# Patient Record
Sex: FEMALE | Race: WHITE | Employment: OTHER | URBAN - METROPOLITAN AREA
[De-identification: names, ages, dates, MRNs, and addresses within clinical notes are randomized per-mention and may not be internally consistent; named-entity substitution may affect disease eponyms.]

---

## 2017-01-05 PROCEDURE — 87186 SC STD MICRODIL/AGAR DIL: CPT | Performed by: INTERNAL MEDICINE

## 2017-01-05 PROCEDURE — 87086 URINE CULTURE/COLONY COUNT: CPT | Performed by: INTERNAL MEDICINE

## 2017-01-05 PROCEDURE — 87088 URINE BACTERIA CULTURE: CPT | Performed by: INTERNAL MEDICINE

## 2017-01-10 PROBLEM — R26.9 GAIT ABNORMALITY: Status: ACTIVE | Noted: 2017-01-10

## 2017-02-20 ENCOUNTER — APPOINTMENT (OUTPATIENT)
Dept: CT IMAGING | Facility: HOSPITAL | Age: 77
End: 2017-02-20
Attending: EMERGENCY MEDICINE
Payer: MEDICARE

## 2017-02-20 ENCOUNTER — HOSPITAL ENCOUNTER (EMERGENCY)
Facility: HOSPITAL | Age: 77
Discharge: HOME | End: 2017-02-20
Attending: EMERGENCY MEDICINE
Payer: MEDICARE

## 2017-02-20 VITALS
OXYGEN SATURATION: 96 % | RESPIRATION RATE: 18 BRPM | HEART RATE: 94 BPM | DIASTOLIC BLOOD PRESSURE: 84 MMHG | TEMPERATURE: 98 F | BODY MASS INDEX: 24.11 KG/M2 | SYSTOLIC BLOOD PRESSURE: 145 MMHG | HEIGHT: 66 IN | WEIGHT: 150 LBS

## 2017-02-20 DIAGNOSIS — S16.1XXA CERVICAL STRAIN, INITIAL ENCOUNTER: ICD-10-CM

## 2017-02-20 DIAGNOSIS — S09.90XA CLOSED HEAD INJURY, INITIAL ENCOUNTER: ICD-10-CM
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
## 2017-02-20 LAB
INR BLD: 1.29 (ref 0.89–1.12)
PSA SERPL DL<=0.01 NG/ML-MCNC: 16.5 SECONDS (ref 12.3–14.8)

## 2017-02-20 PROCEDURE — 70450 CT HEAD/BRAIN W/O DYE: CPT

## 2017-02-20 PROCEDURE — 72125 CT NECK SPINE W/O DYE: CPT

## 2017-02-20 PROCEDURE — 85610 PROTHROMBIN TIME: CPT | Performed by: EMERGENCY MEDICINE

## 2017-02-20 PROCEDURE — 99284 EMERGENCY DEPT VISIT MOD MDM: CPT

## 2017-02-20 PROCEDURE — 96374 THER/PROPH/DIAG INJ IV PUSH: CPT

## 2017-02-20 RX ORDER — TRAMADOL HYDROCHLORIDE 50 MG/1
TABLET ORAL EVERY 4 HOURS PRN
Qty: 8 TABLET | Refills: 0 | Status: SHIPPED | OUTPATIENT
Start: 2017-02-20 | End: 2017-04-24

## 2017-02-20 RX ORDER — ONDANSETRON 2 MG/ML
4 INJECTION INTRAMUSCULAR; INTRAVENOUS ONCE
Status: COMPLETED | OUTPATIENT
Start: 2017-02-20 | End: 2017-02-20

## 2017-02-20 RX ORDER — TRAMADOL HYDROCHLORIDE 50 MG/1
100 TABLET ORAL ONCE
Status: COMPLETED | OUTPATIENT
Start: 2017-02-20 | End: 2017-02-20

## 2017-02-20 NOTE — ED INITIAL ASSESSMENT (HPI)
Complaint of posterior head and pain to the sides of her neck status post mechanical fall at 3:30 pm today. Patient said she tripped and fell while walking on a parking lot today. Denies LOC. On coumadin for history of atrial fibrillation.  Patient A& O x 3

## 2017-02-21 NOTE — ED PROVIDER NOTES
Patient Seen in: [facility] Emergency Department    History   Patient presents with:  Fall (musculoskeletal, neurologic)    Stated Complaint:     HPI    Patient is a 73-year-old female presents after a fall.   Since she tripped over a parking stop pa pacemaker    OTHER SURGICAL HISTORY      Comment L SINUS TISSUE REMOVED    OTHER SURGICAL HISTORY      Comment CAROTIDENDARTERECTOMY, RIGHT    OTHER SURGICAL HISTORY      Comment BLADDER LIFT    COLONOSCOPY  10/28/2013    Comment Procedure: COLONOSCOPY;  S SERTRALINE  MG Oral Tab,  TAKE 2 TABLETS BY MOUTH DAILY. levETIRAcetam 250 MG Oral Tab,  Take 1 tablet (250 mg total) by mouth 2 (two) times daily.    tacrolimus (PROTOPIC) 0.1 % External Ointment,  Apply to AA of the arms and legs x two weeks alte and negative except as noted above. PSFH elements reviewed from today and agreed except as otherwise stated in HPI.     Physical Exam       ED Triage Vitals   BP 02/20/17 1651 169/101 mmHg   Pulse 02/20/17 1651 79   Resp 02/20/17 1651 18   Temp 02/20/17 (primary encounter diagnosis)  Closed head injury, initial encounter  Cervical strain, initial encounter    Disposition:  There is no disposition on file for this visit.     Follow-up:  Surekha Noguera MD  [address] [facility],[address] [place] [address]

## 2017-02-21 NOTE — ED PROVIDER NOTES
Patient received from Dr. Zeynep Moise with CTs pending after a fall. CTs show no evidence of acute injury. C-collar removed from patient. Patient ambulating in ER with no difficulty. Patient well-appearing. Patient discharged home with family.   To return

## 2017-02-21 NOTE — ED NOTES
Report received from [place] at 0419. Pt received resting on cart, in no active distress.  Family at bedside

## 2017-04-12 PROBLEM — I48.91 ATRIAL FIBRILLATION, UNSPECIFIED TYPE (HCC): Status: ACTIVE | Noted: 2017-04-12

## 2017-04-24 ENCOUNTER — APPOINTMENT (OUTPATIENT)
Dept: GENERAL RADIOLOGY | Facility: HOSPITAL | Age: 77
DRG: 074 | End: 2017-04-24
Attending: EMERGENCY MEDICINE
Payer: MEDICARE

## 2017-04-24 ENCOUNTER — APPOINTMENT (OUTPATIENT)
Dept: CT IMAGING | Facility: HOSPITAL | Age: 77
DRG: 074 | End: 2017-04-24
Attending: EMERGENCY MEDICINE
Payer: MEDICARE

## 2017-04-24 ENCOUNTER — HOSPITAL ENCOUNTER (INPATIENT)
Facility: HOSPITAL | Age: 77
LOS: 3 days | Discharge: NURSING, REHAB OR LONG-TERM CARE | DRG: 074 | End: 2017-04-28
Attending: EMERGENCY MEDICINE | Admitting: INTERNAL MEDICINE
Payer: MEDICARE

## 2017-04-24 DIAGNOSIS — R55 SYNCOPE AND COLLAPSE: Primary | ICD-10-CM

## 2017-04-24 DIAGNOSIS — D68.9 COAGULOPATHY (HCC): ICD-10-CM

## 2017-04-24 DIAGNOSIS — S60.211A CONTUSION OF RIGHT WRIST, INITIAL ENCOUNTER: ICD-10-CM

## 2017-04-24 PROCEDURE — 80053 COMPREHEN METABOLIC PANEL: CPT | Performed by: EMERGENCY MEDICINE

## 2017-04-24 PROCEDURE — 93005 ELECTROCARDIOGRAM TRACING: CPT

## 2017-04-24 PROCEDURE — 70450 CT HEAD/BRAIN W/O DYE: CPT

## 2017-04-24 PROCEDURE — 85730 THROMBOPLASTIN TIME PARTIAL: CPT | Performed by: EMERGENCY MEDICINE

## 2017-04-24 PROCEDURE — 85025 COMPLETE CBC W/AUTO DIFF WBC: CPT | Performed by: EMERGENCY MEDICINE

## 2017-04-24 PROCEDURE — 99285 EMERGENCY DEPT VISIT HI MDM: CPT

## 2017-04-24 PROCEDURE — 93010 ELECTROCARDIOGRAM REPORT: CPT

## 2017-04-24 PROCEDURE — 84484 ASSAY OF TROPONIN QUANT: CPT | Performed by: EMERGENCY MEDICINE

## 2017-04-24 PROCEDURE — 71010 XR CHEST AP PORTABLE  (CPT=71010): CPT

## 2017-04-24 PROCEDURE — 36415 COLL VENOUS BLD VENIPUNCTURE: CPT

## 2017-04-24 PROCEDURE — 81001 URINALYSIS AUTO W/SCOPE: CPT | Performed by: EMERGENCY MEDICINE

## 2017-04-24 PROCEDURE — 87086 URINE CULTURE/COLONY COUNT: CPT | Performed by: EMERGENCY MEDICINE

## 2017-04-24 PROCEDURE — 85610 PROTHROMBIN TIME: CPT | Performed by: EMERGENCY MEDICINE

## 2017-04-24 PROCEDURE — 73110 X-RAY EXAM OF WRIST: CPT

## 2017-04-24 RX ORDER — PROPRANOLOL HYDROCHLORIDE 120 MG/1
120 CAPSULE, EXTENDED RELEASE ORAL 2 TIMES DAILY
Status: ON HOLD | COMMUNITY
End: 2017-04-28

## 2017-04-24 RX ORDER — ACETAMINOPHEN 325 MG/1
650 TABLET ORAL EVERY 6 HOURS PRN
Status: DISCONTINUED | OUTPATIENT
Start: 2017-04-24 | End: 2017-04-28

## 2017-04-24 RX ORDER — GABAPENTIN 300 MG/1
600 CAPSULE ORAL 3 TIMES DAILY
Status: DISCONTINUED | OUTPATIENT
Start: 2017-04-24 | End: 2017-04-28

## 2017-04-24 RX ORDER — TOPIRAMATE 25 MG/1
25 TABLET ORAL 2 TIMES DAILY
COMMUNITY
End: 2017-07-05

## 2017-04-24 RX ORDER — FLUTICASONE PROPIONATE AND SALMETEROL 250; 50 UG/1; UG/1
1 POWDER RESPIRATORY (INHALATION) EVERY 12 HOURS SCHEDULED
COMMUNITY
End: 2018-10-11

## 2017-04-24 RX ORDER — WARFARIN SODIUM 7.5 MG/1
7 TABLET ORAL AS DIRECTED
Status: ON HOLD | COMMUNITY
End: 2017-04-28

## 2017-04-24 RX ORDER — SERTRALINE HYDROCHLORIDE 100 MG/1
100 TABLET, FILM COATED ORAL 2 TIMES DAILY
COMMUNITY
End: 2017-07-07

## 2017-04-24 RX ORDER — LEVETIRACETAM 500 MG/1
500 TABLET ORAL 2 TIMES DAILY
Status: DISCONTINUED | OUTPATIENT
Start: 2017-04-24 | End: 2017-04-28

## 2017-04-24 RX ORDER — FLUTICASONE PROPIONATE 50 MCG
2 SPRAY, SUSPENSION (ML) NASAL DAILY
Status: DISCONTINUED | OUTPATIENT
Start: 2017-04-25 | End: 2017-04-28

## 2017-04-24 RX ORDER — PRAMIPEXOLE DIHYDROCHLORIDE 0.25 MG/1
0.25 TABLET ORAL NIGHTLY
Status: DISCONTINUED | OUTPATIENT
Start: 2017-04-24 | End: 2017-04-28

## 2017-04-24 RX ORDER — TOPIRAMATE 25 MG/1
25 TABLET ORAL 2 TIMES DAILY
Status: DISCONTINUED | OUTPATIENT
Start: 2017-04-24 | End: 2017-04-28

## 2017-04-24 RX ORDER — ASPIRIN 81 MG/1
81 TABLET ORAL DAILY
Status: DISCONTINUED | OUTPATIENT
Start: 2017-04-25 | End: 2017-04-28

## 2017-04-24 RX ORDER — ATORVASTATIN CALCIUM 40 MG/1
40 TABLET, FILM COATED ORAL NIGHTLY
Status: DISCONTINUED | OUTPATIENT
Start: 2017-04-24 | End: 2017-04-28

## 2017-04-24 RX ORDER — MELATONIN
325
COMMUNITY
End: 2018-02-05

## 2017-04-24 RX ORDER — MELATONIN
325
Status: DISCONTINUED | OUTPATIENT
Start: 2017-04-25 | End: 2017-04-28

## 2017-04-24 RX ORDER — DONEPEZIL HYDROCHLORIDE 10 MG/1
10 TABLET, FILM COATED ORAL NIGHTLY
Status: DISCONTINUED | OUTPATIENT
Start: 2017-04-24 | End: 2017-04-28

## 2017-04-24 RX ORDER — LEVETIRACETAM 500 MG/1
500 TABLET ORAL 2 TIMES DAILY
COMMUNITY
End: 2017-10-05

## 2017-04-24 RX ORDER — ALBUTEROL SULFATE 90 UG/1
2 AEROSOL, METERED RESPIRATORY (INHALATION) EVERY 6 HOURS PRN
Status: DISCONTINUED | OUTPATIENT
Start: 2017-04-24 | End: 2017-04-28

## 2017-04-24 RX ORDER — BUTALBITAL, ACETAMINOPHEN AND CAFFEINE 50; 325; 40 MG/1; MG/1; MG/1
1 TABLET ORAL EVERY 6 HOURS PRN
COMMUNITY
End: 2017-05-23

## 2017-04-24 RX ORDER — GABAPENTIN 300 MG/1
600 CAPSULE ORAL 3 TIMES DAILY
Status: ON HOLD | COMMUNITY
End: 2017-05-25

## 2017-04-24 NOTE — ED INITIAL ASSESSMENT (HPI)
Patient reports she has been falling a lot recently. Reports she has no warning b/f her falls. Patient reports this has been going on for months, has been taking seizure medications for this, unsure if there is improvement.   Patient has been seeing her d

## 2017-04-25 ENCOUNTER — APPOINTMENT (OUTPATIENT)
Dept: GENERAL RADIOLOGY | Facility: HOSPITAL | Age: 77
DRG: 074 | End: 2017-04-25
Attending: Other
Payer: MEDICARE

## 2017-04-25 ENCOUNTER — APPOINTMENT (OUTPATIENT)
Dept: ULTRASOUND IMAGING | Facility: HOSPITAL | Age: 77
DRG: 074 | End: 2017-04-25
Attending: HOSPITALIST
Payer: MEDICARE

## 2017-04-25 PROCEDURE — 85610 PROTHROMBIN TIME: CPT | Performed by: HOSPITALIST

## 2017-04-25 PROCEDURE — 83735 ASSAY OF MAGNESIUM: CPT | Performed by: HOSPITALIST

## 2017-04-25 PROCEDURE — 82550 ASSAY OF CK (CPK): CPT | Performed by: OTHER

## 2017-04-25 PROCEDURE — 94660 CPAP INITIATION&MGMT: CPT

## 2017-04-25 PROCEDURE — 84443 ASSAY THYROID STIM HORMONE: CPT | Performed by: HOSPITALIST

## 2017-04-25 PROCEDURE — 72110 X-RAY EXAM L-2 SPINE 4/>VWS: CPT

## 2017-04-25 PROCEDURE — 80048 BASIC METABOLIC PNL TOTAL CA: CPT | Performed by: HOSPITALIST

## 2017-04-25 PROCEDURE — 80171 DRUG SCREEN QUANT GABAPENTIN: CPT | Performed by: OTHER

## 2017-04-25 PROCEDURE — 94664 DEMO&/EVAL PT USE INHALER: CPT

## 2017-04-25 PROCEDURE — 83036 HEMOGLOBIN GLYCOSYLATED A1C: CPT | Performed by: OTHER

## 2017-04-25 PROCEDURE — 95819 EEG AWAKE AND ASLEEP: CPT

## 2017-04-25 PROCEDURE — 85025 COMPLETE CBC W/AUTO DIFF WBC: CPT | Performed by: HOSPITALIST

## 2017-04-25 PROCEDURE — 93880 EXTRACRANIAL BILAT STUDY: CPT

## 2017-04-25 PROCEDURE — 80201 ASSAY OF TOPIRAMATE: CPT | Performed by: OTHER

## 2017-04-25 PROCEDURE — 80177 DRUG SCRN QUAN LEVETIRACETAM: CPT | Performed by: OTHER

## 2017-04-25 PROCEDURE — 94640 AIRWAY INHALATION TREATMENT: CPT

## 2017-04-25 PROCEDURE — 36415 COLL VENOUS BLD VENIPUNCTURE: CPT | Performed by: HOSPITALIST

## 2017-04-25 PROCEDURE — 82607 VITAMIN B-12: CPT | Performed by: OTHER

## 2017-04-25 NOTE — CM/SW NOTE
04/25/17 1500   CM/SW Referral Data   Referral Source Social Work (self-referral)   Reason for Referral Discharge planning   Informant Patient   Pertinent Medical Hx   Primary Care Physician Name [facility]   Patient Info   Patient's Mental Status Alert;Kennedy

## 2017-04-25 NOTE — CONSULTS
[facility] Cardiology  Report of Consultation    Ifeanyi Jarrell Patient Status:  Inpatient    8/10/1940 MRN [id number]   [facility] 3NE-A Attending Tata Riojas MD   Hosp Day # 1 PCP Magdalene Estrada MD     Reason for Con Pneumonia, organism unspecified    • Anemia 2013     normal coloncopy, egd, capulse endoscopy   • Visual impairment    • Depression    • Arrhythmia      A-Fib diagnosed 2005   • Hearing impairment      no hearing aids   • MCI (mild cognitive impairment) 20 PRN Medications:   Albuterol Sulfate HFA, acetaminophen    Outpatient Medications:     No current facility-administered medications on file prior to encounter.   Current Outpatient Prescriptions on File Prior to Encounter:  ClonazePAM 0.5 MG Oral Tab (Topical)        Anaphylaxis    Comment:Severe vomiting  Milk                    Other (See Comments)    Comment:Difficulty breathing. Pt states she can have 2%             milk.   Pcn [Bicillin L-A]          Comment:SHORTNESS OF BREATH  Pollen 111   CO2  27.0  26.0   ALKPHO  150*   --    AST  35   --    ALT  25   --    BILT  0.7   --    TP  7.1   --        Recent Labs   Lab  04/24/17   1716  04/25/17   0704   RBC  4.77  4.49   HGB  13.4  12.6   HCT  40.8  38.9   MCV  85.5  86.6   MCH  28.1  28.1

## 2017-04-25 NOTE — PLAN OF CARE
Problem: NEUROLOGICAL - ADULT  Goal: Achieves stable or improved neurological status  INTERVENTIONS  - Assess for and report changes in neurological status  - Initiate measures to prevent increased intracranial pressure  - Maintain blood pressure and fluid lead EKG if indicated  - Evaluate effectiveness of antiarrhythmic and heart rate control medications as ordered  - Initiate emergency measures for life threatening arrhythmias  - Monitor electrolytes and administer replacement therapy as ordered  Outcome:

## 2017-04-25 NOTE — PROGRESS NOTES
04/24/17 2122 04/24/17 2123 04/24/17 2124   Vitals   Pulse 80 --  --    Heart Rate Source Monitor --  --    Resp 16 --  --    /88 mmHg 143/95 mmHg 156/85 mmHg   BP Location Left arm Left arm Left arm   BP Method Automatic Automatic Automatic   Gloria Landon

## 2017-04-25 NOTE — H&P
General Medicine H&P     Patient presents with:  Fall (musculoskeletal, neurologic)       PCP: Jimi Ellington MD    History of Present Illness: Patient is a 64year old female with PMH sig for afib on coumadin, PVD, HTN, HL, GERD, CAD s/p CABG 4/2013, COLONOSCOPY  7/2010    BYPASS SURGERY      Comment 4/28/13    OTHER SURGICAL HISTORY      Comment pacemaker    OTHER SURGICAL HISTORY      Comment L SINUS TISSUE REMOVED    OTHER SURGICAL HISTORY      Comment CAROTIDENDARTERECTOMY, RIGHT    OTHER SURGICAL Brother        Review of Systems  Comprehensive ROS reviewed and negative except for what's stated above.  \    OBJECTIVE:  /88 mmHg  Pulse 70  Temp(Src) 98.5 °F (36.9 °C) (Oral)  Resp 18  Ht 5' 6\" (1.676 m)  Wt 143 lb 4.8 oz (65 kg)  BMI 23.14 kg/m2 Noncontrast CT scanning is performed through the brain. Dose reduction techniques were used. Dose information is transmitted to the [facility] Rad[facility]) NRDR ([address]) which includes the Dose Index Registry.   PATIENT RATE:  Irregular.   VELOCITY MEASUREMENTS:  Right CCA Peak Systolic Velocity:  30.90 cm/s Right Proximal ICA Peak Systolic Velocity:  60.40 cm/s Right Mid-Longitudinal ICA Peak Systolic Velocity:  83.65 cm/s Right Distal ICA Peak Systolic Velocity:  71.83 c previously, possibly cardiac related, follow on tele  -CT head no acute IC abnormality, CXR ok  -carotid dopplers <50% stenosis but progression compared to prior study 6/1/16  -echo if needed per cards  -PT eval  -UA tr LE, Ucx NG x 1d  -CMP/CBC ok, alk ph

## 2017-04-25 NOTE — PROGRESS NOTES
04/25/17 1834 04/25/17 1836 04/25/17 1837   Vitals   Pulse 65 70 69   /77 mmHg 159/77 mmHg 141/62 mmHg   MAP (mmHg) 96 97 81   Patient Position Lying Sitting Standing     Orthostatic BP and HR

## 2017-04-25 NOTE — PROGRESS NOTES
04/25/17 1834 04/25/17 1835 04/25/17 1836   Vitals   Pulse 65 80 70   /77 mmHg --  159/77 mmHg   MAP (mmHg) 96 --  97       04/25/17 1837   Vitals   Pulse 69   /62 mmHg   MAP (mmHg) 81       Orthostatic bp and hr

## 2017-04-25 NOTE — PHYSICAL THERAPY NOTE
Therapy orders received through fall risk protocol. Pt at risk for fall and INR 8.32 this morning, will hold until INR is appropriate for PT evaluation. RN aware.

## 2017-04-25 NOTE — ED PROVIDER NOTES
Patient Seen in: [facility] Emergency Department    History   Patient presents with:  Fall (musculoskeletal, neurologic)    Stated Complaint: multiple falls     HPI    Patient is a 41-year-old female who has a long history of falls and possible sync impairment    • Depression    • Arrhythmia      A-Fib diagnosed 2005   • Hearing impairment      no hearing aids   • MCI (mild cognitive impairment) 2013     stable on Aricept   • PONV (postoperative nausea and vomiting)            Past Surgical History Oral Tab,  Take 2 tablets (0.25 mg total) by mouth nightly.   ergocalciferol 77019 UNITS Oral Cap,  Take 1 capsule (50,000 Units total) by mouth once a week. Donepezil HCl 10 MG Oral Tab,  Take 1 tablet (10 mg total) by mouth nightly.    Warfarin Sodium 5 Device 04/24/17 1610 None (Room air)       Current:/86 mmHg  Pulse 68  Temp(Src) 97.8 °F (36.6 °C) (Temporal)  Resp 18  Ht 167.6 cm (5' 6\")  Wt 65 kg  BMI 23.14 kg/m2  SpO2 95%        Physical Exam  GENERAL: Patient resting comfortably on the cart i Cells Moderate (*)     All other components within normal limits   TROPONIN I - Normal   CBC WITH DIFFERENTIAL WITH PLATELET    Narrative: The following orders were created for panel order CBC WITH DIFFERENTIAL WITH PLATELET.   Procedure

## 2017-04-26 PROCEDURE — 85610 PROTHROMBIN TIME: CPT | Performed by: HOSPITALIST

## 2017-04-26 PROCEDURE — 85027 COMPLETE CBC AUTOMATED: CPT | Performed by: INTERNAL MEDICINE

## 2017-04-26 PROCEDURE — 97116 GAIT TRAINING THERAPY: CPT

## 2017-04-26 PROCEDURE — 83735 ASSAY OF MAGNESIUM: CPT | Performed by: INTERNAL MEDICINE

## 2017-04-26 PROCEDURE — 97162 PT EVAL MOD COMPLEX 30 MIN: CPT

## 2017-04-26 PROCEDURE — 80053 COMPREHEN METABOLIC PANEL: CPT | Performed by: INTERNAL MEDICINE

## 2017-04-26 RX ORDER — PROPRANOLOL HYDROCHLORIDE 120 MG/1
120 CAPSULE, EXTENDED RELEASE ORAL 2 TIMES DAILY
Status: DISCONTINUED | OUTPATIENT
Start: 2017-04-26 | End: 2017-04-27

## 2017-04-26 RX ORDER — FAMOTIDINE 20 MG/1
20 TABLET ORAL 2 TIMES DAILY
Status: DISCONTINUED | OUTPATIENT
Start: 2017-04-26 | End: 2017-04-28

## 2017-04-26 RX ORDER — POTASSIUM CHLORIDE 20 MEQ/1
40 TABLET, EXTENDED RELEASE ORAL ONCE
Status: DISCONTINUED | OUTPATIENT
Start: 2017-04-26 | End: 2017-04-26

## 2017-04-26 RX ORDER — METOPROLOL SUCCINATE 50 MG/1
50 TABLET, EXTENDED RELEASE ORAL
Status: DISCONTINUED | OUTPATIENT
Start: 2017-04-26 | End: 2017-04-27

## 2017-04-26 RX ORDER — LABETALOL HYDROCHLORIDE 5 MG/ML
10 INJECTION, SOLUTION INTRAVENOUS EVERY 6 HOURS PRN
Status: DISCONTINUED | OUTPATIENT
Start: 2017-04-26 | End: 2017-04-28

## 2017-04-26 RX ORDER — HYDRALAZINE HYDROCHLORIDE 20 MG/ML
10 INJECTION INTRAMUSCULAR; INTRAVENOUS EVERY 4 HOURS PRN
Status: DISCONTINUED | OUTPATIENT
Start: 2017-04-26 | End: 2017-04-28

## 2017-04-26 RX ORDER — POTASSIUM CHLORIDE 20 MEQ/1
40 TABLET, EXTENDED RELEASE ORAL ONCE
Status: COMPLETED | OUTPATIENT
Start: 2017-04-26 | End: 2017-04-26

## 2017-04-26 RX ORDER — WARFARIN SODIUM 7.5 MG/1
7.5 TABLET ORAL
Status: COMPLETED | OUTPATIENT
Start: 2017-04-26 | End: 2017-04-26

## 2017-04-26 NOTE — PLAN OF CARE
CARDIOVASCULAR - ADULT      •  Maintains optimal cardiac output and hemodynamic stability  Progressing      •  Absence of cardiac arrhythmias or at baseline  Progressing            NEUROLOGICAL - ADULT      •  Achieves stable or improved neurological statu

## 2017-04-26 NOTE — RESPIRATORY THERAPY NOTE
DUNCAN - Equipment Use Daily Summary:                  . Set Mode:                . Usage in hours:                . 90% Pressure (EPAP) level:                . 90% Insp. Pressure (IPAP): Karla Mckeon AHI:                .  Supplemental Oxygen:

## 2017-04-26 NOTE — PROGRESS NOTES
[facility] [address] [place] [address] Cardiology  Progress Note    Nikkie Bolus Patient Status:  Inpatient    8/10/1940 MRN [id number]   [facility] 3NE-A Attending Moody Sawant MD   [facility] Day # 2 PCP Enrique Agudelo MD     Subjective:   No chest pain. Comment:Severe vomiting  Milk                    Other (See Comments)    Comment:Difficulty breathing. Pt states she can have 2%             milk.   Pcn [Bicillin L-A]          Comment:SHORTNESS OF BREATH  Pollen                    Quinolones 5.6  4.8  6.0   PLT  167.0  153.0  173.0       Recent Labs   Lab  04/24/17 1716 04/25/17   0704  04/26/17   0456   PTP  67.1*  71.9*  23.4*   INR  7.63*  8.32*  2.05*       Recent Labs   Lab  04/24/17 1716 04/25/17   1823   TROP  <0.046   --    CK

## 2017-04-26 NOTE — CONSULTS
[address]    PATIENT'S NAME: Gilberto Madison   ATTENDING PHYSICIAN: Lynn Martinez. MONE Booth: Wendi Villafana M.D.    PATIENT ACCOUNT#:   [de-identified]    LOCATION:  [address] [place] [address]  MEDICAL RECORD #:   [id number]       DATE OF that she needs to stay at least overnight for evaluation of these multiple falls.       PAST MEDICAL HISTORY:  Peripheral vascular disease, carotid artery stenosis, atrial fibrillation (for which she takes Coumadin; her INR is supratherapeutic at 7), hyperl patient states this is chronic and not new. Gait was deferred. Sensation appears equal bilaterally. Again, unclear whether or how reliable the patient's examination is today due to her history of memory loss.   There are no tremors, bradykinesia, or rigi modify in the future if necessary. We will, though, get Keppra, gabapentin, and topiramate levels, as the patient is on warfarin, probably not changing these levels significantly but it is possible, so we will await these levels and modify as needed.   Fur

## 2017-04-26 NOTE — PROGRESS NOTES
04/26/17 0541 04/26/17 0542 04/26/17 0543   Vitals   BP (!) 188/85 mmHg (!) 171/84 mmHg (!) 178/82 mmHg   BP Location Left arm Left arm Left arm   BP Method Automatic Automatic Automatic   Patient Position Lying Sitting Standing   MD notified regarding

## 2017-04-26 NOTE — CM/SW NOTE
SW met with pt regarding PT recommendation for ELOISE. Pt is agreeable to ELOISE with referral to [place] due to closer proximity to residence, pt requested SW contact pt's daughter Thuy Paula (C#[phone]) and update her with plan.  SW left message for pt's daug

## 2017-04-26 NOTE — PHYSICAL THERAPY NOTE
PHYSICAL THERAPY EVALUATION - INPATIENT     Room Number: 2299/7673-E  Evaluation Date: 4/26/2017  Type of Evaluation: Initial  Physician Order: PT Eval and Treat    Presenting Problem: syncope  Reason for Therapy: Mobility Dysfunction and Discharge Jacob stable on Aricept   • PONV (postoperative nausea and vomiting)        Past Surgical History      Past Surgical History    CHOLECYSTECTOMY      HYSTERECTOMY      COLONOSCOPY  7/2010    BYPASS SURGERY      Comment 4/28/13    OTHER SURGICAL HISTORY      Comme Errors:  assistance required to identify errors made  · Awareness of Deficits:  decreased awareness of deficits    RANGE OF MOTION AND STRENGTH ASSESSMENT  Upper extremity ROM and strength are within functional limits     Lower extremity ROM is within func Shuffle; Ataxic  Stoop/Curb Assistance: Not tested       Skilled Therapy Provided: pt recd in supine, educated in role of PT< goals for session. Pt able to follow one step commands, requires redirection at times as she is easily distracted.   Pt transferred patient is below her baseline and would benefit from skilled inpatient PT to address the above deficits to assist patient in returning to prior level of function. Subacute rehab is recommended for 14-16 days.   After this period of rehabilitation patient s

## 2017-04-26 NOTE — PROGRESS NOTES
[facility] Hospitalist note    PCP: Darren Pinto MD    Chief Complaint:  Follow-up recurrent falls    SUBJECTIVE:  Pt worked with PT earlier, noted to be unsteady. Asks when she can go home but when ELOISE discussed she seemed amenable. Eating, no sob. night   • aspirin  81 mg Oral Daily   • atorvastatin  40 mg Oral Nightly   • Donepezil HCl  10 mg Oral Nightly   • ferrous sulfate  325 mg Oral Daily with breakfast   • Fluticasone Propionate  2 spray Nasal Daily   • Fluticasone Furoate-Vilanterol  1 puff

## 2017-04-26 NOTE — PROGRESS NOTES
Neurology follow up NOTE    SUBJECTIVE:  She has not start walk yet. I have talked to her daughter over the phone and found out most of her falls were happened when she was along, such as in the bathroom, or slid down from the bed.  When she was found by fa but she dose not. Most fall was when shew as no use her walker. 3. Intermittent legs weakness and cause her falls, still most related with her spinal stenosis. 4. Chronic migraine: not related to the falls. 5. Mild dementia.      PLAN:       1. I have

## 2017-04-27 PROCEDURE — 84132 ASSAY OF SERUM POTASSIUM: CPT | Performed by: INTERNAL MEDICINE

## 2017-04-27 PROCEDURE — 85610 PROTHROMBIN TIME: CPT | Performed by: HOSPITALIST

## 2017-04-27 RX ORDER — LISINOPRIL 10 MG/1
10 TABLET ORAL DAILY
Status: DISCONTINUED | OUTPATIENT
Start: 2017-04-27 | End: 2017-04-28

## 2017-04-27 RX ORDER — BUTALBITAL, ACETAMINOPHEN AND CAFFEINE 50; 325; 40 MG/1; MG/1; MG/1
1 TABLET ORAL EVERY 6 HOURS PRN
Status: DISCONTINUED | OUTPATIENT
Start: 2017-04-27 | End: 2017-04-28

## 2017-04-27 RX ORDER — WARFARIN SODIUM 7.5 MG/1
7.5 TABLET ORAL
Status: COMPLETED | OUTPATIENT
Start: 2017-04-27 | End: 2017-04-27

## 2017-04-27 RX ORDER — ONDANSETRON 2 MG/ML
4 INJECTION INTRAMUSCULAR; INTRAVENOUS EVERY 6 HOURS PRN
Status: DISCONTINUED | OUTPATIENT
Start: 2017-04-27 | End: 2017-04-28

## 2017-04-27 RX ORDER — METOPROLOL SUCCINATE 50 MG/1
50 TABLET, EXTENDED RELEASE ORAL
Status: DISCONTINUED | OUTPATIENT
Start: 2017-04-28 | End: 2017-04-28

## 2017-04-27 RX ORDER — WARFARIN SODIUM 5 MG/1
5 TABLET ORAL
Status: DISCONTINUED | OUTPATIENT
Start: 2017-04-27 | End: 2017-04-27

## 2017-04-27 RX ORDER — POTASSIUM CHLORIDE 20 MEQ/1
40 TABLET, EXTENDED RELEASE ORAL ONCE
Status: DISCONTINUED | OUTPATIENT
Start: 2017-04-27 | End: 2017-04-28

## 2017-04-27 NOTE — PROGRESS NOTES
[facility] Cardiology  Progress Note    Jesus Alberto Richmond Patient Status:  Inpatient    8/10/1940 MRN [id number]   [facility] 3NE-A Attending Alisha Campbell MD   Hosp Day # 3 PCP Roxana Argueta MD     Subjective:   HA this AM with Cephalosporins            Chocolate                 Cipro [Ciprofloxaci*        Comment:SEVERE RECTAL BLEEDING  Eggs Or Egg-Derived*      Iodine (Topical)        Anaphylaxis    Comment:Severe vomiting  Milk                    Other (See Comments)    Comm Recent Labs   Lab  04/24/17   1716  04/25/17   0704  04/26/17   0456   RBC  4.77  4.49  4.75   HGB  13.4  12.6  13.4   HCT  40.8  38.9  40.8   MCV  85.5  86.6  85.9   MCH  28.1  28.1  28.2   MCHC  32.8  32.4  32.8   RDW  13.5  13.9  13.9   NEPRELIM

## 2017-04-27 NOTE — PROGRESS NOTES
Pt had high bp again this am despite being given prn apresoline. This rn did manual bp check and still high but slightly lower. Iv prn labetolol given.  Pt feeling nauseous at this time, meds held

## 2017-04-27 NOTE — RESPIRATORY THERAPY NOTE
DUNCAN - Equipment Use Daily Summary:  · Set Mode   · Usage in hours:   · 90% Pressure (EPAP) level:   · 90% Insp Pressure (IPAP):   · AHI:   · Supplemental Oxygen:  · Comments: DID NOT USE

## 2017-04-27 NOTE — PLAN OF CARE
CARDIOVASCULAR - ADULT      •  Maintains optimal cardiac output and hemodynamic stability  Progressing      •  Absence of cardiac arrhythmias or at baseline  Progressing            Impaired Functional Mobility      •  Achieve highest/safest level of mobili

## 2017-04-27 NOTE — PLAN OF CARE
Patient feeling better, she ate dinner and has no no further n/v.  HA better per patient, still requesting PRN meds when due

## 2017-04-27 NOTE — PROGRESS NOTES
[facility] Hospitalist note    PCP: Bertha Jimenez MD    Chief Complaint:  Follow-up recurrent falls    SUBJECTIVE:  Afebrile, was hypertensive early this morning  Currently nauseated, vomited earlier  By this afternoon also with HA, takes fioricet at home Daily Beta Blocker   • famoTIDine  20 mg Oral BID   • aspirin  81 mg Oral Daily   • atorvastatin  40 mg Oral Nightly   • Donepezil HCl  10 mg Oral Nightly   • ferrous sulfate  325 mg Oral Daily with breakfast   • Fluticasone Propionate  2 spray Nasal Daily

## 2017-04-27 NOTE — PHYSICAL THERAPY NOTE
Attempted to see pt for PT session today. Pt declined stating she was feeling nauseated. Will attempt again as schedule allows. Thank you.

## 2017-04-27 NOTE — OCCUPATIONAL THERAPY NOTE
IP OT attempt to see patient for therapeutic treatment. Pt is currently very nauseous and would like to hold on therapy at this time. Pt requesting more water. Pt also reaching for basin and sat up with dry heaves for ~ 3 minutes.  Pt left with Water and b

## 2017-04-27 NOTE — CM/SW NOTE
SESAR met with patient updating her [facility] accepted patient for ELOISE. SESAR left message for patient's daughter Peggyann Severe (C#[phone]) updating her with [facility]

## 2017-04-28 VITALS
BODY MASS INDEX: 22.58 KG/M2 | SYSTOLIC BLOOD PRESSURE: 143 MMHG | TEMPERATURE: 99 F | RESPIRATION RATE: 18 BRPM | OXYGEN SATURATION: 96 % | WEIGHT: 140.5 LBS | HEIGHT: 66 IN | HEART RATE: 79 BPM | DIASTOLIC BLOOD PRESSURE: 69 MMHG

## 2017-04-28 PROCEDURE — 84132 ASSAY OF SERUM POTASSIUM: CPT | Performed by: HOSPITALIST

## 2017-04-28 PROCEDURE — 97530 THERAPEUTIC ACTIVITIES: CPT

## 2017-04-28 PROCEDURE — 80048 BASIC METABOLIC PNL TOTAL CA: CPT | Performed by: INTERNAL MEDICINE

## 2017-04-28 PROCEDURE — 85610 PROTHROMBIN TIME: CPT | Performed by: INTERNAL MEDICINE

## 2017-04-28 PROCEDURE — 97116 GAIT TRAINING THERAPY: CPT

## 2017-04-28 PROCEDURE — 97110 THERAPEUTIC EXERCISES: CPT

## 2017-04-28 RX ORDER — LISINOPRIL 10 MG/1
10 TABLET ORAL DAILY
Qty: 90 TABLET | Refills: 3 | Status: SHIPPED | OUTPATIENT
Start: 2017-04-28 | End: 2017-07-07 | Stop reason: ALTCHOICE

## 2017-04-28 RX ORDER — METOPROLOL SUCCINATE 50 MG/1
50 TABLET, EXTENDED RELEASE ORAL
Qty: 180 TABLET | Refills: 3 | Status: SHIPPED | OUTPATIENT
Start: 2017-04-28 | End: 2017-07-05

## 2017-04-28 RX ORDER — WARFARIN SODIUM 7.5 MG/1
7.5 TABLET ORAL
Refills: 0 | Status: SHIPPED | COMMUNITY
Start: 2017-05-01 | End: 2017-05-11

## 2017-04-28 RX ORDER — POTASSIUM CHLORIDE 20 MEQ/1
40 TABLET, EXTENDED RELEASE ORAL EVERY 4 HOURS
Status: COMPLETED | OUTPATIENT
Start: 2017-04-28 | End: 2017-04-28

## 2017-04-28 RX ORDER — WARFARIN SODIUM 5 MG/1
5 TABLET ORAL
Status: DISCONTINUED | OUTPATIENT
Start: 2017-04-28 | End: 2017-04-28

## 2017-04-28 NOTE — PROGRESS NOTES
Report given to [facility] from [facility]. Staff will continue to monitor patient.

## 2017-04-28 NOTE — PROGRESS NOTES
Neurology follow up NOTE    SUBJECTIVE:    No events overnight, doing well and ready to go rehab.   OBJECTIVE:   /69 mmHg  Pulse 79  Temp(Src) 98.9 °F (37.2 °C) (Oral)  Resp 18  Ht 5' 6\" (1.676 m)  Wt 140 lb 8 oz (63.73 kg)  BMI 22.69 kg/m2  SpO2 96%

## 2017-04-28 NOTE — PROGRESS NOTES
NURSING DISCHARGE NOTE    Discharged Rehab facility via Wheelchair. Accompanied by Support staff  Belongings Taken by patient/family . Pt. Discharged to [facility] via [address]. IV discontinued. Pt belongings sent with pt.   Pt verbalized under

## 2017-04-28 NOTE — PROGRESS NOTES
[facility] Cardiology  Progress Note    Harsha Sherwood Patient Status:  Inpatient    8/10/1940 MRN [id number]   [facility] 3NE-A Attending Janet Treviño MD   Hosp Day # 4 PCP Domenic Campbell MD     Subjective:   Feels better ove Cephalosporins            Chocolate                 Cipro [Ciprofloxaci*        Comment:SEVERE RECTAL BLEEDING  Eggs Or Egg-Derived*      Iodine (Topical)        Anaphylaxis    Comment:Severe vomiting  Milk                    Other (See Comments)    Com MCH  28.2   MCHC  32.8   RDW  13.9   WBC  6.0   PLT  173.0       Recent Labs   Lab  04/26/17   0456  04/27/17   0459  04/28/17   0514   PTP  23.4*  20.0*  22.9*   INR  2.05*  1.68*  1.99*       Recent Labs   Lab  04/25/17   1823   CK  75         Tele: Pa

## 2017-04-28 NOTE — PHYSICAL THERAPY NOTE
PHYSICAL THERAPY TREATMENT NOTE - INPATIENT    Room Number: 7319/6728-D     Session: 1   Number of Visits to Meet Established Goals: 5    Presenting Problem: syncope    Problem List  Principal Problem:    Syncope and collapse  Active Problems:    Contusio Comment Procedure: COLONOSCOPY;  Surgeon: Carlo Liu MD;  Location: [facility] ENDOSCOPY    PACEMAKER  9/2005    Comment [employer] pacemaker    OPEN HEART SURGERY (PBP)      Comment 2002 CABG x 2       SUBJECTIVE  \"the stairs are the hardest\"    Patient’s self tested       Skilled Therapy Provided: RN approved session. Pt received in semi supine. Supervision sup>sit c HOB elevated. Pt performed AP seated EOB and denies dizziness. Pt reports \"I don't get dizzy\". CGA  sit>stand to RW, cues for hand placement.  Pt training  Rehab Potential : Fair  Frequency (Obs): 5x/week    CURRENT GOALS   Goal #1  Patient is able to demonstrate supine - sit EOB @ level: independent       Goal #2  Patient is able to demonstrate transfers Sit to/from Stand at assistance level: super

## 2017-04-28 NOTE — CM/SW NOTE
SW notified patient is medically cleared for d/c today and confirmed bed availability with Derek Johnson at [facility] ([phone]), [place] updates sent. [facility]IN referral sent to [facility] ([phone]) for 5:00p medicar transport.  RN to call repor

## 2017-04-28 NOTE — PROGRESS NOTES
Neurology follow up NOTE    SUBJECTIVE:  No dizziness, no seizure like activities. Doing better.      OBJECTIVE:   /79 mmHg  Pulse 92  Temp(Src) 98.2 °F (36.8 °C) (Oral)  Resp 18  Ht 5' 6\" (1.676 m)  Wt 140 lb 8 oz (63.73 kg)  BMI 22.69 kg/m2  SpO2 9

## 2017-04-28 NOTE — PLAN OF CARE
Resumed care of pt. At 1900. Pt has numerous allergies and is a full code. Monitoring B/P. Ax4 but can be forgetful. RA, Tele: V paced,, , continent of bowel and bladder. Cardiac diet. Refuses CPAP. Refused K replacement.   PIV: SL,  Seizure precau

## 2017-04-29 NOTE — DISCHARGE SUMMARY
[facility] Discharge Summary    Patient ID:  Jose Wallis  [id number]  89 year old  8/10/1940    Admit date: 4/24/2017  Discharge date and time: 4/28/17  Attending Physician: Lizbeth Reynaga MD  Primary Care Physician: Jim Rosales MD stable.     Day of discharge Exam   04/28/17  1525   BP: 143/69   Pulse: 79   Temp: 98.9 °F (37.2 °C)   Resp: 18     Exam on day of discharge:  Gen: No acute distress, alert and oriented  CV: RRR, +s1/s2  Lungs: CTAB, good respiratory effort  Abdomen: s/nt/ once daily. Butalbital-APAP-Caffeine -40 MG Tabs   Commonly known as:  FIORICET, ESGIC       Donepezil HCl 10 MG Tabs   Commonly known as:  ARICEPT   Take 1 tablet (10 mg total) by mouth nightly.        ECOTRIN LOW STRENGTH 81 MG Tbec   Generic FINDINGS:  No acute fracture or subluxation in the lumbar spine. Trace anterolisthesis of L3 on L4. Mild scattered degenerative disc space loss endplate spurring in the lumbar spine, most pronounced at L2-3. Vascular calcifications.  Facet arthropathy throu or extra-axial fluid collection. Lucencies in the deep periventricular white matter are likely sequelae of chronic small vessel ischemic disease. Ventricles and sulci are appropriate for the patient's age. No mass effect.  Marked opacification left maxillar Velocity:  77.55 cm/s Left Distal ICA Peak Systolic Velocity:  42.37 cm/s Left Carotid Bulb Peak Systolic Velocity  69.00 cm/s Left ICA/CCA Velocity Ratio:  1.26  The vertebral arteries demonstrate antegrade flow. 4/25/2017  CONCLUSION:  1.  Compared t

## 2017-05-02 NOTE — CM/SW NOTE
Pt d/c 04/28 to [address].       05/02/17 0900   Discharge disposition   Discharged to: Skilled Nurs   Name of [address]  D   Patient is Discharged to a [address] [place] Yes   Discharge transportation [facility]

## 2017-05-08 ENCOUNTER — HOSPITAL (OUTPATIENT)
Dept: OTHER | Age: 77
End: 2017-05-08
Attending: INTERNAL MEDICINE

## 2017-05-24 ENCOUNTER — APPOINTMENT (OUTPATIENT)
Dept: CT IMAGING | Facility: HOSPITAL | Age: 77
End: 2017-05-24
Attending: STUDENT IN AN ORGANIZED HEALTH CARE EDUCATION/TRAINING PROGRAM
Payer: MEDICARE

## 2017-05-24 ENCOUNTER — HOSPITAL ENCOUNTER (OUTPATIENT)
Facility: HOSPITAL | Age: 77
Setting detail: OBSERVATION
Discharge: HOME HEALTH | End: 2017-05-27
Attending: STUDENT IN AN ORGANIZED HEALTH CARE EDUCATION/TRAINING PROGRAM | Admitting: HOSPITALIST
Payer: MEDICARE

## 2017-05-24 ENCOUNTER — APPOINTMENT (OUTPATIENT)
Dept: GENERAL RADIOLOGY | Facility: HOSPITAL | Age: 77
End: 2017-05-24
Attending: STUDENT IN AN ORGANIZED HEALTH CARE EDUCATION/TRAINING PROGRAM
Payer: MEDICARE

## 2017-05-24 DIAGNOSIS — N39.0 URINARY TRACT INFECTION, SITE UNSPECIFIED: ICD-10-CM

## 2017-05-24 DIAGNOSIS — R19.7 NAUSEA VOMITING AND DIARRHEA: ICD-10-CM

## 2017-05-24 DIAGNOSIS — I48.20 CHRONIC ATRIAL FIBRILLATION (HCC): ICD-10-CM

## 2017-05-24 DIAGNOSIS — R53.1 WEAKNESS GENERALIZED: Primary | ICD-10-CM

## 2017-05-24 DIAGNOSIS — R11.2 NAUSEA VOMITING AND DIARRHEA: ICD-10-CM

## 2017-05-24 PROCEDURE — 83880 ASSAY OF NATRIURETIC PEPTIDE: CPT | Performed by: STUDENT IN AN ORGANIZED HEALTH CARE EDUCATION/TRAINING PROGRAM

## 2017-05-24 PROCEDURE — 84484 ASSAY OF TROPONIN QUANT: CPT | Performed by: STUDENT IN AN ORGANIZED HEALTH CARE EDUCATION/TRAINING PROGRAM

## 2017-05-24 PROCEDURE — 96365 THER/PROPH/DIAG IV INF INIT: CPT

## 2017-05-24 PROCEDURE — 70450 CT HEAD/BRAIN W/O DYE: CPT | Performed by: STUDENT IN AN ORGANIZED HEALTH CARE EDUCATION/TRAINING PROGRAM

## 2017-05-24 PROCEDURE — 93005 ELECTROCARDIOGRAM TRACING: CPT

## 2017-05-24 PROCEDURE — 72072 X-RAY EXAM THORAC SPINE 3VWS: CPT | Performed by: STUDENT IN AN ORGANIZED HEALTH CARE EDUCATION/TRAINING PROGRAM

## 2017-05-24 PROCEDURE — 85610 PROTHROMBIN TIME: CPT | Performed by: STUDENT IN AN ORGANIZED HEALTH CARE EDUCATION/TRAINING PROGRAM

## 2017-05-24 PROCEDURE — 72110 X-RAY EXAM L-2 SPINE 4/>VWS: CPT | Performed by: STUDENT IN AN ORGANIZED HEALTH CARE EDUCATION/TRAINING PROGRAM

## 2017-05-24 PROCEDURE — 71010 XR CHEST AP PORTABLE  (CPT=71010): CPT | Performed by: STUDENT IN AN ORGANIZED HEALTH CARE EDUCATION/TRAINING PROGRAM

## 2017-05-24 PROCEDURE — 99285 EMERGENCY DEPT VISIT HI MDM: CPT

## 2017-05-24 PROCEDURE — 83690 ASSAY OF LIPASE: CPT | Performed by: STUDENT IN AN ORGANIZED HEALTH CARE EDUCATION/TRAINING PROGRAM

## 2017-05-24 PROCEDURE — 84443 ASSAY THYROID STIM HORMONE: CPT | Performed by: STUDENT IN AN ORGANIZED HEALTH CARE EDUCATION/TRAINING PROGRAM

## 2017-05-24 PROCEDURE — 85025 COMPLETE CBC W/AUTO DIFF WBC: CPT | Performed by: STUDENT IN AN ORGANIZED HEALTH CARE EDUCATION/TRAINING PROGRAM

## 2017-05-24 PROCEDURE — 96361 HYDRATE IV INFUSION ADD-ON: CPT

## 2017-05-24 PROCEDURE — 85730 THROMBOPLASTIN TIME PARTIAL: CPT | Performed by: STUDENT IN AN ORGANIZED HEALTH CARE EDUCATION/TRAINING PROGRAM

## 2017-05-24 PROCEDURE — 93010 ELECTROCARDIOGRAM REPORT: CPT

## 2017-05-24 PROCEDURE — 72125 CT NECK SPINE W/O DYE: CPT | Performed by: STUDENT IN AN ORGANIZED HEALTH CARE EDUCATION/TRAINING PROGRAM

## 2017-05-24 PROCEDURE — 80053 COMPREHEN METABOLIC PANEL: CPT | Performed by: STUDENT IN AN ORGANIZED HEALTH CARE EDUCATION/TRAINING PROGRAM

## 2017-05-24 RX ORDER — SODIUM CHLORIDE 9 MG/ML
INJECTION, SOLUTION INTRAVENOUS ONCE
Status: COMPLETED | OUTPATIENT
Start: 2017-05-24 | End: 2017-05-25

## 2017-05-25 ENCOUNTER — APPOINTMENT (OUTPATIENT)
Dept: GENERAL RADIOLOGY | Facility: HOSPITAL | Age: 77
End: 2017-05-25
Attending: STUDENT IN AN ORGANIZED HEALTH CARE EDUCATION/TRAINING PROGRAM
Payer: MEDICARE

## 2017-05-25 PROBLEM — R19.7 NAUSEA VOMITING AND DIARRHEA: Status: ACTIVE | Noted: 2017-05-25

## 2017-05-25 PROBLEM — N39.0 URINARY TRACT INFECTION, SITE UNSPECIFIED: Status: ACTIVE | Noted: 2017-05-25

## 2017-05-25 PROBLEM — R53.1 WEAKNESS GENERALIZED: Status: ACTIVE | Noted: 2017-05-25

## 2017-05-25 PROBLEM — I48.20 CHRONIC ATRIAL FIBRILLATION (HCC): Status: ACTIVE | Noted: 2017-05-25

## 2017-05-25 PROBLEM — R11.2 NAUSEA VOMITING AND DIARRHEA: Status: ACTIVE | Noted: 2017-05-25

## 2017-05-25 PROCEDURE — 87077 CULTURE AEROBIC IDENTIFY: CPT | Performed by: STUDENT IN AN ORGANIZED HEALTH CARE EDUCATION/TRAINING PROGRAM

## 2017-05-25 PROCEDURE — 73030 X-RAY EXAM OF SHOULDER: CPT | Performed by: STUDENT IN AN ORGANIZED HEALTH CARE EDUCATION/TRAINING PROGRAM

## 2017-05-25 PROCEDURE — 87086 URINE CULTURE/COLONY COUNT: CPT | Performed by: STUDENT IN AN ORGANIZED HEALTH CARE EDUCATION/TRAINING PROGRAM

## 2017-05-25 PROCEDURE — 81001 URINALYSIS AUTO W/SCOPE: CPT | Performed by: STUDENT IN AN ORGANIZED HEALTH CARE EDUCATION/TRAINING PROGRAM

## 2017-05-25 PROCEDURE — 87493 C DIFF AMPLIFIED PROBE: CPT | Performed by: HOSPITALIST

## 2017-05-25 PROCEDURE — 85610 PROTHROMBIN TIME: CPT | Performed by: HOSPITALIST

## 2017-05-25 PROCEDURE — 96367 TX/PROPH/DG ADDL SEQ IV INF: CPT

## 2017-05-25 PROCEDURE — 87088 URINE BACTERIA CULTURE: CPT | Performed by: STUDENT IN AN ORGANIZED HEALTH CARE EDUCATION/TRAINING PROGRAM

## 2017-05-25 PROCEDURE — 96372 THER/PROPH/DIAG INJ SC/IM: CPT

## 2017-05-25 PROCEDURE — 82962 GLUCOSE BLOOD TEST: CPT

## 2017-05-25 PROCEDURE — 96366 THER/PROPH/DIAG IV INF ADDON: CPT

## 2017-05-25 PROCEDURE — 87186 SC STD MICRODIL/AGAR DIL: CPT | Performed by: STUDENT IN AN ORGANIZED HEALTH CARE EDUCATION/TRAINING PROGRAM

## 2017-05-25 RX ORDER — FLUTICASONE PROPIONATE 50 MCG
2 SPRAY, SUSPENSION (ML) NASAL DAILY
Status: DISCONTINUED | OUTPATIENT
Start: 2017-05-25 | End: 2017-05-27

## 2017-05-25 RX ORDER — ACETAMINOPHEN 325 MG/1
650 TABLET ORAL EVERY 6 HOURS PRN
Status: DISCONTINUED | OUTPATIENT
Start: 2017-05-25 | End: 2017-05-27

## 2017-05-25 RX ORDER — WARFARIN SODIUM 2.5 MG/1
2.5 TABLET ORAL
Status: COMPLETED | OUTPATIENT
Start: 2017-05-25 | End: 2017-05-25

## 2017-05-25 RX ORDER — ALBUTEROL SULFATE 90 UG/1
2 AEROSOL, METERED RESPIRATORY (INHALATION) EVERY 6 HOURS PRN
Status: DISCONTINUED | OUTPATIENT
Start: 2017-05-25 | End: 2017-05-27

## 2017-05-25 RX ORDER — ASPIRIN 81 MG/1
81 TABLET ORAL DAILY
Status: DISCONTINUED | OUTPATIENT
Start: 2017-05-25 | End: 2017-05-27

## 2017-05-25 RX ORDER — LEVOFLOXACIN 5 MG/ML
750 INJECTION, SOLUTION INTRAVENOUS ONCE
Status: COMPLETED | OUTPATIENT
Start: 2017-05-25 | End: 2017-05-25

## 2017-05-25 RX ORDER — LISINOPRIL 10 MG/1
10 TABLET ORAL DAILY
Status: DISCONTINUED | OUTPATIENT
Start: 2017-05-25 | End: 2017-05-27

## 2017-05-25 RX ORDER — LEVOFLOXACIN 5 MG/ML
750 INJECTION, SOLUTION INTRAVENOUS EVERY 24 HOURS
Status: DISCONTINUED | OUTPATIENT
Start: 2017-05-26 | End: 2017-05-27

## 2017-05-25 RX ORDER — ONDANSETRON 2 MG/ML
4 INJECTION INTRAMUSCULAR; INTRAVENOUS EVERY 4 HOURS PRN
Status: DISCONTINUED | OUTPATIENT
Start: 2017-05-25 | End: 2017-05-27

## 2017-05-25 RX ORDER — PROPRANOLOL HYDROCHLORIDE 120 MG/1
120 CAPSULE, EXTENDED RELEASE ORAL 2 TIMES DAILY
Status: DISCONTINUED | OUTPATIENT
Start: 2017-05-25 | End: 2017-05-25

## 2017-05-25 RX ORDER — DONEPEZIL HYDROCHLORIDE 5 MG/1
10 TABLET, FILM COATED ORAL NIGHTLY
Status: DISCONTINUED | OUTPATIENT
Start: 2017-05-25 | End: 2017-05-27

## 2017-05-25 RX ORDER — TOPIRAMATE 25 MG/1
25 TABLET ORAL 2 TIMES DAILY
Status: DISCONTINUED | OUTPATIENT
Start: 2017-05-25 | End: 2017-05-27

## 2017-05-25 RX ORDER — TRAZODONE HYDROCHLORIDE 50 MG/1
50 TABLET ORAL NIGHTLY
Status: DISCONTINUED | OUTPATIENT
Start: 2017-05-25 | End: 2017-05-27

## 2017-05-25 RX ORDER — ONDANSETRON 2 MG/ML
4 INJECTION INTRAMUSCULAR; INTRAVENOUS EVERY 6 HOURS PRN
Status: DISCONTINUED | OUTPATIENT
Start: 2017-05-25 | End: 2017-05-27

## 2017-05-25 RX ORDER — METOPROLOL SUCCINATE 50 MG/1
50 TABLET, EXTENDED RELEASE ORAL DAILY
Status: DISCONTINUED | OUTPATIENT
Start: 2017-05-25 | End: 2017-05-27

## 2017-05-25 RX ORDER — PRAMIPEXOLE DIHYDROCHLORIDE 0.25 MG/1
0.25 TABLET ORAL NIGHTLY
Status: DISCONTINUED | OUTPATIENT
Start: 2017-05-25 | End: 2017-05-27

## 2017-05-25 RX ORDER — ATORVASTATIN CALCIUM 40 MG/1
40 TABLET, FILM COATED ORAL
Status: DISCONTINUED | OUTPATIENT
Start: 2017-05-25 | End: 2017-05-27

## 2017-05-25 RX ORDER — GABAPENTIN 600 MG/1
600 TABLET ORAL 3 TIMES DAILY
Status: DISCONTINUED | OUTPATIENT
Start: 2017-05-25 | End: 2017-05-27

## 2017-05-25 RX ORDER — SODIUM CHLORIDE 9 MG/ML
INJECTION, SOLUTION INTRAVENOUS CONTINUOUS
Status: ACTIVE | OUTPATIENT
Start: 2017-05-25 | End: 2017-05-25

## 2017-05-25 RX ORDER — WARFARIN SODIUM 5 MG/1
5 TABLET ORAL DAILY
Status: DISCONTINUED | OUTPATIENT
Start: 2017-05-25 | End: 2017-05-25

## 2017-05-25 RX ORDER — BUTALBITAL, ACETAMINOPHEN AND CAFFEINE 50; 325; 40 MG/1; MG/1; MG/1
1 TABLET ORAL DAILY PRN
Status: DISCONTINUED | OUTPATIENT
Start: 2017-05-25 | End: 2017-05-27

## 2017-05-25 RX ORDER — LEVETIRACETAM 500 MG/1
500 TABLET ORAL 2 TIMES DAILY
Status: DISCONTINUED | OUTPATIENT
Start: 2017-05-25 | End: 2017-05-27

## 2017-05-25 RX ORDER — METRONIDAZOLE 500 MG/100ML
500 INJECTION, SOLUTION INTRAVENOUS EVERY 8 HOURS
Status: DISCONTINUED | OUTPATIENT
Start: 2017-05-25 | End: 2017-05-27

## 2017-05-25 RX ORDER — SERTRALINE HYDROCHLORIDE 100 MG/1
100 TABLET, FILM COATED ORAL 2 TIMES DAILY
Status: DISCONTINUED | OUTPATIENT
Start: 2017-05-25 | End: 2017-05-27

## 2017-05-25 RX ORDER — PROPRANOLOL HYDROCHLORIDE 120 MG/1
120 CAPSULE, EXTENDED RELEASE ORAL 2 TIMES DAILY
COMMUNITY
End: 2017-10-05

## 2017-05-25 RX ORDER — HEPARIN SODIUM 5000 [USP'U]/ML
5000 INJECTION, SOLUTION INTRAVENOUS; SUBCUTANEOUS EVERY 8 HOURS SCHEDULED
Status: DISCONTINUED | OUTPATIENT
Start: 2017-05-25 | End: 2017-05-25

## 2017-05-25 NOTE — ED PROVIDER NOTES
Patient Seen in: [facility] Emergency Department    History   Patient presents with:  Nausea/Vomiting/Diarrhea (gastrointestinal)  Dehydration (metabolic/constitutional)    Stated Complaint: GENERALIZED WEAKNESS    HPI    Patient is a 58-year-old ma PERIPHERAL VASCULAR DISEASE    • Pneumonia, organism unspecified(486)    • Anemia 2013     normal coloncopy, egd, capulse endoscopy   • Visual impairment    • Depression    • Arrhythmia      A-Fib diagnosed 2005   • Hearing impairment      no hearing aids DISKUS) 250-50 MCG/DOSE Inhalation Aerosol Powder, Breath Activated,  Inhale 1 puff into the lungs every 12 (twelve) hours.    Albuterol Sulfate HFA (PROAIR HFA) 108 (90 BASE) MCG/ACT Inhalation Aero Soln,  Inhale 2 puffs into the lungs every 6 (six) hours Temporal   SpO2 05/24/17 2221 100 %   O2 Device 05/24/17 2221 None (Room air)       Current:/62 mmHg  Pulse 78  Temp(Src) 97.1 °F (36.2 °C) (Temporal)  Resp 16  SpO2 94%        Physical Exam    Constitutional: The patient is oriented to person, place URINALYSIS WITH CULTURE REFLEX - Abnormal; Notable for the following:     Clarity Urine Hazy (*)     Blood Urine Moderate (*)     Nitrite Urine Positive (*)     Leukocyte Esterase Urine Large (*)     WBC Urine >50 (*)     RBC URINE 3-5 (*)     Bacteria U 2017.            MDM     Extensive differential diagnosis was considered for the patient including neurologic, infectious, cardiac, thromboembolic, vascular, GI and other pathology. CT of the brain demonstrated no acute intracranial abnormality.   CT of

## 2017-05-25 NOTE — H&P
[facility] Hospitalist History and Physical      Patient presents with:  Nausea/Vomiting/Diarrhea (gastrointestinal)  Dehydration (metabolic/constitutional)       PCP: Jimi Ellington MD      History of Present Illness: Patient is a 64year old female with PMH impairment      no hearing aids   • MCI (mild cognitive impairment) 2013     stable on Aricept   • PONV (postoperative nausea and vomiting)    • Seizure disorder [facility])           Past Surgical History    CHOLECYSTECTOMY      HYSTERECTOMY      COLONOSCOPY  7 what is stated in HPI. OBJECTIVE:  /61 mmHg  Pulse 76  Temp(Src) 97.5 °F (36.4 °C) (Oral)  Resp 20  Wt 134 lb 11.2 oz (61.1 kg)  SpO2 100%  General:  Alert, no distress, appears stated age.     Head:  Normocephalic, without obvious abnormality, a LUMBAR SPINE (MIN 4 VIEWS) (CPT=72110), 4/25/2017, 18:59. INDICATIONS:  GENERALIZED WEAKNESS  PATIENT STATED HISTORY: (As transcribed by Technologist)  Patient offered no additional information at this time. FINDINGS:  No acute fracture or subluxation. ([employer] [facility]) NRDR ([address]) which includes the Dose Index Registry.   PATIENT STATED HISTORY: (As transcribed by Technologist)  Patient with generalized weakness, nausea, vomiting, diarrhea, right facial droop tod anterior subluxation of C4 with respect to C5. Stable C5-6 degenerative disc disease and broad-based posterior disc/osteophyte complex. Increased vacuum phenomenon in the superior endplate of C6.  Stable multilevel facet hypertrophy and foraminal narrowing, care.  Greater than 50% face to face encounter.       Damir Terrell MD  [facility]  [phone]

## 2017-05-25 NOTE — PROGRESS NOTES
[facility] Dosing Service  Warfarin (Coumadin) Initial Dosing    Agnes Humphreys is a 64year old female for whom pharmacy has been consulted to dose warfarin (COUMADIN) for A. Fib by Dr. Benjamin Peña. Based on this indication, goal INR is 2-3.     Pertinent

## 2017-05-25 NOTE — PLAN OF CARE
PT AWAKE AND ALERT. OFFERS NO COMPLAINTS AT PRESENT. SAT ON 2L AT 93%. TELE IS AFIB/. K INFUSION RUNNING FOR K OF 3.5. TAKES DIET WELL. INCONTINENT OF URINE. UP WITH ONE ASSIST AND WALKER. LOOSE STOOLS CONTINUE. STOOL SAMPLE SENT FOR C DIFF.

## 2017-05-25 NOTE — PROGRESS NOTES
NURSING ADMISSION NOTE      Patient admitted via Cart  Oriented to room. Safety precautions initiated. Bed in low position. Call light in reach. Admission navigator completed w/ patient's  & son. Patient's a poor historian.  Unable to compl

## 2017-05-25 NOTE — ED INITIAL ASSESSMENT (HPI)
PT TO ER VIA EMS FROM HOME FOR GENERALIZED WEAKNESS, N/V/D X 2 DAYS. PT FELL TODAY AS SHE WAS GETTING IN THE BATHROOM. SKIN TEAR TO LT ELBOW NOTED. PER EMS, PT HAD RT FACIAL DROOP. HX OF DEMENTIA, PER FAMILY PT IS AT HER NORMAL STATE.

## 2017-05-25 NOTE — OCCUPATIONAL THERAPY NOTE
IP OT attempt to see patient for therapeutic treatment. Pt received on floor early this AM.  MD has not assessed patient yet, OT will hold at this time. Will attempt again as able.

## 2017-05-25 NOTE — PHYSICAL THERAPY NOTE
Pt identified through fall risk protocol. No H&P or md consults in Epic at this time.   Will await more MD input before performing PT eval.

## 2017-05-25 NOTE — PROGRESS NOTES
Called patient's daughter, Tosha Richter, this morning to f/u for patient's home med list. Tosha Richter stated that she will call this RN back in 30 minutes.

## 2017-05-26 PROCEDURE — 97161 PT EVAL LOW COMPLEX 20 MIN: CPT | Performed by: PHYSICAL THERAPIST

## 2017-05-26 PROCEDURE — 96366 THER/PROPH/DIAG IV INF ADDON: CPT

## 2017-05-26 PROCEDURE — 97116 GAIT TRAINING THERAPY: CPT | Performed by: PHYSICAL THERAPIST

## 2017-05-26 PROCEDURE — 85610 PROTHROMBIN TIME: CPT | Performed by: INTERNAL MEDICINE

## 2017-05-26 PROCEDURE — 80048 BASIC METABOLIC PNL TOTAL CA: CPT | Performed by: INTERNAL MEDICINE

## 2017-05-26 PROCEDURE — 84132 ASSAY OF SERUM POTASSIUM: CPT | Performed by: HOSPITALIST

## 2017-05-26 RX ORDER — WARFARIN SODIUM 2.5 MG/1
2.5 TABLET ORAL
Status: COMPLETED | OUTPATIENT
Start: 2017-05-26 | End: 2017-05-26

## 2017-05-26 RX ORDER — POTASSIUM CHLORIDE 20 MEQ/1
40 TABLET, EXTENDED RELEASE ORAL ONCE
Status: COMPLETED | OUTPATIENT
Start: 2017-05-26 | End: 2017-05-26

## 2017-05-26 NOTE — PHYSICAL THERAPY NOTE
PHYSICAL THERAPY EVALUATION - INPATIENT     Room Number: 3807/5928-R  Evaluation Date: 5/26/2017  Type of Evaluation: Initial  Physician Order: PT Eval and Treat    Presenting Problem: Generalized weakness  Reason for Therapy: Mobility Dysfunction and [facility] [place])        Past Surgical History      Past Surgical History    CHOLECYSTECTOMY      HYSTERECTOMY      COLONOSCOPY  7/2010    BYPASS SURGERY      Comment 4/28/13    OTHER SURGICAL HISTORY      Comment pacemaker    OTHER SURGICAL HISTORY      Comment L SIN TESTS  Additional Tests: Other (Comment) (Sit to stand 5 times: 45 seconds )                                 NEUROLOGICAL FINDINGS                      ACTIVITY TOLERANCE  Liters of O2:  2L    AM-PAC '6-Clicks' INPATIENT SHORT FORM - BASIC MOBILITY  How mu functional limitations in performing her ADL's such as walking, negotiating stairs and performing her house hold activities.   The patient is below her baseline and would benefit from skilled inpatient PT to address the above deficits to assist patient in r

## 2017-05-26 NOTE — PROGRESS NOTES
[facility] Hospitalist Progress Note     PCP: Eddie Mendoza MD    SUBJECTIVE:  No CP, SOB, or N/V. Pt still having diarrhea but notes that it is more formed presently.     OBJECTIVE:  Temp:  [97.4 °F (36.3 °C)-98.1 °F (36.7 °C)] 97.5 °F (36.4 °C)  Pulse: Nasal Daily   • gabapentin  600 mg Oral TID   • levETIRAcetam  500 mg Oral BID   • lisinopril  10 mg Oral Daily   • Metoprolol Succinate ER  50 mg Oral Daily   • Pramipexole Dihydrochloride  0.25 mg Oral Nightly   • Sertraline HCl  100 mg Oral BID   • topi

## 2017-05-26 NOTE — PROGRESS NOTES
[address] [facility] Dosing Service  Warfarin (Coumadin) Subsequent Dosing    Samina Odell is a 64year old female for whom pharmacy has been dosing warfarin (Coumadin).  Goal INR is 2-3    Recent Labs   Lab  05/24/17   2230  05/25/17   0503  05/26/17   0802   INR

## 2017-05-27 VITALS
DIASTOLIC BLOOD PRESSURE: 73 MMHG | OXYGEN SATURATION: 96 % | SYSTOLIC BLOOD PRESSURE: 143 MMHG | RESPIRATION RATE: 16 BRPM | HEART RATE: 90 BPM | BODY MASS INDEX: 22 KG/M2 | TEMPERATURE: 98 F | WEIGHT: 134.69 LBS

## 2017-05-27 PROCEDURE — 80048 BASIC METABOLIC PNL TOTAL CA: CPT | Performed by: HOSPITALIST

## 2017-05-27 PROCEDURE — 96366 THER/PROPH/DIAG IV INF ADDON: CPT

## 2017-05-27 PROCEDURE — 85610 PROTHROMBIN TIME: CPT | Performed by: INTERNAL MEDICINE

## 2017-05-27 PROCEDURE — 85025 COMPLETE CBC W/AUTO DIFF WBC: CPT | Performed by: HOSPITALIST

## 2017-05-27 RX ORDER — LEVOFLOXACIN 500 MG/1
500 TABLET, FILM COATED ORAL DAILY
Qty: 4 TABLET | Refills: 0 | Status: SHIPPED | OUTPATIENT
Start: 2017-05-27 | End: 2017-07-05

## 2017-05-27 RX ORDER — METRONIDAZOLE 500 MG/1
500 TABLET ORAL EVERY 8 HOURS SCHEDULED
Qty: 33 TABLET | Refills: 0 | Status: SHIPPED | OUTPATIENT
Start: 2017-05-27 | End: 2017-06-28

## 2017-05-27 RX ORDER — METRONIDAZOLE 500 MG/1
500 TABLET ORAL EVERY 8 HOURS SCHEDULED
Status: DISCONTINUED | OUTPATIENT
Start: 2017-05-27 | End: 2017-05-27

## 2017-05-27 RX ORDER — POTASSIUM CHLORIDE 20 MEQ/1
40 TABLET, EXTENDED RELEASE ORAL ONCE
Status: COMPLETED | OUTPATIENT
Start: 2017-05-27 | End: 2017-05-27

## 2017-05-27 RX ORDER — WARFARIN SODIUM 2.5 MG/1
2.5 TABLET ORAL
Status: DISCONTINUED | OUTPATIENT
Start: 2017-05-27 | End: 2017-05-27

## 2017-05-27 NOTE — PROGRESS NOTES
Pharmacy Dosing Service: Warfarin (Coumadin)  Ifeanyi Jarrell is a 64year old female for whom pharmacy has been dosing warfarin (Coumadin).  Goal INR is 2-3    Recent Labs   Lab  05/24/17   2230  05/25/17   0503  05/26/17   0802  05/27/17   0612   INR  2.19*

## 2017-05-27 NOTE — CM/SW NOTE
05/27/17 1100   Discharge disposition   Discharged to: Home-Health   Name of Facillity/Home Care/Hospice Residential   Discharge transportation Private car      Pt has d/c orders. Spoke with pt and patient's daughter Anna Peres (C#[phone]).  Plan for pt t

## 2017-05-27 NOTE — DISCHARGE SUMMARY
General Medicine Discharge Summary     Patient ID:  Safia Rivera  64year old  8/10/1940    Admit date: 5/24/2017    Discharge date and time: 5/27/17    Attending Physician: DO Dulce Costello SOCIAL WORK  IP CONSULT TO PHARMACY    Radiology reports:    Xr Lumbar Spine (min 4 Views) (cpt=72110)    5/25/2017  PROCEDURE:  XR LUMBAR SPINE (MIN 4 VIEWS) (CPT=72110)  TECHNIQUE:  AP, lateral, oblique, and coned down L5-S1 views were obtained.   Zita Banda 5/25/2017 at 7:49     Approved by: Aj Underwood MD            Xr Shoulder, Complete (min 2 Views), Left (cpt=73030)    5/25/2017  PROCEDURE:  XR SHOULDER, COMPLETE (MIN 2 VIEWS), LEFT (CPT=73030)     TECHNIQUE:  Multiple views were obtained.   COMPARISON techniques were used. Dose information is transmitted to the [facility]) NRDR ([address]) which includes the Dose Index Registry.   PATIENT STATED HISTORY: (As transcribed by Technologist)  Patient with general of consciousness. FINDINGS:  No fracture. Stable 2 mm anterior subluxation of C4 with respect to C5. Stable C5-6 degenerative disc disease and broad-based posterior disc/osteophyte complex. Increased vacuum phenomenon in the superior endplate of C6.  Sta for Pain.    gabapentin 600 MG Oral Tab  Take 1 tablet (600 mg total) by mouth 3 (three) times daily.     Warfarin Sodium 5 MG Oral Tab  Take 1 to 1&1/2 tablet(s) daily as directed by [facility].    lisinopril 10 MG Oral Tab  Take 1 tablet ( Hospitalist

## 2017-05-27 NOTE — PLAN OF CARE
Problem: NEUROLOGICAL - ADULT  Goal: Achieves stable or improved neurological status  INTERVENTIONS  - Assess for and report changes in neurological status  - Initiate measures to prevent increased intracranial pressure  - Maintain blood pressure and fluid venous puncture sites for bleeding and/or hematoma  - Assess quality of pulses, skin color and temperature  - Assess for signs of decreased coronary artery perfusion - ex.  Angina  - Evaluate fluid balance, assess for edema, trend weights   Outcome: Nisha allow for food preferences  - Enhance eating environment   Outcome: Progressing    Problem: SKIN/TISSUE INTEGRITY - ADULT  Goal: Incision(s), wounds(s) or drain site(s) healing without S/S of infection  INTERVENTIONS:  - Assess and document risk factors fo

## 2017-05-27 NOTE — PLAN OF CARE
Problem: Patient/Family Goals  Goal: Patient/Family Long Term Goal  Patient’s Long Term Goal: go back home    Interventions:  - finish abx  - work with PT    - See additional Care Plan goals for specific interventions   Outcome: Adequate for Discharge

## 2017-05-27 NOTE — PLAN OF CARE
Problem: Patient/Family Goals  Goal: Patient/Family Short Term Goal  Patient’s Short Term Goal: feel better and stronger    Interventions:   - PT/OT  - IV abx  - vitals, medications  - See additional Care Plan goals for specific interventions   Outcome:  Ad

## 2017-05-27 NOTE — PLAN OF CARE
Discharge instructions discussed with pt and spouse  Both verbalized understanding, questions and concerns answered  Condition stable  Pt voided in the bathroom, no bm at this time  Condition stable at dc  Spouse claims daughter will drive them home and is

## 2017-06-27 PROCEDURE — 87045 FECES CULTURE AEROBIC BACT: CPT | Performed by: INTERNAL MEDICINE

## 2017-06-27 PROCEDURE — 87046 STOOL CULTR AEROBIC BACT EA: CPT | Performed by: INTERNAL MEDICINE

## 2017-06-27 PROCEDURE — 87493 C DIFF AMPLIFIED PROBE: CPT | Performed by: INTERNAL MEDICINE

## 2017-07-21 ENCOUNTER — OFFICE VISIT (OUTPATIENT)
Dept: FAMILY MEDICINE CLINIC | Facility: CLINIC | Age: 77
End: 2017-07-21

## 2017-07-21 VITALS
HEIGHT: 65.5 IN | TEMPERATURE: 98 F | OXYGEN SATURATION: 96 % | RESPIRATION RATE: 16 BRPM | SYSTOLIC BLOOD PRESSURE: 102 MMHG | DIASTOLIC BLOOD PRESSURE: 60 MMHG | WEIGHT: 125 LBS | HEART RATE: 85 BPM | BODY MASS INDEX: 20.58 KG/M2

## 2017-07-21 DIAGNOSIS — N30.01 ACUTE CYSTITIS WITH HEMATURIA: Primary | ICD-10-CM

## 2017-07-21 LAB
BILIRUBIN: NEGATIVE
GLUCOSE (URINE DIPSTICK): NEGATIVE MG/DL
KETONES (URINE DIPSTICK): NEGATIVE MG/DL
MULTISTIX LOT#: ABNORMAL NUMERIC
NITRITE, URINE: POSITIVE
PH, URINE: 6.5 (ref 4.5–8)
PROTEIN (URINE DIPSTICK): 30 MG/DL
SPECIFIC GRAVITY: 1.02 (ref 1–1.03)
URINE-COLOR: YELLOW
UROBILINOGEN,SEMI-QN: 1 MG/DL (ref 0–1.9)

## 2017-07-21 PROCEDURE — 81003 URINALYSIS AUTO W/O SCOPE: CPT | Performed by: NURSE PRACTITIONER

## 2017-07-21 PROCEDURE — 87077 CULTURE AEROBIC IDENTIFY: CPT | Performed by: NURSE PRACTITIONER

## 2017-07-21 PROCEDURE — 87086 URINE CULTURE/COLONY COUNT: CPT | Performed by: NURSE PRACTITIONER

## 2017-07-21 PROCEDURE — 87186 SC STD MICRODIL/AGAR DIL: CPT | Performed by: NURSE PRACTITIONER

## 2017-07-21 PROCEDURE — 99213 OFFICE O/P EST LOW 20 MIN: CPT | Performed by: NURSE PRACTITIONER

## 2017-07-21 RX ORDER — SULFAMETHOXAZOLE AND TRIMETHOPRIM 800; 160 MG/1; MG/1
1 TABLET ORAL 2 TIMES DAILY
Qty: 20 TABLET | Refills: 0 | Status: SHIPPED | OUTPATIENT
Start: 2017-07-21 | End: 2017-07-31 | Stop reason: WASHOUT

## 2017-07-21 NOTE — PROGRESS NOTES
CHIEF COMPLAINT:   Patient presents with:  Urinary Symptoms (urologic)      HPI:   Blanca Naranjo is a 64year old female who presents with symptoms of UTI. Complaining of urinary frequency, urgency, dysuria for last 2 weeks.  Treated in hospital and felt Oral Tab Take 1 to 1&1/2 tablet(s) daily as directed by [facility]. (Patient taking differently: Take 5 mg by mouth daily.  as directed by [facility]. ) Disp: 120 tablet Rfl: 1   levETIRAcetam 500 MG Oral Tab Take 500 mg by m • HIGH CHOLESTEROL    • HYPERLIPIDEMIA    • HYPERTENSION    • Insomnia, unspecified    • MCI (mild cognitive impairment) 2013    stable on Aricept   • Occlusion and stenosis of carotid artery without mention of cerebral infarction     right CEA 2000   • 07/21/17  3:41 PM   Result Value Ref Range   GLUCOSE (URINE DIPSTICK) negative mg/dL   BILIRUBIN negative Negative   KETONES (URINE DIPSTICK) negative Negative mg/dL   SPECIFIC GRAVITY 1.020 1.005 - 1.030   OCCULT BLOOD moderate Negative   PH, URINE 6.5 4. Take 1 tablet by mouth 2 (two) times daily. Risk and benefits of medication discussed. Stressed importance of completing full course of antibiotic. Patient Instructions   Monitor INR more frequently with Home health nurse.  Follow-up with Coum

## 2017-07-26 ENCOUNTER — APPOINTMENT (OUTPATIENT)
Dept: CT IMAGING | Facility: HOSPITAL | Age: 77
End: 2017-07-26
Attending: EMERGENCY MEDICINE
Payer: MEDICARE

## 2017-07-26 ENCOUNTER — APPOINTMENT (OUTPATIENT)
Dept: GENERAL RADIOLOGY | Facility: HOSPITAL | Age: 77
End: 2017-07-26
Attending: EMERGENCY MEDICINE
Payer: MEDICARE

## 2017-07-26 ENCOUNTER — HOSPITAL ENCOUNTER (EMERGENCY)
Facility: HOSPITAL | Age: 77
Discharge: HOME | End: 2017-07-26
Attending: EMERGENCY MEDICINE
Payer: MEDICARE

## 2017-07-26 VITALS
RESPIRATION RATE: 19 BRPM | DIASTOLIC BLOOD PRESSURE: 72 MMHG | WEIGHT: 123.44 LBS | OXYGEN SATURATION: 94 % | BODY MASS INDEX: 20.57 KG/M2 | HEART RATE: 87 BPM | HEIGHT: 65 IN | SYSTOLIC BLOOD PRESSURE: 144 MMHG | TEMPERATURE: 98 F

## 2017-07-26 DIAGNOSIS — J44.1 COPD EXACERBATION (HCC): ICD-10-CM

## 2017-07-26 DIAGNOSIS — I48.20 ATRIAL FIBRILLATION, CHRONIC (HCC): ICD-10-CM

## 2017-07-26 DIAGNOSIS — I25.10 CORONARY ARTERY DISEASE INVOLVING NATIVE HEART WITHOUT ANGINA PECTORIS, UNSPECIFIED VESSEL OR LESION TYPE: ICD-10-CM
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
## 2017-07-26 LAB
ALBUMIN SERPL-MCNC: 3.3 G/DL (ref 3.5–4.8)
ALP LIVER SERPL-CCNC: 122 U/L (ref 55–142)
ALT SERPL-CCNC: 28 U/L (ref 14–54)
AST SERPL-CCNC: 31 U/L (ref 15–41)
BASOPHILS # BLD AUTO: 0.09 X10(3) UL (ref 0–0.1)
BASOPHILS NFR BLD AUTO: 0.7 %
BILIRUB SERPL-MCNC: 0.4 MG/DL (ref 0.1–2)
BUN BLD-MCNC: 13 MG/DL (ref 8–20)
CALCIUM BLD-MCNC: 8.4 MG/DL (ref 8.3–10.3)
CHLORIDE: 104 MMOL/L (ref 101–111)
CK: 61 IU/L (ref 26–192)
CO2: 26 MMOL/L (ref 22–32)
CREAT BLD-MCNC: 1.23 MG/DL (ref 0.55–1.02)
EOSINOPHIL # BLD AUTO: 0.13 X10(3) UL (ref 0–0.3)
EOSINOPHIL NFR BLD AUTO: 1 %
ERYTHROCYTE [DISTWIDTH] IN BLOOD BY AUTOMATED COUNT: 14.3 % (ref 11.5–16)
GLUCOSE BLD-MCNC: 119 MG/DL (ref 70–99)
HCT VFR BLD AUTO: 41.5 % (ref 34–50)
HGB BLD-MCNC: 13.5 G/DL (ref 12–16)
IMMATURE GRANULOCYTE COUNT: 0.17 X10(3) UL (ref 0–1)
IMMATURE GRANULOCYTE RATIO %: 1.3 %
INR BLD: 1.64 (ref 0.89–1.11)
LACTIC ACID: 1.2 MMOL/L (ref 0.5–2)
LYMPHOCYTES # BLD AUTO: 1.3 X10(3) UL (ref 0.9–4)
LYMPHOCYTES NFR BLD AUTO: 9.9 %
M PROTEIN MFR SERPL ELPH: 7.4 G/DL (ref 6.1–8.3)
MCH RBC QN AUTO: 29.5 PG (ref 27–33.2)
MCHC RBC AUTO-ENTMCNC: 32.5 G/DL (ref 31–37)
MCV RBC AUTO: 90.6 FL (ref 81–100)
MONOCYTES # BLD AUTO: 1.06 X10(3) UL (ref 0.1–0.6)
MONOCYTES NFR BLD AUTO: 8.1 %
NEUTROPHIL ABS PRELIM: 10.4 X10 (3) UL (ref 1.3–6.7)
NEUTROPHILS # BLD AUTO: 10.4 X10(3) UL (ref 1.3–6.7)
NEUTROPHILS NFR BLD AUTO: 79 %
PLATELET # BLD AUTO: 281 10(3)UL (ref 150–450)
POTASSIUM SERPL-SCNC: 4.6 MMOL/L (ref 3.6–5.1)
PSA SERPL DL<=0.01 NG/ML-MCNC: 19.6 SECONDS (ref 12–14.3)
RBC # BLD AUTO: 4.58 X10(6)UL (ref 3.8–5.1)
RED CELL DISTRIBUTION WIDTH-SD: 47.9 FL (ref 35.1–46.3)
SODIUM SERPL-SCNC: 136 MMOL/L (ref 136–144)
TROPONIN: <0.046 NG/ML (ref ?–0.05)
WBC # BLD AUTO: 13.2 X10(3) UL (ref 4–13)

## 2017-07-26 PROCEDURE — 96361 HYDRATE IV INFUSION ADD-ON: CPT

## 2017-07-26 PROCEDURE — 70450 CT HEAD/BRAIN W/O DYE: CPT | Performed by: EMERGENCY MEDICINE

## 2017-07-26 PROCEDURE — 84484 ASSAY OF TROPONIN QUANT: CPT | Performed by: EMERGENCY MEDICINE

## 2017-07-26 PROCEDURE — 93005 ELECTROCARDIOGRAM TRACING: CPT

## 2017-07-26 PROCEDURE — 80053 COMPREHEN METABOLIC PANEL: CPT | Performed by: EMERGENCY MEDICINE

## 2017-07-26 PROCEDURE — 83605 ASSAY OF LACTIC ACID: CPT | Performed by: EMERGENCY MEDICINE

## 2017-07-26 PROCEDURE — 82550 ASSAY OF CK (CPK): CPT | Performed by: EMERGENCY MEDICINE

## 2017-07-26 PROCEDURE — 72125 CT NECK SPINE W/O DYE: CPT | Performed by: EMERGENCY MEDICINE

## 2017-07-26 PROCEDURE — 96360 HYDRATION IV INFUSION INIT: CPT

## 2017-07-26 PROCEDURE — 85610 PROTHROMBIN TIME: CPT | Performed by: EMERGENCY MEDICINE

## 2017-07-26 PROCEDURE — 71010 XR CHEST AP PORTABLE  (CPT=71010): CPT | Performed by: EMERGENCY MEDICINE

## 2017-07-26 PROCEDURE — 85025 COMPLETE CBC W/AUTO DIFF WBC: CPT | Performed by: EMERGENCY MEDICINE

## 2017-07-26 PROCEDURE — 72170 X-RAY EXAM OF PELVIS: CPT | Performed by: EMERGENCY MEDICINE

## 2017-07-26 PROCEDURE — 93010 ELECTROCARDIOGRAM REPORT: CPT

## 2017-07-26 PROCEDURE — 99285 EMERGENCY DEPT VISIT HI MDM: CPT

## 2017-07-26 RX ORDER — SODIUM CHLORIDE 9 MG/ML
INJECTION, SOLUTION INTRAVENOUS ONCE
Status: DISCONTINUED | OUTPATIENT
Start: 2017-07-26 | End: 2017-07-26

## 2017-07-26 RX ORDER — SODIUM CHLORIDE 9 MG/ML
INJECTION, SOLUTION INTRAVENOUS ONCE
Status: COMPLETED | OUTPATIENT
Start: 2017-07-26 | End: 2017-07-26

## 2017-07-26 NOTE — ED INITIAL ASSESSMENT (HPI)
Fell x 2 this a.m. At home. Pt is on coumadin.   Here with tailbone pain, head and neck pain radiating into upper back

## 2017-07-26 NOTE — ED PROVIDER NOTES
Patient Seen in: [facility] Emergency Department    History   Patient presents with:  Fall (musculoskeletal, neurologic)    Stated Complaint: fall x2 this morning    HPI    64-year-old female with a history of atrial fibrillation, history of pacemak impairment) 2013    stable on Aricept   • Occlusion and stenosis of carotid artery without mention of cerebral infarction     right CEA 2000   • Other and unspecified hyperlipidemia    • PERIPHERAL VASCULAR DISEASE    • Peripheral vascular disease, unspeci mg total) by mouth once daily. TRAZODONE HCL 50 MG Oral Tab,  TAKE 1 TABLET BY MOUTH DAILY AT BEDTIME   Propranolol HCl  MG Oral Capsule SR 24 Hr,  Take 120 mg by mouth 2 (two) times daily.    ClonazePAM 0.5 MG Oral Tab,  TAKE 1 TABLET BY MOUTH TWIC Packs/day: 1.00      Years: 30.00        Types: Cigarettes     Quit date: 5/16/1993  Smokeless tobacco: Never Used                      Comment: pt quit 13-14 years ago. Alcohol use:  No                Revie Course     Labs Reviewed   COMP METABOLIC PANEL (14) - Abnormal; Notable for the following:        Result Value    Glucose 119 (*)     Creatinine 1.23 (*)     GFR 42 (*)     Albumin 3.3 (*)     All other components within normal limits   PROTHROMBIN TIME ( no evidence of hip fracture. Portable chest x-ray is negative.  ============================================================     Patient was brought back to the examination room immediately.   The patient was then placed on a cardiac monitor and pulse ox change. SINUSES:           No sign of acute sinusitis. Stable chronic opacification of the left maxillary sinus which contains high density. Findings suggest prior surgery involving the left maxillary sinus, correlate clinically .      MASTOIDS: Findings of her x-ray and laboratory studies were discussed with the patient and son at bedside. Patient feels improved from her initial presentation. Will be discharged home to follow-up with her primary care physician.   Recommend Tylenol or Motrin for

## 2017-07-28 LAB
ATRIAL RATE: 127 BPM
Q-T INTERVAL: 364 MS
QRS DURATION: 94 MS
QTC CALCULATION (BEZET): 425 MS
R AXIS: 28 DEGREES
T AXIS: -70 DEGREES
VENTRICULAR RATE: 82 BPM

## 2017-08-26 ENCOUNTER — APPOINTMENT (OUTPATIENT)
Dept: GENERAL RADIOLOGY | Facility: HOSPITAL | Age: 77
End: 2017-08-26
Attending: EMERGENCY MEDICINE
Payer: MEDICARE

## 2017-08-26 ENCOUNTER — HOSPITAL ENCOUNTER (EMERGENCY)
Facility: HOSPITAL | Age: 77
Discharge: HOME | End: 2017-08-26
Attending: EMERGENCY MEDICINE
Payer: MEDICARE

## 2017-08-26 ENCOUNTER — APPOINTMENT (OUTPATIENT)
Dept: CT IMAGING | Facility: HOSPITAL | Age: 77
End: 2017-08-26
Attending: EMERGENCY MEDICINE
Payer: MEDICARE

## 2017-08-26 VITALS
RESPIRATION RATE: 17 BRPM | DIASTOLIC BLOOD PRESSURE: 99 MMHG | HEART RATE: 101 BPM | SYSTOLIC BLOOD PRESSURE: 153 MMHG | HEIGHT: 65 IN | TEMPERATURE: 98 F | BODY MASS INDEX: 19.99 KG/M2 | OXYGEN SATURATION: 98 % | WEIGHT: 120 LBS

## 2017-08-26 DIAGNOSIS — T45.515A WARFARIN-INDUCED COAGULOPATHY (HCC): ICD-10-CM

## 2017-08-26 DIAGNOSIS — S60.222A CONTUSION OF LEFT HAND, INITIAL ENCOUNTER: ICD-10-CM

## 2017-08-26 DIAGNOSIS — S00.03XA CONTUSION OF SCALP, INITIAL ENCOUNTER: ICD-10-CM
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
## 2017-08-26 DIAGNOSIS — D68.32 WARFARIN-INDUCED COAGULOPATHY (HCC): ICD-10-CM

## 2017-08-26 LAB
INR BLD: 2.48 (ref 0.89–1.11)
PSA SERPL DL<=0.01 NG/ML-MCNC: 27.3 SECONDS (ref 12–14.3)

## 2017-08-26 PROCEDURE — 99285 EMERGENCY DEPT VISIT HI MDM: CPT

## 2017-08-26 PROCEDURE — 93005 ELECTROCARDIOGRAM TRACING: CPT

## 2017-08-26 PROCEDURE — 70450 CT HEAD/BRAIN W/O DYE: CPT | Performed by: EMERGENCY MEDICINE

## 2017-08-26 PROCEDURE — 96374 THER/PROPH/DIAG INJ IV PUSH: CPT

## 2017-08-26 PROCEDURE — 96375 TX/PRO/DX INJ NEW DRUG ADDON: CPT

## 2017-08-26 PROCEDURE — 73130 X-RAY EXAM OF HAND: CPT | Performed by: EMERGENCY MEDICINE

## 2017-08-26 PROCEDURE — 93010 ELECTROCARDIOGRAM REPORT: CPT

## 2017-08-26 PROCEDURE — 85610 PROTHROMBIN TIME: CPT | Performed by: EMERGENCY MEDICINE

## 2017-08-26 PROCEDURE — 96376 TX/PRO/DX INJ SAME DRUG ADON: CPT

## 2017-08-26 RX ORDER — HYDRALAZINE HYDROCHLORIDE 20 MG/ML
10 INJECTION INTRAMUSCULAR; INTRAVENOUS
Status: DISPENSED | OUTPATIENT
Start: 2017-08-26 | End: 2017-08-26

## 2017-08-26 RX ORDER — HYDRALAZINE HYDROCHLORIDE 20 MG/ML
20 INJECTION INTRAMUSCULAR; INTRAVENOUS
Status: COMPLETED | OUTPATIENT
Start: 2017-08-26 | End: 2017-08-26

## 2017-08-26 RX ORDER — TRAMADOL HYDROCHLORIDE 50 MG/1
TABLET ORAL EVERY 4 HOURS PRN
Qty: 20 TABLET | Refills: 0 | Status: SHIPPED | OUTPATIENT
Start: 2017-08-26 | End: 2017-09-02 | Stop reason: WASHOUT

## 2017-08-26 RX ORDER — ONDANSETRON 2 MG/ML
4 INJECTION INTRAMUSCULAR; INTRAVENOUS ONCE
Status: COMPLETED | OUTPATIENT
Start: 2017-08-26 | End: 2017-08-26

## 2017-08-26 NOTE — ED PROVIDER NOTES
Patient Seen in: [facility] Emergency Department    History   Patient presents with:  Contusion (musculoskeletal)    Stated Complaint: hematoma    HPI    25-year-old female stated that she tripped over the dog at home and fell hitting the left side Surgeon: Salome Giang MD;  Location: [address] [place] [address]  No date: HYSTERECTOMY  No date: OPEN HEART SURGERY (PBP)      Comment: 2002 CABG x 2  No date: OTHER SURGICAL HISTORY      Comment: pacemaker  No date: OTHER SURGICAL HISTORY      Comment: L levETIRAcetam 500 MG Oral Tab,  Take 500 mg by mouth 2 (two) times daily.    ferrous sulfate 325 (65 FE) MG Oral Tab EC,  Take 325 mg by mouth daily with breakfast.   fluticasone-salmeterol (ADVAIR DISKUS) 250-50 MCG/DOSE Inhalation Aerosol Powder, Breath A °C)  Temp src: Temporal  SpO2: 100 %  O2 Device: None (Room air)    Current:BP (!) 165/85   Pulse 101   Temp 97.8 °F (36.6 °C) (Temporal)   Resp 16   Ht 165.1 cm (5' 5\")   Wt 54.4 kg   SpO2 98%   BMI 19.97 kg/m²         Physical Exam    General appearance treatment plan were discussed prior to discharge. EKG    Rate, intervals and axes as noted on EKG Report. Rate: 101  Rhythm: Atrial Fibrillation  Reading: Rapid ventricular response. Left axis deviation. Anterior infarct, age undetermined.   ST and T-wa

## 2017-08-26 NOTE — ED INITIAL ASSESSMENT (HPI)
Pt tripped on sons dog and feel onto hand and hit head. Pt denies any LOC. Pt has swelling on left hand. Pt is AOx3.

## 2017-08-27 LAB
ATRIAL RATE: 88 BPM
Q-T INTERVAL: 380 MS
QRS DURATION: 88 MS
QTC CALCULATION (BEZET): 492 MS
R AXIS: -65 DEGREES
T AXIS: 240 DEGREES
VENTRICULAR RATE: 101 BPM

## 2017-08-27 NOTE — ED NOTES
Patient up to bathroom with family, she is standing at nurses station vomiting assisted back to bed and instructed to please call for assistance. Patient states she feels light headed, skin cool and pale, placed on monitor and plan of care discussed.

## 2017-08-27 NOTE — ED NOTES
Patient denies c/o headache, nausea or vision disturbance, plan of care discussed and MD notified of BP

## 2017-11-22 ENCOUNTER — HOSPITAL ENCOUNTER (INPATIENT)
Facility: HOSPITAL | Age: 77
LOS: 4 days | Discharge: HOME HEALTH | DRG: 083 | End: 2017-11-27
Attending: EMERGENCY MEDICINE | Admitting: INTERNAL MEDICINE
Payer: MEDICARE

## 2017-11-22 ENCOUNTER — APPOINTMENT (OUTPATIENT)
Dept: CT IMAGING | Facility: HOSPITAL | Age: 77
DRG: 083 | End: 2017-11-22
Attending: EMERGENCY MEDICINE
Payer: MEDICARE

## 2017-11-22 DIAGNOSIS — D68.32 WARFARIN-INDUCED COAGULOPATHY (HCC): ICD-10-CM

## 2017-11-22 DIAGNOSIS — I10 ESSENTIAL HYPERTENSION: ICD-10-CM

## 2017-11-22 DIAGNOSIS — S01.81XA FACIAL LACERATION, INITIAL ENCOUNTER: ICD-10-CM

## 2017-11-22 DIAGNOSIS — T45.515A WARFARIN-INDUCED COAGULOPATHY (HCC): ICD-10-CM

## 2017-11-22 DIAGNOSIS — I60.9 SAH (SUBARACHNOID HEMORRHAGE) (HCC): Primary | ICD-10-CM

## 2017-11-22 PROCEDURE — 30283B1 TRANSFUSION OF NONAUTOLOGOUS 4-FACTOR PROTHROMBIN COMPLEX CONCENTRATE INTO VEIN, PERCUTANEOUS APPROACH: ICD-10-PCS | Performed by: HOSPITALIST

## 2017-11-22 PROCEDURE — 0HQ1XZZ REPAIR FACE SKIN, EXTERNAL APPROACH: ICD-10-PCS | Performed by: EMERGENCY MEDICINE

## 2017-11-22 PROCEDURE — 70450 CT HEAD/BRAIN W/O DYE: CPT | Performed by: EMERGENCY MEDICINE

## 2017-11-22 PROCEDURE — 72125 CT NECK SPINE W/O DYE: CPT | Performed by: EMERGENCY MEDICINE

## 2017-11-22 RX ORDER — LABETALOL HYDROCHLORIDE 5 MG/ML
20 INJECTION, SOLUTION INTRAVENOUS ONCE
Status: COMPLETED | OUTPATIENT
Start: 2017-11-22 | End: 2017-11-22

## 2017-11-22 RX ORDER — HYDROMORPHONE HYDROCHLORIDE 1 MG/ML
0.5 INJECTION, SOLUTION INTRAMUSCULAR; INTRAVENOUS; SUBCUTANEOUS EVERY 30 MIN PRN
Status: DISCONTINUED | OUTPATIENT
Start: 2017-11-22 | End: 2017-11-27

## 2017-11-22 RX ORDER — ONDANSETRON 2 MG/ML
4 INJECTION INTRAMUSCULAR; INTRAVENOUS ONCE
Status: COMPLETED | OUTPATIENT
Start: 2017-11-22 | End: 2017-11-22

## 2017-11-23 ENCOUNTER — APPOINTMENT (OUTPATIENT)
Dept: CT IMAGING | Facility: HOSPITAL | Age: 77
DRG: 083 | End: 2017-11-23
Attending: NURSE PRACTITIONER
Payer: MEDICARE

## 2017-11-23 PROBLEM — S01.81XA FACIAL LACERATION, INITIAL ENCOUNTER: Status: ACTIVE | Noted: 2017-11-23

## 2017-11-23 PROBLEM — I10 ESSENTIAL HYPERTENSION: Status: ACTIVE | Noted: 2017-11-23

## 2017-11-23 PROBLEM — T45.515A WARFARIN-INDUCED COAGULOPATHY (HCC): Status: ACTIVE | Noted: 2017-11-23

## 2017-11-23 PROBLEM — S01.81XA FACIAL LACERATION: Status: ACTIVE | Noted: 2017-11-23

## 2017-11-23 PROBLEM — D68.32 WARFARIN-INDUCED COAGULOPATHY (HCC): Status: ACTIVE | Noted: 2017-11-23

## 2017-11-23 PROBLEM — T45.515A WARFARIN-INDUCED COAGULOPATHY: Status: ACTIVE | Noted: 2017-11-23

## 2017-11-23 PROBLEM — D68.32 WARFARIN-INDUCED COAGULOPATHY: Status: ACTIVE | Noted: 2017-11-23

## 2017-11-23 PROCEDURE — 70450 CT HEAD/BRAIN W/O DYE: CPT | Performed by: NURSE PRACTITIONER

## 2017-11-23 PROCEDURE — 99291 CRITICAL CARE FIRST HOUR: CPT | Performed by: OTHER

## 2017-11-23 PROCEDURE — 99221 1ST HOSP IP/OBS SF/LOW 40: CPT | Performed by: NEUROLOGICAL SURGERY

## 2017-11-23 RX ORDER — SODIUM CHLORIDE 9 MG/ML
INJECTION, SOLUTION INTRAVENOUS CONTINUOUS
Status: DISCONTINUED | OUTPATIENT
Start: 2017-11-23 | End: 2017-11-27

## 2017-11-23 RX ORDER — ACETAMINOPHEN 325 MG/1
650 TABLET ORAL EVERY 4 HOURS PRN
Status: DISCONTINUED | OUTPATIENT
Start: 2017-11-23 | End: 2017-11-27

## 2017-11-23 RX ORDER — SODIUM CHLORIDE 9 MG/ML
INJECTION, SOLUTION INTRAVENOUS CONTINUOUS
Status: ACTIVE | OUTPATIENT
Start: 2017-11-23 | End: 2017-11-23

## 2017-11-23 RX ORDER — SODIUM PHOSPHATE, DIBASIC AND SODIUM PHOSPHATE, MONOBASIC 7; 19 G/133ML; G/133ML
1 ENEMA RECTAL ONCE AS NEEDED
Status: DISCONTINUED | OUTPATIENT
Start: 2017-11-23 | End: 2017-11-27

## 2017-11-23 RX ORDER — LORAZEPAM 2 MG/ML
1 INJECTION INTRAMUSCULAR
Status: DISCONTINUED | OUTPATIENT
Start: 2017-11-23 | End: 2017-11-27

## 2017-11-23 RX ORDER — BISACODYL 10 MG
10 SUPPOSITORY, RECTAL RECTAL
Status: DISCONTINUED | OUTPATIENT
Start: 2017-11-23 | End: 2017-11-27

## 2017-11-23 RX ORDER — POLYETHYLENE GLYCOL 3350 17 G/17G
17 POWDER, FOR SOLUTION ORAL DAILY PRN
Status: DISCONTINUED | OUTPATIENT
Start: 2017-11-23 | End: 2017-11-27

## 2017-11-23 RX ORDER — FAMOTIDINE 10 MG/ML
20 INJECTION, SOLUTION INTRAVENOUS DAILY
Status: DISCONTINUED | OUTPATIENT
Start: 2017-11-23 | End: 2017-11-27

## 2017-11-23 RX ORDER — FAMOTIDINE 20 MG/1
20 TABLET ORAL DAILY
Status: DISCONTINUED | OUTPATIENT
Start: 2017-11-23 | End: 2017-11-27

## 2017-11-23 RX ORDER — GABAPENTIN 300 MG/1
300 CAPSULE ORAL 3 TIMES DAILY
Status: DISCONTINUED | OUTPATIENT
Start: 2017-11-23 | End: 2017-11-27

## 2017-11-23 RX ORDER — LEVETIRACETAM 500 MG/1
500 TABLET ORAL 2 TIMES DAILY
Status: DISCONTINUED | OUTPATIENT
Start: 2017-11-23 | End: 2017-11-24

## 2017-11-23 RX ORDER — METOCLOPRAMIDE HYDROCHLORIDE 5 MG/ML
10 INJECTION INTRAMUSCULAR; INTRAVENOUS EVERY 8 HOURS PRN
Status: DISCONTINUED | OUTPATIENT
Start: 2017-11-23 | End: 2017-11-27

## 2017-11-23 RX ORDER — PHENYLEPHRINE HCL IN 0.9% NACL 50MG/250ML
PLASTIC BAG, INJECTION (ML) INTRAVENOUS CONTINUOUS PRN
Status: DISCONTINUED | OUTPATIENT
Start: 2017-11-23 | End: 2017-11-27

## 2017-11-23 RX ORDER — DONEPEZIL HYDROCHLORIDE 10 MG/1
10 TABLET, FILM COATED ORAL NIGHTLY
Status: DISCONTINUED | OUTPATIENT
Start: 2017-11-23 | End: 2017-11-27

## 2017-11-23 RX ORDER — ATORVASTATIN CALCIUM 40 MG/1
40 TABLET, FILM COATED ORAL NIGHTLY
Status: DISCONTINUED | OUTPATIENT
Start: 2017-11-23 | End: 2017-11-27

## 2017-11-23 RX ORDER — ACETAMINOPHEN 650 MG/1
650 SUPPOSITORY RECTAL EVERY 4 HOURS PRN
Status: DISCONTINUED | OUTPATIENT
Start: 2017-11-23 | End: 2017-11-27

## 2017-11-23 RX ORDER — LABETALOL HYDROCHLORIDE 5 MG/ML
10 INJECTION, SOLUTION INTRAVENOUS EVERY 10 MIN PRN
Status: COMPLETED | OUTPATIENT
Start: 2017-11-23 | End: 2017-11-23

## 2017-11-23 RX ORDER — METOPROLOL SUCCINATE 50 MG/1
50 TABLET, EXTENDED RELEASE ORAL
Status: DISCONTINUED | OUTPATIENT
Start: 2017-11-23 | End: 2017-11-27

## 2017-11-23 RX ORDER — DOCUSATE SODIUM 100 MG/1
100 CAPSULE, LIQUID FILLED ORAL 2 TIMES DAILY
Status: DISCONTINUED | OUTPATIENT
Start: 2017-11-23 | End: 2017-11-27

## 2017-11-23 RX ORDER — ONDANSETRON 2 MG/ML
4 INJECTION INTRAMUSCULAR; INTRAVENOUS EVERY 6 HOURS PRN
Status: DISCONTINUED | OUTPATIENT
Start: 2017-11-23 | End: 2017-11-27

## 2017-11-23 RX ORDER — SENNOSIDES 8.6 MG
17.2 TABLET ORAL NIGHTLY
Status: DISCONTINUED | OUTPATIENT
Start: 2017-11-23 | End: 2017-11-27

## 2017-11-23 NOTE — ED PROVIDER NOTES
Patient Seen in: [facility] Emergency Department    History   Patient presents with:  Fall (musculoskeletal, neurologic)    Stated Complaint: fall on coumadin    HPI    25-year-old female on Coumadin for atrial fibrillation presents to the emergency COLONOSCOPY;  Surgeon: Awa Doherty MD;  Location: [address] [place] [address] ENDOSCOPY  No date: HYSTERECTOMY  No date: OPEN HEART SURGERY (PBP)      Comment: 2002 CABG x 2  No date: OTHER SURGICAL HISTORY      Comment: pacemaker  No date: OTHER SURGICAL HISTORY adenopathy or thyromegaly. Lungs are clear to auscultation. Heart exam: Normal S1-S2 without extra sounds or murmurs. Regular rate and rhythm. Chest and abdomen are nontender. Back is nontender. Extremities:  There is a bruise over the right kneecap w 11/22/2017  CONCLUSION:   1. No acute bony injury to the cervical spine. 2. Moderate multilevel degenerative changes of the cervical spine.     Dictated by: Liseth Samuels MD on 11/22/2017 at 21:10     Approved by: Liseth Samuels MD            Tetanus statu ICD-10-CM Noted POA    SAH (subarachnoid hemorrhage) ([facility] [address] I60.9 11/22/2017 Unknown

## 2017-11-23 NOTE — CONSULTS
[facility]  Report of Consultation    Harsha Sherwood Patient Status:  Inpatient    8/10/1940 MRN [id number]   [facility] 6NE-A Attending Allison Vicente MD   Hosp Day # 0 PCP Domenic Campbell MD     Reason for Co (postoperative nausea and vomiting)    • Seizure disorder [facility])    • Unspecified essential hypertension    • Unspecified sleep apnea    • Unspecified sleep apnea SPLIT 9-25-13    AHI 55 RDI 60  Sao2 giovana 88% CPAP 12 [employer]   • Visual impairment       Pas ondansetron HCl **OR** Metoclopramide HCl, LORazepam, HYDROmorphone HCl PF    Outpatient Medications:     No current facility-administered medications on file prior to encounter.    Current Outpatient Prescriptions on File Prior to Encounter:  Warfarin Sodi EVERYDAY Disp: 1 Bottle Rfl: 2   EPINEPHrine (EPIPEN 2-GERRI) 0.3 MG/0.3ML Injection Solution Auto-injector Inject 0.3 mL (1 each total) as directed one time.  Disp: 1 each Rfl: 0   fluticasone-salmeterol (ADVAIR DISKUS) 250-50 MCG/DOSE Inhalation Aerosol Pow murmurs, rubs, or gallops. PMI is non-displaced with a normal apical impulse. Lungs: Coarse on ascultation bilaterally. No focal rales, rhonchi, or wheezes. Good air movement is noted throughout all lung fields. Abdomen: Soft. Non-distended.   Non-ten evaluation performed. 11. Sz disorder - on Keppra  12. Dementia Hx         Plan:  1. Anticoagulant reversed  2. F/U INR  3.  Beta-blockade rate control  4. Neuro evaluation ongoing  5. Statin  6.   All anticoagulant presently on hold - will consider op

## 2017-11-23 NOTE — H&P
[facility] Hospitalist History and Physical      Patient presents with:  Fall (musculoskeletal, neurologic)       PCP: Bertha Jimenez MD      History of Present Illness: Patient is a 76year old female with PMH sig for HTN, HL, CAD s/p CABG, paroxysmal A fib History:  No date: BYPASS SURGERY      Comment: 4/28/13  No date: CHOLECYSTECTOMY  7/2010: COLONOSCOPY  10/28/2013: COLONOSCOPY      Comment: Procedure: COLONOSCOPY;  Surgeon: Salome Giang MD;  Location:  ENDOSCOPY  No date: HYSTERECTOMY  N Wt 129 lb 3 oz (58.6 kg)   SpO2 96%   BMI 21.17 kg/m²   General:  Alert   Head:  Pt with contusion around left eye, lacerations of left jesus-orbit   Eyes:  Sclera anicteric   Nose: Nares normal..   Throat: Lips, mucosa, and tongue normal.    Neck: Supple history of subarachnoid hemorrhage. FINDINGS:  There is a 1.1 x 0.7 x .8 cm rounded focus of acute subarachnoid hemorrhage overlying the right superior frontal gyrus, not significantly changed.   New 1.5 x 0.8 cm region of acute subarachnoid hemorrhage i Registry) which includes the Dose Index Registry. PATIENT STATED HISTORY: (As transcribed by Technologist)  Patient fell after syncopal episode with laceration to left side forehead and brow area.     FINDINGS:  VENTRICLES/SULCI:  Ventricles and sulci are Radiology) NRDR ([address]) which includes the Dose Index Registry. PATIENT STATED HISTORY: (As transcribed by Technologist)  Patient fell with unspecific neck pain and c-collar placed.     FINDINGS:   There is no acute fracture, dis Hospitalist  [phone]    **Certification      PHYSICIAN Certification of Need for Inpatient Hospitalization - Initial Certification    Patient will require inpatient services that will reasonably be expected to span two midnight's based on the clinical

## 2017-11-23 NOTE — ED NOTES
Unsuccessful attempt at PIV insertion, Nikkie Lu RN at bedside with ultrasound machine for PIV insertion

## 2017-11-23 NOTE — ED INITIAL ASSESSMENT (HPI)
Presents with fall outside while walking her dog, Arrived via EMS. Has laceration to left forehead/brow area. Had possible syncopal episode, fell while walking her dog, tried to get up and fell again. Neighbors witnessed fall.

## 2017-11-23 NOTE — CONSULTS
[facility]  Neurosurgery Consult  2017    Azalia Seip Patient Status:  Inpatient    8/10/1940 MRN [id number]   [facility] 6NE-A Attending Slim Pulido MD   Hosp Day # 0 PCP Carine Pimentel MD     REASON FOR CONSULT: (120 MG TOTAL) BY MOUTH 2 (TWO) TIMES DAILY.  Disp: 180 capsule Rfl: 1 Taking   ATORVASTATIN 40 MG Oral Tab TAKE 1 TABLET BY MOUTH EVERY DAY Disp: 90 tablet Rfl: 1 Taking   ergocalciferol 00187 units Oral Cap Take 1 capsule (50,000 Units total) by mouth onc • CORONARY ARTERY DISEASE     .  CABG 4/28/13 due to L [place] disease with LIMA to LAD and SVG to OM     • DEPRESSION    • Depression    • Esophageal reflux    • Extrinsic asthma, unspecified    • GERD     EGD 2010   • Hearing impairment     no hearing aid included Cigarettes. She has a 30.00 pack-year smoking history. She has never used smokeless tobacco. She reports that she does not drink alcohol or use drugs.     PHYSICAL EXAMINATION:  Vital Signs:  /78   Pulse 92   Temp 98.2 °F (36.8 °C) (Oral)   R

## 2017-11-23 NOTE — PHYSICAL THERAPY NOTE
Physical Therapy    Orders received per neuro protocol, but pt does not have activity orders and is currently nauseous following minimal activity in room. Will hold eval today, rn Subhash aware.

## 2017-11-23 NOTE — CONSULTS
[facility]  Neurocritical Care       Name: Claudia Arreaga  MRN: [id number]  Admission Date/Time: 11/22/2017  8:09 PM  Primary Care Provider:  Herbert Baca MD        Reason for Consultation:   Traumatic [facility]  Mechanical Fall    HPI: fibrillation [facility])         Date Noted: 05/25/2017      Urinary tract infection, site unspecified         Date Noted: 05/25/2017      Contusion of right wrist, initial encounter         Date Noted: 04/24/2017      Coagulopathy [facility])         Date Noted: 04/2 Depression    • Esophageal reflux    • Extrinsic asthma, unspecified    • GERD     EGD 2010   • Hearing impairment     no hearing aids   • HIGH BLOOD PRESSURE    • HIGH CHOLESTEROL    • HYPERLIPIDEMIA    • HYPERTENSION    • Insomnia, unspecified    • MCI ( MOUTH DAILY AT BEDTIME Disp: 90 tablet Rfl: 3 Taking   Pantoprazole Sodium (PROTONIX) 40 MG Oral Tab EC Take 1 tablet (40 mg total) by mouth every morning before breakfast. Disp: 90 tablet Rfl: 3 Taking   DONEPEZIL HCL 10 MG Oral Tab TAKE 1 TABLET BY MOUTH Taking   aspirin (ECOTRIN LOW STRENGTH) 81 MG Oral Tab EC Take 81 mg by mouth daily.  Disp:  Rfl:  Taking       Codeine                 Hallucinations  Bees                    Anaphylaxis  Carbapenems               Cephalosporins            Chocolate injection 10 mg 10 mg Intravenous Q10 Min PRN   niCARdipine HCl in NaCl (CARDENE) 20 mg/200 ml premix infusion 5-15 mg/hr Intravenous Continuous PRN   phenylephrine in NaCl (JAMILAH-SYNEPHRINE) 50 mg/250 ml premix infusion SOLN 100-200 mcg/min Intravenous Cont and are negative. Vitals:   Blood pressure 134/68, pulse 92, temperature 98 °F (36.7 °C), temperature source Oral, resp. rate 16, height 166.4 cm (5' 5.5\"), weight 129 lb 3 oz (58.6 kg), SpO2 100 %, not currently breastfeeding. Body mass index is 21. 1 There is a 1.1 x 0.7 x .8 cm rounded focus of acute subarachnoid hemorrhage overlying the right superior frontal gyrus, not significantly changed.   New 1.5 x 0.8 cm region of acute subarachnoid hemorrhage is seen within the right quadrigeminal plate cister PATIENT STATED HISTORY: (As transcribed by Technologist)  Patient fell after syncopal episode with laceration to left side forehead and brow area. FINDINGS:  VENTRICLES/SULCI:  Ventricles and sulci are prominent consistent with moderate global atrophy. includes the Dose Index Registry. PATIENT STATED HISTORY: (As transcribed by Technologist)  Patient fell with unspecific neck pain and c-collar placed. FINDINGS:   There is no acute fracture, dislocation, or subluxation.  There's no lytic or blastic les 1.5 cm region of acute subarachnoid hemorrhage is seen within the right quadrigeminal plate cistern and 1.1 cm rounded focus of acute subarachnoid hemorrhage overlying the right superior frontal gyrus  2. Nausea/Vomitingdue to # 1 above.     Plan:  Neuro: complex decision making, and/or extensive discussions with the patient, family, and clinical staff. Thank you for allowing me to participate in the care of this patient.      Raul Meyers MD  Medical Director - Stroke and [facility] [phone]

## 2017-11-23 NOTE — PLAN OF CARE
NEUROLOGICAL - ADULT    • Achieves stable or improved neurological status Progressing    • Absence of seizures Progressing        Care asumed @ 0730, asleep, awakens easily to verbal stimuli,oriented,speech clear, sl facial droop, left eye remains almost c

## 2017-11-23 NOTE — PROGRESS NOTES
[facility]  Neurocritical Care APN Progress Note    NAME: Monie Cho - ROOM: 8087/7906-J - MRN: [id number] - Age: 76year old - :8/10/1940    History Of Present Illness:  Monie Cho is a 76year old female with PMHx significant of carotid artery without mention of cerebral infarction     right CEA 2000   • Other and unspecified hyperlipidemia    • PERIPHERAL VASCULAR DISEASE    • Peripheral vascular disease, unspecified ([facility] [address]    • Pneumonia, organism unspecified(486)    • PONV (p droop. Uvula and palate elevate symmetrically. Shrug shoulders normally bilaterally. The rest of the cranial nerves are grossly intact. Sensation to light touch is intact bilaterally. Motor: No Drift. Slight arm and leg weakness noted. 4/5 bilaterally. Nicardipine gtt prn to maintain BP parameters  - IVF 0.9NS @ 75/hr  - CT Brain in am, sooner if deteriorates   - Na, Mg, INR daily  - NS on consult    2. AFib, HTN, HLD, CHF  -Hold Coumadin  -Monitor INR  -Continue Lipitor  -Cardiology consult    3.  HA 2/2

## 2017-11-23 NOTE — ED PROVIDER NOTES
Patient is a 42-year-old female initially evaluated by my partner, please refer to their documentation for additional details. She had fallen and was diagnosed with subarachnoid hemorrhage.      Her care was turned over to me at change of shift pending her

## 2017-11-23 NOTE — ED NOTES
Dr Beth Tang notified of elevated BP, will recheck BP and monitor at this time. Pt just returned from CT scan.

## 2017-11-24 ENCOUNTER — APPOINTMENT (OUTPATIENT)
Dept: CT IMAGING | Facility: HOSPITAL | Age: 77
DRG: 083 | End: 2017-11-24
Attending: NEUROLOGICAL SURGERY
Payer: MEDICARE

## 2017-11-24 PROCEDURE — 99231 SBSQ HOSP IP/OBS SF/LOW 25: CPT | Performed by: NEUROLOGICAL SURGERY

## 2017-11-24 PROCEDURE — 70450 CT HEAD/BRAIN W/O DYE: CPT | Performed by: NEUROLOGICAL SURGERY

## 2017-11-24 PROCEDURE — 99233 SBSQ HOSP IP/OBS HIGH 50: CPT | Performed by: OTHER

## 2017-11-24 RX ORDER — LEVETIRACETAM 250 MG/1
250 TABLET ORAL 2 TIMES DAILY
Status: DISCONTINUED | OUTPATIENT
Start: 2017-11-24 | End: 2017-11-26

## 2017-11-24 RX ORDER — MAGNESIUM OXIDE 400 MG (241.3 MG MAGNESIUM) TABLET
400 TABLET ONCE
Status: COMPLETED | OUTPATIENT
Start: 2017-11-24 | End: 2017-11-24

## 2017-11-24 RX ORDER — LISINOPRIL 10 MG/1
10 TABLET ORAL DAILY
Status: DISCONTINUED | OUTPATIENT
Start: 2017-11-24 | End: 2017-11-25

## 2017-11-24 RX ORDER — ARIPIPRAZOLE 15 MG/1
40 TABLET ORAL EVERY 4 HOURS
Status: COMPLETED | OUTPATIENT
Start: 2017-11-24 | End: 2017-11-24

## 2017-11-24 RX ORDER — LABETALOL HYDROCHLORIDE 5 MG/ML
10 INJECTION, SOLUTION INTRAVENOUS EVERY 10 MIN PRN
Status: ACTIVE | OUTPATIENT
Start: 2017-11-24 | End: 2017-11-24

## 2017-11-24 NOTE — PROGRESS NOTES
[facility] [address] [place] [address] Cardiology  Progress Note    Moniemoses Cookdict Patient Status:  Inpatient    8/10/1940 MRN [id number]   [facility] [place] 6NE-A Attending Annabella Jo MD   Hosp Day # 1 PCP Hubert Hdz MD     Subjective:   No chest Cephalosporins            Chocolate                 Cipro [Ciprofloxaci*        Comment:SEVERE RECTAL BLEEDING  Eggs Or Egg-Derived*      Iodine (Topical)        Anaphylaxis    Comment:Severe vomiting  Milk                    Other (See Comments)    Comm 15.2  12.7  12.5   HCT  48.0  39.9  39.4   MCV  91.4  88.7  88.9   MCH  29.0  28.2  28.2   MCHC  31.7  31.8  31.7   RDW  15.0  14.6  14.3   NEPRELIM  7.51*  6.38  5.07   WBC  9.5  8.3  7.2   PLT  232.0  178.0  175.0       Recent Labs   Lab  11/22/17   221

## 2017-11-24 NOTE — PLAN OF CARE
Problem: Impaired Cognition  Goal: Patient will exhibit improved attention, thought processing and/or memory  Interventions:  - Minimize distractions in the room when full attention is required  - Consider use of a daily journal to improve memory and reduc

## 2017-11-24 NOTE — HOME CARE LIAISON
[facility] choice offered, she has a history with [facility] and wants to continue to use our service  Thank you for the referral    [address] HISTORY:  SUBARACHNOID HEMORRHAGE S/P FALL ON COUMADIN    FACILITY NAME AND DC DA

## 2017-11-24 NOTE — PROGRESS NOTES
[address] Neurosurgery Progress Note    Azalia Seip Patient Status:  Inpatient    8/10/1940 MRN [id number]   [facility] 6NE-A Attending Slim Pulido MD   Hosp Day # 1 PCP Carine Pimentel MD     CC: Traumatic [facility] 88 11/24/2017       Diagnostics:  11/24/2017 CT Brain  Stable right frontal lobe SAH without significant mass effect or midline shift. Slight interval decrease in size of the right quadrigeminal plate cistern SAH.   No new intracranial hemorrhage identifie

## 2017-11-24 NOTE — SLP NOTE
ADULT SWALLOWING EVALUATION    ASSESSMENT    ASSESSMENT/OVERALL IMPRESSION:  Patient seen for swallowing evaluation per protocol. Patient alert but lethargic. She is cooperative and follows simple commands well but with reduced delayed recall.   She denie PRESSURE    • HIGH CHOLESTEROL    • HYPERLIPIDEMIA    • HYPERTENSION    • Insomnia, unspecified    • MCI (mild cognitive impairment) 2013    stable on Aricept   • Occlusion and stenosis of carotid artery without mention of cerebral infarction     right CEA Upright;Midline (in bed)    Oral Phase of Swallow: Within Functional Limits                      Pharyngeal Phase of Swallow: Within Functional Limits           (Please note: Silent aspiration cannot be evaluated clinically.  Videofluoroscopic Swallow Study

## 2017-11-24 NOTE — PLAN OF CARE
Assumed pt care at 0730. Pt A&Ox 4, resting in bed. Neurologically intact, 5/5 strength in extremities. Positive PERRLA, VSS. Afib with paced rhythm noted on monitor. Maintaining SBP <150. Pt feeling nauseas this morning, PRN zofran given and relief noted.

## 2017-11-24 NOTE — OCCUPATIONAL THERAPY NOTE
OCCUPATIONAL THERAPY EVALUATION - INPATIENT     Room Number: 4434/0309-T  Evaluation Date: 11/24/2017  Type of Evaluation: Initial  Presenting Problem: [facility]    Physician Order: IP Consult to Occupational Therapy  Reason for Therapy: ADL/IADL Dysfunction and CHOLECYSTECTOMY  7/2010: COLONOSCOPY  10/28/2013: COLONOSCOPY      Comment: Procedure: COLONOSCOPY;  Surgeon: Adia Rothman MD;  Location: [facility] ENDOSCOPY  No date: HYSTERECTOMY  No date: OPEN HEART SURGERY (PBP)      Comment: 2002 CABG x 2  No d extremity strength is within functional limits     COORDINATION  Gross Motor    WFL    Fine Motor    WFL      ADDITIONAL TESTS                                    NEUROLOGICAL FINDINGS                   ACTIVITY TOLERANCE   Room air  No shortness of breath deficits: endurance, safety, pain, and activity tolerance. These deficits impact the patient’s ability to participate in ADLs, instrumental activities of daily living, rest and sleep, work, leisure and social participation.      The patient is functioning b

## 2017-11-24 NOTE — PHYSICAL THERAPY NOTE
PHYSICAL THERAPY EVALUATION - INPATIENT     Room Number: 8427/8458-G  Evaluation Date: 11/24/2017  Type of Evaluation: Initial  Physician Order: PT Eval and Treat    Presenting Problem: s/p fall, R frontal SAH  Reason for Therapy: Mobility Dysfunction COLONOSCOPY  10/28/2013: COLONOSCOPY      Comment: Procedure: COLONOSCOPY;  Surgeon: Cally Ch MD;  Location: [address] [place] [address] ENDOSCOPY  No date: HYSTERECTOMY  No date: OPEN HEART SURGERY (PBP)      Comment: 2002 CABG x 2  No date: OTHER SURGICAL HISTO STRENGTH ASSESSMENT  Upper extremity ROM and strength - see OT eval     Lower extremity ROM is within functional limits     Lower extremity strength is within functional limits except for the following:    Right Hip flexion  3+/5  Left Hip flexion  4/5  Ri pattern and excessive kyphosis. Pt ambulated with HHA/MOD assist for balance and guidance to bedside chair. Pt returned to sitting up in bedside chair. BP: 166/81. Pt educated on HEP, POC, and recommendations. Pt left with call light in reach. RN aware. training;Strengthening;Transfer training;Balance training  Rehab Potential : Good  Frequency (Obs): Daily  Number of Visits to Meet Established Goals: 5      CURRENT GOALS    Goal #1 Patient is able to demonstrate supine - sit EOB @ level: minimum assistan

## 2017-11-24 NOTE — PROGRESS NOTES
[facility] Hospitalist Progress Note                                                                   Enoch  8/10/1940    CC: F/U SAH    SUBJECTIVE:    Less nausea compared to yesterday.  Sofya Hidalgo succinate  50 mg Oral 2x Daily(Beta Blocker)   • atorvastatin  40 mg Oral Nightly   • Donepezil HCl  10 mg Oral Nightly   • gabapentin  300 mg Oral TID     Continuous Infusions:   • sodium chloride 75 mL/hr at 11/23/17 0116   • NiCARdipine Stopped (11/23/1

## 2017-11-24 NOTE — PROGRESS NOTES
[facility]  Neurocritical Care       Subjective: Rogelio Meredith is a(n) 76year old female s/p fall and traumatic ICH, Nausea improved.     Review of Systems    Constitutional:    Denies unusual weight loss or weight gain, fever/chills or symmetric. 5/5 b/l 2+ b/l. Sensory: Normal and symmetric to light touch. Cerebellar: Finger-nose-finger intact. Gait: Deferred.   Diagnostics:   Ct Brain Or Head (77937)    Result Date: 11/24/2017  PROCEDURE:  CT BRAIN OR HEAD (53936)  COMPARISON:  EDW swelling. The lens appears normally located. CONCLUSION:  1. Stable right frontal lobe subarachnoid hemorrhage without significant mass effect or midline shift.  2. Slight interval decrease in size of the right quadrigeminal plate cistern subarachnoid subarachnoid hemorrhage is seen within the right quadrigeminal plate cistern. 2. There is a 1.1 cm rounded focus of acute subarachnoid hemorrhage overlying the right superior frontal gyrus, unchanged.  3. Mild to moderate chronic small vessel ischemic disea inflammation. SKULL:             No evidence for fracture or osseous abnormality. OTHER:             There is a soft tissue scalp hematoma along the left frontal region without evidence of underlying fracture.  There is a laceration with gas extending to th 21:10     Approved by: Serena Guzman MD              Lab Review     Lab Results  Component Value Date   WBC 7.2 11/24/2017   HGB 12.5 11/24/2017   HCT 39.4 11/24/2017   .0 11/24/2017   CREATSERUM 0.63 11/24/2017   BUN 13 11/24/2017    11/24/20 suspension 30 mL 30 mL Oral Daily PRN   bisacodyl (DULCOLAX) rectal suppository 10 mg 10 mg Rectal Daily PRN   FLEET ENEMA (FLEET) 7-19 GM/118ML enema 133 mL 1 enema Rectal Once PRN   ondansetron HCl (ZOFRAN) injection 4 mg 4 mg Intravenous Q6H PRN   Or LFT's Q3 days     Cardiac:  · Systolic BP Goal 304-314, restart her BP meds.   · EKG and cardiac monitor      Pulmonary:  · RA   Renal:  · BUN/Cr 13/0.63  · Monitor I/O’s     Intake/Output Summary (Last 24 hours) at 11/24/17 1235  Last data filed at 11/24/1

## 2017-11-24 NOTE — PROGRESS NOTES
11/24/17 1446   Clinical Encounter Type   Visited With Patient   Routine Visit (Spiritual Care consult)   Continue Visiting No   Patient's Supportive Strategies/Resources  provided non anxious presence and emotional support, including active lis

## 2017-11-24 NOTE — CM/SW NOTE
11/24/17 1100   CM/SW Referral Data   Referral Source Physician   Reason for Referral Discharge planning   Informant Patient; Children   Patient Info   Patient's Mental Status Alert;Oriented   Patient's Home Environment House   Patient Status Prior to Ad

## 2017-11-25 PROCEDURE — 99231 SBSQ HOSP IP/OBS SF/LOW 25: CPT | Performed by: PHYSICIAN ASSISTANT

## 2017-11-25 RX ORDER — LISINOPRIL 10 MG/1
10 TABLET ORAL 2 TIMES DAILY
Status: DISCONTINUED | OUTPATIENT
Start: 2017-11-25 | End: 2017-11-27

## 2017-11-25 RX ORDER — HYDRALAZINE HYDROCHLORIDE 20 MG/ML
10 INJECTION INTRAMUSCULAR; INTRAVENOUS EVERY 2 HOUR PRN
Status: DISCONTINUED | OUTPATIENT
Start: 2017-11-25 | End: 2017-11-27

## 2017-11-25 RX ORDER — HYDRALAZINE HYDROCHLORIDE 20 MG/ML
10 INJECTION INTRAMUSCULAR; INTRAVENOUS EVERY 6 HOURS PRN
Status: DISCONTINUED | OUTPATIENT
Start: 2017-11-25 | End: 2017-11-25

## 2017-11-25 RX ORDER — LISINOPRIL 5 MG/1
5 TABLET ORAL ONCE
Status: COMPLETED | OUTPATIENT
Start: 2017-11-25 | End: 2017-11-25

## 2017-11-25 RX ORDER — AMLODIPINE BESYLATE 10 MG/1
10 TABLET ORAL ONCE
Status: COMPLETED | OUTPATIENT
Start: 2017-11-25 | End: 2017-11-25

## 2017-11-25 RX ORDER — AMLODIPINE BESYLATE 10 MG/1
10 TABLET ORAL DAILY
Status: DISCONTINUED | OUTPATIENT
Start: 2017-11-26 | End: 2017-11-27

## 2017-11-25 RX ORDER — HYDRALAZINE HYDROCHLORIDE 25 MG/1
25 TABLET, FILM COATED ORAL 3 TIMES DAILY
Status: DISCONTINUED | OUTPATIENT
Start: 2017-11-25 | End: 2017-11-27

## 2017-11-25 NOTE — PROGRESS NOTES
[facility] Hospitalist Progress Note                                                                   Enoch  8/10/1940    CC: F/U SAH    SUBJECTIVE:    Complains of rib pain.  Less nausea, Oral Daily    Or   • famoTIDine  20 mg Intravenous Daily   • metoprolol succinate  50 mg Oral 2x Daily(Beta Blocker)   • atorvastatin  40 mg Oral Nightly   • Donepezil HCl  10 mg Oral Nightly   • gabapentin  300 mg Oral TID     Continuous Infusions:   • so

## 2017-11-25 NOTE — PLAN OF CARE
Assumed care at [address]. Pt denies any pain. Confused and forgetful at times. Pt pulled IV out and stated \"I am ready to go home now. \"  New IV inserted. IV fluid infusing. Neuro check Q4hr. Pt attempted to get out of bed multiple times.  Pt ambulates wit

## 2017-11-25 NOTE — PROGRESS NOTES
[facility] [address] [place] [address] Cardiology  Progress Note    Josandra Al Patient Status:  Inpatient    8/10/1940 MRN [id number]   [facility] 6NE-A Attending Bonita Cao MD   Hosp Day # 2 PCP Leona Velazquez MD     Subjective:   No chest Hallucinations  Bees                    Anaphylaxis  Carbapenems               Cephalosporins            Chocolate                 Cipro [Ciprofloxaci*        Comment:SEVERE RECTAL BLEEDING  Eggs Or Egg-Derived*      Iodine (Topical)        Anaphylaxis BILT  1.0   --    --    --    TP  7.9   --    --    --        Recent Labs   Lab  11/23/17   0418  11/24/17   0426  11/25/17   0626   RBC  4.50  4.43  4.60   HGB  12.7  12.5  13.0   HCT  39.9  39.4  41.0   MCV  88.7  88.9  89.1   MCH  28.2  28.2  28.3   M

## 2017-11-25 NOTE — PROGRESS NOTES
[facility]  Neurosurgery Progress Note    Caro Vivas Patient Status:  Inpatient    8/10/1940 MRN [id number]   [facility] 7NE-A Attending Halina Blunt MD   Hosp Day # 2 PCP Emily Cruz MD     Subjective:  Caro Vivas intracranial hemorrhage identified. Chronic left maxillary sinusitis changes.   Left frontal scalp hematoma and left facial soft tissue swelling.     11/22/2017 CT Brain  Acute small focus of rounded subarachnoid blood on the surface of the right medial fr

## 2017-11-26 PROCEDURE — 99223 1ST HOSP IP/OBS HIGH 75: CPT | Performed by: OTHER

## 2017-11-26 RX ORDER — MAGNESIUM OXIDE 400 MG (241.3 MG MAGNESIUM) TABLET
400 TABLET ONCE
Status: COMPLETED | OUTPATIENT
Start: 2017-11-26 | End: 2017-11-26

## 2017-11-26 RX ORDER — DIVALPROEX SODIUM 250 MG/1
250 TABLET, EXTENDED RELEASE ORAL 2 TIMES DAILY
Status: DISCONTINUED | OUTPATIENT
Start: 2017-11-26 | End: 2017-11-26

## 2017-11-26 RX ORDER — DIVALPROEX SODIUM 250 MG/1
250 TABLET, EXTENDED RELEASE ORAL 2 TIMES DAILY
Status: DISCONTINUED | OUTPATIENT
Start: 2017-11-27 | End: 2017-11-27

## 2017-11-26 NOTE — PROGRESS NOTES
[facility] Cardiology  Progress Note    Daja Dire Patient Status:  Inpatient    8/10/1940 MRN [id number]   [facility] 6NE-A Attending Jo Ann Vyas MD   Hosp Day # 3 PCP Valentino Coons, MD     Subjective:   No chest 4.8 oz (58.2 kg)  10/05/17 : 125 lb (56.7 kg)  09/29/17 : 127 lb (57.6 kg)      Allergies:    Codeine                 Hallucinations  Bees                    Anaphylaxis  Carbapenems               Cephalosporins            Chocolate                 Cipro [ Recent Labs   Lab  11/24/17   0425   PTP  15.3*   INR  1.20*       No results for input(s): TROP, CK in the last 72 hours. Tele: AF / Int pacing        Assessment:    1. S/P simple fall  2. SAH on CT  3.  CABG 4/28/13 due to L Main disease with L

## 2017-11-26 NOTE — PROGRESS NOTES
[facility] Hospitalist Progress Note                                                                   Enoch  8/10/1940    CC: F/U SAH    SUBJECTIVE:    Complains of headache, improved with atorvastatin  40 mg Oral Nightly   • Donepezil HCl  10 mg Oral Nightly   • gabapentin  300 mg Oral TID     Continuous Infusions:   • sodium chloride 75 mL/hr at 11/24/17 1633   • NiCARdipine Stopped (11/23/17 0330)   • phenylephrine in NaCl       PRN: hydr

## 2017-11-26 NOTE — PLAN OF CARE
Assumed care at 0700. A&Ox3, forgetful of time. V paced on tele. VSS. Tylenol given for headache, some relief. Up to chair, tolerating well. Neuro checks Q4H, no deficits. Seizure precautions in place. Will continue to monitor.      Addendum 1500 - Dr. Ralph Fagan

## 2017-11-26 NOTE — PLAN OF CARE
Assumed care at 0700. A&Ox3, forgetful of time. V paced on tele. Elevated Bp, Dr Delmy Black paged x4. New BP meds ordered. Neuros Q4H, no changes. Family at bedside. Will continue to monitor. Addendum 2048: Pt complaining of new headache and nausea.  No neuro

## 2017-11-26 NOTE — PLAN OF CARE
Assumed care at 1900. No acute issues overnight. A/o x 3, forgetful throughout the night. Prn tylenol for c/o HA. Follows commands. Extensive facial bruising. Lacerations x 2 w/ sutures. V paced/ A fib on tele. HR 90s.  Prn IV hydralazine given for SB

## 2017-11-26 NOTE — PHYSICAL THERAPY NOTE
PHYSICAL THERAPY TREATMENT NOTE - INPATIENT    Room Number: 8362/7349-D     Session: 11   Number of Visits to Meet Established Goals: 1    Presenting Problem: s/p fall, R frontal SAH    Problem List  Principal Problem:    SAH (subarachnoid hemorrhage) ([facility] 2002 CABG x 2  No date: OTHER SURGICAL HISTORY      Comment: pacemaker  No date: OTHER SURGICAL HISTORY      Comment: L SINUS TISSUE REMOVED  No date: OTHER SURGICAL HISTORY      Comment: CAROTIDENDARTERECTOMY, RIGHT  No date: OTHER SURGICAL HISTORY      C CJ    FUNCTIONAL ABILITY STATUS  Gait Assessment   Gait Assistance: Supervision  Distance (ft):  (200)  Assistive Device: Rolling walker  Pattern: Shuffle (excessive kyphosis)          Skilled Therapy Provided:   Sit<>Stand supervision  Amb 200ft with MERRITT anderson

## 2017-11-27 VITALS
DIASTOLIC BLOOD PRESSURE: 79 MMHG | SYSTOLIC BLOOD PRESSURE: 140 MMHG | OXYGEN SATURATION: 95 % | WEIGHT: 128.31 LBS | HEIGHT: 65.5 IN | TEMPERATURE: 98 F | HEART RATE: 103 BPM | RESPIRATION RATE: 16 BRPM | BODY MASS INDEX: 21.12 KG/M2

## 2017-11-27 PROCEDURE — 95816 EEG AWAKE AND DROWSY: CPT | Performed by: OTHER

## 2017-11-27 PROCEDURE — 99233 SBSQ HOSP IP/OBS HIGH 50: CPT | Performed by: OTHER

## 2017-11-27 RX ORDER — WARFARIN SODIUM 5 MG/1
TABLET ORAL
Qty: 120 TABLET | Refills: 2 | Status: ON HOLD | OUTPATIENT
Start: 2017-11-27 | End: 2018-03-21

## 2017-11-27 RX ORDER — AMLODIPINE BESYLATE 10 MG/1
10 TABLET ORAL DAILY
Qty: 30 TABLET | Refills: 3 | Status: ON HOLD | OUTPATIENT
Start: 2017-11-28 | End: 2018-03-23

## 2017-11-27 RX ORDER — LISINOPRIL 10 MG/1
10 TABLET ORAL 2 TIMES DAILY
Qty: 60 TABLET | Refills: 3 | Status: SHIPPED | OUTPATIENT
Start: 2017-11-27 | End: 2018-04-02

## 2017-11-27 RX ORDER — HYDRALAZINE HYDROCHLORIDE 25 MG/1
25 TABLET, FILM COATED ORAL 3 TIMES DAILY
Qty: 90 TABLET | Refills: 3 | Status: SHIPPED | OUTPATIENT
Start: 2017-11-27 | End: 2018-07-16

## 2017-11-27 RX ORDER — DIVALPROEX SODIUM 250 MG/1
250 TABLET, EXTENDED RELEASE ORAL 2 TIMES DAILY
Qty: 60 TABLET | Refills: 0 | Status: SHIPPED | OUTPATIENT
Start: 2017-11-27 | End: 2017-12-18

## 2017-11-27 RX ORDER — METOPROLOL SUCCINATE 50 MG/1
50 TABLET, EXTENDED RELEASE ORAL
Qty: 60 TABLET | Refills: 3 | Status: SHIPPED | OUTPATIENT
Start: 2017-11-27 | End: 2018-04-02

## 2017-11-27 NOTE — CM/SW NOTE
Pt is ready for d/c today. Pt will go home with [address] from [facility] aware of pt's d/c today. Home

## 2017-11-27 NOTE — CONSULTS
[facility]    PATIENT'S NAME: Lazaro James   ATTENDING PHYSICIAN: MONE Espinozalarichelle Cox: Sang Vieira M.D.    PATIENT ACCOUNT#:   [de-identified]    LOCATION:  [address] [place] [address]  MEDICAL RECORD #:   [id number]       DATE OF BIRTH:  6[birth year] atrial fibrillation, paced; pacemaker insertion; depression; dementia' high cholesterol; hypertension; and seizure disorder on low-dose Keppra. PAST SURGICAL HISTORY:  Bypass surgery, pacemaker insertion.     MEDICATIONS:  Her current medications were re Give her IV for 3 doses over 24 hours, then change to p.o. 250 mg twice a day for both head contusion headache as well as seizure prevention. Side effects were explained to her. She understood. I will order EEG in the morning to decide further dose.   Sh

## 2017-11-27 NOTE — PLAN OF CARE
Assumed care at 1900. No acute issues overnight. A/o x 3, forgetful. Follows commands. Extensive facial bruising. Lacerations x 2 w/ sutures. V paced/ A fib on tele. HR 80s- 90s. Up x 1 assist and walker, slightly unsteady gait.    Seizure precaution

## 2017-11-27 NOTE — PROGRESS NOTES
[facility] Cardiology  Progress Note    Harsha Sherwood Patient Status:  Inpatient    8/10/1940 MRN [id number]   [facility] 6NE-A Attending Allison Vicente MD   [facility] Day # 4 PCP Domenic Campbell MD     Subjective:   No chest Encounters:  11/25/17 : 128 lb 4.8 oz (58.2 kg)  10/05/17 : 125 lb (56.7 kg)  09/29/17 : 127 lb (57.6 kg)      Allergies:    Codeine                 Hallucinations  Bees                    Anaphylaxis  Carbapenems               Cephalosporins            Ch RDW  14.7   NEPRELIM  4.83   WBC  6.9   PLT  196.0       No results for input(s): PTP, INR in the last 72 hours. No results for input(s): TROP, CK in the last 72 hours. Tele: AF / Int pacing        Assessment:    1. S/P simple fall  2.  SAH on CT

## 2017-11-27 NOTE — SLP NOTE
Attempted follow up for cognitive communication assessment; patient undergoing EEG. Will reattempt as schedule permits.     Rayna Peralta [address]il[address] 85 CCC-SLP  Pager [phone]

## 2017-11-27 NOTE — PHYSICAL THERAPY NOTE
PHYSICAL THERAPY TREATMENT NOTE - INPATIENT    Room Number: 7333/3527-Z     Session: 2  Number of Visits to Meet Established Goals: 1    Presenting Problem: s/p fall, R frontal SAH    Problem List  Principal Problem:    SAH (subarachnoid hemorrhage) ([facility] Surgeon: Kathleen Boyce MD;  Location: [facility] ENDOSCOPY  No date: HYSTERECTOMY  No date: OPEN HEART SURGERY (PBP)      Comment: 2002 CABG x 2  No date: OTHER SURGICAL HISTORY      Comment: pacemaker  No date: OTHER SURGICAL HISTORY      Comment: L Impairment Score: 20.91%   Standardized Score (AM-PAC Scale): 53.28   CMS Modifier (G-Code): CJ    FUNCTIONAL ABILITY STATUS  Gait Assessment   Gait Assistance: Supervision  Distance (ft): 150  Assistive Device: Rolling walker  Pattern: Shuffle  Stoop/Curb demonstrate supine - sit EOB @ level: minimum assistance MET   Goal #2 Patient is able to demonstrate transfers EOB to/from [address] at assistance level: minimum assistance MET   Goal #3 Patient is able to ambulate 50 feet with assist device: walker - rolling a

## 2017-11-27 NOTE — PROGRESS NOTES
[address] Neurology Progress Note    Ifeanyi Jarrell Patient Status:  Inpatient    8/10/1940 MRN [id number]   [facility] 7NE-A Attending Ki Key MD   [facility] Day # 4 PCP Magdalene Estrada MD         Subjective:  Marcia Arevalo midline shift. 2. Slight interval decrease in size of the right quadrigeminal plate cistern subarachnoid hemorrhage. 3. No new intracranial hemorrhage identified. 4. Chronic left maxillary sinusitis changes.   5. Left frontal scalp hematoma and left faci consciousness    Assessment/Plan:    Headache secondary to fall and SAH possible post concussive   Left SAH without mass effect  S/p fall   Chronic Atrial fib   Seizure disorder   PPM      Imaging/diagnostics:   HCT 11/24 stable right frontal SAH, slight d status:  Orientation: Alert and oriented to person, place, time  Speech fluent and conversational    CN: PERRL, EOMI with no nystagmus, VFF, smile symmetric, sensation intact, tongue and palate midline, SCM/hearing intact, otherwise, CN 2-12 intact   Motor

## 2017-11-27 NOTE — PROCEDURES
[address] EEG report    Name: Nikko Viramontes    Date of Study: 11/27/2017      Routine EEG Report    Ordering Physician: Valentine Mchugh MD                               Primary Care Physician: Jimi Ellington MD    Technical mg Oral Nightly   docusate sodium (COLACE) cap 100 mg 100 mg Oral BID   PEG 3350 (MIRALAX) powder packet 17 g 17 g Oral Daily PRN   magnesium hydroxide (MILK OF MAGNESIA) 400 MG/5ML suspension 30 mL 30 mL Oral Daily PRN   bisacodyl (DULCOLAX) rectal suppos stimulation were not performed     Epileptiform activity: None    Seizures: none    Events: none           Impression:    This EEG is normal.  No epileptiform activity, abnormal slowing or clinical or electrographic seizures were noted on this awake and hallie

## 2017-11-27 NOTE — DISCHARGE SUMMARY
General Medicine Discharge Summary     Patient ID:  Caro Vivas  76year old  8/10/1940    Admit date: 11/22/2017    Discharge date and time:  11/27/17    Attending Physician: Pavithra Shelton on coumadin  -holding coumadin 2/2 SAH- as above, risk/benefit of restarting being discussed with patient  -BB  -Cardiology following, appreciate     CAD s/p CABG, HTN, HL, SSS s/p PPM  -Cardiology following  -BB, statin     Seizure disorder  - EEG 11/27 l

## 2017-11-28 NOTE — PLAN OF CARE
NURSING DISCHARGE NOTE    Discharged Home via Wheelchair. Accompanied by Family member  Belongings Taken by patient/family. PIV and tele dc'd  Education provided on medication changes and follow up appointments.  Stroke folder provided  Pt verbalized

## 2017-11-28 NOTE — CM/SW NOTE
11/28/17 1000   Discharge disposition   Discharged to: Home-Health   Name of Tsering Proc. Vanessa Olegario 1 services after discharge Skilled home care   Discharge transportation Private car

## 2017-11-30 NOTE — CDS QUERY
Clarification – Potential Diagnosis Association  CLINICAL DOCUMENTATION CLARIFICATION FORM  Dear Doctor:  Clinical information in the patient's medical record (provided below) includes documentation of diagnoses with potential association.  For accurate ICD

## 2017-12-01 ENCOUNTER — TELEPHONE (OUTPATIENT)
Dept: NEUROLOGY | Facility: CLINIC | Age: 77
End: 2017-12-01

## 2017-12-01 NOTE — TELEPHONE ENCOUNTER
LMTCB on unidentified voicemail. Labs done inpatient. Patient has follow up with Dr Julio Anglin 12/12/17.

## 2017-12-14 ENCOUNTER — HOSPITAL ENCOUNTER (OUTPATIENT)
Dept: CT IMAGING | Facility: HOSPITAL | Age: 77
Discharge: HOME | End: 2017-12-14
Attending: PHYSICIAN ASSISTANT
Payer: MEDICARE

## 2017-12-14 DIAGNOSIS — T45.515A WARFARIN-INDUCED COAGULOPATHY (HCC): ICD-10-CM

## 2017-12-14 DIAGNOSIS — D68.32 WARFARIN-INDUCED COAGULOPATHY (HCC): ICD-10-CM

## 2017-12-14 DIAGNOSIS — I10 ESSENTIAL HYPERTENSION: ICD-10-CM

## 2017-12-14 DIAGNOSIS — I60.9 SAH (SUBARACHNOID HEMORRHAGE) (HCC): ICD-10-CM

## 2017-12-14 PROCEDURE — 70450 CT HEAD/BRAIN W/O DYE: CPT | Performed by: PHYSICIAN ASSISTANT

## 2017-12-18 ENCOUNTER — OFFICE VISIT (OUTPATIENT)
Dept: NEUROLOGY | Facility: CLINIC | Age: 77
End: 2017-12-18

## 2017-12-18 VITALS
RESPIRATION RATE: 16 BRPM | WEIGHT: 120 LBS | SYSTOLIC BLOOD PRESSURE: 128 MMHG | HEIGHT: 65 IN | DIASTOLIC BLOOD PRESSURE: 62 MMHG | BODY MASS INDEX: 19.99 KG/M2 | HEART RATE: 88 BPM

## 2017-12-18 DIAGNOSIS — R51.9 NONINTRACTABLE HEADACHE, UNSPECIFIED CHRONICITY PATTERN, UNSPECIFIED HEADACHE TYPE: ICD-10-CM

## 2017-12-18 DIAGNOSIS — I60.9 SAH (SUBARACHNOID HEMORRHAGE) (HCC): Primary | ICD-10-CM

## 2017-12-18 PROCEDURE — 99214 OFFICE O/P EST MOD 30 MIN: CPT | Performed by: OTHER

## 2017-12-18 RX ORDER — DIVALPROEX SODIUM 250 MG/1
250 TABLET, EXTENDED RELEASE ORAL 2 TIMES DAILY
Qty: 60 TABLET | Refills: 2 | Status: ON HOLD | OUTPATIENT
Start: 2017-12-18 | End: 2018-03-23

## 2017-12-18 NOTE — PROGRESS NOTES
[facility] Progress Note    HPI  Patient presents with:  Neurologic Problem:  Follow up on intracerebral hemmorhage       Jose Wallis is a 76year old woman with past medical history of atrial fibrillation (on Coumadin), CHF, COPD, sei carotid artery without mention of cerebral infarction     right CEA 2000   • Other and unspecified hyperlipidemia    • PERIPHERAL VASCULAR DISEASE    • Peripheral vascular disease, unspecified ([facility] [address]    • Pneumonia, organism unspecified(486)    • PONV (post Cigarettes       Quit date: 5/16/1993    Smokeless tobacco: Former User                       Comment: pt quit 13-14 years ago. Alcohol use:  No              Drug use: No            Other Topics            Concern  Caffeine Concern        No  Exercise TABLET BY MOUTH TWICE A DAY AS NEEDED ANXIETY, Disp: 60 tablet, Rfl: 3  •  Pantoprazole Sodium (PROTONIX) 40 MG Oral Tab EC, Take 1 tablet (40 mg total) by mouth every morning before breakfast., Disp: 90 tablet, Rfl: 3  •  DONEPEZIL HCL 10 MG Oral Tab, GREGORY pulses intact    Neck: supple, full range of motion; no carotid bruits    Neuro:  Mental status:  Orientation: Alert and oriented to person, place, time  Speech Fluent and conversational    CN  Motor: 5/5 strength throughout, tone normall; no tremor or bra seen subarachnoid hemorrhage overlying the right frontal lobe has resolved. The previously seen subarachnoid hemorrhage within the right aspect of the quadrigeminal plate cistern has resolved. No new region of acute intracranial   hemorrhage is seen.   Quinton Ballard sinus consistent with chronic sinusitis change. MASTOIDS:          No sign of acute inflammation. SKULL:             No evidence for fracture or osseous abnormality. OTHER:             Left frontal scalp hematoma again identified.  The soft tissue swelli symptoms including, but not limited to the following: sudden onset of slurred speech, numbness/tingling/weakness on one side of the body, confusion, double vision, loss of vision, vertigo,    ; patient was counseled regarding conservative management, stres

## 2017-12-18 NOTE — PATIENT INSTRUCTIONS
Continue depakote 250 mg bid  Follow up in 2 months   Refill policies:    • Allow 2-3 business days for refills; controlled substances may take longer.   • Contact your pharmacy at least 5 days prior to running out of medication and have them send an electr Authorizations  If your physician has recommended that you have a procedure or additional testing performed. [facility]) will contact your insurance carrier to obtain pre-certification or prior authorization.     Unfortunately, MARLEY

## 2018-01-31 PROBLEM — G62.9 NEUROPATHY: Status: ACTIVE | Noted: 2018-01-31

## 2018-03-19 ENCOUNTER — APPOINTMENT (OUTPATIENT)
Dept: CT IMAGING | Facility: HOSPITAL | Age: 78
DRG: 914 | End: 2018-03-19
Attending: EMERGENCY MEDICINE
Payer: MEDICARE

## 2018-03-19 ENCOUNTER — APPOINTMENT (OUTPATIENT)
Dept: CT IMAGING | Facility: HOSPITAL | Age: 78
DRG: 914 | End: 2018-03-19
Payer: MEDICARE

## 2018-03-19 ENCOUNTER — APPOINTMENT (OUTPATIENT)
Dept: GENERAL RADIOLOGY | Facility: HOSPITAL | Age: 78
DRG: 914 | End: 2018-03-19
Attending: EMERGENCY MEDICINE
Payer: MEDICARE

## 2018-03-19 ENCOUNTER — HOSPITAL ENCOUNTER (INPATIENT)
Facility: HOSPITAL | Age: 78
LOS: 3 days | Discharge: NURSING, REHAB OR LONG-TERM CARE | DRG: 914 | End: 2018-03-23
Attending: EMERGENCY MEDICINE | Admitting: HOSPITALIST
Payer: MEDICARE

## 2018-03-19 DIAGNOSIS — R26.2 INABILITY TO AMBULATE DUE TO HIP: ICD-10-CM

## 2018-03-19 DIAGNOSIS — S09.90XA CLOSED HEAD INJURY, INITIAL ENCOUNTER: Primary | ICD-10-CM

## 2018-03-19 DIAGNOSIS — S70.01XA CONTUSION OF RIGHT HIP, INITIAL ENCOUNTER: ICD-10-CM

## 2018-03-19 DIAGNOSIS — R58 RETROPERITONEAL HEMORRHAGE: ICD-10-CM

## 2018-03-19 DIAGNOSIS — K66.1 RETROPERITONEAL HEMATOMA: ICD-10-CM

## 2018-03-19 LAB
ALBUMIN SERPL-MCNC: 2.7 G/DL (ref 3.5–4.8)
ALP LIVER SERPL-CCNC: 119 U/L (ref 55–142)
ALT SERPL-CCNC: 13 U/L (ref 14–54)
APTT PPP: 53.4 SECONDS (ref 25–34)
AST SERPL-CCNC: 28 U/L (ref 15–41)
BASOPHILS # BLD AUTO: 0.07 X10(3) UL (ref 0–0.1)
BASOPHILS # BLD AUTO: 0.09 X10(3) UL (ref 0–0.1)
BASOPHILS NFR BLD AUTO: 0.5 %
BASOPHILS NFR BLD AUTO: 0.5 %
BILIRUB SERPL-MCNC: 1 MG/DL (ref 0.1–2)
BILIRUB UR QL STRIP.AUTO: NEGATIVE
BUN BLD-MCNC: 12 MG/DL (ref 8–20)
CALCIUM BLD-MCNC: 8.8 MG/DL (ref 8.3–10.3)
CHLORIDE: 102 MMOL/L (ref 101–111)
CLARITY UR REFRACT.AUTO: CLEAR
CO2: 25 MMOL/L (ref 22–32)
COLOR UR AUTO: YELLOW
CREAT BLD-MCNC: 0.66 MG/DL (ref 0.55–1.02)
EOSINOPHIL # BLD AUTO: 0 X10(3) UL (ref 0–0.3)
EOSINOPHIL # BLD AUTO: 0.01 X10(3) UL (ref 0–0.3)
EOSINOPHIL NFR BLD AUTO: 0 %
EOSINOPHIL NFR BLD AUTO: 0.1 %
ERYTHROCYTE [DISTWIDTH] IN BLOOD BY AUTOMATED COUNT: 14.3 % (ref 11.5–16)
ERYTHROCYTE [DISTWIDTH] IN BLOOD BY AUTOMATED COUNT: 14.4 % (ref 11.5–16)
GLUCOSE BLD-MCNC: 87 MG/DL (ref 70–99)
GLUCOSE UR STRIP.AUTO-MCNC: NEGATIVE MG/DL
HCT VFR BLD AUTO: 33.6 % (ref 34–50)
HCT VFR BLD AUTO: 35.6 % (ref 34–50)
HGB BLD-MCNC: 10.9 G/DL (ref 12–16)
HGB BLD-MCNC: 11.4 G/DL (ref 12–16)
IMMATURE GRANULOCYTE COUNT: 0.07 X10(3) UL (ref 0–1)
IMMATURE GRANULOCYTE COUNT: 0.07 X10(3) UL (ref 0–1)
IMMATURE GRANULOCYTE RATIO %: 0.4 %
IMMATURE GRANULOCYTE RATIO %: 0.5 %
INR BLD: 2.39 (ref 0.9–1.1)
LEUKOCYTE ESTERASE UR QL STRIP.AUTO: NEGATIVE
LYMPHOCYTES # BLD AUTO: 1.18 X10(3) UL (ref 0.9–4)
LYMPHOCYTES # BLD AUTO: 1.3 X10(3) UL (ref 0.9–4)
LYMPHOCYTES NFR BLD AUTO: 7.8 %
LYMPHOCYTES NFR BLD AUTO: 8.5 %
M PROTEIN MFR SERPL ELPH: 6.6 G/DL (ref 6.1–8.3)
MCH RBC QN AUTO: 29.3 PG (ref 27–33.2)
MCH RBC QN AUTO: 29.8 PG (ref 27–33.2)
MCHC RBC AUTO-ENTMCNC: 32 G/DL (ref 31–37)
MCHC RBC AUTO-ENTMCNC: 32.4 G/DL (ref 31–37)
MCV RBC AUTO: 91.5 FL (ref 81–100)
MCV RBC AUTO: 91.8 FL (ref 81–100)
MONOCYTES # BLD AUTO: 1.25 X10(3) UL (ref 0.1–1)
MONOCYTES # BLD AUTO: 1.41 X10(3) UL (ref 0.1–1)
MONOCYTES NFR BLD AUTO: 8.4 %
MONOCYTES NFR BLD AUTO: 9 %
NEUTROPHIL ABS PRELIM: 11.31 X10 (3) UL (ref 1.3–6.7)
NEUTROPHIL ABS PRELIM: 13.87 X10 (3) UL (ref 1.3–6.7)
NEUTROPHILS # BLD AUTO: 11.31 X10(3) UL (ref 1.3–6.7)
NEUTROPHILS # BLD AUTO: 13.87 X10(3) UL (ref 1.3–6.7)
NEUTROPHILS NFR BLD AUTO: 81.4 %
NEUTROPHILS NFR BLD AUTO: 82.9 %
NITRITE UR QL STRIP.AUTO: NEGATIVE
PH UR STRIP.AUTO: 7 [PH] (ref 4.5–8)
PLATELET # BLD AUTO: 167 10(3)UL (ref 150–450)
PLATELET # BLD AUTO: 206 10(3)UL (ref 150–450)
POTASSIUM SERPL-SCNC: 4.4 MMOL/L (ref 3.6–5.1)
PROT UR STRIP.AUTO-MCNC: NEGATIVE MG/DL
PSA SERPL DL<=0.01 NG/ML-MCNC: 26.9 SECONDS (ref 12.4–14.7)
RBC # BLD AUTO: 3.66 X10(6)UL (ref 3.8–5.1)
RBC # BLD AUTO: 3.89 X10(6)UL (ref 3.8–5.1)
RED CELL DISTRIBUTION WIDTH-SD: 48.2 FL (ref 35.1–46.3)
RED CELL DISTRIBUTION WIDTH-SD: 48.6 FL (ref 35.1–46.3)
SODIUM SERPL-SCNC: 135 MMOL/L (ref 136–144)
SP GR UR STRIP.AUTO: 1.01 (ref 1–1.03)
UROBILINOGEN UR STRIP.AUTO-MCNC: 2 MG/DL
WBC # BLD AUTO: 13.9 X10(3) UL (ref 4–13)
WBC # BLD AUTO: 16.7 X10(3) UL (ref 4–13)

## 2018-03-19 PROCEDURE — 99285 EMERGENCY DEPT VISIT HI MDM: CPT

## 2018-03-19 PROCEDURE — 86850 RBC ANTIBODY SCREEN: CPT | Performed by: EMERGENCY MEDICINE

## 2018-03-19 PROCEDURE — 96376 TX/PRO/DX INJ SAME DRUG ADON: CPT

## 2018-03-19 PROCEDURE — 73502 X-RAY EXAM HIP UNI 2-3 VIEWS: CPT | Performed by: EMERGENCY MEDICINE

## 2018-03-19 PROCEDURE — 76376 3D RENDER W/INTRP POSTPROCES: CPT | Performed by: EMERGENCY MEDICINE

## 2018-03-19 PROCEDURE — 70450 CT HEAD/BRAIN W/O DYE: CPT

## 2018-03-19 PROCEDURE — 93005 ELECTROCARDIOGRAM TRACING: CPT

## 2018-03-19 PROCEDURE — 85025 COMPLETE CBC W/AUTO DIFF WBC: CPT | Performed by: EMERGENCY MEDICINE

## 2018-03-19 PROCEDURE — 96365 THER/PROPH/DIAG IV INF INIT: CPT

## 2018-03-19 PROCEDURE — 85025 COMPLETE CBC W/AUTO DIFF WBC: CPT

## 2018-03-19 PROCEDURE — 85730 THROMBOPLASTIN TIME PARTIAL: CPT

## 2018-03-19 PROCEDURE — 30233K1 TRANSFUSION OF NONAUTOLOGOUS FROZEN PLASMA INTO PERIPHERAL VEIN, PERCUTANEOUS APPROACH: ICD-10-PCS | Performed by: HOSPITALIST

## 2018-03-19 PROCEDURE — 85730 THROMBOPLASTIN TIME PARTIAL: CPT | Performed by: EMERGENCY MEDICINE

## 2018-03-19 PROCEDURE — 86901 BLOOD TYPING SEROLOGIC RH(D): CPT | Performed by: EMERGENCY MEDICINE

## 2018-03-19 PROCEDURE — 93010 ELECTROCARDIOGRAM REPORT: CPT

## 2018-03-19 PROCEDURE — 72125 CT NECK SPINE W/O DYE: CPT

## 2018-03-19 PROCEDURE — 86900 BLOOD TYPING SEROLOGIC ABO: CPT | Performed by: EMERGENCY MEDICINE

## 2018-03-19 PROCEDURE — 80053 COMPREHEN METABOLIC PANEL: CPT | Performed by: EMERGENCY MEDICINE

## 2018-03-19 PROCEDURE — 72100 X-RAY EXAM L-S SPINE 2/3 VWS: CPT | Performed by: EMERGENCY MEDICINE

## 2018-03-19 PROCEDURE — 74176 CT ABD & PELVIS W/O CONTRAST: CPT | Performed by: EMERGENCY MEDICINE

## 2018-03-19 PROCEDURE — 73700 CT LOWER EXTREMITY W/O DYE: CPT | Performed by: EMERGENCY MEDICINE

## 2018-03-19 PROCEDURE — 81001 URINALYSIS AUTO W/SCOPE: CPT | Performed by: EMERGENCY MEDICINE

## 2018-03-19 PROCEDURE — 85610 PROTHROMBIN TIME: CPT | Performed by: EMERGENCY MEDICINE

## 2018-03-19 PROCEDURE — 85610 PROTHROMBIN TIME: CPT

## 2018-03-19 PROCEDURE — 96375 TX/PRO/DX INJ NEW DRUG ADDON: CPT

## 2018-03-19 PROCEDURE — 80053 COMPREHEN METABOLIC PANEL: CPT

## 2018-03-19 RX ORDER — MORPHINE SULFATE 4 MG/ML
2 INJECTION, SOLUTION INTRAMUSCULAR; INTRAVENOUS ONCE
Status: COMPLETED | OUTPATIENT
Start: 2018-03-19 | End: 2018-03-19

## 2018-03-20 ENCOUNTER — APPOINTMENT (OUTPATIENT)
Dept: GENERAL RADIOLOGY | Facility: HOSPITAL | Age: 78
DRG: 914 | End: 2018-03-20
Attending: EMERGENCY MEDICINE
Payer: MEDICARE

## 2018-03-20 PROBLEM — K66.1 RETROPERITONEAL HEMATOMA: Status: ACTIVE | Noted: 2018-03-20

## 2018-03-20 PROBLEM — S09.90XA CLOSED HEAD INJURY, INITIAL ENCOUNTER: Status: ACTIVE | Noted: 2018-03-20

## 2018-03-20 PROBLEM — R26.2 INABILITY TO AMBULATE DUE TO HIP: Status: ACTIVE | Noted: 2018-03-20

## 2018-03-20 PROBLEM — R58 RETROPERITONEAL HEMORRHAGE: Status: ACTIVE | Noted: 2018-03-20

## 2018-03-20 PROBLEM — S70.01XA CONTUSION OF RIGHT HIP, INITIAL ENCOUNTER: Status: ACTIVE | Noted: 2018-03-20

## 2018-03-20 PROBLEM — K68.3 RETROPERITONEAL HEMATOMA: Status: ACTIVE | Noted: 2018-03-20

## 2018-03-20 LAB
ANTIBODY SCREEN: NEGATIVE
ATRIAL RATE: 119 BPM
BASOPHILS # BLD AUTO: 0.06 X10(3) UL (ref 0–0.1)
BASOPHILS NFR BLD AUTO: 0.5 %
BUN BLD-MCNC: 13 MG/DL (ref 8–20)
CALCIUM BLD-MCNC: 8.8 MG/DL (ref 8.3–10.3)
CHLORIDE: 100 MMOL/L (ref 101–111)
CO2: 28 MMOL/L (ref 22–32)
CREAT BLD-MCNC: 0.62 MG/DL (ref 0.55–1.02)
EOSINOPHIL # BLD AUTO: 0.02 X10(3) UL (ref 0–0.3)
EOSINOPHIL NFR BLD AUTO: 0.2 %
ERYTHROCYTE [DISTWIDTH] IN BLOOD BY AUTOMATED COUNT: 14.2 % (ref 11.5–16)
GLUCOSE BLD-MCNC: 84 MG/DL (ref 70–99)
HAV IGM SER QL: 2.1 MG/DL (ref 1.7–3)
HCT VFR BLD AUTO: 30.4 % (ref 34–50)
HGB BLD-MCNC: 10 G/DL (ref 12–16)
HGB BLD-MCNC: 10.7 G/DL (ref 12–16)
HGB BLD-MCNC: 9.9 G/DL (ref 12–16)
IMMATURE GRANULOCYTE COUNT: 0.05 X10(3) UL (ref 0–1)
IMMATURE GRANULOCYTE RATIO %: 0.4 %
INR BLD: 1.42 (ref 0.9–1.1)
LYMPHOCYTES # BLD AUTO: 1.63 X10(3) UL (ref 0.9–4)
LYMPHOCYTES NFR BLD AUTO: 13 %
MCH RBC QN AUTO: 29.3 PG (ref 27–33.2)
MCHC RBC AUTO-ENTMCNC: 32.6 G/DL (ref 31–37)
MCV RBC AUTO: 89.9 FL (ref 81–100)
MONOCYTES # BLD AUTO: 1.55 X10(3) UL (ref 0.1–1)
MONOCYTES NFR BLD AUTO: 12.4 %
NEUTROPHIL ABS PRELIM: 9.19 X10 (3) UL (ref 1.3–6.7)
NEUTROPHILS # BLD AUTO: 9.19 X10(3) UL (ref 1.3–6.7)
NEUTROPHILS NFR BLD AUTO: 73.5 %
PHOSPHATE SERPL-MCNC: 3.5 MG/DL (ref 2.5–4.9)
PLATELET # BLD AUTO: 154 10(3)UL (ref 150–450)
POTASSIUM SERPL-SCNC: 4.3 MMOL/L (ref 3.6–5.1)
PSA SERPL DL<=0.01 NG/ML-MCNC: 17.9 SECONDS (ref 12.4–14.7)
Q-T INTERVAL: 326 MS
QRS DURATION: 86 MS
QTC CALCULATION (BEZET): 398 MS
R AXIS: -51 DEGREES
RBC # BLD AUTO: 3.38 X10(6)UL (ref 3.8–5.1)
RED CELL DISTRIBUTION WIDTH-SD: 46.7 FL (ref 35.1–46.3)
RH BLOOD TYPE: POSITIVE
SODIUM SERPL-SCNC: 137 MMOL/L (ref 136–144)
T AXIS: -53 DEGREES
VENTRICULAR RATE: 90 BPM
WBC # BLD AUTO: 12.5 X10(3) UL (ref 4–13)

## 2018-03-20 PROCEDURE — 84100 ASSAY OF PHOSPHORUS: CPT | Performed by: NURSE PRACTITIONER

## 2018-03-20 PROCEDURE — 85018 HEMOGLOBIN: CPT | Performed by: HOSPITALIST

## 2018-03-20 PROCEDURE — 80048 BASIC METABOLIC PNL TOTAL CA: CPT | Performed by: HOSPITALIST

## 2018-03-20 PROCEDURE — 87081 CULTURE SCREEN ONLY: CPT | Performed by: HOSPITALIST

## 2018-03-20 PROCEDURE — 86927 PLASMA FRESH FROZEN: CPT

## 2018-03-20 PROCEDURE — 71045 X-RAY EXAM CHEST 1 VIEW: CPT | Performed by: NURSE PRACTITIONER

## 2018-03-20 PROCEDURE — 85025 COMPLETE CBC W/AUTO DIFF WBC: CPT | Performed by: HOSPITALIST

## 2018-03-20 PROCEDURE — 97167 OT EVAL HIGH COMPLEX 60 MIN: CPT

## 2018-03-20 PROCEDURE — 85610 PROTHROMBIN TIME: CPT | Performed by: HOSPITALIST

## 2018-03-20 PROCEDURE — 97535 SELF CARE MNGMENT TRAINING: CPT

## 2018-03-20 PROCEDURE — 83735 ASSAY OF MAGNESIUM: CPT | Performed by: NURSE PRACTITIONER

## 2018-03-20 RX ORDER — MORPHINE SULFATE 4 MG/ML
2 INJECTION, SOLUTION INTRAMUSCULAR; INTRAVENOUS ONCE
Status: COMPLETED | OUTPATIENT
Start: 2018-03-20 | End: 2018-03-20

## 2018-03-20 RX ORDER — HYDROCODONE BITARTRATE AND ACETAMINOPHEN 5; 325 MG/1; MG/1
1 TABLET ORAL EVERY 6 HOURS PRN
Status: DISCONTINUED | OUTPATIENT
Start: 2018-03-20 | End: 2018-03-21

## 2018-03-20 RX ORDER — ALBUTEROL SULFATE 90 UG/1
2 AEROSOL, METERED RESPIRATORY (INHALATION) EVERY 6 HOURS PRN
Status: DISCONTINUED | OUTPATIENT
Start: 2018-03-20 | End: 2018-03-20

## 2018-03-20 RX ORDER — ACETAMINOPHEN 325 MG/1
650 TABLET ORAL EVERY 6 HOURS PRN
Status: DISCONTINUED | OUTPATIENT
Start: 2018-03-20 | End: 2018-03-23

## 2018-03-20 RX ORDER — LEVETIRACETAM 500 MG/1
500 TABLET ORAL 2 TIMES DAILY
Status: DISCONTINUED | OUTPATIENT
Start: 2018-03-20 | End: 2018-03-23

## 2018-03-20 RX ORDER — ATORVASTATIN CALCIUM 40 MG/1
40 TABLET, FILM COATED ORAL NIGHTLY
Status: DISCONTINUED | OUTPATIENT
Start: 2018-03-20 | End: 2018-03-23

## 2018-03-20 RX ORDER — METOPROLOL SUCCINATE 25 MG/1
25 TABLET, EXTENDED RELEASE ORAL
Status: DISCONTINUED | OUTPATIENT
Start: 2018-03-20 | End: 2018-03-21

## 2018-03-20 RX ORDER — IPRATROPIUM BROMIDE AND ALBUTEROL SULFATE 2.5; .5 MG/3ML; MG/3ML
3 SOLUTION RESPIRATORY (INHALATION) EVERY 4 HOURS PRN
Status: DISCONTINUED | OUTPATIENT
Start: 2018-03-20 | End: 2018-03-23

## 2018-03-20 RX ORDER — DONEPEZIL HYDROCHLORIDE 10 MG/1
10 TABLET, FILM COATED ORAL NIGHTLY
Status: DISCONTINUED | OUTPATIENT
Start: 2018-03-20 | End: 2018-03-23

## 2018-03-20 RX ORDER — PANTOPRAZOLE SODIUM 40 MG/1
40 TABLET, DELAYED RELEASE ORAL
Status: DISCONTINUED | OUTPATIENT
Start: 2018-03-20 | End: 2018-03-23

## 2018-03-20 RX ORDER — MORPHINE SULFATE 4 MG/ML
1 INJECTION, SOLUTION INTRAMUSCULAR; INTRAVENOUS EVERY 2 HOUR PRN
Status: DISCONTINUED | OUTPATIENT
Start: 2018-03-20 | End: 2018-03-21

## 2018-03-20 RX ORDER — MORPHINE SULFATE 4 MG/ML
2 INJECTION, SOLUTION INTRAMUSCULAR; INTRAVENOUS EVERY 2 HOUR PRN
Status: DISCONTINUED | OUTPATIENT
Start: 2018-03-20 | End: 2018-03-21

## 2018-03-20 RX ORDER — MORPHINE SULFATE 4 MG/ML
4 INJECTION, SOLUTION INTRAMUSCULAR; INTRAVENOUS EVERY 2 HOUR PRN
Status: DISCONTINUED | OUTPATIENT
Start: 2018-03-20 | End: 2018-03-21

## 2018-03-20 RX ORDER — ONDANSETRON 2 MG/ML
4 INJECTION INTRAMUSCULAR; INTRAVENOUS EVERY 6 HOURS PRN
Status: DISCONTINUED | OUTPATIENT
Start: 2018-03-20 | End: 2018-03-23

## 2018-03-20 RX ORDER — FLUTICASONE PROPIONATE 50 MCG
2 SPRAY, SUSPENSION (ML) NASAL DAILY
Status: DISCONTINUED | OUTPATIENT
Start: 2018-03-20 | End: 2018-03-23

## 2018-03-20 RX ORDER — SODIUM CHLORIDE 9 MG/ML
INJECTION, SOLUTION INTRAVENOUS CONTINUOUS
Status: DISCONTINUED | OUTPATIENT
Start: 2018-03-20 | End: 2018-03-22

## 2018-03-20 NOTE — ED PROVIDER NOTES
Ct Brain Or Head (57774)    Result Date: 3/19/2018  PROCEDURE:  CT BRAIN OR HEAD (68758)  COMPARISON:  [facility] , CT BRAIN OR HEAD (31327), 12/14/2017, 17:05. [facility] , CT BRAIN OR HEAD ([id number]), 11/24/2017, 5:41.   INDICATIONS:  fall  TECHNIQUE:  Noncontrast Radiology) [facility] ([address]) which includes the Dose Index Registry  PATIENT STATED HISTORY: (As transcribed by Technologist)  Patient with fall today, right hip pain.     FINDINGS:  No acute fractures or osseous lesions are identified Multilevel degenerative disc disease greatest at the C5-C6 level 7    Dictated by: Patrick Lim MD on 3/19/2018 at 20:45     Approved by: Patrick Lim MD            Xr Lumbar Spine (min 2 Views) (cpt=72100)    Result Date: 3/19/2018  PROCEDURE:  XR There is nodularity noted within the right lung from 12/16/2013 within the right upper and right middle lobes. LIVER:  Normal in shape and contour but limited evaluation without contrast.  Minimal perihepatic ascites. BILIARY:   Cholecystectomy clips. Ruby NAVARRETE TECHNIQUE:  Unilateral 2 to 3 views of the hip and pelvis if performed. COMPARISON:  [facility] , XR PELVIS (1 VIEW) (CPT=72170), 7/26/2017, 9:53. [facility] , CT ABD(C/S)/PELVIS(C) (SET), 1/04/2007, 11:12.   INDICATIONS:  fall  PATIENT STATED HISTORY: (As transc

## 2018-03-20 NOTE — CM/SW NOTE
03/20/18 1500   CM/SW Referral Data   Referral Source Physician   Reason for Referral Discharge planning   Informant Patient;Spouse   Social History   Recreational Drug/Alcohol Use no   Major Changes Last 6 Months no   Domestic/Partner Violence no   See Mendoza and has gone to rehab twice. MSW will resume [facility] and add  services eval. MSW will continue to monitor for any additional needs the patient may have.     Tyesha Matos LCSW

## 2018-03-20 NOTE — CONSULTS
[facility]  Report of Consultation    Jo Ann Magaña Patient Status:  Inpatient    8/10/1940 MRN [id number]   [facility] 4SW-A Attending Panfilo Villalpando MD   Hosp Day # 0 PCP Hannah Marin MD     Reason for Consultation:    [facility] ([facility]    • Pneumonia, organism unspecified(486)    • PONV (postoperative nausea and vomiting)    • Pulmonary embolism ([facility]    • Seizure disorder ([facility]    • Unspecified essential hypertension    • Unspecified sleep apnea    • Unspecified sleep apnea SPLIT states she can have 2%             milk.   Pcn [Bicillin L-A]          Comment:SHORTNESS OF BREATH  Pollen                    Quinolones                Shellfish               Anaphylaxis    Current Medications:      Current Facility-Administered Medication DAILY WITH BREAKFAST. Disp: 30 tablet Rfl: 5   PROPRANOLOL HCL  MG Oral Capsule SR 24 Hr TAKE 1 CAPSULE (120 MG TOTAL) BY MOUTH 2 (TWO) TIMES DAILY.  Disp: 180 capsule Rfl: 0   gabapentin (NEURONTIN) 300 MG Oral Cap Take 2 capsules (600 mg total) by m morning before breakfast. Disp: 90 tablet Rfl: 3   DONEPEZIL HCL 10 MG Oral Tab TAKE 1 TABLET BY MOUTH EVERY DAY AT NIGHT Disp: 90 tablet Rfl: 3   Albuterol Sulfate HFA (PROAIR HFA) 108 (90 BASE) MCG/ACT Inhalation Aero Soln Inhale 2 puffs into the lungs e Dose information is transmitted to the [facility] [employer] of Radiology) NRDR ([address]) which includes the Dose Index Registry.   PATIENT STATED HISTORY: (As transcribed by Technologist)  Patient with frequenct falls, hit the back fluid noted within the retroperitoneum which may represent blood products. Atheromatous calcifications are present. CONCLUSION:  1. No acute fractures.  2.  Not well visualized is right psoas muscle hematoma with small amount of retroperitoneal blood views were obtained. COMPARISON:  [facility] , XR LUMBAR SPINE (MIN 4 VIEWS) (CPT=72110), 5/24/2017, 23:17. [facility] , XR LUMBAR SPINE (MIN 4 VIEWS) (CPT=72110), 4/25/2017, 18:59.   INDICATIONS:  fall  PATIENT STATED HISTORY: (As transcribed by Technologist)  R jesus-pancreatic inflammatory stranding ADRENALS:  Normal.  No mass or enlargement. KIDNEYS:  The kidneys are symmetric in size and shape without evidence of hydronephrosis. No obstructing urolithiasis.   Right renal cyst. BOWEL/MESENTERY:  Bowel is normal seen in the aorta. Surgical clips are. There are sternotomy wires present. There is a small area of infiltrate or atelectasis seen within the right upper lobe. Correlate clinically for pneumonia. No large consolidation or pleural effusion seen.   No pn for long-term anticoagulant use     Syncope and collapse     At risk for falling     Seizure [facility])     Gait abnormality     Atrial fibrillation, unspecified type (HCC)     Contusion of right wrist, initial encounter     Coagulopathy (HCC)     Weakness gene

## 2018-03-20 NOTE — ED INITIAL ASSESSMENT (HPI)
Pt slipped and fell going to the bathroom this am injuring her right hip and left elbow. Family helped her to bed and hasn't walked since. Pt fell 2 days ago when the dog knocked her over causing her to hit the back of her head and since that fall has felt

## 2018-03-20 NOTE — PLAN OF CARE
Received pt resting in bed. 3L NC. Neuro checks Q4 hrs. Forgetful, but answers questions correctly. Some weakness in RLE (attributed to pain from fall). Delayed responses at times. A-fib with intermittent v-pacing on monitor. VSS. Hgb Q6 hrs.  Stable

## 2018-03-20 NOTE — HOME CARE LIAISON
PTNT IS NOT CURRENT WITH [facility]. PTNT HAS A HISTORY WITH [facility].  PLEASE PUT IN [facility] ORDER AND TNL WILL FOLLOW UP    THANKS  Marie Watts

## 2018-03-20 NOTE — CONSULTS
Pulmonary H&P/Consult       NAME: Lilly Gibson - ROOM: [address] [place] [address]-[facility] MRN: [id number] - Age: 76year old - :  8/10/1940    Date of Admission: 3/19/2018  7:09 PM  Admission Diagnosis: Retroperitoneal hemorrhage [R58]  Retroperitoneal hematoma [K66.1]  [place] PERIPHERAL VASCULAR DISEASE    • Peripheral vascular disease (HCC)    • Peripheral vascular disease, unspecified (HCC)    • Pneumonia, organism unspecified(486)    • PONV (postoperative nausea and vomiting)    • Pulmonary embolism (HCC)    • Seizure disord TIMES DAILY. Disp: 180 capsule Rfl: 0 3/19/2018 at Unknown time   gabapentin (NEURONTIN) 300 MG Oral Cap Take 2 capsules (600 mg total) by mouth 3 (three) times daily.  Disp: 180 capsule Rfl: 12 3/19/2018 at Unknown time   ATORVASTATIN 40 MG Oral Tab TAKE 1 Restart on 11/30/17. Take 1/2 to 1 tablet daily as directed by [facility].  Disp: 120 tablet Rfl: 2 3/18/2018   DONEPEZIL HCL 10 MG Oral Tab TAKE 1 TABLET BY MOUTH EVERY DAY AT NIGHT Disp: 90 tablet Rfl: 3 3/18/2018   Albuterol Sulfate HFA ( for the 3/19/18 encounter [facility] Encounter):  BUTALBITAL-APAP-CAFFEINE -40 MG Oral Tab TAKE 1 TABLET BY MOUTH TWICE DAILY AS NEEDED FOR PAIN Disp: 60 tablet Rfl: 0   LEVETIRACETAM 500 MG Oral Tab TAKE 1 TABLET (500 MG TOTAL) BY MOUTH 2 (TWO) TIMES DISKUS) 250-50 MCG/DOSE Inhalation Aerosol Powder, Breath Activated Inhale 1 puff into the lungs every 12 (twelve) hours. Disp:  Rfl:    Pramipexole Dihydrochloride 0.125 MG Oral Tab Take 2 tablets (0.25 mg total) by mouth nightly.  Disp: 60 tablet Rfl: 5 : 129 lb (58.5 kg)        Intake/Output Summary (Last 24 hours) at 03/20/18 0737  Last data filed at 03/20/18 0617   Gross per 24 hour   Intake              650 ml   Output              450 ml   Net              200 ml       /69   Pulse 89   Temp 98. started on March 13,2018. The new INR reference range is 0.90-1. 10.     03/13/2018 3.3 (A) 2 - 3 Final   ----------  BETA NATRIURETIC PEPTIDE   Date/Time Value Ref Range Status   09/05/2013 12:30  (H) 2 - 99 pg/mL Final   Comment:   [facility]

## 2018-03-20 NOTE — PROGRESS NOTES
ICU  Critical Care APRN Progress Note    NAME: Jo Al - ROOM: [address] [place] [address] - MRN: [id number] - Age: 76year old - :8/10/1940    History Of Present Illness:  Jo Al is a 76year old female with PMHx significant for CHF, AF-on coumadin, CAD, car Asthma    • ATRIAL FIBRILLATION     s/p pacemaker 2005. on coumadin. • CONGESTIVE HEART FAILURE    • COPD    • CORONARY ARTERY DISEASE     .  CABG 4/28/13 due to [place] disease with LIMA to LAD and SVG to OM     • DEPRESSION    • Depression    • Esophagea bilaterally, posterior bibasilar crackles, respirations unlabored  Heart: Irregular rate and rhythm, S1 and S2 normal, + murmur, no rub or gallop  Abdomen: Soft, non-tender, bowel sounds active all four quadrants, no masses, no organomegaly, small bruise t degenerative disc disease. Dictated by: Bill Rojo MD on 3/19/2018 at 21:27     Approved by: Bill Rojo MD            Ct Abdomen+pelvis(cpt=74176)    Result Date: 3/19/2018  CONCLUSION:  1.   There are 2 areas of hyperdensity noted within the no ICH or fracture, +scalp hematoma--hx of seizures  -Monitor neuro exam q4h  -Continue seizure medications--Keppra 500mg BID per home medications    3.   Multiple mechanical falls with injury  -Consider with holding further anticoagulation given large numb

## 2018-03-20 NOTE — OCCUPATIONAL THERAPY NOTE
OCCUPATIONAL THERAPY EVALUATION - INPATIENT     Room Number: 471/471-A  Evaluation Date: 3/20/2018  Type of Evaluation: Initial  Presenting Problem: Retroperitoneal bleed, s/p fall; closed head injry    Physician Order: IP Consult to Occupational Therapy Retroperitoneal hematoma    Retroperitoneal hemorrhage      Past Medical History  Past Medical History:   Diagnosis Date   • Anemia 2013    normal coloncopy, egd, capulse endoscopy   • Arrhythmia     A-Fib diagnosed 2005   • Asthma    • ATRIAL FIBRILLATIO TISSUE REMOVED  No date: OTHER SURGICAL HISTORY      Comment: CAROTIDENDARTERECTOMY, RIGHT  No date: OTHER SURGICAL HISTORY      Comment: BLADDER LIFT  9/2005: PACEMAKER      Comment: Medtronic pacemaker    OCCUPATIONAL PROFILE    HOME SITUATION  Type of H WFL    Behavioral/Emotional/Social: WFL    RANGE OF MOTION AND STRENGTH ASSESSMENT  Upper extremity ROM is within functional limits     Upper extremity strength is within functional limits     COORDINATION  Gross Motor    [facility]    Fine Motor    [facility]      DENZEL session/findings; All patient questions and concerns addressed; Alarm set; Family present; Discussed recommendations with /    ASSESSMENT     Patient is a 76year old female admitted on 3/19/2018 for retroperitoneal bleed, s/p fall; cl Decision Making HIGH - Analysis of occupational profile, comprehensive assessments, multiple treatment options    Overall Complexity HIGH     OT Discharge Recommendations: 24 hour care/supervision;Home  OT Device Recommendations: 3-in-1 commode;Azalea lopez;S

## 2018-03-20 NOTE — CONSULTS
[facility] [address] [place] [address] Cardiology  Report of Consultation    Lilly Troyjose francisco Patient Status:  Inpatient    8/10/1940 MRN [id number]   [facility] 4SW-A Attending Saritha Sanches MD   Hosp Day # 0 PCP Reena Carranza MD     Reason for Cons Extrinsic asthma, unspecified    • GERD     EGD 2010   • Hearing impairment     no hearing aids   • HIGH BLOOD PRESSURE    • HIGH CHOLESTEROL    • History of blood transfusion    • HYPERLIPIDEMIA    • HYPERTENSION    • Insomnia, unspecified    • MCI (mild Years: 30.00        Types: Cigarettes     Quit date: 5/16/1993  Smokeless tobacco: Former User                     Comment: pt quit 13-14 years ago. Alcohol use:  No                       Medications:   Scheduled:   • Donepezil HCl  10 mg Oral Nightly Tab Take 1 tablet (25 mg total) by mouth 3 (three) times daily. Disp: 90 tablet Rfl: 3   lisinopril 10 MG Oral Tab Take 1 tablet (10 mg total) by mouth 2 (two) times daily.  Disp: 60 tablet Rfl: 3   Metoprolol Succinate ER 50 MG Oral Tablet 24 Hr Take 1 tab Egg-Derived*      Iodine (Topical)        Anaphylaxis    Comment:Severe vomiting  Milk                    Other (See Comments)    Comment:Difficulty breathing. Pt states she can have 2%             milk.   Pcn [Bicillin L-A]          Comment:SHORTNESS OF BR 4.4  4.3   CL  102  100*   CO2  25.0  28.0   ALKPHO  119   --    AST  28   --    ALT  13*   --    BILT  1.0   --    TP  6.6   --        Recent Labs   Lab  03/19/18   1927  03/19/18   2350  03/20/18   0644  03/20/18   1203   RBC  3.66*  3.89  3.38*   --

## 2018-03-20 NOTE — H&P
[facility] Hospitalist History and Physical      Patient presents with:  Fall (musculoskeletal, neurologic)       PCP: Abner Aviles MD      History of Present Illness: Patient is a 76year old female with PMH sig for COPD, CAD s/p CABG, A fib on coumadin, H • Unspecified sleep apnea SPLIT 9-25-13    AHI 55 RDI 60  Sao2 giovana 88% CPAP 12 [employer]   • Visual impairment       Past Surgical History:  No date: BYPASS SURGERY      Comment: 4/28/13  No date: CHOLECYSTECTOMY  7/2010: COLONOSCOPY  10/28/2013: Oley Miracle for what is stated in HPI.       OBJECTIVE:  /78 (BP Location: Left arm)   Pulse 77   Temp 98.3 °F (36.8 °C) (Temporal)   Resp 18   Ht 5' 5\" (1.651 m)   Wt 126 lb 8.7 oz (57.4 kg)   SpO2 97%   BMI 21.06 kg/m²   General:  Alert, no distress   Head:  N frequenct falls, hit the back of her head, intermittent dizziness. FINDINGS:  There is cerebral atrophy with symmetric prominence of the ventricles.  There are patchy areas of low attenuation in the periventricular and deep white matter which are nonspec amount of retroperitoneal blood.   Please refer to CT abdomen report for further evaluation     Dictated by: Roosevelt Richey MD on 3/19/2018 at 22:44     Approved by: Roosevelt Richey MD            Ct Spine Cervical (cpt=72125)    Result Date: 3/19/2018  P transcribed by Technologist)  Right hip and lower back pain post fall. FINDINGS:    Lumbar vertebral alignment demonstrates mild levoconvex curvature. Surgical clips within the right abdomen. .  No acute fractures or osseous lesions are identified.   Mul BOWEL/MESENTERY:  Bowel is normal in caliber. No evidence of obstruction. Colonic diverticulosis without evidence of diverticulitis. There are probable right psoas muscle hematomas measuring 2.9 x 2.7 x 4.0 cm and 1.9 x 2.2 x 2.0 cm. Imsael Guess within consolidation or pleural effusion seen. No pneumothorax. CONCLUSION:  1. Small right upper lobe infiltrate or atelectasis. Correlate clinically for pneumonia. 2.  Heart size upper normal.  Status post CABG. 3.  Dual lead pacemaker.      Dictated by: hospital records reviewed.    [facility] hospitalist to continue to follow patient while in  [address] MD  [place]  [phone]    **Certification      PHYSICIAN Certification of Need for Inpatient Hospitalization - Initial Certification

## 2018-03-20 NOTE — ED PROVIDER NOTES
Patient Seen in: [facility] Emergency Department    History   Patient presents with:  Fall (musculoskeletal, neurologic)    Stated Complaint: fall    HPI    68-year-old female brought in at the urging of her  for mechanical falls.   She fell 2 of carotid artery without mention of cerebral infarction     right CEA 2000   • Other and unspecified hyperlipidemia    • PERIPHERAL VASCULAR DISEASE    • Peripheral vascular disease, unspecified ([facility] [address]    • Pneumonia, organism unspecified(486)    • PONV (p 21.46 kg/m²         Physical Exam    Constitutional: Pt is oriented to person, place, and time. Appears well-developed and well-nourished. Head: Normocephalic and atraumatic. No tenderness of the facial bones.     Nose: Nose normal.   Eyes: EOM are norm heparin anti-Xa units/mL.      CBC W/ DIFFERENTIAL - Abnormal; Notable for the following:     WBC 13.9 (*)     RBC 3.66 (*)     HGB 10.9 (*)     HCT 33.6 (*)     RDW-SD 48.2 (*)     Neutrophil Absolute Prelim 11.31 (*)     Neutrophil Absolute 11.31 (*) there is no intra-abdominal cause for this pain. My colleague will follow up on the results and notify any consultants if that is necessary. I plan for her to be admitted to the medical floor.     She will be admitted primarily to the [facility] hospitalist.

## 2018-03-20 NOTE — PLAN OF CARE
Received patient from ED. A&Ox3-4, forgetful (known hx of dementia). PERRLA. Moves all extremities. Intermittently delayed responses. Seizure precautions. Wean O2 as tolerated -- on 3L NC. Controlled A fib w/ intermittent V pacing. VSS.     Vit K

## 2018-03-21 ENCOUNTER — APPOINTMENT (OUTPATIENT)
Dept: GENERAL RADIOLOGY | Facility: HOSPITAL | Age: 78
DRG: 914 | End: 2018-03-21
Attending: NURSE PRACTITIONER
Payer: MEDICARE

## 2018-03-21 LAB
BASOPHILS # BLD AUTO: 0.05 X10(3) UL (ref 0–0.1)
BASOPHILS NFR BLD AUTO: 0.5 %
BLOOD TYPE BARCODE: 6200
BUN BLD-MCNC: 10 MG/DL (ref 8–20)
CALCIUM BLD-MCNC: 8.8 MG/DL (ref 8.3–10.3)
CHLORIDE: 97 MMOL/L (ref 101–111)
CO2: 30 MMOL/L (ref 22–32)
CREAT BLD-MCNC: 0.61 MG/DL (ref 0.55–1.02)
EOSINOPHIL # BLD AUTO: 0.12 X10(3) UL (ref 0–0.3)
EOSINOPHIL NFR BLD AUTO: 1.2 %
ERYTHROCYTE [DISTWIDTH] IN BLOOD BY AUTOMATED COUNT: 13.9 % (ref 11.5–16)
GLUCOSE BLD-MCNC: 96 MG/DL (ref 70–99)
HAV IGM SER QL: 1.9 MG/DL (ref 1.7–3)
HCT VFR BLD AUTO: 35.2 % (ref 34–50)
HGB BLD-MCNC: 10.5 G/DL (ref 12–16)
HGB BLD-MCNC: 11.3 G/DL (ref 12–16)
HGB BLD-MCNC: 11.4 G/DL (ref 12–16)
HGB BLD-MCNC: 11.5 G/DL (ref 12–16)
HGB BLD-MCNC: 11.7 G/DL (ref 12–16)
IMMATURE GRANULOCYTE COUNT: 0.03 X10(3) UL (ref 0–1)
IMMATURE GRANULOCYTE RATIO %: 0.3 %
INR BLD: 1.2 (ref 0.9–1.1)
LYMPHOCYTES # BLD AUTO: 1.21 X10(3) UL (ref 0.9–4)
LYMPHOCYTES NFR BLD AUTO: 12.4 %
MCH RBC QN AUTO: 29.4 PG (ref 27–33.2)
MCHC RBC AUTO-ENTMCNC: 32.1 G/DL (ref 31–37)
MCV RBC AUTO: 91.4 FL (ref 81–100)
MONOCYTES # BLD AUTO: 1.21 X10(3) UL (ref 0.1–1)
MONOCYTES NFR BLD AUTO: 12.4 %
NEUTROPHIL ABS PRELIM: 7.12 X10 (3) UL (ref 1.3–6.7)
NEUTROPHILS # BLD AUTO: 7.12 X10(3) UL (ref 1.3–6.7)
NEUTROPHILS NFR BLD AUTO: 73.2 %
PHOSPHATE SERPL-MCNC: 3.1 MG/DL (ref 2.5–4.9)
PLATELET # BLD AUTO: 212 10(3)UL (ref 150–450)
POTASSIUM SERPL-SCNC: 3.7 MMOL/L (ref 3.6–5.1)
PSA SERPL DL<=0.01 NG/ML-MCNC: 15.7 SECONDS (ref 12.4–14.7)
RBC # BLD AUTO: 3.85 X10(6)UL (ref 3.8–5.1)
RED CELL DISTRIBUTION WIDTH-SD: 46.9 FL (ref 35.1–46.3)
SODIUM SERPL-SCNC: 136 MMOL/L (ref 136–144)
WBC # BLD AUTO: 9.7 X10(3) UL (ref 4–13)

## 2018-03-21 PROCEDURE — 85610 PROTHROMBIN TIME: CPT | Performed by: INTERNAL MEDICINE

## 2018-03-21 PROCEDURE — 83735 ASSAY OF MAGNESIUM: CPT | Performed by: NURSE PRACTITIONER

## 2018-03-21 PROCEDURE — 85025 COMPLETE CBC W/AUTO DIFF WBC: CPT | Performed by: INTERNAL MEDICINE

## 2018-03-21 PROCEDURE — 97162 PT EVAL MOD COMPLEX 30 MIN: CPT

## 2018-03-21 PROCEDURE — 85018 HEMOGLOBIN: CPT | Performed by: HOSPITALIST

## 2018-03-21 PROCEDURE — 84100 ASSAY OF PHOSPHORUS: CPT | Performed by: NURSE PRACTITIONER

## 2018-03-21 PROCEDURE — 80048 BASIC METABOLIC PNL TOTAL CA: CPT | Performed by: NURSE PRACTITIONER

## 2018-03-21 PROCEDURE — 71045 X-RAY EXAM CHEST 1 VIEW: CPT | Performed by: NURSE PRACTITIONER

## 2018-03-21 PROCEDURE — 36415 COLL VENOUS BLD VENIPUNCTURE: CPT

## 2018-03-21 PROCEDURE — 97116 GAIT TRAINING THERAPY: CPT

## 2018-03-21 PROCEDURE — 4B02XSZ MEASUREMENT OF CARDIAC PACEMAKER, EXTERNAL APPROACH: ICD-10-PCS | Performed by: INTERNAL MEDICINE

## 2018-03-21 RX ORDER — PROPRANOLOL HYDROCHLORIDE 120 MG/1
120 CAPSULE, EXTENDED RELEASE ORAL 2 TIMES DAILY
Status: ON HOLD | COMMUNITY
End: 2018-03-23

## 2018-03-21 RX ORDER — BUTALBITAL, ACETAMINOPHEN AND CAFFEINE 50; 325; 40 MG/1; MG/1; MG/1
1 TABLET ORAL 2 TIMES DAILY PRN
COMMUNITY
End: 2018-05-03

## 2018-03-21 RX ORDER — TRAMADOL HYDROCHLORIDE 50 MG/1
50 TABLET ORAL EVERY 6 HOURS PRN
Status: DISCONTINUED | OUTPATIENT
Start: 2018-03-21 | End: 2018-03-23

## 2018-03-21 RX ORDER — PANTOPRAZOLE SODIUM 40 MG/1
40 TABLET, DELAYED RELEASE ORAL
COMMUNITY
End: 2019-07-31

## 2018-03-21 RX ORDER — WARFARIN SODIUM 5 MG/1
2.5 TABLET ORAL SEE ADMIN INSTRUCTIONS
Status: ON HOLD | COMMUNITY
End: 2018-03-23

## 2018-03-21 RX ORDER — WARFARIN SODIUM 5 MG/1
5 TABLET ORAL SEE ADMIN INSTRUCTIONS
Status: ON HOLD | COMMUNITY
End: 2018-03-23

## 2018-03-21 RX ORDER — LEVETIRACETAM 500 MG/1
500 TABLET ORAL 2 TIMES DAILY
COMMUNITY
End: 2018-05-03 | Stop reason: ALTCHOICE

## 2018-03-21 RX ORDER — DONEPEZIL HYDROCHLORIDE 10 MG/1
10 TABLET, FILM COATED ORAL NIGHTLY
COMMUNITY
End: 2020-01-05

## 2018-03-21 RX ORDER — CLONAZEPAM 0.5 MG/1
0.5 TABLET ORAL 2 TIMES DAILY PRN
COMMUNITY
End: 2018-06-02

## 2018-03-21 RX ORDER — LISINOPRIL 10 MG/1
10 TABLET ORAL 2 TIMES DAILY
Status: DISCONTINUED | OUTPATIENT
Start: 2018-03-21 | End: 2018-03-22

## 2018-03-21 RX ORDER — POTASSIUM CHLORIDE 20 MEQ/1
40 TABLET, EXTENDED RELEASE ORAL ONCE
Status: COMPLETED | OUTPATIENT
Start: 2018-03-21 | End: 2018-03-21

## 2018-03-21 RX ORDER — HALOPERIDOL 5 MG/ML
1 INJECTION INTRAMUSCULAR EVERY 6 HOURS PRN
Status: DISCONTINUED | OUTPATIENT
Start: 2018-03-21 | End: 2018-03-23

## 2018-03-21 RX ORDER — FLUTICASONE PROPIONATE 50 MCG
2 SPRAY, SUSPENSION (ML) NASAL DAILY
COMMUNITY
End: 2019-07-31

## 2018-03-21 RX ORDER — METOPROLOL SUCCINATE 50 MG/1
50 TABLET, EXTENDED RELEASE ORAL
Status: DISCONTINUED | OUTPATIENT
Start: 2018-03-21 | End: 2018-03-23

## 2018-03-21 RX ORDER — HALOPERIDOL 5 MG/ML
1 INJECTION INTRAMUSCULAR EVERY 6 HOURS PRN
Status: DISCONTINUED | OUTPATIENT
Start: 2018-03-21 | End: 2018-03-21

## 2018-03-21 RX ORDER — ATORVASTATIN CALCIUM 40 MG/1
40 TABLET, FILM COATED ORAL DAILY
COMMUNITY
End: 2018-06-15

## 2018-03-21 RX ORDER — METOPROLOL SUCCINATE 25 MG/1
25 TABLET, EXTENDED RELEASE ORAL ONCE
Status: COMPLETED | OUTPATIENT
Start: 2018-03-21 | End: 2018-03-21

## 2018-03-21 RX ORDER — MELATONIN
325
COMMUNITY

## 2018-03-21 NOTE — PHYSICAL THERAPY NOTE
PHYSICAL THERAPY EVALUATION - INPATIENT     Room Number: 4065/5647-Q  Evaluation Date: 3/21/2018  Type of Evaluation: Initial  Physician Order: PT Eval and Treat    Presenting Problem: mechanical fall resultingin CHI and retroperitoneal hemorrhage  R Retroperitoneal hematoma    Retroperitoneal hemorrhage      Past Medical History  Past Medical History:   Diagnosis Date   • Anemia 2013    normal coloncopy, egd, capulse endoscopy   • Arrhythmia     A-Fib diagnosed 2005   • Asthma    • ATRIAL FIBRILLATION TISSUE REMOVED  No date: OTHER SURGICAL HISTORY      Comment: CAROTIDENDARTERECTOMY, RIGHT  No date: OTHER SURGICAL HISTORY      Comment: BLADDER LIFT  9/2005: PACEMAKER      Comment: Medtronic pacemaker    HOME SITUATION  Type of Home: [address] ACTIVITY TOLERANCE  O2 Saturation with activity 93%  Room air  No shortness of breath    AM-PAC '6-Clicks' INPATIENT SHORT FORM - BASIC MOBILITY  How much difficulty does the patient currently have. ..  -   Turning over in bed (including impacting therapy include CAD, s/p CABG, pacemaker, A-fib, COPD and neuropathy.   In this PT evaluation, the patient presents with the following impairments impaired cognition/motor planning, impaired standing balance, and decreased strength all 4 extremiti

## 2018-03-21 NOTE — PROGRESS NOTES
[facility]  Progress Note    Luis Martinez Patient Status:  Inpatient    8/10/1940 MRN [id number]   [facility] 4SW-A Attending Forrest Leon MD   Hosp Day # 1 PCP Mercedes Sicard, MD     Subjective:    Patient continues to c/o abnormality     Atrial fibrillation, unspecified type (HCC)     Contusion of right wrist, initial encounter     Coagulopathy (HCC)     Weakness generalized     Nausea vomiting and diarrhea     Chronic atrial fibrillation (HCC)     Urinary tract infection,

## 2018-03-21 NOTE — PLAN OF CARE
Received pt resting in bed on 2L NC. Neurologically stable. SBP elevated this morning as high as 180's. Home BP meds restarted, and goal SBP<160/DBP<85 per cardiology. Voiding in bedpan. Norco x1 for pain to R hip. Orders to transfer to tele.     Repo

## 2018-03-21 NOTE — PLAN OF CARE
CARDIOVASCULAR - ADULT    • Absence of cardiac arrhythmias or at baseline Progressing        HEMATOLOGIC - ADULT    • Maintains hematologic stability Progressing    • Free from bleeding injury Progressing        Impaired Activities of Daily Living    • Ach

## 2018-03-21 NOTE — PLAN OF CARE
DNR! (sign above bed & arm band in place)    High fall risk precautions maintained (sign above bed & arm band in place)  Seizure precautions maintained (sign above bed & emergency supplies in room)    Comments: Pt is A&Ox3-4 (forgetful w/short-term memory Incentive Spirometry  - Assess the need for suctioning and perform as needed  - Assess and instruct to report SOB or any respiratory difficulty  - Respiratory Therapy support as indicated  - Manage/alleviate anxiety  - Monitor for signs/symptoms of CO2 ret parameters to optimize cerebral perfusion and minimize risk of hemorrhage  - Monitor temperature, glucose, and sodium.  Initiate appropriate interventions as ordered  Outcome: Progressing    Goal: Remains free of injury related to seizure activity  INTERVEN Maintains hematologic stability  INTERVENTIONS  - Assess for signs and symptoms of bleeding or hemorrhage  - Monitor labs and vital signs for trends  - Administer supportive blood products/factors, fluids and medications as ordered and appropriate  - Admin

## 2018-03-21 NOTE — PROGRESS NOTES
RN notified Chiara RING who then notified Dr. Amari Posadas re: SBP >160.   No new orders rec'd.       03/21/18 1313   Provider Notification   Reason for Communication Other (comment)  (re: SBP >160)   Provider Name Other (comment)  JHONATHAN Acuña)   Method of Com

## 2018-03-21 NOTE — PROGRESS NOTES
Pulmonary Progress Note        NAME: Olive Duncan - ROOM: [address] [place] [address]-N - MRN: [id number] - Age: 76year old - : 8/10/1940        SUBJECTIVE: No events overnight, slept well, needed 1-2L at times w/ sleep    OBJECTIVE:   18  0300 18  0400  Recent Labs   03/19/18 1927 03/20/18  0644 03/21/18  0455   * 137 136   K 4.4 4.3 3.7    100* 97*   CO2 25.0 28.0 30.0   BUN 12 13 10   CA 8.8 8.8 8.8   MG  --  2.1 1.9   PHOS  --  3.5 3.1         Recent Labs   03/19/18 1927   ALT 13* intracranial bleed  -coumadin reversed  -monitor  4. Coagulopathy  -due to coumadin  -reversed  -monitor  5. Mult mechanical falls  -PT/OT following  6. A-fib/CHF/CAD  -cards following  -holding anticoag for now, considering Watchmen in the future  7.  COPD

## 2018-03-21 NOTE — PROGRESS NOTES
NURSING TRANSFER NOTE (from 471/ICU to 2608)      Patient transferred on bed with portable monitor (transporter & bedside RN present). Oriented to room. Safety precautions initiated. Bed in low position. Call light in reach.   Unit telemetry monitor & C

## 2018-03-21 NOTE — PROGRESS NOTES
[facility]  Progress Note    Olive Duncan Patient Status:  Inpatient    8/10/1940 MRN [id number]   [facility] 4SW-A Attending Nadiya Cameron MD   Hosp Day # 1 PCP Zafar Cheung MD     Subjective:   Restless n Comment:SEVERE RECTAL BLEEDING  Eggs Or Egg-Derived*      Iodine (Topical)        Anaphylaxis    Comment:Severe vomiting  Milk                    Other (See Comments)    Comment:Difficulty breathing. Pt states she can have 2%             milk.   Pcn [[place] 3. 89  3.38*   --    --    --   3.85   HGB  11.4*  9.9*   < >  10.7*  10.5*  11.3*   HCT  35.6  30.4*   --    --    --   35.2   MCV  91.5  89.9   --    --    --   91.4   MCH  29.3  29.3   --    --    --   29.4   MCHC  32.0  32.6   --    --    --   32.1   RD

## 2018-03-21 NOTE — PROGRESS NOTES
[facility] Hospitalist Progress Note                                                                   Enoch  8/10/1940    CC: F/U RP bleed    SUBJECTIVE:    Pt seen in AM. States she feels Nightly     Continuous Infusions:   • sodium chloride 50 mL/hr at 03/20/18 0380     PRN: acetaminophen, morphINE sulfate **OR** morphINE sulfate **OR** morphINE sulfate, ondansetron HCl, ipratropium-albuterol, HYDROcodone-acetaminophen    Assessment/Plan:

## 2018-03-22 LAB
BASOPHILS # BLD AUTO: 0.05 X10(3) UL (ref 0–0.1)
BASOPHILS NFR BLD AUTO: 0.6 %
EOSINOPHIL # BLD AUTO: 0.17 X10(3) UL (ref 0–0.3)
EOSINOPHIL NFR BLD AUTO: 1.9 %
ERYTHROCYTE [DISTWIDTH] IN BLOOD BY AUTOMATED COUNT: 13.7 % (ref 11.5–16)
HCT VFR BLD AUTO: 36.8 % (ref 34–50)
HGB BLD-MCNC: 11.5 G/DL (ref 12–16)
HGB BLD-MCNC: 12 G/DL (ref 12–16)
IMMATURE GRANULOCYTE COUNT: 0.04 X10(3) UL (ref 0–1)
IMMATURE GRANULOCYTE RATIO %: 0.5 %
INR BLD: 1.1 (ref 0.9–1.1)
LYMPHOCYTES # BLD AUTO: 1.23 X10(3) UL (ref 0.9–4)
LYMPHOCYTES NFR BLD AUTO: 13.9 %
MCH RBC QN AUTO: 29 PG (ref 27–33.2)
MCHC RBC AUTO-ENTMCNC: 32.6 G/DL (ref 31–37)
MCV RBC AUTO: 88.9 FL (ref 81–100)
MONOCYTES # BLD AUTO: 1.17 X10(3) UL (ref 0.1–1)
MONOCYTES NFR BLD AUTO: 13.2 %
NEUTROPHIL ABS PRELIM: 6.18 X10 (3) UL (ref 1.3–6.7)
NEUTROPHILS # BLD AUTO: 6.18 X10(3) UL (ref 1.3–6.7)
NEUTROPHILS NFR BLD AUTO: 69.9 %
PLATELET # BLD AUTO: 289 10(3)UL (ref 150–450)
POTASSIUM SERPL-SCNC: 3.6 MMOL/L (ref 3.6–5.1)
POTASSIUM SERPL-SCNC: 3.8 MMOL/L (ref 3.6–5.1)
PSA SERPL DL<=0.01 NG/ML-MCNC: 14.6 SECONDS (ref 12.4–14.7)
RBC # BLD AUTO: 4.14 X10(6)UL (ref 3.8–5.1)
RED CELL DISTRIBUTION WIDTH-SD: 44.4 FL (ref 35.1–46.3)
WBC # BLD AUTO: 8.8 X10(3) UL (ref 4–13)

## 2018-03-22 PROCEDURE — 85018 HEMOGLOBIN: CPT | Performed by: HOSPITALIST

## 2018-03-22 PROCEDURE — 85025 COMPLETE CBC W/AUTO DIFF WBC: CPT | Performed by: INTERNAL MEDICINE

## 2018-03-22 PROCEDURE — 84132 ASSAY OF SERUM POTASSIUM: CPT | Performed by: HOSPITALIST

## 2018-03-22 PROCEDURE — 84132 ASSAY OF SERUM POTASSIUM: CPT | Performed by: INTERNAL MEDICINE

## 2018-03-22 PROCEDURE — 97116 GAIT TRAINING THERAPY: CPT

## 2018-03-22 PROCEDURE — 97530 THERAPEUTIC ACTIVITIES: CPT

## 2018-03-22 PROCEDURE — 85610 PROTHROMBIN TIME: CPT | Performed by: HOSPITALIST

## 2018-03-22 RX ORDER — AMLODIPINE BESYLATE 2.5 MG/1
2.5 TABLET ORAL DAILY
Status: DISCONTINUED | OUTPATIENT
Start: 2018-03-22 | End: 2018-03-23

## 2018-03-22 RX ORDER — LISINOPRIL 20 MG/1
20 TABLET ORAL 2 TIMES DAILY
Status: DISCONTINUED | OUTPATIENT
Start: 2018-03-22 | End: 2018-03-23

## 2018-03-22 RX ORDER — POTASSIUM CHLORIDE 20 MEQ/1
40 TABLET, EXTENDED RELEASE ORAL EVERY 4 HOURS
Status: COMPLETED | OUTPATIENT
Start: 2018-03-22 | End: 2018-03-22

## 2018-03-22 RX ORDER — POTASSIUM CHLORIDE 20 MEQ/1
40 TABLET, EXTENDED RELEASE ORAL ONCE
Status: COMPLETED | OUTPATIENT
Start: 2018-03-22 | End: 2018-03-22

## 2018-03-22 NOTE — PROGRESS NOTES
[facility] Hospitalist Progress Note                                                                   Enoch  8/10/1940    CC: F/U RP bleed    SUBJECTIVE:    States she wants to go home.  No Pantoprazole Sodium  40 mg Oral QAM AC   • atorvastatin  40 mg Oral Nightly     Continuous Infusions:     PRN: haloperidol lactate, TraMADol HCl, acetaminophen, ondansetron HCl, ipratropium-albuterol    Assessment/Plan:  Principal Problem:    Closed head i

## 2018-03-22 NOTE — CM/SW NOTE
MSW spoke to pt and her spouse in room. They are agreeable for MSW to call their Dtr Adriana [phone]. MSW called pt's dtr Adriana, she thinks that rehab would be a good idea. ELOISE list provided to patient's spouse.

## 2018-03-22 NOTE — PROGRESS NOTES
[facility]  Progress Note    Claudia Arreaga Patient Status:  Inpatient    8/10/1940 MRN [id number]   [facility] 4SW-A Attending Antione Conklin MD   Hosp Day # 2 PCP Herbert Baca MD     Subjective:   Better nig RECTAL BLEEDING  Eggs Or Egg-Derived*      Iodine (Topical)        Anaphylaxis    Comment:Severe vomiting  Milk                    Other (See Comments)    Comment:Difficulty breathing. Pt states she can have 2%             milk.   Pcn [[facility]] Lab  03/20/18   0644   03/21/18   0455   03/21/18   1901  03/21/18   2333  03/22/18   0549   RBC  3.38*   --   3.85   --    --    --   4.14   HGB  9.9*   < >  11.3*   < >  11.7*  11.5*  12.0   HCT  30.4*   --   35.2   --    --    --   36.8   MCV  89.9

## 2018-03-22 NOTE — OCCUPATIONAL THERAPY NOTE
OCCUPATIONAL THERAPY TREATMENT NOTE - INPATIENT     Room Number: 6812/4637-E  Session: 1   Number of Visits to Meet Established Goals: 7    Presenting Problem: Retroperitoneal bleed, s/p fall; closed head injry    History related to current admission: Pres Peripheral vascular disease, unspecified ([facility]    • Pneumonia, organism unspecified(486)    • PONV (postoperative nausea and vomiting)    • Pulmonary embolism (HCC)    • Seizure disorder ([facility]    • Unspecified essential hypertension    • Unspecified sleep None    AM-PAC Score:  Score: 19  Approx Degree of Impairment: 42.8%  Standardized Score (AM-PAC Scale): 40.22  CMS Modifier (G-Code): CK    FUNCTIONAL TRANSFER ASSESSMENT  Supine to Sit : Supervision  Sit to Stand: Supervision    Skilled Therapy Provided: training;Cognitive reorientation;Patient/Family education;Patient/Family training;Equipment eval/education; Fine motor coordination activities; Compensatory technique education  Rehab Potential : Good  Frequency (Obs): 5x/week    ADL Goals   Patient will per

## 2018-03-22 NOTE — PHYSICAL THERAPY NOTE
PHYSICAL THERAPY TREATMENT NOTE - INPATIENT    Room Number: 5467/7485-N     Session: 1   Number of Visits to Meet Established Goals: 5    Presenting Problem: mechanical fall resultingin CHI and retroperitoneal hemorrhage    Problem List  Principal Problem History  Past Surgical History:  No date: BYPASS SURGERY      Comment: 4/28/13  No date: CHOLECYSTECTOMY  7/2010: COLONOSCOPY  10/28/2013: COLONOSCOPY      Comment: Procedure: COLONOSCOPY;  Surgeon: Daniel Reynolds MD;  Location: [facility] ENDOSCOPY  N Impairment Score: 54.16%   Standardized Score (AM-PAC Scale): 40.78   CMS Modifier (G-Code): CK    FUNCTIONAL ABILITY STATUS  Gait Assessment   Gait Assistance: Minimum assistance  Distance (ft): 200  Assistive Device: Rolling walker  Pattern: Shuffle  Sto recommendation to ELOISE with ELOS 12-14 days.      DISCHARGE RECOMMENDATIONS  PT Discharge Recommendations: Sub-acute rehabilitation (ELOS 12/14 days)     PLAN  PT Treatment Plan: Bed mobility;Gait training;Strengthening;Stair training;Transfer training;Susanne

## 2018-03-22 NOTE — PLAN OF CARE
DNR! (sign above bed & arm band in place)    High fall risk precautions maintained (sign above bed & arm band in place)  Seizure precautions maintained (sign above bed & emergency supplies in room)    Comments: Pt is A&Ox3-4 (forgetful at times, but much m effort  - Oxygen supplementation based on oxygen saturation or ABGs  - Provide Smoking Cessation handout, if applicable  - Encourage broncho-pulmonary hygiene including cough, deep breathe, Incentive Spirometry  - Assess the need for suctioning and perform If seizure occurs, turn patient to side and suction secretions as needed  - Reorient patient post seizure  - Seizure pads on all 4 side rails  - Instruct patient/family to notify RN of any seizure activity  - Instruct patient/family to call for assistance platelets)  INTERVENTIONS:  - Avoid intramuscular injections, enemas and rectal medication administration  - Ensure safe mobilization of patient  - Hold pressure on venipuncture sites to achieve adequate hemostasis  - Assess for signs and symptoms of inter

## 2018-03-23 VITALS
OXYGEN SATURATION: 96 % | WEIGHT: 112.44 LBS | TEMPERATURE: 99 F | BODY MASS INDEX: 18.73 KG/M2 | DIASTOLIC BLOOD PRESSURE: 80 MMHG | HEIGHT: 65 IN | HEART RATE: 95 BPM | SYSTOLIC BLOOD PRESSURE: 145 MMHG | RESPIRATION RATE: 17 BRPM

## 2018-03-23 LAB
HGB BLD-MCNC: 12.9 G/DL (ref 12–16)
POTASSIUM SERPL-SCNC: 3.9 MMOL/L (ref 3.6–5.1)

## 2018-03-23 PROCEDURE — 85018 HEMOGLOBIN: CPT | Performed by: HOSPITALIST

## 2018-03-23 PROCEDURE — 84132 ASSAY OF SERUM POTASSIUM: CPT | Performed by: HOSPITALIST

## 2018-03-23 RX ORDER — ASPIRIN 81 MG/1
81 TABLET ORAL DAILY
Status: DISCONTINUED | OUTPATIENT
Start: 2018-03-23 | End: 2018-03-23

## 2018-03-23 RX ORDER — AMLODIPINE BESYLATE 2.5 MG/1
2.5 TABLET ORAL DAILY
Qty: 90 TABLET | Refills: 1 | Status: SHIPPED | OUTPATIENT
Start: 2018-03-24 | End: 2018-10-11

## 2018-03-23 RX ORDER — ASPIRIN 81 MG/1
81 TABLET ORAL DAILY
Qty: 30 TABLET | Refills: 3 | Status: ON HOLD | OUTPATIENT
Start: 2018-03-24 | End: 2019-08-06

## 2018-03-23 RX ORDER — DIPHENHYDRAMINE HYDROCHLORIDE 12.5 MG/5ML
12.5 SOLUTION ORAL EVERY 6 HOURS PRN
Status: DISCONTINUED | OUTPATIENT
Start: 2018-03-23 | End: 2018-03-23

## 2018-03-23 NOTE — PROGRESS NOTES
[facility] Cardiology  Progress Note    Surjit Wong Patient Status:  Inpatient    8/10/1940 MRN [id number]   [facility] 4SW-A Attending Anne-Marie Portillo MD   Hosp Day # 3 PCP Vj Baptiste MD     Subjective:   No chest p Cipro [Ciprofloxaci*        Comment:SEVERE RECTAL BLEEDING  Eggs Or Egg-Derived*      Iodine (Topical)        Anaphylaxis    Comment:Severe vomiting  Milk                    Other (See Comments)    Comment:Difficulty breathing.  Pt states she can have HGB  11.3*   < >  12.0  11.5*  12.9   HCT  35.2   --   36.8   --    --    MCV  91.4   --   88.9   --    --    MCH  29.4   --   29.0   --    --    MCHC  32.1   --   32.6   --    --    RDW  13.9   --   13.7   --    --    NEPRELIM  7.12*   --   6.18   --

## 2018-03-23 NOTE — CM/SW NOTE
MSW spoke to Dtr, she is agreeable to [address] [facility]. She wanted pt to d/c on Saturday because she dtr has medical appointments today to go to. MSW will f/u with RN. MSW asked RN if patient was ready for d/c today, RN will call MSW back.     5 pm set up with Shona

## 2018-03-23 NOTE — PROGRESS NOTES
NURSING DISCHARGE NOTE    Discharged Home via Ambulance. Accompanied by Support staff  Belongings Taken by patient/family. VSS. [phone] Laura Randall for DC per Cardiology and Dr. Joey Cuenca. Per Dr. Chava Avery ok for patient to resume 81mg aspirin upon DC.  DC papers and

## 2018-03-23 NOTE — CM/SW NOTE
8: 52am  MSW called pt's dtr [facility] [phone] to get magda choices. She would like referrals to [facility] and [facility].

## 2018-03-23 NOTE — PLAN OF CARE
Pt a/ox4. Forgetful at times. RA. Lungs clear but diminished to posterior bases bilaterally. A-fib. Rate controlled. Coumadin on hold d/t fall. Hemoglobin 12.9. Ok to DC q 4 neuro checks per Dr. Yeny Messina. BM today. PRN Tylenol given for back pain.  Pt resti

## 2018-03-26 NOTE — DISCHARGE SUMMARY
General Medicine Discharge Summary     Patient ID:  Veronica Rojas  76year old  8/10/1940    Admit date: 3/19/2018    Discharge date and time: 3/23/2018  5:45 PM     Attending Physician: Nicole att. providers sig for COPD, CAD s/p CABG, A fib on coumadin, HTN, HL, depression, seizures who presented to [facility] with complaint of falls and right and back pain. Pt states she had a fall overnight in the bathroom.  In the ED, CT head negative for intracranial bleed, bu R-1      CONTINUE these medications which have NOT CHANGED    atorvastatin 40 MG Oral Tab  Take 40 mg by mouth daily. , Historical    Butalbital-APAP-Caffeine -40 MG Oral Tab  Take 1 tablet by mouth 2 (two) times daily as needed for Pain., Historical Soln  Inhale 2 puffs into the lungs every 6 (six) hours as needed., Normal, Disp-1 Inhaler, R-6    Pramipexole Dihydrochloride 0.125 MG Oral Tab  Take 2 tablets (0.25 mg total) by mouth nightly., Normal, Disp-60 tablet, R-5    Acidophilus/Pectin Oral Cap

## 2018-03-26 NOTE — CM/SW NOTE
03/26/18 0800   Discharge disposition   Discharged to: Home-Health   Name of Facillity/Home Care/Hospice Residential

## 2018-03-28 NOTE — CDS QUERY
Confusion/Delirium  Real Sinhala  Dear Doctor:  Clinical information (provided below) indicates confusion and/or delirium without history of injury.  For accurate ICD-10-CM code assignment to reflect severity of illness and risk

## 2018-04-04 ENCOUNTER — TELEPHONE (OUTPATIENT)
Dept: SURGERY | Facility: CLINIC | Age: 78
End: 2018-04-04

## 2018-04-04 ENCOUNTER — LAB REQUISITION (OUTPATIENT)
Dept: LAB | Age: 78
End: 2018-04-04
Payer: MEDICARE

## 2018-04-04 DIAGNOSIS — K66.1 HEMOPERITONEUM: ICD-10-CM

## 2018-04-04 DIAGNOSIS — I50.9 HEART FAILURE (HCC): ICD-10-CM

## 2018-04-04 PROCEDURE — 85025 COMPLETE CBC W/AUTO DIFF WBC: CPT | Performed by: INTERNAL MEDICINE

## 2018-04-04 PROCEDURE — 80053 COMPREHEN METABOLIC PANEL: CPT | Performed by: INTERNAL MEDICINE

## 2018-04-04 NOTE — TELEPHONE ENCOUNTER
Medication was discontinue on 3/23/18 when patient was discharged from the hospital.           Medication: Divalproex 250mg       Last office visit: 12/18/17  Due back to clinic per last office note:  2 months  Date next office visit scheduled:  No appoint pattern, unspecified headache type  Plan: divalproex Sodium  MG Oral Tablet 24 Hr        As noted above      30 total minutes spent with patient >50% of visit was spent in counseling and coordination of care, including discussion of fall safety preca

## 2018-04-11 PROCEDURE — 81003 URINALYSIS AUTO W/O SCOPE: CPT | Performed by: INTERNAL MEDICINE

## 2018-06-01 ENCOUNTER — APPOINTMENT (OUTPATIENT)
Dept: GENERAL RADIOLOGY | Facility: HOSPITAL | Age: 78
End: 2018-06-01
Attending: EMERGENCY MEDICINE
Payer: MEDICARE

## 2018-06-01 ENCOUNTER — APPOINTMENT (OUTPATIENT)
Dept: CT IMAGING | Facility: HOSPITAL | Age: 78
End: 2018-06-01
Attending: EMERGENCY MEDICINE
Payer: MEDICARE

## 2018-06-01 ENCOUNTER — HOSPITAL ENCOUNTER (EMERGENCY)
Facility: HOSPITAL | Age: 78
Discharge: HOME | End: 2018-06-01
Attending: EMERGENCY MEDICINE
Payer: MEDICARE

## 2018-06-01 VITALS
RESPIRATION RATE: 16 BRPM | OXYGEN SATURATION: 96 % | BODY MASS INDEX: 20.16 KG/M2 | WEIGHT: 121 LBS | TEMPERATURE: 97 F | SYSTOLIC BLOOD PRESSURE: 191 MMHG | HEIGHT: 65 IN | DIASTOLIC BLOOD PRESSURE: 96 MMHG | HEART RATE: 102 BPM

## 2018-06-01 DIAGNOSIS — S00.03XA CONTUSION OF SCALP, INITIAL ENCOUNTER: ICD-10-CM

## 2018-06-01 DIAGNOSIS — S63.502A SPRAIN OF LEFT WRIST, INITIAL ENCOUNTER: ICD-10-CM

## 2018-06-01 DIAGNOSIS — S09.90XA MINOR HEAD INJURY, INITIAL ENCOUNTER: ICD-10-CM
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
## 2018-06-01 DIAGNOSIS — S40.011A CONTUSION OF RIGHT SHOULDER, INITIAL ENCOUNTER: ICD-10-CM

## 2018-06-01 PROCEDURE — 99284 EMERGENCY DEPT VISIT MOD MDM: CPT

## 2018-06-01 PROCEDURE — 71045 X-RAY EXAM CHEST 1 VIEW: CPT | Performed by: EMERGENCY MEDICINE

## 2018-06-01 PROCEDURE — 70450 CT HEAD/BRAIN W/O DYE: CPT | Performed by: EMERGENCY MEDICINE

## 2018-06-01 PROCEDURE — 73030 X-RAY EXAM OF SHOULDER: CPT | Performed by: EMERGENCY MEDICINE

## 2018-06-01 PROCEDURE — 73110 X-RAY EXAM OF WRIST: CPT | Performed by: EMERGENCY MEDICINE

## 2018-06-01 RX ORDER — TRAMADOL HYDROCHLORIDE 50 MG/1
TABLET ORAL EVERY 4 HOURS PRN
Qty: 20 TABLET | Refills: 0 | Status: SHIPPED | OUTPATIENT
Start: 2018-06-01 | End: 2018-06-08 | Stop reason: WASHOUT

## 2018-06-01 RX ORDER — ONDANSETRON 4 MG/1
4 TABLET, ORALLY DISINTEGRATING ORAL ONCE
Status: COMPLETED | OUTPATIENT
Start: 2018-06-01 | End: 2018-06-01

## 2018-06-01 RX ORDER — TRAMADOL HYDROCHLORIDE 50 MG/1
50 TABLET ORAL ONCE
Status: COMPLETED | OUTPATIENT
Start: 2018-06-01 | End: 2018-06-01

## 2018-06-01 NOTE — ED PROVIDER NOTES
Patient Seen in: [facility] Emergency Department    History   Patient presents with:  Fall (musculoskeletal, neurologic)    Stated Complaint: Fall    HPI    77-year-old female presents to the emergency department after a fall.   Patient and another i • Pneumonia, organism unspecified(486)    • PONV (postoperative nausea and vomiting)    • Pulmonary embolism (HCC)    • Seizure disorder ([facility]    • Unspecified essential hypertension    • Unspecified sleep apnea    • Unspecified sleep apnea SPLIT 9-25-13 Constitutional: She is oriented to person, place, and time. She appears well-developed and well-nourished. HENT:   Head: Normocephalic. Head is without laceration.        Mouth/Throat: Oropharynx is clear and moist.   Eyes: EOM are normal. Pupils are [place] Result Date: 6/1/2018  CONCLUSION:  No evidence of active cardiopulmonary disease.      Dictated by: Oliverio Silva MD on 6/01/2018 at 15:34     Approved by: Oliverio Silva MD          ED Course as of Jun 01 1548  ---------------------------------------------

## 2018-06-01 NOTE — ED NOTES
Pt back from xray, +dry heeves, no vomiting, pt gets nauseated with movement of cart, md aware, zofran to be ordered, headache pain 8/10

## 2018-06-01 NOTE — ED INITIAL ASSESSMENT (HPI)
Pt presents s/p falling backwards down 7 stairs, injuring back and head, hematoma to r wrist, no loc, + nausea

## 2018-07-11 ENCOUNTER — HOSPITAL ENCOUNTER (OUTPATIENT)
Dept: GENERAL RADIOLOGY | Facility: HOSPITAL | Age: 78
Discharge: HOME | End: 2018-07-11
Attending: INTERNAL MEDICINE
Payer: MEDICARE

## 2018-07-11 DIAGNOSIS — M25.559 ARTHRALGIA OF HIP: ICD-10-CM

## 2018-07-11 PROCEDURE — 73523 X-RAY EXAM HIPS BI 5/> VIEWS: CPT | Performed by: INTERNAL MEDICINE

## 2019-02-24 ENCOUNTER — APPOINTMENT (OUTPATIENT)
Dept: GENERAL RADIOLOGY | Facility: HOSPITAL | Age: 79
End: 2019-02-24
Attending: EMERGENCY MEDICINE
Payer: MEDICARE

## 2019-02-24 ENCOUNTER — HOSPITAL ENCOUNTER (EMERGENCY)
Facility: HOSPITAL | Age: 79
Discharge: NURSING, REHAB OR LONG-TERM CARE | End: 2019-02-25
Attending: EMERGENCY MEDICINE
Payer: MEDICARE

## 2019-02-24 ENCOUNTER — APPOINTMENT (OUTPATIENT)
Dept: CT IMAGING | Facility: HOSPITAL | Age: 79
End: 2019-02-24
Attending: EMERGENCY MEDICINE
Payer: MEDICARE

## 2019-02-24 DIAGNOSIS — S09.90XA MINOR HEAD INJURY, INITIAL ENCOUNTER: ICD-10-CM

## 2019-02-24 DIAGNOSIS — S60.511A ABRASION OF RIGHT HAND, INITIAL ENCOUNTER: ICD-10-CM

## 2019-02-24 DIAGNOSIS — S70.02XA CONTUSION OF LEFT HIP, INITIAL ENCOUNTER: ICD-10-CM

## 2019-02-24 DIAGNOSIS — S62.606A CLOSED NONDISPLACED FRACTURE OF PHALANX OF RIGHT LITTLE FINGER, UNSPECIFIED PHALANX, INITIAL ENCOUNTER: Primary | ICD-10-CM

## 2019-02-24 LAB
ALBUMIN SERPL-MCNC: 3.6 G/DL (ref 3.4–5)
ALBUMIN/GLOB SERPL: 1.1 {RATIO} (ref 1–2)
ALP LIVER SERPL-CCNC: 105 U/L (ref 55–142)
ALT SERPL-CCNC: 15 U/L (ref 13–56)
AMPHET UR QL SCN: NEGATIVE
ANION GAP SERPL CALC-SCNC: 9 MMOL/L (ref 0–18)
APAP SERPL-MCNC: <2 UG/ML (ref 10–30)
AST SERPL-CCNC: 20 U/L (ref 15–37)
BASOPHILS # BLD AUTO: 0.06 X10(3) UL (ref 0–0.2)
BASOPHILS NFR BLD AUTO: 0.9 %
BENZODIAZ UR QL SCN: NEGATIVE
BILIRUB SERPL-MCNC: 0.9 MG/DL (ref 0.1–2)
BUN BLD-MCNC: 13 MG/DL (ref 7–18)
BUN/CREAT SERPL: 14.4 (ref 10–20)
CALCIUM BLD-MCNC: 8.5 MG/DL (ref 8.5–10.1)
CANNABINOIDS UR QL SCN: NEGATIVE
CHLORIDE SERPL-SCNC: 109 MMOL/L (ref 98–107)
CO2 SERPL-SCNC: 25 MMOL/L (ref 21–32)
COCAINE UR QL: NEGATIVE
CREAT BLD-MCNC: 0.9 MG/DL (ref 0.55–1.02)
CREAT UR-SCNC: 25.4 MG/DL
DEPRECATED RDW RBC AUTO: 44.5 FL (ref 35.1–46.3)
EOSINOPHIL # BLD AUTO: 0.26 X10(3) UL (ref 0–0.7)
EOSINOPHIL NFR BLD AUTO: 3.9 %
ERYTHROCYTE [DISTWIDTH] IN BLOOD BY AUTOMATED COUNT: 13.9 % (ref 11–15)
ETHANOL SERPL-MCNC: <3 MG/DL (ref ?–3)
ETHANOL UR-MCNC: NEGATIVE MG/DL
GLOBULIN PLAS-MCNC: 3.4 G/DL (ref 2.8–4.4)
GLUCOSE BLD-MCNC: 87 MG/DL (ref 70–99)
HCT VFR BLD AUTO: 35.7 % (ref 35–48)
HGB BLD-MCNC: 12.3 G/DL (ref 12–16)
IMM GRANULOCYTES # BLD AUTO: 0.01 X10(3) UL (ref 0–1)
IMM GRANULOCYTES NFR BLD: 0.2 %
LYMPHOCYTES # BLD AUTO: 2.05 X10(3) UL (ref 1–4)
LYMPHOCYTES NFR BLD AUTO: 31 %
M PROTEIN MFR SERPL ELPH: 7 G/DL (ref 6.4–8.2)
MCH RBC QN AUTO: 30.3 PG (ref 26–34)
MCHC RBC AUTO-ENTMCNC: 34.5 G/DL (ref 31–37)
MCV RBC AUTO: 87.9 FL (ref 80–100)
MONOCYTES # BLD AUTO: 0.76 X10(3) UL (ref 0.1–1)
MONOCYTES NFR BLD AUTO: 11.5 %
NEUTROPHILS # BLD AUTO: 3.48 X10 (3) UL (ref 1.5–7.7)
NEUTROPHILS # BLD AUTO: 3.48 X10(3) UL (ref 1.5–7.7)
NEUTROPHILS NFR BLD AUTO: 52.5 %
OPIATES UR QL SCN: NEGATIVE
OSMOLALITY SERPL CALC.SUM OF ELEC: 295 MOSM/KG (ref 275–295)
PCP UR QL SCN: NEGATIVE
PLATELET # BLD AUTO: 204 10(3)UL (ref 150–450)
POTASSIUM SERPL-SCNC: 4 MMOL/L (ref 3.5–5.1)
RBC # BLD AUTO: 4.06 X10(6)UL (ref 3.8–5.3)
SALICYLATES SERPL-MCNC: <1.7 MG/DL (ref 2.8–20)
SODIUM SERPL-SCNC: 143 MMOL/L (ref 136–145)
TROPONIN I SERPL-MCNC: <0.045 NG/ML (ref ?–0.04)
WBC # BLD AUTO: 6.6 X10(3) UL (ref 4–11)

## 2019-02-24 PROCEDURE — 84484 ASSAY OF TROPONIN QUANT: CPT | Performed by: EMERGENCY MEDICINE

## 2019-02-24 PROCEDURE — 26720 TREAT FINGER FRACTURE EACH: CPT

## 2019-02-24 PROCEDURE — 73502 X-RAY EXAM HIP UNI 2-3 VIEWS: CPT | Performed by: EMERGENCY MEDICINE

## 2019-02-24 PROCEDURE — 80329 ANALGESICS NON-OPIOID 1 OR 2: CPT | Performed by: EMERGENCY MEDICINE

## 2019-02-24 PROCEDURE — 96361 HYDRATE IV INFUSION ADD-ON: CPT

## 2019-02-24 PROCEDURE — 71045 X-RAY EXAM CHEST 1 VIEW: CPT | Performed by: EMERGENCY MEDICINE

## 2019-02-24 PROCEDURE — 73130 X-RAY EXAM OF HAND: CPT | Performed by: EMERGENCY MEDICINE

## 2019-02-24 PROCEDURE — 90471 IMMUNIZATION ADMIN: CPT

## 2019-02-24 PROCEDURE — 99285 EMERGENCY DEPT VISIT HI MDM: CPT

## 2019-02-24 PROCEDURE — 72125 CT NECK SPINE W/O DYE: CPT | Performed by: EMERGENCY MEDICINE

## 2019-02-24 PROCEDURE — 96374 THER/PROPH/DIAG INJ IV PUSH: CPT

## 2019-02-24 PROCEDURE — 85025 COMPLETE CBC W/AUTO DIFF WBC: CPT | Performed by: EMERGENCY MEDICINE

## 2019-02-24 PROCEDURE — 70450 CT HEAD/BRAIN W/O DYE: CPT | Performed by: EMERGENCY MEDICINE

## 2019-02-24 PROCEDURE — 93005 ELECTROCARDIOGRAM TRACING: CPT

## 2019-02-24 PROCEDURE — 80053 COMPREHEN METABOLIC PANEL: CPT | Performed by: EMERGENCY MEDICINE

## 2019-02-24 PROCEDURE — 96375 TX/PRO/DX INJ NEW DRUG ADDON: CPT

## 2019-02-24 PROCEDURE — 80307 DRUG TEST PRSMV CHEM ANLYZR: CPT | Performed by: EMERGENCY MEDICINE

## 2019-02-24 PROCEDURE — 80320 DRUG SCREEN QUANTALCOHOLS: CPT | Performed by: EMERGENCY MEDICINE

## 2019-02-24 PROCEDURE — 93010 ELECTROCARDIOGRAM REPORT: CPT

## 2019-02-24 RX ORDER — METOPROLOL SUCCINATE 50 MG/1
50 TABLET, EXTENDED RELEASE ORAL
Status: DISCONTINUED | OUTPATIENT
Start: 2019-02-24 | End: 2019-02-25

## 2019-02-24 RX ORDER — LABETALOL HYDROCHLORIDE 5 MG/ML
10 INJECTION, SOLUTION INTRAVENOUS ONCE
Status: COMPLETED | OUTPATIENT
Start: 2019-02-24 | End: 2019-02-24

## 2019-02-24 RX ORDER — LORAZEPAM 2 MG/ML
1 INJECTION INTRAMUSCULAR ONCE
Status: COMPLETED | OUTPATIENT
Start: 2019-02-24 | End: 2019-02-24

## 2019-02-24 RX ORDER — HYDRALAZINE HYDROCHLORIDE 20 MG/ML
10 INJECTION INTRAMUSCULAR; INTRAVENOUS ONCE
Status: COMPLETED | OUTPATIENT
Start: 2019-02-24 | End: 2019-02-24

## 2019-02-25 VITALS
OXYGEN SATURATION: 98 % | DIASTOLIC BLOOD PRESSURE: 94 MMHG | HEART RATE: 82 BPM | RESPIRATION RATE: 16 BRPM | TEMPERATURE: 99 F | SYSTOLIC BLOOD PRESSURE: 168 MMHG

## 2019-02-25 LAB
BILIRUB UR QL STRIP.AUTO: NEGATIVE
C DIFF TOX B STL QL: NEGATIVE
CLARITY UR REFRACT.AUTO: CLEAR
GLUCOSE UR STRIP.AUTO-MCNC: NEGATIVE MG/DL
KETONES UR STRIP.AUTO-MCNC: NEGATIVE MG/DL
NITRITE UR QL STRIP.AUTO: POSITIVE
PH UR STRIP.AUTO: 7 [PH] (ref 4.5–8)
PROT UR STRIP.AUTO-MCNC: NEGATIVE MG/DL
RBC UR QL AUTO: NEGATIVE
SP GR UR STRIP.AUTO: 1 (ref 1–1.03)
UROBILINOGEN UR STRIP.AUTO-MCNC: <2 MG/DL

## 2019-02-25 PROCEDURE — 87045 FECES CULTURE AEROBIC BACT: CPT | Performed by: EMERGENCY MEDICINE

## 2019-02-25 PROCEDURE — 87046 STOOL CULTR AEROBIC BACT EA: CPT | Performed by: EMERGENCY MEDICINE

## 2019-02-25 PROCEDURE — 87186 SC STD MICRODIL/AGAR DIL: CPT | Performed by: EMERGENCY MEDICINE

## 2019-02-25 PROCEDURE — 87088 URINE BACTERIA CULTURE: CPT | Performed by: EMERGENCY MEDICINE

## 2019-02-25 PROCEDURE — 81001 URINALYSIS AUTO W/SCOPE: CPT | Performed by: EMERGENCY MEDICINE

## 2019-02-25 PROCEDURE — 87493 C DIFF AMPLIFIED PROBE: CPT | Performed by: EMERGENCY MEDICINE

## 2019-02-25 PROCEDURE — 87427 SHIGA-LIKE TOXIN AG IA: CPT | Performed by: EMERGENCY MEDICINE

## 2019-02-25 PROCEDURE — 87086 URINE CULTURE/COLONY COUNT: CPT | Performed by: EMERGENCY MEDICINE

## 2019-02-25 RX ORDER — SODIUM CHLORIDE 9 MG/ML
INJECTION, SOLUTION INTRAVENOUS ONCE
Status: COMPLETED | OUTPATIENT
Start: 2019-02-25 | End: 2019-02-25

## 2019-02-25 RX ORDER — ACETAMINOPHEN 500 MG
1000 TABLET ORAL ONCE
Status: COMPLETED | OUTPATIENT
Start: 2019-02-25 | End: 2019-02-25

## 2019-02-25 RX ORDER — SULFAMETHOXAZOLE AND TRIMETHOPRIM 800; 160 MG/1; MG/1
1 TABLET ORAL ONCE
Status: COMPLETED | OUTPATIENT
Start: 2019-02-25 | End: 2019-02-25

## 2019-02-25 NOTE — ED NOTES
Pt's son Tal Rivers requesting to be reached by phone if patient is admitted to [facility] or psychiatric facility.

## 2019-02-25 NOTE — ED INITIAL ASSESSMENT (HPI)
Pt here via EMS from home with c/o multiple falls today, unwitnessed, weakness. Per medics patient's family states she has not been taking medications as prescribed, making passive statements \"I want to be with my ,\" who recently passed.  Patient i

## 2019-02-25 NOTE — BH PROGRESS NOTE
Pt's son Francine Henley requested to be notified when pt find placement to a facility.  # [phone]

## 2019-02-25 NOTE — BH PROGRESS NOTE
[facility]- No answer [facility] Per Afshin, no beds available   [facility]- Per Dominique Rabago, no beds available   [facility]- No answer  [facility]- spoke with Chaz Davis, faxed clinical. Awaiting response   [facility]- Per Valentina Oh, non beds available  [facility]

## 2019-02-25 NOTE — ED NOTES
[facility] Patient care assumed at this time. Per report, Pt's Son brought all of Pt's belongings home except for her 2 rings which Pt is wearing on her left ring finger.

## 2019-02-25 NOTE — ED PROVIDER NOTES
Patient awake and alert and appropriate here in the emergency department without issue.   Patient awaiting placement

## 2019-02-25 NOTE — BH PROGRESS NOTE
Dr. Freddy Stark recommended inpt admission. No female beds available at [facility] at this time. Pt will be transferred out to in network facility. Pt needs a medical bed with one to one and a  blocked room due to hx of C-DIFF in 6/2017 and chronic diarrhea.   [facility]

## 2019-02-25 NOTE — BH PROGRESS NOTE
Pt will become an ER boarder at 2:00am. Pt will need to be seen by Dr. Garret Gusman on 2/26/19 around 2:00pm if pt is not transferred out to an in network facility and remains in the ED.   Dr. Garret Gusman aware  César Baxterall, 2/24/19@ 10:34Pm

## 2019-02-25 NOTE — ED NOTES
Pt escorted to the washroom by ED PCT - Nellie Nissen at [phone]. Pt ambulated using walker. Pt returned to ED Rm. B2 without any incident.

## 2019-02-25 NOTE — ED NOTES
Rohit Gallardo from [facility] called back at [phone] and and stated that their Nurse \"wants a current C-diff negative result and another Troponin drawn since it's positive. \" This RN relayed that the Troponin drawn at 2032 on 2/24/19's result was negat

## 2019-02-25 NOTE — ED NOTES
Pt ambulate to bathroom with walker, steady gait without RN assistance. Patient attempt to urinate into collection bucket for UA sample collection, patient had small amount of stool in collection hat, unable to send urine or stool for collection.  Pt to be

## 2019-02-25 NOTE — ED NOTES
Bedside Report given to Perla Gallegos RN. Endorsed accordingly. Pt awaiting for placement. Pt's Lab and Radiology results faxed to [facility], with transmission report completed.

## 2019-02-25 NOTE — ED NOTES
Sami, Intake from [facility]. In [facility] called at [phone]. Pt status given.

## 2019-02-25 NOTE — ED NOTES
Packets for review have been sent to:  [facility] [id number] [facility] and  [facility]

## 2019-02-25 NOTE — ED PROVIDER NOTES
Patient Seen in: [facility] Emergency Department    History   Patient presents with:  Fall (musculoskeletal, neurologic)    Stated Complaint: FALL, eval p     HPI  This is a [de-identified] female who has a history of anemia, A. fib, congestive heart fa stenosis of carotid artery without mention of cerebral infarction     right CEA 2000   • Other and unspecified hyperlipidemia    • PERIPHERAL VASCULAR DISEASE    • Peripheral vascular disease ([facility]    • Peripheral vascular disease, unspecified (HCC)    • P 120   Resp 22   Temp 98.7 °F (37.1 °C)   Temp src    SpO2 97 %   O2 Device None (Room air)       Current:BP (!) 194/91   Pulse 81   Temp 98.7 °F (37.1 °C)   Resp (!) 27   SpO2 96%         Physical Exam  General: .   She is C-collared, backboarded she is cry components within normal limits   ACETAMINOPHEN (TYLENOL), S - Abnormal; Notable for the following components:    Acetaminophen <2.0 (*)     All other components within normal limits   TROPONIN I - Normal   ETHYL ALCOHOL - Normal   CBC WITH DIFFERENTIAL WI Ct Brain Or Head (24710)    Result Date: 2/24/2019  PROCEDURE:  CT BRAIN OR HEAD (54851)  COMPARISON:  [facility] , CT BRAIN OR HEAD (97948), 3/19/2018, 20:27. [facility] , CT BRAIN OR HEAD (34129), 12/14/2017, 17:05.   CT BRAIN OR HEAD (81417), 11/24/2017 (CPT=72125), 7/26/2017, 10:12. [facility] , CT SPINE CERVICAL (CPT=72125), 3/19/2018, 20:29. INDICATIONS:  FALL, eval p  TECHNIQUE:  Noncontrast CT scanning of the cervical spine is performed from the skull base through C7.   Multiplanar reconstructions are g been taking her blood pressure meds over the last 2-3days the patient is been really depressed and is just not been able to take care of her self.   The patient is medically cleared pending a chest x-ray and a hip x-ray if those are negative the patient sue significantly better on repeat exam.  She is no complaints of headache, chest pain, abdominal pain. She is medically cleared at this present time.   Disposition and Plan     Clinical Impression:  Closed nondisplaced fracture of phalanx of right little fing

## 2019-02-25 NOTE — ED NOTES
Pt endorsed from [facility]. Pt sleeping on cart and awakens to verbal. Pt denies pain at this time and is on cardiac monitor. Awaiting acceptance from appropriate psych facility.

## 2019-02-25 NOTE — ED NOTES
Pt had an incontinent stool episode. ED PCTs - Corinna Rey and [facility] at bedside providing perineal care. Linen, blanket and Pt's gown changed.

## 2019-02-25 NOTE — BH LEVEL OF CARE ASSESSMENT
Level of Care Assessment Note    General Questions  Why are you here?: Pt states \"I just want to go, I miss him, I will die and go to him\" Pt is tearful and states she has SI with plans and intent to stop eating food and stop taking her medications with family since 6/2017 pt has chronic diarrehea. Pt has on average 2/3 loose stools/diarreha daily. Pt is prone to falls and has hx of falls. Pt requires assistance from her family with most complex ADLs.  Pt states she is able to take care of basic ADLs like started to do anything, or prepared to do anything to end your life? (lifetime): No  Score - BH OV: 10 - High Risk   Describe : Pt admits to having SI with plans and intentiosn to stop taking her medications and stop eating food to kill herself   Is your e Generalized  Panic Attacks: none reported   Trauma Reaction: Other(none )  Bipolar Symptoms: No problems reported or observed  Sleep Pattern: Difficulty falling asleep; Disturbed/interrupted sleep  Number of Sleep Hours: 6 Hours  Use of Sleep Aids: none rep Yes  Describe Concerns/Conflicts with Social Relationships[de-identified] Pt's  passed away 3 weeks ago and   Decreased Functional Ability: Complete ADLs(Pt needs assistance with showering and uses a walker to ambulate )  Do you have any prior/current legal conc Pt is a [de-identified]year old female. Pt was brought to [facility] ER by her family. Pt fell in the kitchen today, pt has been severely depressed since her  passed away 3 weeks ago.  Today pt threw all her medications in the toilet and told her family that she plans ambulate. Pt has hx of verbal agitation. Pt does not have any MH providers.      Risk/Protective Factors  Risk Factors: Current suicidal behavior;Current/past psychiatric disorder;Stressful life events or loss  Protective Factors: unknown per pt    Motivati

## 2019-02-25 NOTE — BH PROGRESS NOTE
[facility] deflected Pt due to not having appropriate bed available at this time.   Keira Landeros, 2/25/19@ 2:54am

## 2019-02-25 NOTE — ED NOTES
Report given to [facility] at [facility]. Pt to be a direct admit to room 368. Pt resting on cart, tearful speaking about her late .  [facility] called, ETA 45 minutes

## 2019-02-25 NOTE — ED NOTES
Pt's son Eleno Gupta informed of pt being admitted to [facility].  Pt up to washroom without incident and endorsed to [facility] for further care

## 2019-02-25 NOTE — ED NOTES
Pt escorted to the washroom by ED PCT - Lauren Rios. Pt ambulated using walker. Pt voided, urine specimen sent to lab. Pt returned to ED Rm. B2 without any incident.

## 2019-02-26 LAB
ATRIAL RATE: 337 BPM
Q-T INTERVAL: 364 MS
QRS DURATION: 86 MS
QTC CALCULATION (BEZET): 440 MS
R AXIS: -1 DEGREES
T AXIS: 9 DEGREES
VENTRICULAR RATE: 88 BPM

## 2019-07-31 ENCOUNTER — APPOINTMENT (OUTPATIENT)
Dept: GENERAL RADIOLOGY | Facility: HOSPITAL | Age: 79
DRG: 470 | End: 2019-07-31
Attending: EMERGENCY MEDICINE
Payer: MEDICARE

## 2019-07-31 ENCOUNTER — HOSPITAL ENCOUNTER (INPATIENT)
Facility: HOSPITAL | Age: 79
LOS: 6 days | Discharge: NURSING, REHAB OR LONG-TERM CARE | DRG: 470 | End: 2019-08-06
Attending: EMERGENCY MEDICINE | Admitting: HOSPITALIST
Payer: MEDICARE

## 2019-07-31 DIAGNOSIS — S72.001A CLOSED FRACTURE OF RIGHT HIP, INITIAL ENCOUNTER (HCC): Primary | ICD-10-CM

## 2019-07-31 DIAGNOSIS — I48.20 ATRIAL FIBRILLATION, CHRONIC (HCC): ICD-10-CM

## 2019-07-31 DIAGNOSIS — R09.02 HYPOXIA: ICD-10-CM

## 2019-07-31 DIAGNOSIS — S72.001A CLOSED FRACTURE OF NECK OF RIGHT FEMUR, INITIAL ENCOUNTER (HCC): ICD-10-CM

## 2019-07-31 PROBLEM — D64.9 ANEMIA: Status: ACTIVE | Noted: 2019-07-31

## 2019-07-31 LAB
ALBUMIN SERPL-MCNC: 2.8 G/DL (ref 3.4–5)
ALBUMIN/GLOB SERPL: 0.7 {RATIO} (ref 1–2)
ALP LIVER SERPL-CCNC: 133 U/L (ref 55–142)
ALT SERPL-CCNC: 25 U/L (ref 13–56)
ANION GAP SERPL CALC-SCNC: 7 MMOL/L (ref 0–18)
ANTIBODY SCREEN: NEGATIVE
APTT PPP: 32.8 SECONDS (ref 25.4–36.1)
AST SERPL-CCNC: 33 U/L (ref 15–37)
ATRIAL RATE: 202 BPM
BASOPHILS # BLD AUTO: 0.08 X10(3) UL (ref 0–0.2)
BASOPHILS NFR BLD AUTO: 1 %
BILIRUB SERPL-MCNC: 0.4 MG/DL (ref 0.1–2)
BILIRUB UR QL STRIP.AUTO: NEGATIVE
BUN BLD-MCNC: 12 MG/DL (ref 7–18)
BUN/CREAT SERPL: 13.3 (ref 10–20)
CALCIUM BLD-MCNC: 8.3 MG/DL (ref 8.5–10.1)
CHLORIDE SERPL-SCNC: 109 MMOL/L (ref 98–112)
CLARITY UR REFRACT.AUTO: CLEAR
CO2 SERPL-SCNC: 23 MMOL/L (ref 21–32)
COLOR UR AUTO: YELLOW
CREAT BLD-MCNC: 0.9 MG/DL (ref 0.55–1.02)
DEPRECATED RDW RBC AUTO: 47.5 FL (ref 35.1–46.3)
EOSINOPHIL # BLD AUTO: 0.6 X10(3) UL (ref 0–0.7)
EOSINOPHIL NFR BLD AUTO: 7.6 %
ERYTHROCYTE [DISTWIDTH] IN BLOOD BY AUTOMATED COUNT: 14.7 % (ref 11–15)
GLOBULIN PLAS-MCNC: 4 G/DL (ref 2.8–4.4)
GLUCOSE BLD-MCNC: 119 MG/DL (ref 70–99)
GLUCOSE UR STRIP.AUTO-MCNC: NEGATIVE MG/DL
HCT VFR BLD AUTO: 35.7 % (ref 35–48)
HGB BLD-MCNC: 11.5 G/DL (ref 12–16)
IMM GRANULOCYTES # BLD AUTO: 0.07 X10(3) UL (ref 0–1)
IMM GRANULOCYTES NFR BLD: 0.9 %
INR BLD: 1.05 (ref 0.9–1.1)
KETONES UR STRIP.AUTO-MCNC: NEGATIVE MG/DL
LEUKOCYTE ESTERASE UR QL STRIP.AUTO: NEGATIVE
LYMPHOCYTES # BLD AUTO: 1.37 X10(3) UL (ref 1–4)
LYMPHOCYTES NFR BLD AUTO: 17.4 %
M PROTEIN MFR SERPL ELPH: 6.8 G/DL (ref 6.4–8.2)
MCH RBC QN AUTO: 28.4 PG (ref 26–34)
MCHC RBC AUTO-ENTMCNC: 32.2 G/DL (ref 31–37)
MCV RBC AUTO: 88.1 FL (ref 80–100)
MONOCYTES # BLD AUTO: 0.65 X10(3) UL (ref 0.1–1)
MONOCYTES NFR BLD AUTO: 8.3 %
MRSA NASAL: NEGATIVE
NEUTROPHILS # BLD AUTO: 5.09 X10 (3) UL (ref 1.5–7.7)
NEUTROPHILS # BLD AUTO: 5.09 X10(3) UL (ref 1.5–7.7)
NEUTROPHILS NFR BLD AUTO: 64.8 %
NITRITE UR QL STRIP.AUTO: NEGATIVE
NT-PROBNP SERPL-MCNC: 2509 PG/ML (ref ?–450)
OSMOLALITY SERPL CALC.SUM OF ELEC: 289 MOSM/KG (ref 275–295)
PH UR STRIP.AUTO: 5 [PH] (ref 4.5–8)
PLATELET # BLD AUTO: 221 10(3)UL (ref 150–450)
POTASSIUM SERPL-SCNC: 4.4 MMOL/L (ref 3.5–5.1)
PROT UR STRIP.AUTO-MCNC: 30 MG/DL
PSA SERPL DL<=0.01 NG/ML-MCNC: 14.2 SECONDS (ref 12.5–14.7)
Q-T INTERVAL: 358 MS
QRS DURATION: 100 MS
QTC CALCULATION (BEZET): 433 MS
R AXIS: 9 DEGREES
RBC # BLD AUTO: 4.05 X10(6)UL (ref 3.8–5.3)
RBC UR QL AUTO: NEGATIVE
RH BLOOD TYPE: POSITIVE
SODIUM SERPL-SCNC: 139 MMOL/L (ref 136–145)
SP GR UR STRIP.AUTO: 1.01 (ref 1–1.03)
STAPH A BY PCR: POSITIVE
T AXIS: 11 DEGREES
UROBILINOGEN UR STRIP.AUTO-MCNC: <2 MG/DL
VENTRICULAR RATE: 88 BPM
WBC # BLD AUTO: 7.9 X10(3) UL (ref 4–11)

## 2019-07-31 PROCEDURE — 93010 ELECTROCARDIOGRAM REPORT: CPT

## 2019-07-31 PROCEDURE — 85610 PROTHROMBIN TIME: CPT | Performed by: EMERGENCY MEDICINE

## 2019-07-31 PROCEDURE — 96375 TX/PRO/DX INJ NEW DRUG ADDON: CPT

## 2019-07-31 PROCEDURE — 86901 BLOOD TYPING SEROLOGIC RH(D): CPT | Performed by: ORTHOPAEDIC SURGERY

## 2019-07-31 PROCEDURE — 87641 MR-STAPH DNA AMP PROBE: CPT | Performed by: EMERGENCY MEDICINE

## 2019-07-31 PROCEDURE — 80053 COMPREHEN METABOLIC PANEL: CPT | Performed by: EMERGENCY MEDICINE

## 2019-07-31 PROCEDURE — 81001 URINALYSIS AUTO W/SCOPE: CPT | Performed by: HOSPITALIST

## 2019-07-31 PROCEDURE — 86900 BLOOD TYPING SEROLOGIC ABO: CPT | Performed by: ORTHOPAEDIC SURGERY

## 2019-07-31 PROCEDURE — 85730 THROMBOPLASTIN TIME PARTIAL: CPT | Performed by: EMERGENCY MEDICINE

## 2019-07-31 PROCEDURE — 99285 EMERGENCY DEPT VISIT HI MDM: CPT

## 2019-07-31 PROCEDURE — 87640 STAPH A DNA AMP PROBE: CPT | Performed by: EMERGENCY MEDICINE

## 2019-07-31 PROCEDURE — 93005 ELECTROCARDIOGRAM TRACING: CPT

## 2019-07-31 PROCEDURE — 73502 X-RAY EXAM HIP UNI 2-3 VIEWS: CPT | Performed by: EMERGENCY MEDICINE

## 2019-07-31 PROCEDURE — 96374 THER/PROPH/DIAG INJ IV PUSH: CPT

## 2019-07-31 PROCEDURE — 85025 COMPLETE CBC W/AUTO DIFF WBC: CPT | Performed by: EMERGENCY MEDICINE

## 2019-07-31 PROCEDURE — 96361 HYDRATE IV INFUSION ADD-ON: CPT

## 2019-07-31 PROCEDURE — 71045 X-RAY EXAM CHEST 1 VIEW: CPT | Performed by: EMERGENCY MEDICINE

## 2019-07-31 PROCEDURE — 86850 RBC ANTIBODY SCREEN: CPT | Performed by: ORTHOPAEDIC SURGERY

## 2019-07-31 PROCEDURE — 83880 ASSAY OF NATRIURETIC PEPTIDE: CPT | Performed by: EMERGENCY MEDICINE

## 2019-07-31 RX ORDER — ACETAMINOPHEN 325 MG/1
650 TABLET ORAL EVERY 6 HOURS PRN
Status: DISCONTINUED | OUTPATIENT
Start: 2019-07-31 | End: 2019-08-03

## 2019-07-31 RX ORDER — DONEPEZIL HYDROCHLORIDE 10 MG/1
10 TABLET, FILM COATED ORAL NIGHTLY
Status: DISCONTINUED | OUTPATIENT
Start: 2019-07-31 | End: 2019-08-06

## 2019-07-31 RX ORDER — METOPROLOL SUCCINATE 50 MG/1
50 TABLET, EXTENDED RELEASE ORAL
Status: DISCONTINUED | OUTPATIENT
Start: 2019-07-31 | End: 2019-08-01

## 2019-07-31 RX ORDER — METOCLOPRAMIDE HYDROCHLORIDE 5 MG/ML
10 INJECTION INTRAMUSCULAR; INTRAVENOUS EVERY 8 HOURS PRN
Status: DISCONTINUED | OUTPATIENT
Start: 2019-07-31 | End: 2019-08-03 | Stop reason: DRUGHIGH

## 2019-07-31 RX ORDER — POLYETHYLENE GLYCOL 3350 17 G/17G
17 POWDER, FOR SOLUTION ORAL DAILY PRN
Status: DISCONTINUED | OUTPATIENT
Start: 2019-07-31 | End: 2019-08-02

## 2019-07-31 RX ORDER — LEVETIRACETAM 250 MG/1
250 TABLET ORAL 2 TIMES DAILY
Status: DISCONTINUED | OUTPATIENT
Start: 2019-07-31 | End: 2019-08-06

## 2019-07-31 RX ORDER — HYDRALAZINE HYDROCHLORIDE 25 MG/1
25 TABLET, FILM COATED ORAL 3 TIMES DAILY
Status: DISCONTINUED | OUTPATIENT
Start: 2019-07-31 | End: 2019-08-01

## 2019-07-31 RX ORDER — BISACODYL 10 MG
10 SUPPOSITORY, RECTAL RECTAL
Status: DISCONTINUED | OUTPATIENT
Start: 2019-07-31 | End: 2019-08-02

## 2019-07-31 RX ORDER — MEMANTINE HYDROCHLORIDE 5 MG/1
5 TABLET ORAL 2 TIMES DAILY
COMMUNITY
End: 2020-05-26

## 2019-07-31 RX ORDER — ONDANSETRON 2 MG/ML
4 INJECTION INTRAMUSCULAR; INTRAVENOUS EVERY 6 HOURS PRN
Status: DISCONTINUED | OUTPATIENT
Start: 2019-07-31 | End: 2019-08-06

## 2019-07-31 RX ORDER — TRAZODONE HYDROCHLORIDE 50 MG/1
50 TABLET ORAL NIGHTLY
Status: DISCONTINUED | OUTPATIENT
Start: 2019-07-31 | End: 2019-08-05

## 2019-07-31 RX ORDER — TRAZODONE HYDROCHLORIDE 50 MG/1
50 TABLET ORAL NIGHTLY
Refills: 2 | Status: ON HOLD | COMMUNITY
Start: 2019-03-03 | End: 2019-08-06

## 2019-07-31 RX ORDER — PRAMIPEXOLE DIHYDROCHLORIDE 0.25 MG/1
0.25 TABLET ORAL NIGHTLY
Status: DISCONTINUED | OUTPATIENT
Start: 2019-07-31 | End: 2019-08-06

## 2019-07-31 RX ORDER — SERTRALINE HYDROCHLORIDE 100 MG/1
200 TABLET, FILM COATED ORAL
Status: DISCONTINUED | OUTPATIENT
Start: 2019-07-31 | End: 2019-08-06

## 2019-07-31 RX ORDER — ASPIRIN 81 MG/1
81 TABLET ORAL DAILY
Status: DISCONTINUED | OUTPATIENT
Start: 2019-07-31 | End: 2019-08-06

## 2019-07-31 RX ORDER — MIRTAZAPINE 15 MG/1
15 TABLET, FILM COATED ORAL NIGHTLY
Status: DISCONTINUED | OUTPATIENT
Start: 2019-07-31 | End: 2019-08-05

## 2019-07-31 RX ORDER — SODIUM CHLORIDE 9 MG/ML
125 INJECTION, SOLUTION INTRAVENOUS CONTINUOUS
Status: DISCONTINUED | OUTPATIENT
Start: 2019-07-31 | End: 2019-07-31

## 2019-07-31 RX ORDER — MORPHINE SULFATE 4 MG/ML
1 INJECTION, SOLUTION INTRAMUSCULAR; INTRAVENOUS EVERY 2 HOUR PRN
Status: DISCONTINUED | OUTPATIENT
Start: 2019-07-31 | End: 2019-08-03

## 2019-07-31 RX ORDER — GABAPENTIN 300 MG/1
600 CAPSULE ORAL 3 TIMES DAILY
Status: DISCONTINUED | OUTPATIENT
Start: 2019-07-31 | End: 2019-08-03

## 2019-07-31 RX ORDER — ATORVASTATIN CALCIUM 40 MG/1
40 TABLET, FILM COATED ORAL NIGHTLY
Status: DISCONTINUED | OUTPATIENT
Start: 2019-07-31 | End: 2019-08-06

## 2019-07-31 RX ORDER — ENOXAPARIN SODIUM 100 MG/ML
1 INJECTION SUBCUTANEOUS ONCE
Status: COMPLETED | OUTPATIENT
Start: 2019-07-31 | End: 2019-07-31

## 2019-07-31 RX ORDER — MORPHINE SULFATE 4 MG/ML
4 INJECTION, SOLUTION INTRAMUSCULAR; INTRAVENOUS EVERY 2 HOUR PRN
Status: DISCONTINUED | OUTPATIENT
Start: 2019-07-31 | End: 2019-08-03

## 2019-07-31 RX ORDER — MORPHINE SULFATE 4 MG/ML
4 INJECTION, SOLUTION INTRAMUSCULAR; INTRAVENOUS ONCE
Status: COMPLETED | OUTPATIENT
Start: 2019-07-31 | End: 2019-07-31

## 2019-07-31 RX ORDER — CLONAZEPAM 0.5 MG/1
0.25 TABLET ORAL EVERY MORNING
Status: DISCONTINUED | OUTPATIENT
Start: 2019-07-31 | End: 2019-08-05

## 2019-07-31 RX ORDER — DOCUSATE SODIUM 100 MG/1
100 CAPSULE, LIQUID FILLED ORAL 2 TIMES DAILY
Status: DISCONTINUED | OUTPATIENT
Start: 2019-07-31 | End: 2019-08-02

## 2019-07-31 RX ORDER — ALBUTEROL SULFATE 90 UG/1
2 AEROSOL, METERED RESPIRATORY (INHALATION) EVERY 6 HOURS PRN
Status: DISCONTINUED | OUTPATIENT
Start: 2019-07-31 | End: 2019-08-06

## 2019-07-31 RX ORDER — CLONAZEPAM 0.5 MG/1
0.5 TABLET ORAL NIGHTLY
Status: DISCONTINUED | OUTPATIENT
Start: 2019-07-31 | End: 2019-08-05

## 2019-07-31 RX ORDER — LEVETIRACETAM 250 MG/1
250 TABLET ORAL 2 TIMES DAILY
COMMUNITY
End: 2019-12-28

## 2019-07-31 RX ORDER — ROPIVACAINE HYDROCHLORIDE 5 MG/ML
30 INJECTION, SOLUTION EPIDURAL; INFILTRATION; PERINEURAL AS NEEDED
Status: DISCONTINUED | OUTPATIENT
Start: 2019-07-31 | End: 2019-08-03

## 2019-07-31 RX ORDER — CLINDAMYCIN PHOSPHATE 900 MG/50ML
900 INJECTION INTRAVENOUS
Status: DISPENSED | OUTPATIENT
Start: 2019-08-01 | End: 2019-08-02

## 2019-07-31 RX ORDER — ONDANSETRON 2 MG/ML
4 INJECTION INTRAMUSCULAR; INTRAVENOUS EVERY 4 HOURS PRN
Status: DISCONTINUED | OUTPATIENT
Start: 2019-07-31 | End: 2019-08-02

## 2019-07-31 RX ORDER — ONDANSETRON 2 MG/ML
4 INJECTION INTRAMUSCULAR; INTRAVENOUS ONCE
Status: COMPLETED | OUTPATIENT
Start: 2019-07-31 | End: 2019-07-31

## 2019-07-31 RX ORDER — SODIUM PHOSPHATE, DIBASIC AND SODIUM PHOSPHATE, MONOBASIC 7; 19 G/133ML; G/133ML
1 ENEMA RECTAL ONCE AS NEEDED
Status: DISCONTINUED | OUTPATIENT
Start: 2019-07-31 | End: 2019-08-06

## 2019-07-31 RX ORDER — LISINOPRIL 10 MG/1
10 TABLET ORAL 2 TIMES DAILY
Status: DISCONTINUED | OUTPATIENT
Start: 2019-07-31 | End: 2019-08-04

## 2019-07-31 RX ORDER — MELATONIN
325
Status: DISCONTINUED | OUTPATIENT
Start: 2019-08-01 | End: 2019-08-06

## 2019-07-31 RX ORDER — GABAPENTIN 300 MG/1
600 CAPSULE ORAL 3 TIMES DAILY
Status: ON HOLD | COMMUNITY
End: 2019-08-06

## 2019-07-31 RX ORDER — LIDOCAINE HYDROCHLORIDE 10 MG/ML
2 INJECTION, SOLUTION EPIDURAL; INFILTRATION; INTRACAUDAL; PERINEURAL AS NEEDED
Status: DISCONTINUED | OUTPATIENT
Start: 2019-07-31 | End: 2019-08-03

## 2019-07-31 RX ORDER — MORPHINE SULFATE 4 MG/ML
2 INJECTION, SOLUTION INTRAMUSCULAR; INTRAVENOUS EVERY 2 HOUR PRN
Status: DISCONTINUED | OUTPATIENT
Start: 2019-07-31 | End: 2019-08-03

## 2019-07-31 RX ORDER — MEMANTINE HYDROCHLORIDE 10 MG/1
5 TABLET ORAL 2 TIMES DAILY
Status: DISCONTINUED | OUTPATIENT
Start: 2019-07-31 | End: 2019-08-06

## 2019-07-31 RX ORDER — PRAMIPEXOLE DIHYDROCHLORIDE 0.12 MG/1
0.25 TABLET ORAL NIGHTLY
Status: ON HOLD | COMMUNITY
End: 2020-07-18

## 2019-07-31 RX ORDER — ONDANSETRON 2 MG/ML
4 INJECTION INTRAMUSCULAR; INTRAVENOUS ONCE
Status: DISCONTINUED | OUTPATIENT
Start: 2019-07-31 | End: 2019-07-31

## 2019-07-31 RX ORDER — BUTALBITAL, ACETAMINOPHEN AND CAFFEINE 50; 325; 40 MG/1; MG/1; MG/1
1 TABLET ORAL
COMMUNITY
End: 2019-08-19

## 2019-07-31 RX ORDER — HYDROMORPHONE HYDROCHLORIDE 1 MG/ML
0.5 INJECTION, SOLUTION INTRAMUSCULAR; INTRAVENOUS; SUBCUTANEOUS EVERY 30 MIN PRN
Status: DISPENSED | OUTPATIENT
Start: 2019-07-31 | End: 2019-07-31

## 2019-07-31 RX ORDER — 0.9 % SODIUM CHLORIDE 0.9 %
10 VIAL (ML) INJECTION AS NEEDED
Status: DISCONTINUED | OUTPATIENT
Start: 2019-07-31 | End: 2019-08-03

## 2019-07-31 NOTE — H&P
.  CC: Patient presents with:  Fall (musculoskeletal, neurologic)       PCP: Bertha Jimenez MD    History of Present Illness: Patient is a [de-identified]year old female with PMH sig for afib not on anticoag, CAD, copd, htn, h/o PE (per chart) who presented with R 88% CPAP 12 [employer]   • Visual impairment         PSH  Past Surgical History:   Procedure Laterality Date   • BYPASS SURGERY      4/28/13   • CHOLECYSTECTOMY     • COLONOSCOPY  7/2010   • COLONOSCOPY N/A 10/28/2013    Performed by Salome Giang MD at [address] [place] [address] Disp:  Rfl:    Pramipexole Dihydrochloride 0.125 MG Oral Tab Take 0.25 mg by mouth nightly. Disp:  Rfl:    mirtazapine (REMERON) 15 MG Oral Tab Take 1 tablet (15 mg total) by mouth nightly.  Disp: 30 tablet Rfl: 3   clonazePAM 1 MG Oral Tab TAKE 1/2 TABLET what's stated above.        OBJECTIVE:  /86 (BP Location: Right arm)   Pulse 94   Temp 98 °F (36.7 °C) (Oral)   Resp 19   Ht 5' 5\" (1.651 m)   Wt 145 lb 8.1 oz (66 kg)   SpO2 94%   BMI 24.21 kg/m²     Gen- NAD, appears stated age  HEENT- NCAT, anicte 7/31/2019  PROCEDURE:  XR HIP W OR WO PELVIS 2 OR 3 VIEWS, RIGHT ( CPT=73502)  TECHNIQUE:  Unilateral 2 to 3 views of the hip and pelvis if performed. COMPARISON:  EDDAVIDE , XR HIP W OR WO PELVIS(MIN 5 VIEWS),BILAT(CPT=73523), 7/11/2018, 12:32.   TERA , X clinical documentation in H+P. Based on patients current state of illness, I anticipate that, after discharge, patient will require TBD.       Lisa Spain MD  [facility] Hospitalist  Pager [phone]  Answering Service number: [phone]

## 2019-07-31 NOTE — ED INITIAL ASSESSMENT (HPI)
Patient to ED by EMS for fall this morning. Patient states had been up for awhile, but fell in her bedroom \"without warning\". Patient denies dizziness. Denies head, neck or back pain. States fell on her bottom and c/o pain in right groin.  Patient also c/

## 2019-07-31 NOTE — PLAN OF CARE
Surgery poss Fri per Dr. Virginia Colmenares, Cardiology consult ordered, Dr. Davidson Player paged, meds ordered per Dr. Raleigh Donato monitoring in place,A-fib w/ RZ=259, diet ordered, denies pain, sleeping at this  time .

## 2019-07-31 NOTE — CONSULTS
[facility] Cardiology Consultation    [facility] Patient Status:  Inpatient    8/10/1940 MRN [id number]   [facility] 3SW-A Attending Stacie Alvarenga MD   Hosp Day # 0 PCP Jim Rosales MD     Reason for Consultation:  Pre-op clearance      H essential hypertension    • Unspecified sleep apnea    • Unspecified sleep apnea SPLIT 9-25-13    AHI 55 RDI 60  Sao2 giovana 88% CPAP 12 [employer]   • Visual impairment      Past Surgical History:   Procedure Laterality Date   • BYPASS SURGERY      4/28/13 Nightly   • aspirin  81 mg Oral Daily   • [START ON 8/1/2019] ferrous sulfate  325 mg Oral Daily with breakfast   • gabapentin  600 mg Oral TID   • hydrALAzine HCl  25 mg Oral TID   • levETIRAcetam  250 mg Oral BID   • lisinopril  10 mg Oral BID   • Metopr cyanosis  Neurologic: Alert and oriented, normal affect. Skin: Warm and dry.      Labs:      HEM:  Recent Labs   Lab 07/31/19  0730   WBC 7.9   HGB 11.5*   HCT 35.7   .0       Chem:  Recent Labs   Lab 07/31/19  0730      K 4.4      CO2 2

## 2019-07-31 NOTE — PLAN OF CARE
Patient has a displaced right subcapital femoral neck fracture. Will require a right cemented bipolar hip replacement.   Will plan on Friday assuming the patient is medically cleared

## 2019-07-31 NOTE — ED PROVIDER NOTES
Patient Seen in: [facility] Emergency Department    History   Patient presents with:  Fall (musculoskeletal, neurologic)    Stated Complaint: fall    HPI    39-year-old patient presents to the emergency department, she lives at home, she fell at [facility] giovana 88% CPAP 12 [employer]   • Visual impairment        Past Surgical History:   Procedure Laterality Date   • BYPASS SURGERY      4/28/13   • CHOLECYSTECTOMY     • COLONOSCOPY  7/2010   • COLONOSCOPY N/A 10/28/2013    Performed by Tony Riley MD at [address] [place] [address] her feet at baseline.   She is lying on emergency department bed mildly pale she appears very uncomfortable her HEENT exam is within normal limits she has a JVD she has a carotid endarterectomy well-healed incision on the right side of her neck she has a mi Final result                 Please view results for these tests on the individual orders.    URINALYSIS WITH CULTURE REFLEX   TYPE AND SCREEN   RAINBOW DRAW BLUE   RAINBOW DRAW LAVENDER   RAINBOW DRAW LIGHT GREEN   RAINBOW DRAW GOLD     EKG    Rate, in 3/19/2018, 21:00. INDICATIONS:  fall         PATIENT STATED HISTORY: (As transcribed by Technologist)  Patient states     she fell in her bedroom this morning and now complains of right hip pain.                 FINDINGS:      Severe diffuse osteope

## 2019-08-01 ENCOUNTER — APPOINTMENT (OUTPATIENT)
Dept: NUCLEAR MEDICINE | Facility: HOSPITAL | Age: 79
DRG: 470 | End: 2019-08-01
Attending: HOSPITALIST
Payer: MEDICARE

## 2019-08-01 ENCOUNTER — ANESTHESIA EVENT (OUTPATIENT)
Dept: SURGERY | Facility: HOSPITAL | Age: 79
End: 2019-08-01

## 2019-08-01 PROCEDURE — 94660 CPAP INITIATION&MGMT: CPT

## 2019-08-01 PROCEDURE — 78582 LUNG VENTILAT&PERFUS IMAGING: CPT | Performed by: HOSPITALIST

## 2019-08-01 NOTE — ANESTHESIA PREPROCEDURE EVALUATION
PRE-OP EVALUATION    Patient Name: Jesus Alberto Richmond    Pre-op Diagnosis: Closed fracture of neck of right femur, initial encounter ([facility] [address] Aditya Atwood    Procedure(s):  RIGHT CEMENTED BIPOLAR HIP REPLACEMENT    Surgeon(s) and Role:     Cathy Henley MD - [facility] PRN   FLEET ENEMA (FLEET) 7-19 GM/118ML enema 133 mL 1 enema Rectal Once PRN   Albuterol Sulfate  (90 Base) MCG/ACT inhaler 2 puff 2 puff Inhalation Q6H PRN   atorvastatin (LIPITOR) tab 40 mg 40 mg Oral Nightly   clonazePAM (KLONOPIN) tab 0.25 mg 0. TABLET BY MOUTH  BID  AS NEEDED FOR PAIN Disp: 60 tablet Rfl: 5   hydrALAzine HCl 25 MG Oral Tab Take 1 tablet (25 mg total) by mouth 3 (three) times daily.  Disp: 270 tablet Rfl: 3   Sertraline HCl 100 MG Oral Tab Take 2 tablets (200 mg total) by mouth onc ESOPHAGOGASTRODUODENOSCOPY (EGD) N/A 10/28/2013    Performed by Naseem Chen MD at [facility] N/A 4/28/2013    Performed by Keke Rivera MD at [address] [place] [address] ([facility])      2002 CABG regional anesthesia have been explained to the patient as indicated on the anesthesia consent form, which has been signed.   Plan/risks discussed with: patient and child/children                Present on Admission:  • Anemia

## 2019-08-01 NOTE — PROGRESS NOTES
[facility] Hospitalist note    PCP: Kendall Cobos MD    Chief Complaint:  F/u hypoxia, hip fracture    SUBJECTIVE:  Sleepy, did receive pain meds/chronic meds- apparently daughter indicates that she is often sleepy at home also.    States that her hip is s mg Oral 2x Daily(Beta Blocker)   • Memantine HCl  5 mg Oral BID   • mirtazapine  15 mg Oral Nightly   • Pramipexole Dihydrochloride  0.25 mg Oral Nightly   • Sertraline HCl  200 mg Oral Daily   • traZODone HCl  50 mg Oral Nightly   • clonazePAM  0.5 mg Ora

## 2019-08-01 NOTE — H&P
[facility]    PATIENT'S NAME: Kemar Green   ATTENDING PHYSICIAN: Obey Enrique M.D.    PATIENT ACCOUNT#:   [de-identified]    LOCATION:  [address] [place] [address]  MEDICAL RECORD #:   [id number]       YOB: 1940  ADMISSION DATE:       07/31/2019 SOCIAL HISTORY:  Patient is a former smoker, she smoked for 30 years, quit in 1993. She does not use alcohol or recreational drugs. REVIEW OF SYSTEMS:  Negative.        PHYSICAL EXAMINATION:    VITAL SIGNS:  The patient is 5 feet 5 inches and weighs

## 2019-08-01 NOTE — PROGRESS NOTES
Radiology called and said that VQ scan is low probability.  Bladder scan done @ 0550 and result of 311, will monitor

## 2019-08-01 NOTE — PLAN OF CARE
Pain controlled with IV MSO4, pt required supplemental O2 per NC with pain interventions. DUNCAN with CPAP at night. Patient remains drowsy throughout shift, rouses easily to verbal stimuli.    Severino catheter placed this am for urinary retention, no betadine

## 2019-08-01 NOTE — PLAN OF CARE
Pt is slightly drowsy, oriented x 3, Forgetful. C/o numbness on her right thigh. Denies pain. Able to wiggle toes, +2 pedal pulse. Denies any tingling sensation. Bedrest maintained. Plan is to have sx on Friday w/ Dr. Isidro Sep.  Pt unable to void, bladder sca

## 2019-08-01 NOTE — PROGRESS NOTES
[facility] Cardiology  Progress Note    Alda Washington Patient Status:  Inpatient    8/10/1940 MRN [id number]   [facility] 3SW-A Attending Tory Lopez MD   [facility] Day # 1 PCP Chirag Xavier MD     Subjective:   No chest pain. 8/1/2019 1335  Last data filed at 8/1/2019 1300  Gross per 24 hour   Intake 150 ml   Output 1300 ml   Net -1150 ml       Wt Readings from Last 3 Encounters:  08/01/19 : 144 lb 10 oz (65.6 kg)  05/17/19 : 145 lb 6.4 oz (66 kg)  02/01/19 : 131 lb (59.4 kg) 0.90   GFRAA 70   GFRNAA 61   CA 8.3*   ALB 2.8*      K 4.4      CO2 23.0   ALKPHO 133   AST 33   ALT 25   BILT 0.4   TP 6.8       Recent Labs   Lab 07/31/19  0730   RBC 4.05   HGB 11.5*   HCT 35.7   MCV 88.1   MCH 28.4   MCHC 32.2   RDW 14.7

## 2019-08-02 ENCOUNTER — ANESTHESIA (OUTPATIENT)
Dept: SURGERY | Facility: HOSPITAL | Age: 79
End: 2019-08-02

## 2019-08-02 ENCOUNTER — APPOINTMENT (OUTPATIENT)
Dept: GENERAL RADIOLOGY | Facility: HOSPITAL | Age: 79
DRG: 470 | End: 2019-08-02
Attending: ORTHOPAEDIC SURGERY
Payer: MEDICARE

## 2019-08-02 PROCEDURE — 76942 ECHO GUIDE FOR BIOPSY: CPT | Performed by: ORTHOPAEDIC SURGERY

## 2019-08-02 PROCEDURE — 88311 DECALCIFY TISSUE: CPT | Performed by: ORTHOPAEDIC SURGERY

## 2019-08-02 PROCEDURE — 88305 TISSUE EXAM BY PATHOLOGIST: CPT | Performed by: ORTHOPAEDIC SURGERY

## 2019-08-02 PROCEDURE — 0SRR0J9 REPLACEMENT OF RIGHT HIP JOINT, FEMORAL SURFACE WITH SYNTHETIC SUBSTITUTE, CEMENTED, OPEN APPROACH: ICD-10-PCS | Performed by: ORTHOPAEDIC SURGERY

## 2019-08-02 PROCEDURE — 73501 X-RAY EXAM HIP UNI 1 VIEW: CPT | Performed by: ORTHOPAEDIC SURGERY

## 2019-08-02 PROCEDURE — 3E0T3BZ INTRODUCTION OF ANESTHETIC AGENT INTO PERIPHERAL NERVES AND PLEXI, PERCUTANEOUS APPROACH: ICD-10-PCS | Performed by: ANESTHESIOLOGY

## 2019-08-02 DEVICE — IMPLANTABLE DEVICE: Type: IMPLANTABLE DEVICE | Site: HIP | Status: FUNCTIONAL

## 2019-08-02 DEVICE — KIT FEM BONE CEMENT RESTRICTOR: Type: IMPLANTABLE DEVICE | Site: HIP | Status: FUNCTIONAL

## 2019-08-02 DEVICE — VERSYS DISTAL CENTRALIZER 14MM: Type: IMPLANTABLE DEVICE | Site: HIP | Status: FUNCTIONAL

## 2019-08-02 DEVICE — BIPOLAR LINER 47/48/49 ODX28MM: Type: IMPLANTABLE DEVICE | Site: HIP | Status: FUNCTIONAL

## 2019-08-02 DEVICE — 12/14 COCR FEM HEAD 28MM +0: Type: IMPLANTABLE DEVICE | Site: HIP | Status: FUNCTIONAL

## 2019-08-02 DEVICE — BIPOLAR SHELL 47MM OD: Type: IMPLANTABLE DEVICE | Site: HIP | Status: FUNCTIONAL

## 2019-08-02 DEVICE — BIOMET BC R 1X40 US: Type: IMPLANTABLE DEVICE | Site: HIP | Status: FUNCTIONAL

## 2019-08-02 RX ORDER — OXYCODONE HYDROCHLORIDE 15 MG/1
15 TABLET ORAL EVERY 4 HOURS PRN
Status: DISCONTINUED | OUTPATIENT
Start: 2019-08-02 | End: 2019-08-03

## 2019-08-02 RX ORDER — HYDRALAZINE HYDROCHLORIDE 20 MG/ML
5 INJECTION INTRAMUSCULAR; INTRAVENOUS EVERY 4 HOURS PRN
Status: DISCONTINUED | OUTPATIENT
Start: 2019-08-02 | End: 2019-08-06

## 2019-08-02 RX ORDER — HYDROMORPHONE HYDROCHLORIDE 1 MG/ML
0.4 INJECTION, SOLUTION INTRAMUSCULAR; INTRAVENOUS; SUBCUTANEOUS EVERY 2 HOUR PRN
Status: DISCONTINUED | OUTPATIENT
Start: 2019-08-02 | End: 2019-08-03

## 2019-08-02 RX ORDER — TIZANIDINE 2 MG/1
2 TABLET ORAL 3 TIMES DAILY PRN
Status: DISCONTINUED | OUTPATIENT
Start: 2019-08-02 | End: 2019-08-03

## 2019-08-02 RX ORDER — TRAMADOL HYDROCHLORIDE 50 MG/1
50 TABLET ORAL EVERY 6 HOURS PRN
Status: DISCONTINUED | OUTPATIENT
Start: 2019-08-02 | End: 2019-08-03

## 2019-08-02 RX ORDER — DOCUSATE SODIUM 100 MG/1
100 CAPSULE, LIQUID FILLED ORAL 2 TIMES DAILY
Status: DISCONTINUED | OUTPATIENT
Start: 2019-08-02 | End: 2019-08-06

## 2019-08-02 RX ORDER — ONDANSETRON 2 MG/ML
4 INJECTION INTRAMUSCULAR; INTRAVENOUS AS NEEDED
Status: DISCONTINUED | OUTPATIENT
Start: 2019-08-02 | End: 2019-08-02 | Stop reason: HOSPADM

## 2019-08-02 RX ORDER — METOPROLOL TARTRATE 5 MG/5ML
15 INJECTION INTRAVENOUS ONCE
Status: COMPLETED | OUTPATIENT
Start: 2019-08-02 | End: 2019-08-02

## 2019-08-02 RX ORDER — SODIUM CHLORIDE, SODIUM LACTATE, POTASSIUM CHLORIDE, CALCIUM CHLORIDE 600; 310; 30; 20 MG/100ML; MG/100ML; MG/100ML; MG/100ML
INJECTION, SOLUTION INTRAVENOUS CONTINUOUS
Status: DISCONTINUED | OUTPATIENT
Start: 2019-08-02 | End: 2019-08-02 | Stop reason: HOSPADM

## 2019-08-02 RX ORDER — NALOXONE HYDROCHLORIDE 0.4 MG/ML
80 INJECTION, SOLUTION INTRAMUSCULAR; INTRAVENOUS; SUBCUTANEOUS AS NEEDED
Status: DISCONTINUED | OUTPATIENT
Start: 2019-08-02 | End: 2019-08-02 | Stop reason: HOSPADM

## 2019-08-02 RX ORDER — ACETAMINOPHEN 325 MG/1
650 TABLET ORAL 4 TIMES DAILY
Status: DISCONTINUED | OUTPATIENT
Start: 2019-08-02 | End: 2019-08-04

## 2019-08-02 RX ORDER — SENNOSIDES 8.6 MG
17.2 TABLET ORAL NIGHTLY
Status: DISCONTINUED | OUTPATIENT
Start: 2019-08-02 | End: 2019-08-06

## 2019-08-02 RX ORDER — POLYETHYLENE GLYCOL 3350 17 G/17G
17 POWDER, FOR SOLUTION ORAL DAILY PRN
Status: DISCONTINUED | OUTPATIENT
Start: 2019-08-02 | End: 2019-08-06

## 2019-08-02 RX ORDER — HYDROMORPHONE HYDROCHLORIDE 1 MG/ML
0.8 INJECTION, SOLUTION INTRAMUSCULAR; INTRAVENOUS; SUBCUTANEOUS EVERY 2 HOUR PRN
Status: DISCONTINUED | OUTPATIENT
Start: 2019-08-02 | End: 2019-08-03

## 2019-08-02 RX ORDER — METOPROLOL TARTRATE 5 MG/5ML
INJECTION INTRAVENOUS
Status: COMPLETED
Start: 2019-08-02 | End: 2019-08-02

## 2019-08-02 RX ORDER — METOPROLOL TARTRATE 5 MG/5ML
2.5 INJECTION INTRAVENOUS ONCE
Status: COMPLETED | OUTPATIENT
Start: 2019-08-02 | End: 2019-08-02

## 2019-08-02 RX ORDER — METOCLOPRAMIDE HYDROCHLORIDE 5 MG/ML
10 INJECTION INTRAMUSCULAR; INTRAVENOUS EVERY 6 HOURS PRN
Status: ACTIVE | OUTPATIENT
Start: 2019-08-02 | End: 2019-08-04

## 2019-08-02 RX ORDER — HYDROMORPHONE HYDROCHLORIDE 1 MG/ML
0.2 INJECTION, SOLUTION INTRAMUSCULAR; INTRAVENOUS; SUBCUTANEOUS EVERY 2 HOUR PRN
Status: DISCONTINUED | OUTPATIENT
Start: 2019-08-02 | End: 2019-08-03

## 2019-08-02 RX ORDER — DIPHENHYDRAMINE HYDROCHLORIDE 50 MG/ML
25 INJECTION INTRAMUSCULAR; INTRAVENOUS ONCE AS NEEDED
Status: ACTIVE | OUTPATIENT
Start: 2019-08-02 | End: 2019-08-02

## 2019-08-02 RX ORDER — SODIUM PHOSPHATE, DIBASIC AND SODIUM PHOSPHATE, MONOBASIC 7; 19 G/133ML; G/133ML
1 ENEMA RECTAL ONCE AS NEEDED
Status: DISCONTINUED | OUTPATIENT
Start: 2019-08-02 | End: 2019-08-02

## 2019-08-02 RX ORDER — CLINDAMYCIN PHOSPHATE 900 MG/50ML
INJECTION INTRAVENOUS
Status: COMPLETED | OUTPATIENT
Start: 2019-08-02 | End: 2019-08-02

## 2019-08-02 RX ORDER — OXYCODONE HYDROCHLORIDE 10 MG/1
10 TABLET ORAL EVERY 4 HOURS PRN
Status: DISCONTINUED | OUTPATIENT
Start: 2019-08-02 | End: 2019-08-03

## 2019-08-02 RX ORDER — BISACODYL 10 MG
10 SUPPOSITORY, RECTAL RECTAL
Status: DISCONTINUED | OUTPATIENT
Start: 2019-08-02 | End: 2019-08-06

## 2019-08-02 RX ORDER — ONDANSETRON 2 MG/ML
4 INJECTION INTRAMUSCULAR; INTRAVENOUS EVERY 4 HOURS PRN
Status: DISCONTINUED | OUTPATIENT
Start: 2019-08-02 | End: 2019-08-03

## 2019-08-02 RX ORDER — METOCLOPRAMIDE HYDROCHLORIDE 5 MG/ML
10 INJECTION INTRAMUSCULAR; INTRAVENOUS AS NEEDED
Status: DISCONTINUED | OUTPATIENT
Start: 2019-08-02 | End: 2019-08-02 | Stop reason: HOSPADM

## 2019-08-02 RX ORDER — DIPHENHYDRAMINE HYDROCHLORIDE 50 MG/ML
12.5 INJECTION INTRAMUSCULAR; INTRAVENOUS EVERY 4 HOURS PRN
Status: DISCONTINUED | OUTPATIENT
Start: 2019-08-02 | End: 2019-08-03

## 2019-08-02 RX ORDER — TRAMADOL HYDROCHLORIDE 50 MG/1
TABLET ORAL
Qty: 60 TABLET | Refills: 0 | Status: SHIPPED | OUTPATIENT
Start: 2019-08-02 | End: 2019-08-06

## 2019-08-02 RX ORDER — HYDROMORPHONE HYDROCHLORIDE 1 MG/ML
0.4 INJECTION, SOLUTION INTRAMUSCULAR; INTRAVENOUS; SUBCUTANEOUS EVERY 5 MIN PRN
Status: DISCONTINUED | OUTPATIENT
Start: 2019-08-02 | End: 2019-08-02 | Stop reason: HOSPADM

## 2019-08-02 RX ORDER — DIPHENHYDRAMINE HCL 25 MG
25 CAPSULE ORAL EVERY 4 HOURS PRN
Status: DISCONTINUED | OUTPATIENT
Start: 2019-08-02 | End: 2019-08-03

## 2019-08-02 RX ORDER — CLINDAMYCIN PHOSPHATE 900 MG/50ML
900 INJECTION INTRAVENOUS EVERY 8 HOURS
Status: COMPLETED | OUTPATIENT
Start: 2019-08-02 | End: 2019-08-03

## 2019-08-02 RX ORDER — PROCHLORPERAZINE EDISYLATE 5 MG/ML
10 INJECTION INTRAMUSCULAR; INTRAVENOUS EVERY 6 HOURS PRN
Status: DISCONTINUED | OUTPATIENT
Start: 2019-08-02 | End: 2019-08-03

## 2019-08-02 RX ORDER — OXYCODONE HYDROCHLORIDE 5 MG/1
5 TABLET ORAL EVERY 4 HOURS PRN
Status: DISCONTINUED | OUTPATIENT
Start: 2019-08-02 | End: 2019-08-03

## 2019-08-02 NOTE — PROGRESS NOTES
[facility]  Progress Note    Bebetomerrittsandrita Dakota Patient Status:  Inpatient    8/10/1940 MRN [id number]   [facility] 3SW-A Attending Tiffany Villasenor MD   Hosp Day # 2 PCP Zafar Cheung MD     Subjective:   Rate improved wi Intake 300 ml   Output 1150 ml   Net -850 ml       Wt Readings from Last 3 Encounters:  08/02/19 : 145 lb 8.1 oz (66 kg)  05/17/19 : 145 lb 6.4 oz (66 kg)  02/01/19 : 131 lb (59.4 kg)      Allergies:    Codeine                 HALLUCINATION  Hydrocodone 109   CO2 23.0   ALKPHO 133   AST 33   ALT 25   BILT 0.4   TP 6.8       Recent Labs   Lab 07/31/19  0730   RBC 4.05   HGB 11.5*   HCT 35.7   MCV 88.1   MCH 28.4   MCHC 32.2   RDW 14.7   NEPRELIM 5.09   WBC 7.9   .0       Recent Labs   Lab 07/31/19

## 2019-08-02 NOTE — INTERVAL H&P NOTE
Pre-op Diagnosis: [facility] right [facility] [S72.001A]    The above referenced H&P was reviewed by Sulema Hurtado PA-C on 6/2/1824, the patient was examined and no significant changes have occurred in the patient's cond

## 2019-08-02 NOTE — PROGRESS NOTES
[facility] Patient Status:  Inpatient    8/10/1940 MRN [id number]   [facility] 3SW-A Attending Gilberto Najjar, MD   Hosp Day # 1 PCP MD Jo Betancur Mikaela is a [de-identified]year old female patient.     Patient Act Date Noted: 01/10/2017      Seizure [facility])         Date Noted: 10/18/2016      Syncope and collapse         Date Noted: 06/01/2016      At risk for falling         Date Noted: 06/01/2016      MCI (mild cognitive impairment)         Date Noted: 08/19/2015 • Insomnia, unspecified    • MCI (mild cognitive impairment) 2013    stable on Aricept   • Neuropathy 1/31/2018   • Occlusion and stenosis of carotid artery without mention of cerebral infarction     right CEA 2000   • Other and unspecified hyperlipidemi Assessment & Plan:  OR 8/2/19

## 2019-08-02 NOTE — ANESTHESIA POSTPROCEDURE EVALUATION
[facility] Patient Status:  Inpatient   Age/Gender [de-identified]year old female MRN [id number]   [facility] SURGERY Attending Sarah Carvajal MD   Hosp Day # 2 PCP Chi Zepeda MD       Anesthesia Post-op Note    Procedure(s):

## 2019-08-02 NOTE — PLAN OF CARE
Pt received drowsy, and has been sleeping all day and night. She was able to participate with assessment. She is oriented x 4 with forgetfulness noted. She would, most of the time, fall back to sleep in the middle of the conversation.  RN held her nighttime

## 2019-08-02 NOTE — PLAN OF CARE
Pt pain managed with PRN morphine IV. Pt with intermittent confusion this am, started pulling at IV and vázquez, IV site wrapped, family now at bedside. VSS, DBP slightly elevated, Dr. Tonio Santana aware, no new orders.  Loose BM today, had received milk of Mg yest

## 2019-08-02 NOTE — PLAN OF CARE
Pt arrived from PACU on bed. Family at bedside. Pt very drowsy but arousable. Denies pain or nausea. Will monitor.

## 2019-08-02 NOTE — RESPIRATORY THERAPY NOTE
DUNCAN - Equipment Use Daily Summary:                  . Set Mode: AUTO CPAP WITH C-FLEX                . Usage in hours: 9:03                . 90% Pressure (EPAP) level: 7.4                . 90% Insp. Pressure (IPAP): Ruby Johnson  AHI: 4.3

## 2019-08-02 NOTE — BRIEF OP NOTE
Pre-Operative Diagnosis: Closed fracture of neck of right femur, initial encounter ([facility] [address] [S72.001A]     Post-Operative Diagnosis: Closed fracture of neck of right femur, initial encounter ([facility] [S72.001A]      Procedure Performed:   Procedure(s):  RIGHT CE

## 2019-08-02 NOTE — PROGRESS NOTES
[facility] Hospitalist note    PCP: Hemant Slater MD    Chief Complaint:  F/u hypoxia, hip fracture    SUBJECTIVE:  Pt remains somnolent today  Did receive dose of dilaudid last night  Awakens only briefly to voice or follows brief command before returnin Oral Daily with breakfast   • [MAR Hold] gabapentin  600 mg Oral TID   • [MAR Hold] levETIRAcetam  250 mg Oral BID   • [MAR Hold] lisinopril  10 mg Oral BID   • [MAR Hold] Memantine HCl  5 mg Oral BID   • [MAR Hold] mirtazapine  15 mg Oral Nightly   • Elroy Dowling treatment lovenox empirically on 7/31  - push IS  - try to minimize pain meds where possible  - continue to wean o2 where possible     4) CAD/PAF, pacemaker  - resumed BB, ASA, ACEi, statin     5) seizure disorder- keppra     6) asthma/copd- lung exam norm

## 2019-08-03 ENCOUNTER — APPOINTMENT (OUTPATIENT)
Dept: CT IMAGING | Facility: HOSPITAL | Age: 79
DRG: 470 | End: 2019-08-03
Attending: HOSPITALIST
Payer: MEDICARE

## 2019-08-03 PROBLEM — G47.33 OSA ON CPAP: Status: ACTIVE | Noted: 2019-08-03

## 2019-08-03 PROBLEM — Z99.89 OSA ON CPAP: Status: ACTIVE | Noted: 2019-08-03

## 2019-08-03 LAB
ALLENS TEST: POSITIVE
AMMONIA PLAS-MCNC: 44 UMOL/L (ref 11–32)
ANION GAP SERPL CALC-SCNC: 4 MMOL/L (ref 0–18)
ARTERIAL BLD GAS O2 SATURATION: 92 % (ref 92–100)
ARTERIAL BLOOD GAS BASE EXCESS: 0.7 MMOL/L (ref ?–2)
ARTERIAL BLOOD GAS HCO3: 26.8 MEQ/L (ref 22–26)
ARTERIAL BLOOD GAS PCO2: 48 MM HG (ref 35–45)
ARTERIAL BLOOD GAS PH: 7.36 (ref 7.35–7.45)
ARTERIAL BLOOD GAS PO2: 69 MM HG (ref 80–105)
BUN BLD-MCNC: 30 MG/DL (ref 7–18)
BUN/CREAT SERPL: 41.7 (ref 10–20)
CALCIUM BLD-MCNC: 8.9 MG/DL (ref 8.5–10.1)
CALCULATED O2 SATURATION: 94 % (ref 92–100)
CARBOXYHEMOGLOBIN: 2 % SAT (ref 0–3)
CHLORIDE SERPL-SCNC: 103 MMOL/L (ref 98–112)
CO2 SERPL-SCNC: 28 MMOL/L (ref 21–32)
CREAT BLD-MCNC: 0.72 MG/DL (ref 0.55–1.02)
DEPRECATED RDW RBC AUTO: 48.7 FL (ref 35.1–46.3)
ERYTHROCYTE [DISTWIDTH] IN BLOOD BY AUTOMATED COUNT: 15.4 % (ref 11–15)
GLUCOSE BLD-MCNC: 118 MG/DL (ref 70–99)
HCT VFR BLD AUTO: 32.9 % (ref 35–48)
HGB BLD-MCNC: 10.6 G/DL (ref 12–16)
L/M: 2 L/MIN
MCH RBC QN AUTO: 27.8 PG (ref 26–34)
MCHC RBC AUTO-ENTMCNC: 32.2 G/DL (ref 31–37)
MCV RBC AUTO: 86.4 FL (ref 80–100)
METHEMOGLOBIN: 0.6 % SAT (ref 0.4–1.5)
OSMOLALITY SERPL CALC.SUM OF ELEC: 287 MOSM/KG (ref 275–295)
PATIENT TEMPERATURE: 97.5 F
PLATELET # BLD AUTO: 225 10(3)UL (ref 150–450)
POTASSIUM SERPL-SCNC: 4.6 MMOL/L (ref 3.5–5.1)
RBC # BLD AUTO: 3.81 X10(6)UL (ref 3.8–5.3)
SODIUM SERPL-SCNC: 135 MMOL/L (ref 136–145)
TOTAL HEMOGLOBIN: 12.3 G/DL (ref 11.7–16)
VIT B12 SERPL-MCNC: 444 PG/ML (ref 193–986)
WBC # BLD AUTO: 13.8 X10(3) UL (ref 4–11)

## 2019-08-03 PROCEDURE — 36600 WITHDRAWAL OF ARTERIAL BLOOD: CPT | Performed by: HOSPITALIST

## 2019-08-03 PROCEDURE — 85027 COMPLETE CBC AUTOMATED: CPT | Performed by: PHYSICIAN ASSISTANT

## 2019-08-03 PROCEDURE — 70450 CT HEAD/BRAIN W/O DYE: CPT | Performed by: HOSPITALIST

## 2019-08-03 PROCEDURE — 83921 ORGANIC ACID SINGLE QUANT: CPT | Performed by: OTHER

## 2019-08-03 PROCEDURE — 80177 DRUG SCRN QUAN LEVETIRACETAM: CPT | Performed by: HOSPITALIST

## 2019-08-03 PROCEDURE — 97166 OT EVAL MOD COMPLEX 45 MIN: CPT

## 2019-08-03 PROCEDURE — 82375 ASSAY CARBOXYHB QUANT: CPT | Performed by: HOSPITALIST

## 2019-08-03 PROCEDURE — 82140 ASSAY OF AMMONIA: CPT | Performed by: HOSPITALIST

## 2019-08-03 PROCEDURE — 97163 PT EVAL HIGH COMPLEX 45 MIN: CPT

## 2019-08-03 PROCEDURE — 82803 BLOOD GASES ANY COMBINATION: CPT | Performed by: HOSPITALIST

## 2019-08-03 PROCEDURE — 80048 BASIC METABOLIC PNL TOTAL CA: CPT | Performed by: HOSPITALIST

## 2019-08-03 PROCEDURE — 85018 HEMOGLOBIN: CPT | Performed by: HOSPITALIST

## 2019-08-03 PROCEDURE — 97535 SELF CARE MNGMENT TRAINING: CPT

## 2019-08-03 PROCEDURE — 97530 THERAPEUTIC ACTIVITIES: CPT

## 2019-08-03 PROCEDURE — 82607 VITAMIN B-12: CPT | Performed by: OTHER

## 2019-08-03 PROCEDURE — 83050 HGB METHEMOGLOBIN QUAN: CPT | Performed by: HOSPITALIST

## 2019-08-03 RX ORDER — TRAMADOL HYDROCHLORIDE 50 MG/1
50 TABLET ORAL EVERY 6 HOURS PRN
Status: DISCONTINUED | OUTPATIENT
Start: 2019-08-03 | End: 2019-08-04

## 2019-08-03 RX ORDER — TRAMADOL HYDROCHLORIDE 50 MG/1
25 TABLET ORAL EVERY 6 HOURS PRN
Status: DISCONTINUED | OUTPATIENT
Start: 2019-08-03 | End: 2019-08-06

## 2019-08-03 RX ORDER — HYDROMORPHONE HYDROCHLORIDE 1 MG/ML
0.1 INJECTION, SOLUTION INTRAMUSCULAR; INTRAVENOUS; SUBCUTANEOUS EVERY 4 HOURS PRN
Status: DISCONTINUED | OUTPATIENT
Start: 2019-08-03 | End: 2019-08-05

## 2019-08-03 NOTE — OCCUPATIONAL THERAPY NOTE
OCCUPATIONAL THERAPY EVALUATION - INPATIENT     Room Number: 351/351-A  Evaluation Date: 8/3/2019  Type of Evaluation: Initial  Presenting Problem: (R cemented bipolar hip replacement on 8/2/19)    Physician Order: IP Consult to [address] [place] [address] DISEASE    • Peripheral vascular disease (HCC)    • Peripheral vascular disease, unspecified (HCC)    • Pneumonia, organism unspecified(486)    • PONV (postoperative nausea and vomiting)    • Pulmonary embolism ([facility]    • [facility]    • Unspec pt lives in a two-story home with her son and daughter. The pt lives on the second floor with her daughter, son lives on first floor.  Pt's daughter reports that pt is modified independent with bathing and dressing; requires moderate assistance to perform t strength is within functional limits (as assessed by use of UE's during functional transfer)    COORDINATION  Gross Motor    Impaired   Fine Motor    WFL      ADDITIONAL TESTS  None       NEUROLOGICAL FINDINGS  Neurological Findings: None         ACTIVITY position. Pt agreeable to sit up EOB with therapist assistance; pt required max A x2 people (one for LE's, one for trunk) to achieve upright posture EOB.  Pt requiring mod A for trunk support to maintain upright positioning; pt maintained upright sitting po Profile/Medical History MODERATE - Expanded review of history including review of medical or therapy record   Specific performance deficits impacting engagement in ADL/IADL HIGH  5+ performance deficits    Client Assessment/Performance Deficits MODERATE -

## 2019-08-03 NOTE — CM/SW NOTE
winston spoke to dtr mich who anticipates pt will need ELOISE upon DC. winston requested DON screen. ECIN referral started to [facility], referral pending.  Will need to send therapy notes once available

## 2019-08-03 NOTE — PHYSICAL THERAPY NOTE
PHYSICAL THERAPY EVALUATION - INPATIENT     Room Number: 351/351-A  Evaluation Date: 8/3/2019  Type of Evaluation: Initial  Physician Order: PT Eval and Treat    Presenting Problem: s/p R cemented bipolar hip replacement 8/2/19  Reason for Therapy: M PERIPHERAL VASCULAR DISEASE    • Peripheral vascular disease (HCC)    • Peripheral vascular disease, unspecified (HCC)    • Pneumonia, organism unspecified(486)    • PONV (postoperative nausea and vomiting)    • Pulmonary embolism (HCC)    • Seizure disord not \"when it gets in her way\". Notes mom was \"more tired lately, but not this tired\". Pt's daughter primarily cares for her, but she has had a dislocated shoulder in a sling for approximately a year now.  Pt daughter reports giving \"medium\" help for p -   Sitting down on and standing up from a chair with arms (e.g., wheelchair, bedside commode, etc.): Unable   -   Moving from lying on back to sitting on the side of the bed?: Unable   How much help from another person does the patient currently need. Vikash Williamson bed;Needs met;Call light within reach;RN aware of session/findings; All patient questions and concerns addressed;SCDs in place    ASSESSMENT   Patient is a [de-identified]year old female admitted on 7/31/2019 for R cemented bipolar hip replacement after a fall on 8/2/1 education; Family education;Gait training;Neuromuscular re-educate;Strengthening;Transfer training;Balance training  Rehab Potential : Fair  Frequency (Obs): Daily  Number of Visits to Meet Established Goals: 3      CURRENT GOALS    Goal #1 Patient is able

## 2019-08-03 NOTE — RESPIRATORY THERAPY NOTE
DUNCAN : EQUIPMENT USE: DAILY SUMMARY                                            SET MODE: AUTO CPAP WITH CFLEX                                          USAGE IN HOURS:5:02                                          90%

## 2019-08-03 NOTE — PLAN OF CARE
Patient very drowsy, able to open eye when name called several times, follows simple commands/instruction with lots of cues. Denies having pain to right hip, microfoam dressing to right surgical hip clean, dry and intact. Kept on oxygen 3L/nasal cannula.

## 2019-08-03 NOTE — PROGRESS NOTES
[facility] Cardiology Progress Note - Anish Latarturo Patient Status:  Inpatient    8/10/1940 MRN [id number]   [facility] 3SW-A Attending Tory Lopez MD   Hosp Day # 3 PCP Chirag Xavier MD     Subjective:  Very sle [facility])     Closed fracture of right hip, initial encounter ([facility] [address]     Hypoxia     Atrial fibrillation, chronic (HCC)      Objective:   Temp: 97.8 °F (36.6 °C)  Pulse: 97  Resp: 17  BP: 120/77    Intake/Output:     Intake/Output Summary (Last 24 hours) at 8/3 ANAPHYLAXIS  Carbapenems               Cephalosporins            Chocolate                 Cipro [Ciprofloxaci*        Comment:SEVERE RECTAL BLEEDING  Eggs Or Egg-Derived*      Iodine (Topical)        ANAPHYLAXIS    Comment:Severe vomiting  Milk mg 10 mg Oral Nightly   aspirin EC tab 81 mg 81 mg Oral Daily   ferrous sulfate EC tab 325 mg 325 mg Oral Daily with breakfast   gabapentin (NEURONTIN) cap 600 mg 600 mg Oral TID   levETIRAcetam (KEPPRA) tab 250 mg 250 mg Oral BID   lisinopril (Tara Miller

## 2019-08-03 NOTE — CONSULTS
[facility]  Neurology  Hospital Consultation Report    Azalia Seip Patient Status:  Inpatient    8/10/1940 MRN [id number]   [facility] 3SW-A Attending Ya Waters MD   Hosp Day # 3 PCP Carine Pmientel MD   Date of Admission Neuropathy 1/31/2018   • Occlusion and stenosis of carotid artery without mention of cerebral infarction     right CEA 2000   • Other and unspecified hyperlipidemia    • PERIPHERAL VASCULAR DISEASE    • Peripheral vascular disease (HCC)    • Peripheral vas quit 13-14 years ago.     Alcohol use: No    Drug use: No         Current Medications:    Current Facility-Administered Medications:  HYDROmorphone HCl (DILAUDID) 1 MG/ML injection 0.1 mg 0.1 mg Intravenous Q4H PRN   traMADol HCl (ULTRAM) tab 25 mg 25 mg Or 200 mg 200 mg Oral Daily   traZODone HCl (DESYREL) tab 50 mg 50 mg Oral Nightly   clonazePAM (KLONOPIN) tab 0.5 mg 0.5 mg Oral Nightly       Medications Prior to Admission:  gabapentin 300 MG Oral Cap Take 600 mg by mouth 3 (three) times daily.    levETIRAc HALLUCINATION  Hydrocodone             HALLUCINATION  Bees                    ANAPHYLAXIS  Carbapenems               Cephalosporins            Chocolate                 Cipro [Ciprofloxaci*        Comment:SEVERE RECTAL BLEEDING  Eggs Or Egg-Derived*      I 08/03/2019    BUN 30 (H) 08/03/2019     (L) 08/03/2019    K 4.6 08/03/2019     08/03/2019    CO2 28.0 08/03/2019     (H) 08/03/2019    CA 8.9 08/03/2019    ALB 2.8 (L) 07/31/2019    ALKPHO 133 07/31/2019    TP 6.8 07/31/2019    AST 33 07 Eneida Yeager MD            Xr Hip W Or Wo Pelvis 2 Or 3 Views, Right (cpt=73502)    Result Date: 7/31/2019  CONCLUSION:  Right femoral neck fracture, likely subcapital.  Continued follow-up is suggested.    Dictated by: Mariama Benson MD on 7/31/2019 at 8

## 2019-08-03 NOTE — PROGRESS NOTES
[facility] Hospitalist note    PCP: Kendall Cobos MD    Chief Complaint:  F/u hypoxia, hip fracture    SUBJECTIVE:  In pacu, sleepy but arouses to voice. Wearing O2 mask.      OBJECTIVE:  Temp:  [97.5 °F (36.4 °C)-99.3 °F (37.4 °C)] 97.5 °F (36.4 °C)  Puls Oral Daily with breakfast   • gabapentin  600 mg Oral TID   • levETIRAcetam  250 mg Oral BID   • lisinopril  10 mg Oral BID   • Memantine HCl  5 mg Oral BID   • mirtazapine  15 mg Oral Nightly   • Pramipexole Dihydrochloride  0.25 mg Oral Nightly   • Sertr

## 2019-08-03 NOTE — PROGRESS NOTES
Post Op Day 1 Ortho Note: Right Cemented Bipolar Hip Replacement    Status Post Nerve Block:  Type of Nerve Block: Right Fascia Iliaca  Single Injection Nerve Block    Post op review: No evidence of immediate block related complications, No paresthesia not

## 2019-08-03 NOTE — PLAN OF CARE
Plan of care explained and updated with patient. Progressing per plan of care. Patient was very drowsy, able to arouse at 2200 and give medications one at a time with water. Patient is oriented to person, disoriented to place, time and situation.  On oxygen

## 2019-08-03 NOTE — PROGRESS NOTES
[facility]  Progress Note    Jo Ann Magaña Patient Status:  Inpatient    8/10/1940 MRN [id number]   [facility] 3SW-A Attending Chava Caputo MD   Hosp Day # 3 PCP Hannah Marin MD     Subjective:POD #1   S/P Right THR  The patient consciousness     Acute post-traumatic headache, not intractable     Neuropathy     Closed head injury     Closed head injury, initial encounter     Contusion of right hip, initial encounter     Inability to ambulate due to hip     Retroperitoneal hematoma

## 2019-08-04 LAB
DEPRECATED RDW RBC AUTO: 46.6 FL (ref 35.1–46.3)
ERYTHROCYTE [DISTWIDTH] IN BLOOD BY AUTOMATED COUNT: 15 % (ref 11–15)
HCT VFR BLD AUTO: 31.8 % (ref 35–48)
HGB BLD-MCNC: 10.4 G/DL (ref 12–16)
LEVETIRACETAM (KEPPRA): 16 UG/ML
MCH RBC QN AUTO: 28 PG (ref 26–34)
MCHC RBC AUTO-ENTMCNC: 32.7 G/DL (ref 31–37)
MCV RBC AUTO: 85.5 FL (ref 80–100)
PLATELET # BLD AUTO: 223 10(3)UL (ref 150–450)
RBC # BLD AUTO: 3.72 X10(6)UL (ref 3.8–5.3)
WBC # BLD AUTO: 10.8 X10(3) UL (ref 4–11)

## 2019-08-04 PROCEDURE — 97110 THERAPEUTIC EXERCISES: CPT

## 2019-08-04 PROCEDURE — 84425 ASSAY OF VITAMIN B-1: CPT | Performed by: OTHER

## 2019-08-04 PROCEDURE — 97535 SELF CARE MNGMENT TRAINING: CPT

## 2019-08-04 PROCEDURE — 97530 THERAPEUTIC ACTIVITIES: CPT

## 2019-08-04 PROCEDURE — 82747 ASSAY OF FOLIC ACID RBC: CPT | Performed by: OTHER

## 2019-08-04 PROCEDURE — 85014 HEMATOCRIT: CPT | Performed by: OTHER

## 2019-08-04 PROCEDURE — 85027 COMPLETE CBC AUTOMATED: CPT | Performed by: PHYSICIAN ASSISTANT

## 2019-08-04 RX ORDER — LISINOPRIL 20 MG/1
20 TABLET ORAL 2 TIMES DAILY
Status: DISCONTINUED | OUTPATIENT
Start: 2019-08-04 | End: 2019-08-06

## 2019-08-04 RX ORDER — ACETAMINOPHEN 325 MG/1
325 TABLET ORAL EVERY 4 HOURS PRN
Status: DISCONTINUED | OUTPATIENT
Start: 2019-08-04 | End: 2019-08-06

## 2019-08-04 RX ORDER — ACETAMINOPHEN 325 MG/1
650 TABLET ORAL EVERY 4 HOURS PRN
Status: DISCONTINUED | OUTPATIENT
Start: 2019-08-04 | End: 2019-08-06

## 2019-08-04 NOTE — PROGRESS NOTES
[facility] Hospitalist note    PCP: Noah Lynch MD    Chief Complaint:  F/u hypoxia, hip fracture    SUBJECTIVE:  More awake today but still limited responses. Moving bowels.     OBJECTIVE:  Temp:  [97.8 °F (36.6 °C)-98.2 °F (36.8 °C)] 97.8 °F (36.6 °C) ER  75 mg Oral 2x Daily(Beta Blocker)   • atorvastatin  40 mg Oral Nightly   • clonazePAM  0.25 mg Oral QAM   • Donepezil HCl  10 mg Oral Nightly   • aspirin  81 mg Oral Daily   • ferrous sulfate  325 mg Oral Daily with breakfast   • levETIRAcetam  250 mg possible     4) CAD/PAF, pacemaker  - resumed BB, ASA, ACEi, statin  - pt has not been anticoag most recently due to falls-- of note, she is on eliquis for prophylaxis in setting of hip repair     5) seizure disorder- keppra     6) asthma/copd- lung exam n

## 2019-08-04 NOTE — PROGRESS NOTES
[facility]  Progress Note    Claudia Arreaga Patient Status:  Inpatient    8/10/1940 MRN [id number]   [facility] 3SW-A Attending Naomy Blanca MD   [facility] Day # 4 PCP Herbert Baca MD     Subjective:POD #2   S/P Right cemented THR  Co syndrome     Concussion with no loss of consciousness     Acute post-traumatic headache, not intractable     Neuropathy     Closed head injury     Closed head injury, initial encounter     Contusion of right hip, initial encounter     Inability to ambulate

## 2019-08-04 NOTE — PROGRESS NOTES
[facility] Cardiology Progress Note - Barrington Manuel Patient Status:  Inpatient    8/10/1940 MRN [id number]   [facility] 3SW-A Attending Remigio Mcduffie MD   Hosp Day # 4 PCP Wendi Kramer MD     Subjective:  Patient encounter     Contusion of right hip, initial encounter     Inability to ambulate due to hip     Retroperitoneal hematoma     Retroperitoneal hemorrhage     Anemia     Closed fracture of right hip (HCC)     Closed fracture of right hip, initial encounter ( 0. 72   CA 8.3* 8.9   * 118*       Recent Labs   Lab 07/31/19  0730   ALT 25   AST 33   ALB 2.8*       No results for input(s): TROP in the last 168 hours.     Allergies:     Codeine                 HALLUCINATION  Hydrocodone             HALLUCINATION Albuterol Sulfate  (90 Base) MCG/ACT inhaler 2 puff 2 puff Inhalation Q6H PRN   atorvastatin (LIPITOR) tab 40 mg 40 mg Oral Nightly   clonazePAM (KLONOPIN) tab 0.25 mg 0.25 mg Oral QAM   Donepezil HCl (ARICEPT) tab 10 mg 10 mg Oral Nightly   aspir

## 2019-08-04 NOTE — PHYSICAL THERAPY NOTE
PHYSICAL THERAPY TREATMENT NOTE - INPATIENT    Room Number: 351/351-A     Session: 1   Number of Visits to Meet Established Goals: 3    Presenting Problem: s/p R cemented bipolar hip replacement 8/2/19    Problem List  Principal Problem:    Closed fractur 4/28/13   • CHOLECYSTECTOMY     • COLONOSCOPY  7/2010   • COLONOSCOPY N/A 10/28/2013    Performed by Awa Doherty MD at [facility] ENDOSCOPY   • ESOPHAGOGASTRODUODENOSCOPY (EGD) N/A 10/28/2013    Performed by Awa Doherty MD at [address] [place] [address] etc.): A Lot   -   Moving from lying on back to sitting on the side of the bed?: Unable   How much help from another person does the patient currently need. ..   -   Moving to and from a bed to a chair (including a wheelchair)?: Total   -   Need to walk in Patient End of Session: In bed;Call light within reach; Needs met;RN aware of session/findings; All patient questions and concerns addressed;SCDs in place; Ice applied; Alarm set    ASSESSMENT   Keaton You demonstrated improved arousal in session this date, but

## 2019-08-04 NOTE — PLAN OF CARE
Problem: DISCHARGE PLANNING  Goal: Discharge to home or other facility with appropriate resources  Description  INTERVENTIONS:  - Identify barriers to discharge w/pt and caregiver  - Include patient/family/discharge partner in discharge Alf Bales

## 2019-08-04 NOTE — PLAN OF CARE
Patient still with some drowsiness, able to open eyes when her  name called out and follow simple instructions with some cues and reinforcement.   Noted to have poor appetite but able to consume all liquids in her food tray, needs max assistance with all he

## 2019-08-04 NOTE — RESPIRATORY THERAPY NOTE
DUNCAN : EQUIPMENT USE: DAILY SUMMARY                                            SET MODE: AUTO CPAP WITH CFLEX                                          USAGE IN HOURS:0:04                                          90%

## 2019-08-04 NOTE — PLAN OF CARE
Alert to self only. Continues to be drowsy, but arouses easily. Denies any pain. Tape dressing remains C/D/I. Abductor pillow in place. On 2L NC. Tele monitoring per DUNCAN protocol. HR ranges in the 90s to high 110s.  Tolerating well taking meds with applesa

## 2019-08-04 NOTE — PROGRESS NOTES
Patient seen by Neurology this afternoon. Drowsy, easy to wake up, able to keep eyes open longer compared to this morning. Able to follow simple direction with cues.

## 2019-08-04 NOTE — OCCUPATIONAL THERAPY NOTE
OCCUPATIONAL THERAPY TREATMENT NOTE - INPATIENT     Room Number: 351/351-A  Session: 1  Number of Visits to Meet Established Goals: 6    Presenting Problem: (R cemented bipolar hip replacement on 8/2/19)    History related to current admission: Patient is Pneumonia, organism unspecified(486)    • PONV (postoperative nausea and vomiting)    • Pulmonary embolism ([facility]    • [facility]    • Unspecified essential hypertension    • Unspecified sleep apnea    • Unspecified sleep apnea SPLIT 9-25-13 Form  How much help from another person does the patient currently need…  -   Putting on and taking off regular lower body clothing?: Total  -   Bathing (including washing, rinsing, drying)?: Total  -   Toileting, which includes using toilet, bedpan or uri upright positioning for approximately 3 minutes with min A x2 people while bed changed and pt cleaned up by RN. Pt unable to take side steps up toward head of bed, foot board of hospital bed removed and pt requiring max A x2 people to scoot up in bed.  RN p evaluation;Equipment eval/education  Rehab Potential : Fair  Frequency (Obs): 5x/week      OT Goals:  All goals ongoing as of 8/4/2019  ADL Goals   Patient will perform grooming: with supervision and while at edge of bed  Patient will perform upper body ana

## 2019-08-05 PROBLEM — Z47.89 ORTHOPEDIC AFTERCARE: Status: ACTIVE | Noted: 2019-08-05

## 2019-08-05 LAB
AMMONIA PLAS-MCNC: 38 UMOL/L (ref 11–32)
DEPRECATED RDW RBC AUTO: 45.7 FL (ref 35.1–46.3)
ERYTHROCYTE [DISTWIDTH] IN BLOOD BY AUTOMATED COUNT: 15.1 % (ref 11–15)
HCT VFR BLD AUTO: 33.1 % (ref 35–48)
HGB BLD-MCNC: 10.8 G/DL (ref 12–16)
MCH RBC QN AUTO: 27.8 PG (ref 26–34)
MCHC RBC AUTO-ENTMCNC: 32.6 G/DL (ref 31–37)
MCV RBC AUTO: 85.1 FL (ref 80–100)
PLATELET # BLD AUTO: 245 10(3)UL (ref 150–450)
RBC # BLD AUTO: 3.89 X10(6)UL (ref 3.8–5.3)
WBC # BLD AUTO: 11.2 X10(3) UL (ref 4–11)

## 2019-08-05 PROCEDURE — 97112 NEUROMUSCULAR REEDUCATION: CPT

## 2019-08-05 PROCEDURE — 85027 COMPLETE CBC AUTOMATED: CPT | Performed by: PHYSICIAN ASSISTANT

## 2019-08-05 PROCEDURE — 97530 THERAPEUTIC ACTIVITIES: CPT

## 2019-08-05 PROCEDURE — 82140 ASSAY OF AMMONIA: CPT | Performed by: OTHER

## 2019-08-05 RX ORDER — HYDROCHLOROTHIAZIDE 25 MG/1
25 TABLET ORAL DAILY
Status: DISCONTINUED | OUTPATIENT
Start: 2019-08-05 | End: 2019-08-06

## 2019-08-05 RX ORDER — HYDRALAZINE HYDROCHLORIDE 25 MG/1
25 TABLET, FILM COATED ORAL EVERY 8 HOURS SCHEDULED
Status: DISCONTINUED | OUTPATIENT
Start: 2019-08-05 | End: 2019-08-06

## 2019-08-05 RX ORDER — CARVEDILOL 12.5 MG/1
12.5 TABLET ORAL 2 TIMES DAILY WITH MEALS
Status: DISCONTINUED | OUTPATIENT
Start: 2019-08-05 | End: 2019-08-06

## 2019-08-05 NOTE — RESPIRATORY THERAPY NOTE
DUNCAN - Equipment Use Daily Summary:                  . Set Mode: AUTO CPAP WITH C-FLEX                . Usage in hours: 0:54                . 90% Pressure (EPAP) level: 16                . 90% Insp. Pressure (IPAP): Boyd Fisher AHI: 5.5                .

## 2019-08-05 NOTE — PROGRESS NOTES
[facility] Hospitalist note    PCP: Jimi Ellington MD    Chief Complaint:  F/u hypoxia, hip fracture    SUBJECTIVE:    Remains sleepy and confused. Didn't know year or month. Received trazadone/clopinine/remeron last night.   clonipin again this am. apixaban  2.5 mg Oral BID   • mupirocin  1 Application Each Nare BID   • Metoprolol Succinate ER  75 mg Oral 2x Daily(Beta Blocker)   • atorvastatin  40 mg Oral Nightly   • Donepezil HCl  10 mg Oral Nightly   • aspirin  81 mg Oral Daily   • ferrous sulfate

## 2019-08-05 NOTE — OCCUPATIONAL THERAPY NOTE
OCCUPATIONAL THERAPY                          Nurse requested to hold OT as patient has elevated BP. Will attempt later as time permits.

## 2019-08-05 NOTE — CM/SW NOTE
Therapy notes sent via [facility]  [facility] sw to follow for further dc planning    [facility] [id number] LSW  Ext [phone]

## 2019-08-05 NOTE — PROGRESS NOTES
Rn notified Dr. Jolene Choe of patients continued sedation, lethargy. Home meds went over, some were discontinued per Md. Rn notified Cardiology NP [facility] of patients cont HTN despite new oral meds ordered and IVP prn b/p meds given. Will monitor.

## 2019-08-05 NOTE — PLAN OF CARE
Patient's blood pressure was 165/86. Administered hydralazine slow ivp as ordered and administered metoprolol 0600 dose early. Will continue to monitor.

## 2019-08-05 NOTE — OPERATIVE REPORT
[address]    PATIENT'S NAME: \[facility]\""   ATTENDING PHYSICIAN: Reyna Echavarria M.D. OPERATING PHYSICIAN: Carlos Jeter M.D.    PATIENT ACCOUNT#:   [de-identified]    LOCATION:  [address] [place] [address]  MEDICAL RECORD #:   [id number]       DATE OF BIRTH:  [birth year] with small blades. The hip was placed in marked internal rotation. The short external rotators were released from the posterior aspect of the greater trochanter, including the piriformis tendon.   A T-shaped incision was made in the capsule posteriorly, a equal.  Offset was stable. I withdrew the trials. I prepared the canal with pulsatile lavage. I placed a cement restrictor down the medullary canal. I used the larger cement restrictor.   I used the trial centralizer and chose a centralizer approximately with positioning the patient, retraction, removal of the excess cement, closure of the incision, dislocation and relocation of the hip and application of an abduction pillow.      Dictated By Sonal Quinn M.D.  d: 08/02/2019 19:16:19  t: 08/03/2019 06:55:

## 2019-08-05 NOTE — PROGRESS NOTES
Enoch Patient Status:  Inpatient    8/10/1940 MRN [id number]   [facility] 3SW-A Attending Makayla Morfin MD   [address] Day # 5 PCP MD Usama Santosmary beth Germanes is a [de-identified]year old female patient.     Patient Act Noted: 04/12/2017      Gait abnormality         Date Noted: 01/10/2017      Seizure [facility])         Date Noted: 10/18/2016      Syncope and collapse         Date Noted: 06/01/2016      At risk for falling         Date Noted: 06/01/2016      MCI (mild cogniti transfusion    • HYPERLIPIDEMIA    • HYPERTENSION    • Insomnia, unspecified    • MCI (mild cognitive impairment) 2013    stable on Aricept   • Neuropathy 1/31/2018   • Occlusion and stenosis of carotid artery without mention of cerebral infarction     rig Subjective:  Symptoms:  Stable. Diet:  Poor intake. Activity level: Impaired due to weakness. Pain:  She complains of pain that is mild. She reports pain is improving. Pain is well controlled.           Objective:  NV OK; no calf pain      Asse

## 2019-08-05 NOTE — PLAN OF CARE
Plan of care update discussed with patients daughter Irena Bazan this am via phone call. D/c pending to [facility]. Patient able to stand with P.T this am, remains max assist, unable to follow commands.  She was able to tell Rn this am she was having hip pain, Rn gave ty

## 2019-08-05 NOTE — OCCUPATIONAL THERAPY NOTE
OCCUPATIONAL THERAPY TREATMENT NOTE - INPATIENT     Room Number: 598/286-C  Session: 2/6  Number of Visits to Meet Established Goals: 6    Presenting Problem: (R cemented bipolar hip replacement on 8/2/19)    History related to current admission:   Patient unspecified ([facility]    • Pneumonia, organism unspecified(486)    • PONV (postoperative nausea and vomiting)    • Pulmonary embolism (HCC)    • Seizure disorder ([facility]    • Unspecified essential hypertension    • Unspecified sleep apnea    • Unspecified sleep Putting on and taking off regular lower body clothing?: Total  -   Bathing (including washing, rinsing, drying)?: Total  -   Toileting, which includes using toilet, bedpan or urinal? : A Lot  -   Putting on and taking off regular upper body clothing?: A Lo assistance of two for sit to stand, unable to transfer to chair. Patient is well below her baseline and will benefit form ongoing OT services while in acute care to address deficits and maximize independence with self care.    The AM-SHAYY ' '6-Clicks' Inpati

## 2019-08-05 NOTE — PROGRESS NOTES
[facility]  Neurology  Hospital Progress Report    Sara Jimenez Patient Status:  Inpatient    8/10/1940 MRN [id number]   [facility] 3SW-A Attending Tanya Brothers MD   Hosp Day # 5 PCP Darren Pinto MD   Date of Admission:  7 Unspecified sleep apnea    • Unspecified sleep apnea SPLIT 9-25-13    AHI 55 RDI 60  Sao2 giovana 88% CPAP 12 [employer]   • Visual impairment      Past Surgical History  Past Surgical History:   Procedure Laterality Date   • BYPASS SURGERY      4/28/13   • CHO injection 5 mg 5 mg Intravenous Q4H PRN   Senna (SENOKOT) tab 17.2 mg 17.2 mg Oral Nightly   docusate sodium (COLACE) cap 100 mg 100 mg Oral BID   PEG 3350 (MIRALAX) powder packet 17 g 17 g Oral Daily PRN   magnesium hydroxide (MILK OF MAGNESIA) 400 MG/5ML 0.25 mg by mouth nightly. mirtazapine (REMERON) 15 MG Oral Tab Take 1 tablet (15 mg total) by mouth nightly.    clonazePAM 1 MG Oral Tab TAKE 1/2 TABLET BY MOUTH EVERY MORNING AND 1 TABLET EVERY NIGHT AT BEDTIME   hydrALAzine HCl 25 MG Oral Tab Take 1 tab Review of Systems: Unable to assess    Physical Exam:   Physical Exam:  /73 (BP Location: Right arm)   Pulse 94   Temp 98.3 °F (36.8 °C) (Axillary)   Resp 21   Ht 5' 5\" (1.651 m)   Wt 148 lb 3.2 oz (67.2 kg)   SpO2 95%   BMI 24.66 kg/m²    General (89651)    Result Date: 8/3/2019  CONCLUSION:  No acute intracranial hemorrhage. Stable diffuse cerebral and cerebellar atrophy with chronic microvascular ischemic changes of aging.    Dictated by: Tim Gordon MD on 8/03/2019 at 15:37     Approved by: E neurological testing is anticipated at this time.       Anemia  Per hospitalist team      Closed fracture of right hip [facility])  Per surgery      Hypoxia  Per hospitalist team      Atrial fibrillation, chronic [facility])  Per cardiology      DUNCAN on CPAP  Per respir

## 2019-08-05 NOTE — PROGRESS NOTES
[facility] Cardiology  Progress Note    Josandra Al Patient Status:  Inpatient    8/10/1940 MRN [id number]   [facility] 3SW-A Attending Gilberto Najjar, MD   Hosp Day # 5 PCP Leona Velazquez MD     Subjective:   No chest pain. Intake 240 ml   Output 550 ml   Net -310 ml       Wt Readings from Last 3 Encounters:  08/05/19 : 148 lb 3.2 oz (67.2 kg)  05/17/19 : 145 lb 6.4 oz (66 kg)  02/01/19 : 131 lb (59.4 kg)      Allergies:    Codeine                 HALLUCINATION  Hydrocodone 0. 90 0.72   GFRAA 70 91   GFRNAA 61 79   CA 8.3* 8.9   ALB 2.8*  --     135*   K 4.4 4.6    103   CO2 23.0 28.0   ALKPHO 133  --    AST 33  --    ALT 25  --    BILT 0.4  --    TP 6.8  --        Recent Labs   Lab 07/31/19  0730 08/03/19  0451 08

## 2019-08-05 NOTE — PLAN OF CARE
Plan of care explained and updated with patient. Patient progressing per plan of care. Patient is alert and oriented to person, with known history of dementia. Seizure precautions in place with no seizure activity noted.  On oxygen 1 liter per nasal cannula

## 2019-08-05 NOTE — PROGRESS NOTES
Per patients daughter Terry Givens at bedside, her mom can have meatloaf and mashed potatoes for dinner and asked Rn to order for them. Rn discussed allergies with them, and they all stated she is able to tolerate this, she eats this at home.  Daughter states she c

## 2019-08-06 ENCOUNTER — APPOINTMENT (OUTPATIENT)
Dept: GENERAL RADIOLOGY | Facility: HOSPITAL | Age: 79
End: 2019-08-06
Payer: MEDICARE

## 2019-08-06 ENCOUNTER — APPOINTMENT (OUTPATIENT)
Dept: CT IMAGING | Facility: HOSPITAL | Age: 79
End: 2019-08-06
Payer: MEDICARE

## 2019-08-06 ENCOUNTER — HOSPITAL ENCOUNTER (EMERGENCY)
Facility: HOSPITAL | Age: 79
Discharge: HOME | End: 2019-08-07
Payer: MEDICARE

## 2019-08-06 ENCOUNTER — TELEPHONE (OUTPATIENT)
Dept: MEDSURG UNIT | Facility: HOSPITAL | Age: 79
End: 2019-08-06

## 2019-08-06 VITALS
RESPIRATION RATE: 24 BRPM | TEMPERATURE: 98 F | HEART RATE: 118 BPM | WEIGHT: 143.94 LBS | BODY MASS INDEX: 23.98 KG/M2 | DIASTOLIC BLOOD PRESSURE: 78 MMHG | SYSTOLIC BLOOD PRESSURE: 154 MMHG | HEIGHT: 65 IN | OXYGEN SATURATION: 94 %

## 2019-08-06 DIAGNOSIS — M25.551 PAIN OF RIGHT HIP JOINT: ICD-10-CM

## 2019-08-06 DIAGNOSIS — E87.1 HYPONATREMIA: Primary | ICD-10-CM

## 2019-08-06 DIAGNOSIS — R29.6 UNWITNESSED FALL: ICD-10-CM

## 2019-08-06 LAB
ALBUMIN SERPL-MCNC: 2.3 G/DL (ref 3.4–5)
ALBUMIN/GLOB SERPL: 0.5 {RATIO} (ref 1–2)
ALP LIVER SERPL-CCNC: 154 U/L (ref 55–142)
ALT SERPL-CCNC: 39 U/L (ref 13–56)
ANION GAP SERPL CALC-SCNC: 7 MMOL/L (ref 0–18)
ANION GAP SERPL CALC-SCNC: 9 MMOL/L (ref 0–18)
APTT PPP: 40.2 SECONDS (ref 25.4–36.1)
AST SERPL-CCNC: 63 U/L (ref 15–37)
BASOPHILS # BLD AUTO: 0.08 X10(3) UL (ref 0–0.2)
BASOPHILS # BLD AUTO: 0.08 X10(3) UL (ref 0–0.2)
BASOPHILS NFR BLD AUTO: 0.7 %
BASOPHILS NFR BLD AUTO: 0.7 %
BILIRUB SERPL-MCNC: 0.9 MG/DL (ref 0.1–2)
BUN BLD-MCNC: 17 MG/DL (ref 7–18)
BUN BLD-MCNC: 19 MG/DL (ref 7–18)
BUN/CREAT SERPL: 27.4 (ref 10–20)
BUN/CREAT SERPL: 29.7 (ref 10–20)
CALCIUM BLD-MCNC: 8.4 MG/DL (ref 8.5–10.1)
CALCIUM BLD-MCNC: 8.9 MG/DL (ref 8.5–10.1)
CHLORIDE SERPL-SCNC: 94 MMOL/L (ref 98–112)
CHLORIDE SERPL-SCNC: 96 MMOL/L (ref 98–112)
CO2 SERPL-SCNC: 27 MMOL/L (ref 21–32)
CO2 SERPL-SCNC: 30 MMOL/L (ref 21–32)
CREAT BLD-MCNC: 0.62 MG/DL (ref 0.55–1.02)
CREAT BLD-MCNC: 0.64 MG/DL (ref 0.55–1.02)
DEPRECATED RDW RBC AUTO: 43.3 FL (ref 35.1–46.3)
DEPRECATED RDW RBC AUTO: 45.3 FL (ref 35.1–46.3)
EOSINOPHIL # BLD AUTO: 0.49 X10(3) UL (ref 0–0.7)
EOSINOPHIL # BLD AUTO: 0.57 X10(3) UL (ref 0–0.7)
EOSINOPHIL NFR BLD AUTO: 4.4 %
EOSINOPHIL NFR BLD AUTO: 5.3 %
ERYTHROCYTE [DISTWIDTH] IN BLOOD BY AUTOMATED COUNT: 14.8 % (ref 11–15)
ERYTHROCYTE [DISTWIDTH] IN BLOOD BY AUTOMATED COUNT: 15.2 % (ref 11–15)
GLOBULIN PLAS-MCNC: 4.4 G/DL (ref 2.8–4.4)
GLUCOSE BLD-MCNC: 109 MG/DL (ref 70–99)
GLUCOSE BLD-MCNC: 113 MG/DL (ref 70–99)
HCT VFR BLD AUTO: 33.5 % (ref 35–48)
HCT VFR BLD AUTO: 34.6 % (ref 35–48)
HEMATOCRIT (CLIENT SUPPLIED): 31.8 %
HGB BLD-MCNC: 11.5 G/DL (ref 12–16)
HGB BLD-MCNC: 11.8 G/DL (ref 12–16)
IMM GRANULOCYTES # BLD AUTO: 0.05 X10(3) UL (ref 0–1)
IMM GRANULOCYTES # BLD AUTO: 0.05 X10(3) UL (ref 0–1)
IMM GRANULOCYTES NFR BLD: 0.4 %
IMM GRANULOCYTES NFR BLD: 0.5 %
INR BLD: 1.29 (ref 0.9–1.1)
LYMPHOCYTES # BLD AUTO: 0.99 X10(3) UL (ref 1–4)
LYMPHOCYTES # BLD AUTO: 1 X10(3) UL (ref 1–4)
LYMPHOCYTES NFR BLD AUTO: 8.8 %
LYMPHOCYTES NFR BLD AUTO: 9.3 %
M PROTEIN MFR SERPL ELPH: 6.7 G/DL (ref 6.4–8.2)
MCH RBC QN AUTO: 27.9 PG (ref 26–34)
MCH RBC QN AUTO: 28.6 PG (ref 26–34)
MCHC RBC AUTO-ENTMCNC: 33.2 G/DL (ref 31–37)
MCHC RBC AUTO-ENTMCNC: 35.2 G/DL (ref 31–37)
MCV RBC AUTO: 81.3 FL (ref 80–100)
MCV RBC AUTO: 84 FL (ref 80–100)
MMA: 0.64 UMOL/L
MONOCYTES # BLD AUTO: 0.88 X10(3) UL (ref 0.1–1)
MONOCYTES # BLD AUTO: 1.04 X10(3) UL (ref 0.1–1)
MONOCYTES NFR BLD AUTO: 8.2 %
MONOCYTES NFR BLD AUTO: 9.3 %
NEUTROPHILS # BLD AUTO: 8.17 X10 (3) UL (ref 1.5–7.7)
NEUTROPHILS # BLD AUTO: 8.17 X10(3) UL (ref 1.5–7.7)
NEUTROPHILS # BLD AUTO: 8.56 X10 (3) UL (ref 1.5–7.7)
NEUTROPHILS # BLD AUTO: 8.56 X10(3) UL (ref 1.5–7.7)
NEUTROPHILS NFR BLD AUTO: 76 %
NEUTROPHILS NFR BLD AUTO: 76.4 %
OSMOLALITY SERPL CALC.SUM OF ELEC: 273 MOSM/KG (ref 275–295)
OSMOLALITY SERPL CALC.SUM OF ELEC: 278 MOSM/KG (ref 275–295)
PLATELET # BLD AUTO: 308 10(3)UL (ref 150–450)
PLATELET # BLD AUTO: 350 10(3)UL (ref 150–450)
POTASSIUM SERPL-SCNC: 3.6 MMOL/L (ref 3.5–5.1)
POTASSIUM SERPL-SCNC: 3.7 MMOL/L (ref 3.5–5.1)
POTASSIUM SERPL-SCNC: 4 MMOL/L (ref 3.5–5.1)
PSA SERPL DL<=0.01 NG/ML-MCNC: 16.7 SECONDS (ref 12.5–14.7)
RBC # BLD AUTO: 4.12 X10(6)UL (ref 3.8–5.3)
RBC # BLD AUTO: 4.12 X10(6)UL (ref 3.8–5.3)
SODIUM SERPL-SCNC: 130 MMOL/L (ref 136–145)
SODIUM SERPL-SCNC: 133 MMOL/L (ref 136–145)
WBC # BLD AUTO: 10.8 X10(3) UL (ref 4–11)
WBC # BLD AUTO: 11.2 X10(3) UL (ref 4–11)

## 2019-08-06 PROCEDURE — 99284 EMERGENCY DEPT VISIT MOD MDM: CPT

## 2019-08-06 PROCEDURE — 73502 X-RAY EXAM HIP UNI 2-3 VIEWS: CPT

## 2019-08-06 PROCEDURE — 85025 COMPLETE CBC W/AUTO DIFF WBC: CPT | Performed by: INTERNAL MEDICINE

## 2019-08-06 PROCEDURE — 93005 ELECTROCARDIOGRAM TRACING: CPT

## 2019-08-06 PROCEDURE — 85610 PROTHROMBIN TIME: CPT

## 2019-08-06 PROCEDURE — 85730 THROMBOPLASTIN TIME PARTIAL: CPT

## 2019-08-06 PROCEDURE — 80053 COMPREHEN METABOLIC PANEL: CPT | Performed by: INTERNAL MEDICINE

## 2019-08-06 PROCEDURE — 97535 SELF CARE MNGMENT TRAINING: CPT

## 2019-08-06 PROCEDURE — 97530 THERAPEUTIC ACTIVITIES: CPT

## 2019-08-06 PROCEDURE — 70450 CT HEAD/BRAIN W/O DYE: CPT

## 2019-08-06 PROCEDURE — 36415 COLL VENOUS BLD VENIPUNCTURE: CPT

## 2019-08-06 PROCEDURE — 93010 ELECTROCARDIOGRAM REPORT: CPT

## 2019-08-06 PROCEDURE — 99285 EMERGENCY DEPT VISIT HI MDM: CPT

## 2019-08-06 PROCEDURE — 84132 ASSAY OF SERUM POTASSIUM: CPT | Performed by: INTERNAL MEDICINE

## 2019-08-06 PROCEDURE — 72125 CT NECK SPINE W/O DYE: CPT

## 2019-08-06 PROCEDURE — 85025 COMPLETE CBC W/AUTO DIFF WBC: CPT

## 2019-08-06 RX ORDER — CARVEDILOL 12.5 MG/1
12.5 TABLET ORAL 2 TIMES DAILY WITH MEALS
Qty: 60 TABLET | Refills: 0 | Status: SHIPPED | OUTPATIENT
Start: 2019-08-06 | End: 2019-08-06

## 2019-08-06 RX ORDER — CARVEDILOL 12.5 MG/1
25 TABLET ORAL 2 TIMES DAILY WITH MEALS
Status: DISCONTINUED | OUTPATIENT
Start: 2019-08-06 | End: 2019-08-06

## 2019-08-06 RX ORDER — HALOPERIDOL 5 MG/ML
2.5 INJECTION INTRAMUSCULAR ONCE
Status: DISCONTINUED | OUTPATIENT
Start: 2019-08-06 | End: 2019-08-07

## 2019-08-06 RX ORDER — LISINOPRIL 20 MG/1
20 TABLET ORAL 2 TIMES DAILY
Qty: 60 TABLET | Refills: 0 | Status: SHIPPED | OUTPATIENT
Start: 2019-08-06 | End: 2019-09-28

## 2019-08-06 RX ORDER — CARVEDILOL 25 MG/1
25 TABLET ORAL 2 TIMES DAILY WITH MEALS
Qty: 60 TABLET | Refills: 11 | Status: SHIPPED | OUTPATIENT
Start: 2019-08-06 | End: 2021-09-03

## 2019-08-06 RX ORDER — ACETAMINOPHEN 325 MG/1
650 TABLET ORAL EVERY 4 HOURS PRN
Qty: 30 TABLET | Refills: 0 | Status: SHIPPED | OUTPATIENT
Start: 2019-08-06 | End: 2019-09-18 | Stop reason: ALTCHOICE

## 2019-08-06 RX ORDER — HYDROCHLOROTHIAZIDE 25 MG/1
25 TABLET ORAL DAILY
Qty: 30 TABLET | Refills: 0 | Status: SHIPPED | OUTPATIENT
Start: 2019-08-07 | End: 2019-10-31

## 2019-08-06 RX ORDER — POTASSIUM CHLORIDE 20 MEQ/1
40 TABLET, EXTENDED RELEASE ORAL EVERY 4 HOURS
Status: COMPLETED | OUTPATIENT
Start: 2019-08-06 | End: 2019-08-06

## 2019-08-06 NOTE — CM/SW NOTE
winston notified that DC is pending cardiology. winston confirmed bed at [facility]. sw arranged [facility] for 430pm. Ambulance forms completed and copy to chart. [facility]    Family aware of poss dc today.      RN to call report to [phone]

## 2019-08-06 NOTE — PLAN OF CARE
Patient confused, orientated to self only. More awake this shift. Pulled out her IV and pulled off her aquacel to her right hip. Coverlet placed to right hip. Patients B/P more controlled this shift. Tylenol given for pain. Plan of care reviewed.  Safety pr

## 2019-08-06 NOTE — OCCUPATIONAL THERAPY NOTE
OCCUPATIONAL THERAPY TREATMENT NOTE - INPATIENT     Room Number: 351/351-A  Session:   Number of Visits to Meet Established Goals: 6    Presenting Problem: (R cemented bipolar hip replacement on 8/2/19)      History related to current admission:   Patient unspecified ([facility]    • Pneumonia, organism unspecified(486)    • PONV (postoperative nausea and vomiting)    • Pulmonary embolism (HCC)    • Seizure disorder ([facility]    • Unspecified essential hypertension    • Unspecified sleep apnea    • Unspecified sleep (including washing, rinsing, drying)?: A Lot  -   Toileting, which includes using toilet, bedpan or urinal? : A Lot  -   Putting on and taking off regular upper body clothing?: A Lot  -   Taking care of personal grooming such as brushing teeth?: A Little Fair  Frequency (Obs): 5x/week      OT Goals:  All goals ongoing as of 8/6/2019  ADL Goals   Patient will perform grooming: with supervision and while at edge of bed  Patient will perform upper body dressing:  with supervision and while at edge of bed  Kristie Domínguez

## 2019-08-06 NOTE — PHYSICAL THERAPY NOTE
PHYSICAL THERAPY TREATMENT NOTE - INPATIENT    Room Number: 351/351-A     Session: 3  Number of Visits to Meet Established Goals: 3    Presenting Problem: S/p Right Cemented Bipolar hip replacement on 08/02/19  History related to current admission: Naren Peripheral vascular disease, unspecified ([facility]    • Pneumonia, organism unspecified(486)    • PONV (postoperative nausea and vomiting)    • Pulmonary embolism (HCC)    • Seizure disorder ([facility]    • Unspecified essential hypertension    • Unspecified sleep O2 WALK                    AM-PAC '6-Clicks' INPATIENT SHORT FORM - BASIC MOBILITY  How much difficulty does the patient currently have. ..  -   Turning over in bed (including adjusting bedclothes, sheets and blankets)?: A Lot   -   Sitting down o concerns addressed; Alarm set;SCDs in place    ASSESSMENT    Patient is a [de-identified]year old female admitted on 7/31/2019 for R cemented bipolar hip replacement after a fall on 8/2/19.   Pertinent comorbidities and personal factors impacting therapy include letharg

## 2019-08-06 NOTE — PLAN OF CARE
Patient alert and awake this morning, able to follow direction and answers questions appropriately. At times patient is restless but does not attempt to get out of bed. Bed alarm turned on, abductor pillow between the legs.   Severino still intact with karla

## 2019-08-06 NOTE — PROGRESS NOTES
NURSING DISCHARGE NOTE    Discharged Nursing home via Ambulance.   Accompanied by Support staff  Belongings Taken by patient/family   Patient looking for her wedding ring, called daughter Catrina Floyd, informed patient and RN that she has patient's wed

## 2019-08-06 NOTE — PROGRESS NOTES
[facility] Cardiology  Progress Note    Víctor Randle Patient Status:  Inpatient    8/10/1940 MRN [id number]   [facility] 3SW-A Attending Sharri Shields MD   [facility] Day # 6 PCP Hemant Slater MD     Subjective:   No chest pain. kg)      Allergies:    Codeine                 HALLUCINATION  Hydrocodone             HALLUCINATION  Bees                    ANAPHYLAXIS  Carbapenems               Cephalosporins            Chocolate                 Cipro [Ciprofloxaci*        Comment:PRESLEY ALKPHO 133  --  154*   AST 33  --  63*   ALT 25  --  39   BILT 0.4  --  0.9   TP 6.8  --  6.7       Recent Labs   Lab 07/31/19  0730  08/04/19  0428 08/05/19  0519 08/06/19  0501   RBC 4.05   < > 3.72* 3.89 4.12   HGB 11.5*   < > 10.4* 10.8* 11.5*   HCT

## 2019-08-06 NOTE — RESPIRATORY THERAPY NOTE
DUNCAN - Equipment Use Daily Summary:                  . Set Mode:                . Usage in hours:                . 90% Pressure (EPAP) level:                . 90% Insp. Pressure (IPAP): Tate Kennedy AHI:                .  Supplemental Oxygen:    LPM

## 2019-08-07 VITALS
RESPIRATION RATE: 16 BRPM | SYSTOLIC BLOOD PRESSURE: 137 MMHG | HEART RATE: 124 BPM | DIASTOLIC BLOOD PRESSURE: 71 MMHG | BODY MASS INDEX: 24 KG/M2 | TEMPERATURE: 97 F | WEIGHT: 143.94 LBS | OXYGEN SATURATION: 98 %

## 2019-08-07 RX ORDER — ACETAMINOPHEN 325 MG/1
650 TABLET ORAL ONCE
Status: COMPLETED | OUTPATIENT
Start: 2019-08-07 | End: 2019-08-07

## 2019-08-07 NOTE — ED PROVIDER NOTES
Patient Seen in: [facility] Emergency Department    History   Patient presents with:  Fall (musculoskeletal, neurologic)    Stated Complaint: unwitnessed fall    HPI    33-year-old with dementia in the emergency department after being  recently disc carotid artery without mention of cerebral infarction     right CEA 2000   • Other and unspecified hyperlipidemia    • PERIPHERAL VASCULAR DISEASE    • Peripheral vascular disease ([facility]    • Peripheral vascular disease, unspecified (HCC)    • Pneumonia, or except as noted above.     Physical Exam     ED Triage Vitals [08/06/19 2145]   BP (!) 138/96   Pulse (!) 122   Resp 14   Temp 97 °F (36.1 °C)   Temp src Temporal   SpO2 94 %   O2 Device None (Room air)       Current:/77   Pulse (!) 138   Temp 97 °F ( normal limits   BASIC METABOLIC PANEL (8) - Abnormal; Notable for the following components:    Glucose 109 (*)     Sodium 130 (*)     Chloride 96 (*)     BUN 19 (*)     BUN/CREA Ratio 29.7 (*)     Calculated Osmolality 273 (*)     All other components with PATIENT STATED HISTORY: (As transcribed by Technologist)  Unwitnessed fall. Patient confused. FINDINGS:  VENTRICLES/SULCI:  Ventricles and sulci are stable in size. INTRACRANIAL:  No significant change in the appearance of the brain parenchyma.   Stable inflammation. SKULL:             No depressed calvarial fracture. CONCLUSION:  No acute intracranial hemorrhage. Stable diffuse cerebral and cerebellar atrophy with chronic microvascular ischemic changes of aging.    Dictated by: Westly Meckel, MD on [facility] , XR CHEST AP PORTABLE  (CPT=71045), 6/01/2018, 14:59. INDICATIONS:  fall  PATIENT STATED HISTORY: (As transcribed by Technologist)  Patient states she fell in her bedroom this morning and now complains of right hip pain.     FINDINGS:  Heart size i (ige=77089)    Result Date: 8/2/2019  PROCEDURE:  XR HIP W OR WO PELVIS 1 VIEW, RIGHT (CPT=73501)  TECHNIQUE:  Unilateral view of the hip. COMPARISON:  [facility] , XR HIP W OR WO PELVIS 2 OR 3 VIEWS, RIGHT (CPT=73502), 7/31/2019, 8:10.   [facility] , XR HIP W OR 3 views of the hip and pelvis if performed. COMPARISON:  [facility] , XR HIP W OR WO PELVIS(MIN 5 VIEWS),BILAT(CPT=73523), 7/11/2018, 12:32. [facility] , XR HIP W OR WO PELVIS 2 OR 3 VIEWS, RIGHT (CPT=73502), 3/19/2018, 21:00.   INDICATIONS:  fall  PATIENT STATED Lisseth Monzon MD  [address] [facility]  Theo Weaver [phone]    Call in 2 days  For Repeat Evaluation     Bay Briseno MD  [address] [facility] [phone]    Call  For Repeat Evaluation , For suture removal

## 2019-08-07 NOTE — CM/SW NOTE
08/07/19 0800   Discharge disposition   Expected discharge disposition Skilled Nurs   Name of Alf Luevano   Discharge transportation [address] on 8/6/19

## 2019-08-07 NOTE — ED INITIAL ASSESSMENT (HPI)
Patient presents from [place] following an unwitnessed fall. She was discharged today following a hip surgery. C-collar in place from EMS.

## 2019-08-07 NOTE — ED NOTES
Updated pt's mother on pt's condition and disposition, gave report to Johnny Hoskins RN at [facility].

## 2019-08-07 NOTE — ED NOTES
Patient is currently in afib. She is not oriented at this time and much of the content of her speech is inaccurate.

## 2019-08-08 LAB
ATRIAL RATE: 131 BPM
Q-T INTERVAL: 284 MS
QRS DURATION: 88 MS
QTC CALCULATION (BEZET): 420 MS
R AXIS: -51 DEGREES
T AXIS: -21 DEGREES
VENTRICULAR RATE: 132 BPM
VITAMIN B1 (THIAMINE), WHOLE B: 129 NMOL/L

## 2019-10-31 ENCOUNTER — APPOINTMENT (OUTPATIENT)
Dept: GENERAL RADIOLOGY | Facility: HOSPITAL | Age: 79
DRG: 190 | End: 2019-10-31
Attending: EMERGENCY MEDICINE
Payer: MEDICARE

## 2019-10-31 ENCOUNTER — APPOINTMENT (OUTPATIENT)
Dept: CT IMAGING | Facility: HOSPITAL | Age: 79
DRG: 190 | End: 2019-10-31
Attending: INTERNAL MEDICINE
Payer: MEDICARE

## 2019-10-31 ENCOUNTER — HOSPITAL ENCOUNTER (INPATIENT)
Facility: HOSPITAL | Age: 79
LOS: 1 days | Discharge: HOME | DRG: 190 | End: 2019-11-03
Attending: EMERGENCY MEDICINE | Admitting: HOSPITALIST
Payer: MEDICARE

## 2019-10-31 DIAGNOSIS — J44.1 COPD EXACERBATION (HCC): Primary | ICD-10-CM

## 2019-10-31 DIAGNOSIS — B34.9 VIRAL SYNDROME: ICD-10-CM

## 2019-10-31 PROBLEM — E87.3 METABOLIC ALKALOSIS: Status: ACTIVE | Noted: 2019-10-31

## 2019-10-31 PROBLEM — D69.6 THROMBOCYTOPENIA (HCC): Status: ACTIVE | Noted: 2019-10-31

## 2019-10-31 PROCEDURE — 93005 ELECTROCARDIOGRAM TRACING: CPT

## 2019-10-31 PROCEDURE — 96365 THER/PROPH/DIAG IV INF INIT: CPT

## 2019-10-31 PROCEDURE — 87798 DETECT AGENT NOS DNA AMP: CPT | Performed by: EMERGENCY MEDICINE

## 2019-10-31 PROCEDURE — 36600 WITHDRAWAL OF ARTERIAL BLOOD: CPT | Performed by: EMERGENCY MEDICINE

## 2019-10-31 PROCEDURE — 84295 ASSAY OF SERUM SODIUM: CPT | Performed by: EMERGENCY MEDICINE

## 2019-10-31 PROCEDURE — 82330 ASSAY OF CALCIUM: CPT | Performed by: EMERGENCY MEDICINE

## 2019-10-31 PROCEDURE — 94640 AIRWAY INHALATION TREATMENT: CPT

## 2019-10-31 PROCEDURE — 83605 ASSAY OF LACTIC ACID: CPT | Performed by: EMERGENCY MEDICINE

## 2019-10-31 PROCEDURE — 80053 COMPREHEN METABOLIC PANEL: CPT | Performed by: EMERGENCY MEDICINE

## 2019-10-31 PROCEDURE — 84484 ASSAY OF TROPONIN QUANT: CPT | Performed by: EMERGENCY MEDICINE

## 2019-10-31 PROCEDURE — 99285 EMERGENCY DEPT VISIT HI MDM: CPT

## 2019-10-31 PROCEDURE — 83050 HGB METHEMOGLOBIN QUAN: CPT | Performed by: EMERGENCY MEDICINE

## 2019-10-31 PROCEDURE — 87633 RESP VIRUS 12-25 TARGETS: CPT | Performed by: EMERGENCY MEDICINE

## 2019-10-31 PROCEDURE — 87999 UNLISTED MICROBIOLOGY PX: CPT

## 2019-10-31 PROCEDURE — 85025 COMPLETE CBC W/AUTO DIFF WBC: CPT | Performed by: EMERGENCY MEDICINE

## 2019-10-31 PROCEDURE — 85610 PROTHROMBIN TIME: CPT | Performed by: EMERGENCY MEDICINE

## 2019-10-31 PROCEDURE — 87040 BLOOD CULTURE FOR BACTERIA: CPT | Performed by: EMERGENCY MEDICINE

## 2019-10-31 PROCEDURE — 85018 HEMOGLOBIN: CPT | Performed by: EMERGENCY MEDICINE

## 2019-10-31 PROCEDURE — 87581 M.PNEUMON DNA AMP PROBE: CPT | Performed by: EMERGENCY MEDICINE

## 2019-10-31 PROCEDURE — 82375 ASSAY CARBOXYHB QUANT: CPT | Performed by: EMERGENCY MEDICINE

## 2019-10-31 PROCEDURE — 85379 FIBRIN DEGRADATION QUANT: CPT | Performed by: HOSPITALIST

## 2019-10-31 PROCEDURE — 83880 ASSAY OF NATRIURETIC PEPTIDE: CPT | Performed by: EMERGENCY MEDICINE

## 2019-10-31 PROCEDURE — 96375 TX/PRO/DX INJ NEW DRUG ADDON: CPT

## 2019-10-31 PROCEDURE — 71045 X-RAY EXAM CHEST 1 VIEW: CPT | Performed by: EMERGENCY MEDICINE

## 2019-10-31 PROCEDURE — 36415 COLL VENOUS BLD VENIPUNCTURE: CPT

## 2019-10-31 PROCEDURE — 82803 BLOOD GASES ANY COMBINATION: CPT | Performed by: EMERGENCY MEDICINE

## 2019-10-31 PROCEDURE — 87486 CHLMYD PNEUM DNA AMP PROBE: CPT | Performed by: EMERGENCY MEDICINE

## 2019-10-31 PROCEDURE — 93010 ELECTROCARDIOGRAM REPORT: CPT

## 2019-10-31 PROCEDURE — 85730 THROMBOPLASTIN TIME PARTIAL: CPT | Performed by: EMERGENCY MEDICINE

## 2019-10-31 PROCEDURE — 84132 ASSAY OF SERUM POTASSIUM: CPT | Performed by: EMERGENCY MEDICINE

## 2019-10-31 RX ORDER — LEVETIRACETAM 250 MG/1
250 TABLET ORAL 2 TIMES DAILY
Status: DISCONTINUED | OUTPATIENT
Start: 2019-10-31 | End: 2019-11-03

## 2019-10-31 RX ORDER — IPRATROPIUM BROMIDE AND ALBUTEROL SULFATE 2.5; .5 MG/3ML; MG/3ML
3 SOLUTION RESPIRATORY (INHALATION) ONCE
Status: COMPLETED | OUTPATIENT
Start: 2019-10-31 | End: 2019-10-31

## 2019-10-31 RX ORDER — CLONAZEPAM 1 MG/1
1 TABLET ORAL NIGHTLY PRN
COMMUNITY
End: 2020-02-05

## 2019-10-31 RX ORDER — ACETAMINOPHEN AND CODEINE PHOSPHATE 300; 30 MG/1; MG/1
1-2 TABLET ORAL EVERY 4 HOURS PRN
COMMUNITY
End: 2019-11-03

## 2019-10-31 RX ORDER — CARVEDILOL 12.5 MG/1
25 TABLET ORAL 2 TIMES DAILY WITH MEALS
Status: DISCONTINUED | OUTPATIENT
Start: 2019-10-31 | End: 2019-11-03

## 2019-10-31 RX ORDER — DONEPEZIL HYDROCHLORIDE 10 MG/1
10 TABLET, FILM COATED ORAL NIGHTLY
Status: DISCONTINUED | OUTPATIENT
Start: 2019-10-31 | End: 2019-11-03

## 2019-10-31 RX ORDER — PREDNISONE 20 MG/1
40 TABLET ORAL
Status: DISCONTINUED | OUTPATIENT
Start: 2019-11-01 | End: 2019-11-03

## 2019-10-31 RX ORDER — METHYLPREDNISOLONE SODIUM SUCCINATE 125 MG/2ML
125 INJECTION, POWDER, LYOPHILIZED, FOR SOLUTION INTRAMUSCULAR; INTRAVENOUS ONCE
Status: COMPLETED | OUTPATIENT
Start: 2019-10-31 | End: 2019-10-31

## 2019-10-31 RX ORDER — HYDRALAZINE HYDROCHLORIDE 25 MG/1
25 TABLET, FILM COATED ORAL 3 TIMES DAILY
Status: DISCONTINUED | OUTPATIENT
Start: 2019-10-31 | End: 2019-11-03

## 2019-10-31 RX ORDER — LISINOPRIL 20 MG/1
20 TABLET ORAL 2 TIMES DAILY
Status: DISCONTINUED | OUTPATIENT
Start: 2019-10-31 | End: 2019-11-03

## 2019-10-31 RX ORDER — HYDRALAZINE HYDROCHLORIDE 25 MG/1
25 TABLET, FILM COATED ORAL 3 TIMES DAILY
COMMUNITY
End: 2021-09-03

## 2019-10-31 RX ORDER — MELATONIN
325
Status: DISCONTINUED | OUTPATIENT
Start: 2019-11-01 | End: 2019-11-03

## 2019-10-31 RX ORDER — ONDANSETRON 2 MG/ML
4 INJECTION INTRAMUSCULAR; INTRAVENOUS EVERY 4 HOURS PRN
Status: DISCONTINUED | OUTPATIENT
Start: 2019-10-31 | End: 2019-10-31

## 2019-10-31 RX ORDER — ACETAMINOPHEN AND CODEINE PHOSPHATE 300; 30 MG/1; MG/1
1-2 TABLET ORAL EVERY 4 HOURS PRN
Status: DISCONTINUED | OUTPATIENT
Start: 2019-10-31 | End: 2019-10-31 | Stop reason: SDUPTHER

## 2019-10-31 RX ORDER — BUTALBITAL, ACETAMINOPHEN AND CAFFEINE 50; 325; 40 MG/1; MG/1; MG/1
1 TABLET ORAL EVERY 4 HOURS PRN
COMMUNITY
End: 2020-04-12

## 2019-10-31 RX ORDER — PRAMIPEXOLE DIHYDROCHLORIDE 0.25 MG/1
0.25 TABLET ORAL NIGHTLY
Status: DISCONTINUED | OUTPATIENT
Start: 2019-10-31 | End: 2019-11-03

## 2019-10-31 RX ORDER — TRAZODONE HYDROCHLORIDE 50 MG/1
50 TABLET ORAL NIGHTLY
COMMUNITY
End: 2020-01-13

## 2019-10-31 RX ORDER — LISINOPRIL 20 MG/1
20 TABLET ORAL 2 TIMES DAILY
COMMUNITY
End: 2020-03-23

## 2019-10-31 RX ORDER — CLONAZEPAM 1 MG/1
0.5 TABLET ORAL EVERY MORNING
Status: ON HOLD | COMMUNITY
End: 2020-07-18

## 2019-10-31 RX ORDER — SERTRALINE HYDROCHLORIDE 100 MG/1
200 TABLET, FILM COATED ORAL DAILY
COMMUNITY
End: 2020-04-23

## 2019-10-31 RX ORDER — IPRATROPIUM BROMIDE AND ALBUTEROL SULFATE 2.5; .5 MG/3ML; MG/3ML
3 SOLUTION RESPIRATORY (INHALATION) EVERY 6 HOURS PRN
Status: DISCONTINUED | OUTPATIENT
Start: 2019-10-31 | End: 2019-11-03

## 2019-10-31 RX ORDER — ACETAMINOPHEN AND CODEINE PHOSPHATE 300; 30 MG/1; MG/1
1 TABLET ORAL EVERY 4 HOURS PRN
Status: DISCONTINUED | OUTPATIENT
Start: 2019-10-31 | End: 2019-11-03

## 2019-10-31 RX ORDER — CLONAZEPAM 0.5 MG/1
0.5 TABLET ORAL EVERY MORNING
Status: DISCONTINUED | OUTPATIENT
Start: 2019-10-31 | End: 2019-11-03

## 2019-10-31 RX ORDER — MEMANTINE HYDROCHLORIDE 5 MG/1
5 TABLET ORAL 2 TIMES DAILY
Status: DISCONTINUED | OUTPATIENT
Start: 2019-10-31 | End: 2019-11-03

## 2019-10-31 RX ORDER — METHYLPREDNISOLONE SODIUM SUCCINATE 40 MG/ML
40 INJECTION, POWDER, LYOPHILIZED, FOR SOLUTION INTRAMUSCULAR; INTRAVENOUS EVERY 12 HOURS
Status: COMPLETED | OUTPATIENT
Start: 2019-10-31 | End: 2019-10-31

## 2019-10-31 RX ORDER — ACETAMINOPHEN AND CODEINE PHOSPHATE 300; 30 MG/1; MG/1
2 TABLET ORAL EVERY 4 HOURS PRN
Status: DISCONTINUED | OUTPATIENT
Start: 2019-10-31 | End: 2019-11-03

## 2019-10-31 RX ORDER — BUTALBITAL, ACETAMINOPHEN AND CAFFEINE 50; 325; 40 MG/1; MG/1; MG/1
1 TABLET ORAL EVERY 4 HOURS PRN
Status: DISCONTINUED | OUTPATIENT
Start: 2019-10-31 | End: 2019-11-03

## 2019-10-31 RX ORDER — ATORVASTATIN CALCIUM 40 MG/1
40 TABLET, FILM COATED ORAL DAILY
Status: DISCONTINUED | OUTPATIENT
Start: 2019-10-31 | End: 2019-11-03

## 2019-10-31 RX ORDER — CLONAZEPAM 1 MG/1
1 TABLET ORAL NIGHTLY PRN
Status: DISCONTINUED | OUTPATIENT
Start: 2019-10-31 | End: 2019-11-03

## 2019-10-31 RX ORDER — IBUPROFEN 600 MG/1
600 TABLET ORAL ONCE
Status: COMPLETED | OUTPATIENT
Start: 2019-10-31 | End: 2019-10-31

## 2019-10-31 RX ORDER — TRAZODONE HYDROCHLORIDE 50 MG/1
50 TABLET ORAL NIGHTLY
Status: DISCONTINUED | OUTPATIENT
Start: 2019-10-31 | End: 2019-11-03

## 2019-10-31 RX ORDER — SERTRALINE HYDROCHLORIDE 100 MG/1
200 TABLET, FILM COATED ORAL DAILY
Status: DISCONTINUED | OUTPATIENT
Start: 2019-10-31 | End: 2019-11-03

## 2019-10-31 NOTE — H&P
[facility] Hospitalist H&P       CC: Patient presents with:  Dyspnea CYRIL SOB (respiratory)       PCP: Malgorzata Owens MD    History of Present Illness:  Patient is a 78year old female with PMH afib, CAD, CHF, COPD, PE, seizure d/o presenting with 1 wk h/o p 4/28/13   • CHOLECYSTECTOMY     • COLONOSCOPY  7/2010   • COLONOSCOPY N/A 10/28/2013    Performed by David Ray MD at [facility] ENDOSCOPY   • ESOPHAGOGASTRODUODENOSCOPY (EGD) N/A 10/28/2013    Performed by David Ray MD at [address] [place] [address] mouth every morning., Disp: , Rfl:   lisinopril 20 MG Oral Tab, Take 20 mg by mouth 2 (two) times daily. , Disp: , Rfl:   traZODone HCl 50 MG Oral Tab, Take 50 mg by mouth nightly., Disp: , Rfl:   Sertraline HCl 100 MG Oral Tab, Take 200 mg by mouth daily. , ROS reviewed and negative except for what's stated above.        OBJECTIVE:  BP (!) 188/84   Pulse 99   Temp 99.1 °F (37.3 °C) (Temporal)   Resp 22   Ht 5' 5.5\" (1.664 m)   Wt 126 lb 12.2 oz (57.5 kg)   SpO2 95%   BMI 20.77 kg/m²   PE:  Gen: alert and orie started in ED  - pulm consultation appreciated for review  - RVP negative  - elevated BNP however CXR without overload and no edema on exam.   - h/o PE and recent hip surgery - will obtain ddimer for completeness    # CAD, afib, HTN, HL  - currently in sudhir

## 2019-10-31 NOTE — CONSULTS
Pulmonary H&P/Consult     NAME: Jaja Sarmiento - ROOM: C3/C3 - MRN: [id number] - Age: 78year old - :  8/10/1940    Date of Admission: 10/31/2019 11:13 AM  Admission Diagnosis: No admission diagnoses are documented for this encounter.     Assessment/Plan: impairment     no hearing aids   • HIGH BLOOD PRESSURE    • HIGH CHOLESTEROL    • History of blood transfusion    • HYPERLIPIDEMIA    • HYPERTENSION    • Insomnia, unspecified    • MCI (mild cognitive impairment) 2013    stable on Aricept   • Neuropathy 1/ (Other) Mother    • Other (Other) Father         alzheimers   • Cancer Brother    Social History    Tobacco Use      Smoking status: Former Smoker        Packs/day: 1.00        Years: 30.00        Pack years: 30        Types: Cigarettes        Quit date: 5 10/31/19  1136   *   BUN 14   CREATSERUM 0.86   GFRAA 73   GFRNAA 64   CA 9.1   ALB 3.3*      K 4.0      CO2 24.0   ALKPHO 114   AST 16   ALT 10*   BILT 0.8   TP 7.6     No results for input(s): BNP in the last 168 hours.   Recent Labs

## 2019-10-31 NOTE — ED PROVIDER NOTES
Patient Seen in: [facility] Emergency Department      History   Patient presents with:  Dyspnea CYRIL SOB (respiratory)    Stated Complaint: diagnosed with pneumonia in office, to ED for admission and IV abx    HPI    Patient is a pleasant 78year-old unspecified ([facility].)    • Pneumonia, organism unspecified(486)    • PONV (postoperative nausea and vomiting)    • Pulmonary embolism (HCC)    • Seizure disorder ([facility].)    • Unspecified essential hypertension    • Unspecified sleep apnea    • Unspecified sleep BP (!) 169/78   Pulse 98   Resp 24   Temp 99.1 °F (37.3 °C)   Temp src Temporal   SpO2 92 %   O2 Device None (Room air)       Current:BP (!) 170/90   Pulse 98   Temp 99.1 °F (37.3 °C) (Temporal)   Resp 20   Ht 166.4 cm (5' 5.5\")   Wt 57.5 kg   SpO2 94% components:    HGB 11.5 (*)     HCT 34.8 (*)     .0 (*)     Neutrophil Absolute Prelim 8.80 (*)     Neutrophil Absolute 8.80 (*)     Lymphocyte Absolute 0.99 (*)     All other components within normal limits   TROPONIN I - Normal   PTT, ACTIVATED - pneumonia, effusion, edema, or other acute process.    Dictated by: Martha Henry MD on 10/31/2019 at 12:22     Approved by: Martha Henry MD on 10/31/2019 at 12:22              MDM     Patient was placed on a cart, an IV was established, and blood was

## 2019-10-31 NOTE — ED INITIAL ASSESSMENT (HPI)
Pt sent from MD office for pneumonia, pt to be admitted for IV antibx. Pt states cough and michoacano for \" alittle bit now\". Pt has fever.  Pt states n,v,d.

## 2019-10-31 NOTE — PLAN OF CARE
Problem: Patient/Family Goals  Goal: Patient/Family Long Term Goal  Description  Patient's Long Term Goal:   No hip pain     Interventions:  -  PT?  - See additional Care Plan goals for specific interventions   Outcome: Progressing  Goal: Patient/Family

## 2019-11-01 ENCOUNTER — APPOINTMENT (OUTPATIENT)
Dept: CT IMAGING | Facility: HOSPITAL | Age: 79
DRG: 190 | End: 2019-11-01
Attending: INTERNAL MEDICINE
Payer: MEDICARE

## 2019-11-01 PROCEDURE — 71275 CT ANGIOGRAPHY CHEST: CPT | Performed by: INTERNAL MEDICINE

## 2019-11-01 PROCEDURE — 85025 COMPLETE CBC W/AUTO DIFF WBC: CPT | Performed by: HOSPITALIST

## 2019-11-01 PROCEDURE — 80048 BASIC METABOLIC PNL TOTAL CA: CPT | Performed by: HOSPITALIST

## 2019-11-01 RX ORDER — POTASSIUM CHLORIDE 20 MEQ/1
40 TABLET, EXTENDED RELEASE ORAL ONCE
Status: COMPLETED | OUTPATIENT
Start: 2019-11-01 | End: 2019-11-01

## 2019-11-01 NOTE — CM/SW NOTE
SW met with the patient at bedside to discuss PHQ4 order. Patient has an existing depression diagnosis. She lives with her daughter and son and his wife in raised ranch style home. Family helps with ADL's.  Patient does not remember if she is on anti-depres

## 2019-11-01 NOTE — PROGRESS NOTES
Multidisciplinary Discharge Rounds held 11/1/2019. Treatment team members present today include , , Charge Nurse, Nurse, RT, PT and Pharmacy caring for [facility].      Other care providers present:    Mobility Goal:    Readmi

## 2019-11-01 NOTE — PLAN OF CARE
Problem: COPD exacerbation  Data: Pt is alert and oriented x4. On room air, . VSS. Briefed, incontinent at times. Up with assistance. Seizure precautions in place. High fall risk, bed alarm on.  Instructed to call before getting out of bed, pt verbalized deep breathe, Incentive Spirometry  - Assess the need for suctioning and perform as needed  - Assess and instruct to report SOB or any respiratory difficulty  - Respiratory Therapy support as indicated  - Manage/alleviate anxiety  - Monitor for signs/sympt

## 2019-11-01 NOTE — PROGRESS NOTES
[facility] Hospitalist Progress Note     Susie Brownlee Patient Status:  Observation    8/10/1940 MRN [id number]   [facility] 5NW-A Attending Donna Avery MD   Hosp Day # 0 PCP Wendi Kramer MD     CC: follow up    SUBJECTIVE:  Feels SOB i Medication:Butalbital-APAP-Caffeine, clonazePAM, ipratropium-albuterol, Acetaminophen-Codeine #3 **OR** Acetaminophen-Codeine #3       Microbiology:    Hospital Encounter on 10/31/19   1.  BLOOD CULTURE     Status: None (Preliminary result)    Collection Ti

## 2019-11-01 NOTE — PROGRESS NOTES
10/31/19 2200   Provider Notification   Reason for Communication Review case   Provider Name Other (comment)  (Dr. Inessa Adrian)   Method of Communication Page   Response Waiting for response   Notification Time 2208   Notified Dr. Popeye tineo at

## 2019-11-01 NOTE — PROGRESS NOTES
Enoch Patient Status:  Observation    8/10/1940 MRN [id number]   [facility] 5NW-A Attending Eva Lai MD   Hosp Day # 0 PCP Hemant Slater MD     SUBJECTIVE  Pt states cough and SOB are improving, not yet Exam:                          General: alert, cooperative, in NAD                          HEENT: oropharynx clear without erythema or exudates, moist mucous membranes                          Lungs: diminished BS bilaterally                           Aleena above  3. DUNCAN  - APAP as tolerated  4. Afib and htn  - on eliquis  - per primary  5.  Dispo - full code  - will follow    Brandon Michael MD  11/1/2019  12:00 PM

## 2019-11-01 NOTE — PROGRESS NOTES
11/01/19 0114   Provider Notification   Reason for Communication Review case   Provider Name Fletcher Ewing MD   Method of Communication Page   Response Waiting for response   Notification Time 0116   Notified Dr. Colleen Colino CT showed no PE.

## 2019-11-01 NOTE — PROGRESS NOTES
11/01/19 1239   Clinical Encounter Type   Visited With Patient   Continue Visiting No   Patient Spiritual Encounters   Spiritual Interventions  provided active listening as pt. shared loss of  this year, and other loved ones prior.   Chapl

## 2019-11-02 PROCEDURE — 94640 AIRWAY INHALATION TREATMENT: CPT

## 2019-11-02 PROCEDURE — 84132 ASSAY OF SERUM POTASSIUM: CPT | Performed by: HOSPITALIST

## 2019-11-02 RX ORDER — AZITHROMYCIN 250 MG/1
250 TABLET, FILM COATED ORAL
Status: DISCONTINUED | OUTPATIENT
Start: 2019-11-02 | End: 2019-11-03

## 2019-11-02 NOTE — PROGRESS NOTES
[facility] Hospitalist Progress Note     Obieanupam Jimenez Patient Status:  Observation    8/10/1940 MRN [id number]   [facility] 5NW-A Attending Viki Lion MD   Hosp Day # 0 PCP Darren Pinto MD     CC: follow up    SUBJECTIVE:  \"Today has breakfast     Continuous Infusing Medication:  PRN Medication:Butalbital-APAP-Caffeine, clonazePAM, ipratropium-albuterol, Acetaminophen-Codeine #3 **OR** Acetaminophen-Codeine #3       Microbiology:    Hospital Encounter on 10/31/19   1.  BLOOD CULTURE

## 2019-11-02 NOTE — PLAN OF CARE
Problem: Patient/Family Goals  Goal: Patient/Family Long Term Goal  Description  Patient's Long Term Goal:   No hip pain     Interventions:  -  PT?  - See additional Care Plan goals for specific interventions   Outcome: Progressing  Goal: Patient/Family Progressing   Pt is alert and oriented x4. VSS. Maintaining o2 sats on r/a.  . Eliquis given. Voiding well. No BM this shift. No c/o pain. Pt up x1 assist w/ walker. Fall precautions in place. Will continue to monitor.

## 2019-11-02 NOTE — PLAN OF CARE
Assumed care of patient at 0730, a+ox4,  in place with o2 sats in mid to high 90's. Patient had a np cough this am with wheezing, rt called and neb treatment done. After neb treatment patient stated \" My breathing feels so much better. \" patient Yuki Davis

## 2019-11-02 NOTE — PROGRESS NOTES
Enoch Patient Status:  Observation    8/10/1940 MRN [id number]   [facility] 5NW-A Attending Denise Esposito MD   Hosp Day # 0 PCP Hubert Hdz MD     SUBJECTIVE  Pt doesn't feel well today.  She c/o sob and co wheezing/crackles                          Chest wall: No tenderness or deformity.                         HOKFH: HVPMXJP rate and rhythm, normal S1S2.                          Extremity: No clubbing or cyanosis.  no edema                         Lab Resul

## 2019-11-03 VITALS
SYSTOLIC BLOOD PRESSURE: 145 MMHG | OXYGEN SATURATION: 93 % | DIASTOLIC BLOOD PRESSURE: 73 MMHG | BODY MASS INDEX: 22.7 KG/M2 | TEMPERATURE: 97 F | RESPIRATION RATE: 16 BRPM | HEART RATE: 79 BPM | WEIGHT: 137.88 LBS | HEIGHT: 65.5 IN

## 2019-11-03 RX ORDER — PREDNISONE 20 MG/1
40 TABLET ORAL
Qty: 6 TABLET | Refills: 0 | Status: SHIPPED | OUTPATIENT
Start: 2019-11-04 | End: 2019-11-07

## 2019-11-03 RX ORDER — AZITHROMYCIN 250 MG/1
250 TABLET, FILM COATED ORAL DAILY
Qty: 2 TABLET | Refills: 0 | Status: SHIPPED | OUTPATIENT
Start: 2019-11-03 | End: 2019-11-05

## 2019-11-03 NOTE — PLAN OF CARE
Problem: Patient/Family Goals  Goal: Patient/Family Long Term Goal  Description  Patient's Long Term Goal:   No hip pain     Interventions:  -  PT?  - See additional Care Plan goals for specific interventions   Outcome: Adequate for Discharge  Goal: Alyson Manage/alleviate anxiety  - Monitor for signs/symptoms of CO2 retention  Outcome: Adequate for Discharge

## 2019-11-03 NOTE — PROGRESS NOTES
[facility] PULMONARY/CRITICAL CARE    S: Pt is doing ok, denies sob or cough. Able to ambulate without trouble.      Meds:  • azithromycin  250 mg Oral Daily   • apixaban  2.5 mg Oral BID   • atorvastatin  40 mg Oral Daily   • carvedilol  25 mg Oral BID with meals 8.8  --    ALB 3.3*  --   --     138  --    K 4.0 3.8 4.5    108  --    CO2 24.0 23.0  --    ALKPHO 114  --   --    AST 16  --   --    ALT 10*  --   --    BILT 0.8  --   --    TP 7.6  --   --        Lab Results   Component Value Date    PT 15. 1

## 2019-11-03 NOTE — CONSULTS
[facility] Cardiology Consultation    Susanstacy Bob Patient Status:  Inpatient    8/10/1940 MRN [id number]   [facility] 5NW-A Attending Carmelo Brown MD   Hosp Day # 1 PCP Kendall Cobos MD     Reason for Consultation:  dyspna      History of • Unspecified sleep apnea    • Unspecified sleep apnea SPLIT 9-25-13    AHI 55 RDI 60  Sao2 giovana 88% CPAP 12 [employer]   • Visual impairment      Past Surgical History:   Procedure Laterality Date   • BYPASS SURGERY      4/28/13   • CHOLECYSTECTOMY     • ANAPHYLAXIS    Medications:  • azithromycin  250 mg Oral Daily   • apixaban  2.5 mg Oral BID   • atorvastatin  40 mg Oral Daily   • carvedilol  25 mg Oral BID with meals   • clonazePAM  0.5 mg Oral QAM   • Donepezil HCl  10 mg Oral Nightly   • ferrous sulf 10/31/19  1136 11/01/19  0621 11/02/19  0624    138  --    K 4.0 3.8 4.5    108  --    CO2 24.0 23.0  --    BUN 14 23*  --    CREATSERUM 0.86 0.84  --    CA 9.1 8.8  --    * 257*  --        Recent Labs   Lab 10/31/19  1136   ALT 10*   AS

## 2019-11-03 NOTE — PLAN OF CARE
Problem: Patient/Family Goals  Goal: Patient/Family Long Term Goal  Description  Patient's Long Term Goal:   No hip pain     Interventions:  -  PT?  - See additional Care Plan goals for specific interventions   Outcome: Progressing  Goal: Patient/Family signs/symptoms of CO2 retention  Outcome: Progressing   Pt is alert and oriented x4. VSS. Maintaining o2 sats on r/a. No cough noted. Pt voiding well. No BM this shift. No c/o pain. Fall precautions in place. Will continue to monitor.

## 2019-11-03 NOTE — PROGRESS NOTES
Assumed care of pt at 1500. Received pt AOX4. Pt maintaining O2 saturation >92% on room air, , PO steroids. Seizure precautions. Pt up with standby assist, walker,  BRP, fall precautions in place. Pt tolerated ambulation in halls. Pt denies pain.  PIV sa

## 2019-11-03 NOTE — PROGRESS NOTES
[facility] hospitalist daily note  Patient was seen/examined on 11/3/19    S; breathing about the same.  No chest pain, no abd pain, no nausea    Medications in Epic    PE    11/03/19  0541   BP: 140/55   Pulse: 71   Resp: 16   Temp: 98.5 °F (36.9 °C)     Gen: shamar

## 2019-11-04 NOTE — PROGRESS NOTES
NURSING DISCHARGE NOTE    Discharged Home via Wheelchair. Accompanied by Family member  Belongings Taken by patient/family       Pt d/c via wheelchair accompanied by son. Aox4, seizure precautions. RA, no tele. Hx afib on eliquis. Stress incontinent.

## 2019-11-06 NOTE — DISCHARGE SUMMARY
[facility] Internal Medicine Discharge Summary   Patient ID:  Monie Cho  [id number]  51 year old  8/10/1940    Admit date: 10/31/2019    Discharge date and time: 11/3/2019     Attending Physician: No att. providers found     Primary Care Physician: Keyla Shaw proBNP elevated to 4790 on admit, seen by cardiology, unclear significance of BNP, no impressive heart failure on exam     # CAD, afib, HTN, HL  Pt on eliquis, meds ordered, follow up with PCP     # DUNCAN  - per protocol     # seizure d/o  Med ordered     # Sertraline HCl 100 MG Tabs  Commonly known as:  ZOLOFT     traZODone HCl 50 MG Tabs  Commonly known as:  DESYREL         * This list has 2 medication(s) that are the same as other medications prescribed for you.  Read the directions carefully, and ask your Operative Procedures:      Please seek medical attention if your symptoms do not improve or get worse    Notify physician if you experience any of the followin. Fever  2. . persistent Nausea/vomiting  3. severe uncontrolled pain  4. redness, tenderne

## 2020-07-07 ENCOUNTER — HOSPITAL ENCOUNTER (EMERGENCY)
Facility: HOSPITAL | Age: 80
Discharge: HOME | End: 2020-07-07
Attending: EMERGENCY MEDICINE
Payer: MEDICARE

## 2020-07-07 VITALS
DIASTOLIC BLOOD PRESSURE: 108 MMHG | TEMPERATURE: 98 F | RESPIRATION RATE: 16 BRPM | OXYGEN SATURATION: 95 % | HEART RATE: 83 BPM | WEIGHT: 140 LBS | SYSTOLIC BLOOD PRESSURE: 207 MMHG | HEIGHT: 65.5 IN | BODY MASS INDEX: 23.04 KG/M2

## 2020-07-07 DIAGNOSIS — B02.9 HERPES ZOSTER WITHOUT COMPLICATION: Primary | ICD-10-CM

## 2020-07-07 PROCEDURE — 99284 EMERGENCY DEPT VISIT MOD MDM: CPT

## 2020-07-07 PROCEDURE — 96374 THER/PROPH/DIAG INJ IV PUSH: CPT

## 2020-07-07 PROCEDURE — 96375 TX/PRO/DX INJ NEW DRUG ADDON: CPT

## 2020-07-07 RX ORDER — HYDROMORPHONE HYDROCHLORIDE 1 MG/ML
0.5 INJECTION, SOLUTION INTRAMUSCULAR; INTRAVENOUS; SUBCUTANEOUS EVERY 30 MIN PRN
Status: DISCONTINUED | OUTPATIENT
Start: 2020-07-07 | End: 2020-07-07

## 2020-07-07 RX ORDER — HYDROCODONE BITARTRATE AND ACETAMINOPHEN 5; 325 MG/1; MG/1
1-2 TABLET ORAL EVERY 6 HOURS PRN
Qty: 30 TABLET | Refills: 0 | Status: ON HOLD | OUTPATIENT
Start: 2020-07-07 | End: 2020-07-18

## 2020-07-07 RX ORDER — ONDANSETRON 2 MG/ML
4 INJECTION INTRAMUSCULAR; INTRAVENOUS ONCE
Status: COMPLETED | OUTPATIENT
Start: 2020-07-07 | End: 2020-07-07

## 2020-07-07 RX ORDER — FAMCICLOVIR 500 MG/1
500 TABLET, FILM COATED ORAL 3 TIMES DAILY
Qty: 21 TABLET | Refills: 0 | Status: ON HOLD | OUTPATIENT
Start: 2020-07-07 | End: 2020-07-18

## 2020-07-07 RX ORDER — ACYCLOVIR 800 MG/1
800 TABLET ORAL ONCE
Status: COMPLETED | OUTPATIENT
Start: 2020-07-07 | End: 2020-07-07

## 2020-07-07 RX ORDER — KETOROLAC TROMETHAMINE 30 MG/ML
15 INJECTION, SOLUTION INTRAMUSCULAR; INTRAVENOUS ONCE
Status: COMPLETED | OUTPATIENT
Start: 2020-07-07 | End: 2020-07-07

## 2020-07-07 NOTE — ED PROVIDER NOTES
Patient Seen in: [facility] Emergency Department      History   Patient presents with:  Rash    Stated Complaint: rash to left arm, appears to be shingles x 1 week    HPI    17-year-old female presents to the emergency department complaining of a we Surgical History:   Procedure Laterality Date   • BYPASS SURGERY      4/28/13   • CHOLECYSTECTOMY     • COLONOSCOPY  7/2010   • COLONOSCOPY N/A 10/28/2013    Performed by Gertrude Chapman MD at [facility] ENDOSCOPY   • ESOPHAGOGASTRODUODENOSCOPY (EGD) N/A 10/28/2013 per nurse's notes. HEENT: Normocephalic atraumatic. Anicteric sclera. Oral mucosa is moist.  Neck: No adenopathy or thyromegaly. Lungs are clear to auscultation. Heart exam: Normal S1-S2 without extra sounds or murmurs. Regular rate and rhythm.   Shantel French

## 2020-07-07 NOTE — ED INITIAL ASSESSMENT (HPI)
Pt c/o painful rash to left arm x one week, states has an appointment on 7/11, came to ER for increasing pain.

## 2020-07-14 ENCOUNTER — APPOINTMENT (OUTPATIENT)
Dept: CT IMAGING | Facility: HOSPITAL | Age: 80
DRG: 689 | End: 2020-07-14
Attending: EMERGENCY MEDICINE
Payer: MEDICARE

## 2020-07-14 ENCOUNTER — HOSPITAL ENCOUNTER (INPATIENT)
Facility: HOSPITAL | Age: 80
LOS: 4 days | Discharge: NURSING, REHAB OR LONG-TERM CARE | DRG: 689 | End: 2020-07-18
Attending: EMERGENCY MEDICINE | Admitting: HOSPITALIST
Payer: MEDICARE

## 2020-07-14 ENCOUNTER — APPOINTMENT (OUTPATIENT)
Dept: GENERAL RADIOLOGY | Facility: HOSPITAL | Age: 80
DRG: 689 | End: 2020-07-14
Attending: EMERGENCY MEDICINE
Payer: MEDICARE

## 2020-07-14 DIAGNOSIS — R29.6 FALLS FREQUENTLY: ICD-10-CM

## 2020-07-14 DIAGNOSIS — N39.0 URINARY TRACT INFECTION WITHOUT HEMATURIA, SITE UNSPECIFIED: ICD-10-CM

## 2020-07-14 DIAGNOSIS — R53.1 WEAKNESS GENERALIZED: Primary | ICD-10-CM

## 2020-07-14 PROBLEM — E87.1 HYPONATREMIA: Status: ACTIVE | Noted: 2020-07-14

## 2020-07-14 PROBLEM — D72.829 LEUKOCYTOSIS: Status: ACTIVE | Noted: 2020-07-14

## 2020-07-14 PROBLEM — E87.6 HYPOKALEMIA: Status: ACTIVE | Noted: 2020-07-14

## 2020-07-14 LAB
ALBUMIN SERPL-MCNC: 3.7 G/DL (ref 3.4–5)
ALBUMIN/GLOB SERPL: 1 {RATIO} (ref 1–2)
ALP LIVER SERPL-CCNC: 73 U/L (ref 55–142)
ALT SERPL-CCNC: 24 U/L (ref 13–56)
ANION GAP SERPL CALC-SCNC: 5 MMOL/L (ref 0–18)
APTT PPP: 27.3 SECONDS (ref 25.4–36.1)
AST SERPL-CCNC: 23 U/L (ref 15–37)
ATRIAL RATE: 125 BPM
BASOPHILS # BLD AUTO: 0.05 X10(3) UL (ref 0–0.2)
BASOPHILS NFR BLD AUTO: 0.3 %
BILIRUB SERPL-MCNC: 0.8 MG/DL (ref 0.1–2)
BILIRUB UR QL STRIP.AUTO: NEGATIVE
BUN BLD-MCNC: 16 MG/DL (ref 7–18)
BUN/CREAT SERPL: 13.2 (ref 10–20)
CALCIUM BLD-MCNC: 8.8 MG/DL (ref 8.5–10.1)
CHLORIDE SERPL-SCNC: 100 MMOL/L (ref 98–112)
CO2 SERPL-SCNC: 28 MMOL/L (ref 21–32)
COLOR UR AUTO: YELLOW
CREAT BLD-MCNC: 1.21 MG/DL (ref 0.55–1.02)
DEPRECATED RDW RBC AUTO: 39.7 FL (ref 35.1–46.3)
EOSINOPHIL # BLD AUTO: 0.02 X10(3) UL (ref 0–0.7)
EOSINOPHIL NFR BLD AUTO: 0.1 %
ERYTHROCYTE [DISTWIDTH] IN BLOOD BY AUTOMATED COUNT: 13.6 % (ref 11–15)
GLOBULIN PLAS-MCNC: 3.6 G/DL (ref 2.8–4.4)
GLUCOSE BLD-MCNC: 167 MG/DL (ref 70–99)
GLUCOSE UR STRIP.AUTO-MCNC: NEGATIVE MG/DL
HAV IGM SER QL: 2 MG/DL (ref 1.6–2.6)
HCT VFR BLD AUTO: 37 % (ref 35–48)
HGB BLD-MCNC: 13.3 G/DL (ref 12–16)
HYALINE CASTS #/AREA URNS AUTO: PRESENT /LPF
IMM GRANULOCYTES # BLD AUTO: 0.05 X10(3) UL (ref 0–1)
IMM GRANULOCYTES NFR BLD: 0.3 %
INR BLD: 1.18 (ref 0.89–1.11)
KETONES UR STRIP.AUTO-MCNC: NEGATIVE MG/DL
LYMPHOCYTES # BLD AUTO: 1.58 X10(3) UL (ref 1–4)
LYMPHOCYTES NFR BLD AUTO: 10.4 %
M PROTEIN MFR SERPL ELPH: 7.3 G/DL (ref 6.4–8.2)
MCH RBC QN AUTO: 30 PG (ref 26–34)
MCHC RBC AUTO-ENTMCNC: 35.9 G/DL (ref 31–37)
MCV RBC AUTO: 83.3 FL (ref 80–100)
MONOCYTES # BLD AUTO: 0.79 X10(3) UL (ref 0.1–1)
MONOCYTES NFR BLD AUTO: 5.2 %
NEUTROPHILS # BLD AUTO: 12.65 X10 (3) UL (ref 1.5–7.7)
NEUTROPHILS # BLD AUTO: 12.65 X10(3) UL (ref 1.5–7.7)
NEUTROPHILS NFR BLD AUTO: 83.7 %
NITRITE UR QL STRIP.AUTO: NEGATIVE
OSMOLALITY SERPL CALC.SUM OF ELEC: 281 MOSM/KG (ref 275–295)
PH UR STRIP.AUTO: 5 [PH] (ref 4.5–8)
PLATELET # BLD AUTO: 286 10(3)UL (ref 150–450)
POTASSIUM SERPL-SCNC: 3.1 MMOL/L (ref 3.5–5.1)
PROT UR STRIP.AUTO-MCNC: >=500 MG/DL
PSA SERPL DL<=0.01 NG/ML-MCNC: 15.4 SECONDS (ref 12.4–14.6)
Q-T INTERVAL: 424 MS
QRS DURATION: 94 MS
QTC CALCULATION (BEZET): 470 MS
R AXIS: -50 DEGREES
RBC # BLD AUTO: 4.44 X10(6)UL (ref 3.8–5.3)
RBC #/AREA URNS AUTO: >10 /HPF
SARS-COV-2 RNA RESP QL NAA+PROBE: NOT DETECTED
SODIUM SERPL-SCNC: 133 MMOL/L (ref 136–145)
SP GR UR STRIP.AUTO: 1.02 (ref 1–1.03)
T AXIS: -60 DEGREES
TROPONIN I SERPL-MCNC: <0.045 NG/ML (ref ?–0.04)
UROBILINOGEN UR STRIP.AUTO-MCNC: 2 MG/DL
VENTRICULAR RATE: 74 BPM
WBC # BLD AUTO: 15.1 X10(3) UL (ref 4–11)
WBC #/AREA URNS AUTO: >50 /HPF

## 2020-07-14 PROCEDURE — 80053 COMPREHEN METABOLIC PANEL: CPT | Performed by: EMERGENCY MEDICINE

## 2020-07-14 PROCEDURE — 96361 HYDRATE IV INFUSION ADD-ON: CPT

## 2020-07-14 PROCEDURE — 96360 HYDRATION IV INFUSION INIT: CPT

## 2020-07-14 PROCEDURE — 71045 X-RAY EXAM CHEST 1 VIEW: CPT | Performed by: EMERGENCY MEDICINE

## 2020-07-14 PROCEDURE — 83735 ASSAY OF MAGNESIUM: CPT | Performed by: EMERGENCY MEDICINE

## 2020-07-14 PROCEDURE — 84484 ASSAY OF TROPONIN QUANT: CPT | Performed by: EMERGENCY MEDICINE

## 2020-07-14 PROCEDURE — 87077 CULTURE AEROBIC IDENTIFY: CPT | Performed by: EMERGENCY MEDICINE

## 2020-07-14 PROCEDURE — 72125 CT NECK SPINE W/O DYE: CPT | Performed by: EMERGENCY MEDICINE

## 2020-07-14 PROCEDURE — 87186 SC STD MICRODIL/AGAR DIL: CPT | Performed by: EMERGENCY MEDICINE

## 2020-07-14 PROCEDURE — 87086 URINE CULTURE/COLONY COUNT: CPT | Performed by: EMERGENCY MEDICINE

## 2020-07-14 PROCEDURE — 85025 COMPLETE CBC W/AUTO DIFF WBC: CPT | Performed by: EMERGENCY MEDICINE

## 2020-07-14 PROCEDURE — 70450 CT HEAD/BRAIN W/O DYE: CPT | Performed by: EMERGENCY MEDICINE

## 2020-07-14 PROCEDURE — 81001 URINALYSIS AUTO W/SCOPE: CPT | Performed by: EMERGENCY MEDICINE

## 2020-07-14 PROCEDURE — 93010 ELECTROCARDIOGRAM REPORT: CPT

## 2020-07-14 PROCEDURE — 85730 THROMBOPLASTIN TIME PARTIAL: CPT | Performed by: EMERGENCY MEDICINE

## 2020-07-14 PROCEDURE — 85610 PROTHROMBIN TIME: CPT | Performed by: EMERGENCY MEDICINE

## 2020-07-14 PROCEDURE — 93005 ELECTROCARDIOGRAM TRACING: CPT

## 2020-07-14 PROCEDURE — 99285 EMERGENCY DEPT VISIT HI MDM: CPT

## 2020-07-14 PROCEDURE — 72131 CT LUMBAR SPINE W/O DYE: CPT | Performed by: EMERGENCY MEDICINE

## 2020-07-14 RX ORDER — ATORVASTATIN CALCIUM 40 MG/1
40 TABLET, FILM COATED ORAL DAILY
Status: DISCONTINUED | OUTPATIENT
Start: 2020-07-14 | End: 2020-07-18

## 2020-07-14 RX ORDER — ONDANSETRON 2 MG/ML
4 INJECTION INTRAMUSCULAR; INTRAVENOUS EVERY 4 HOURS PRN
Status: DISCONTINUED | OUTPATIENT
Start: 2020-07-14 | End: 2020-07-14

## 2020-07-14 RX ORDER — LEVETIRACETAM 250 MG/1
250 TABLET ORAL 2 TIMES DAILY
Status: DISCONTINUED | OUTPATIENT
Start: 2020-07-14 | End: 2020-07-18

## 2020-07-14 RX ORDER — HYDRALAZINE HYDROCHLORIDE 25 MG/1
25 TABLET, FILM COATED ORAL 3 TIMES DAILY
Status: DISCONTINUED | OUTPATIENT
Start: 2020-07-14 | End: 2020-07-18

## 2020-07-14 RX ORDER — POTASSIUM CHLORIDE 20 MEQ/1
40 TABLET, EXTENDED RELEASE ORAL EVERY 4 HOURS
Status: COMPLETED | OUTPATIENT
Start: 2020-07-14 | End: 2020-07-15

## 2020-07-14 RX ORDER — SODIUM CHLORIDE 9 MG/ML
INJECTION, SOLUTION INTRAVENOUS CONTINUOUS
Status: DISCONTINUED | OUTPATIENT
Start: 2020-07-14 | End: 2020-07-15

## 2020-07-14 RX ORDER — CARVEDILOL 12.5 MG/1
25 TABLET ORAL 2 TIMES DAILY WITH MEALS
Status: DISCONTINUED | OUTPATIENT
Start: 2020-07-15 | End: 2020-07-18

## 2020-07-14 RX ORDER — DONEPEZIL HYDROCHLORIDE 5 MG/1
10 TABLET, FILM COATED ORAL NIGHTLY
Status: DISCONTINUED | OUTPATIENT
Start: 2020-07-14 | End: 2020-07-18

## 2020-07-14 RX ORDER — CLONAZEPAM 0.5 MG/1
0.5 TABLET ORAL DAILY PRN
Status: DISCONTINUED | OUTPATIENT
Start: 2020-07-14 | End: 2020-07-18

## 2020-07-14 RX ORDER — ONDANSETRON 2 MG/ML
4 INJECTION INTRAMUSCULAR; INTRAVENOUS EVERY 6 HOURS PRN
Status: DISCONTINUED | OUTPATIENT
Start: 2020-07-14 | End: 2020-07-18

## 2020-07-14 RX ORDER — SERTRALINE HYDROCHLORIDE 100 MG/1
200 TABLET, FILM COATED ORAL DAILY
Status: DISCONTINUED | OUTPATIENT
Start: 2020-07-15 | End: 2020-07-18

## 2020-07-14 RX ORDER — ALBUTEROL SULFATE 90 UG/1
2 AEROSOL, METERED RESPIRATORY (INHALATION) EVERY 6 HOURS PRN
Status: DISCONTINUED | OUTPATIENT
Start: 2020-07-14 | End: 2020-07-18

## 2020-07-14 RX ORDER — PRAMIPEXOLE DIHYDROCHLORIDE 0.25 MG/1
0.25 TABLET ORAL NIGHTLY
Status: DISCONTINUED | OUTPATIENT
Start: 2020-07-14 | End: 2020-07-18

## 2020-07-14 RX ORDER — LISINOPRIL 20 MG/1
20 TABLET ORAL 2 TIMES DAILY
Status: DISCONTINUED | OUTPATIENT
Start: 2020-07-15 | End: 2020-07-18

## 2020-07-14 RX ORDER — MEMANTINE HYDROCHLORIDE 10 MG/1
10 TABLET ORAL 2 TIMES DAILY
Status: DISCONTINUED | OUTPATIENT
Start: 2020-07-14 | End: 2020-07-18

## 2020-07-14 RX ORDER — POTASSIUM CHLORIDE 20 MEQ/1
40 TABLET, EXTENDED RELEASE ORAL ONCE
Status: COMPLETED | OUTPATIENT
Start: 2020-07-14 | End: 2020-07-14

## 2020-07-14 RX ORDER — PANTOPRAZOLE SODIUM 40 MG/1
40 TABLET, DELAYED RELEASE ORAL
Status: DISCONTINUED | OUTPATIENT
Start: 2020-07-15 | End: 2020-07-18

## 2020-07-14 RX ORDER — SULFAMETHOXAZOLE AND TRIMETHOPRIM 400; 80 MG/1; MG/1
1 TABLET ORAL EVERY 12 HOURS SCHEDULED
Status: DISCONTINUED | OUTPATIENT
Start: 2020-07-15 | End: 2020-07-16

## 2020-07-14 RX ORDER — ACETAMINOPHEN 325 MG/1
650 TABLET ORAL EVERY 6 HOURS PRN
Status: DISCONTINUED | OUTPATIENT
Start: 2020-07-14 | End: 2020-07-18

## 2020-07-14 RX ORDER — SULFAMETHOXAZOLE AND TRIMETHOPRIM 800; 160 MG/1; MG/1
1 TABLET ORAL ONCE
Status: COMPLETED | OUTPATIENT
Start: 2020-07-14 | End: 2020-07-14

## 2020-07-14 RX ORDER — MELATONIN
325
Status: DISCONTINUED | OUTPATIENT
Start: 2020-07-15 | End: 2020-07-15

## 2020-07-14 NOTE — ED NOTES
This RN has concerns of PT safety at her current living situation. Daughter called for update on PT, daughter was slurring words and was unable to complete sentences.  When RN asked daughter what PT hit her head on or where in the house the PT fell daughter

## 2020-07-14 NOTE — ED INITIAL ASSESSMENT (HPI)
PT TO ED FROM HOME WITH C/O PAIN IN THE NAPE OF NECK AND LOWER BACK. PT STATES SHE HAD A FALL A WEEK AGO AND HIT THE BACK OF HER HEAD ON AN UNKNOWN OBJECT, UNKNOWN LOC.  PT HAD MORE RECENT FALL THIS AM WITH NO KNOWLEDGE OF HOW SHE FELL

## 2020-07-14 NOTE — ED PROVIDER NOTES
Patient Seen in: [facility] Emergency Department      History   No chief complaint on file.     Stated Complaint: fall/back pain     HPI    Patient is a pleasant 68-year-old female, who lives at home with her children, presenting via EMS for evaluati Seizure disorder [facility])    • Unspecified essential hypertension    • Unspecified sleep apnea    • Unspecified sleep apnea SPLIT 9-25-13    AHI 55 RDI 60  Sao2 giovana 88% CPAP 12 [employer]   • Visual impairment               Past Surgical History:   Procedure L air)       Current:BP (!) 138/99   Pulse 81   Temp 98.2 °F (36.8 °C) (Temporal)   Resp 18   Ht 165.1 cm (5' 5\")   Wt 63.5 kg   SpO2 100%   BMI 23.30 kg/m²         Physical Exam    Vital signs noted.    GENERAL: Patient is awake and alert, supine on the car American 42 (*)     GFR, -American 48 (*)     All other components within normal limits   CBC W/ DIFFERENTIAL - Abnormal; Notable for the following components:    WBC 15.1 (*)     Neutrophil Absolute Prelim 12.65 (*)     Neutrophil Absolute 12.65 Shania Goldman present. No skull fracture, acute sinusitis. CONCLUSION:  No acute bleed. Stable chronic appearance.    Dictated by: Sonal Castillo MD on 7/14/2020 at 5:46 PM     Finalized by: Sonal Castillo MD on 7/14/2020 at 5:48 PM          Ct Spine Cervical (cpt= (CPT=72131)  COMPARISON:  [facility] , CT, CT SPINE LUMBAR(CONTRAST ONLY) (CPT=72132), 2/14/2014, 12:25 PM.  INDICATIONS:  fall/back pain  TECHNIQUE:  Noncontrast CT scanning is performed through the lumbar spine. Multiplanar reconstructions are generated.   D COMPARISON:  [facility] , XR, XR CHEST AP PORTABLE  (CPT=71045), 10/31/2019, 11:54 AM.  INDICATIONS:  fall/back pain  PATIENT STATED HISTORY: (As transcribed by Technologist)  Patient denies any chest complaints. She has a pacemaker from about 8 years ago.    [place] specified.         Medications Prescribed:  Current Discharge Medication List                       Present on Admission  Date Reviewed: 5/26/2020          ICD-10-CM Noted POA    * (Principal) Weakness generalized R53.1 5/25/2017     Falls frequently R29.6

## 2020-07-15 ENCOUNTER — APPOINTMENT (OUTPATIENT)
Dept: ULTRASOUND IMAGING | Facility: HOSPITAL | Age: 80
DRG: 689 | End: 2020-07-15
Attending: INTERNAL MEDICINE
Payer: MEDICARE

## 2020-07-15 LAB
ALBUMIN SERPL-MCNC: 3.4 G/DL (ref 3.4–5)
ALBUMIN/GLOB SERPL: 1 {RATIO} (ref 1–2)
ALP LIVER SERPL-CCNC: 68 U/L (ref 55–142)
ALT SERPL-CCNC: 21 U/L (ref 13–56)
ANION GAP SERPL CALC-SCNC: 1 MMOL/L (ref 0–18)
AST SERPL-CCNC: 23 U/L (ref 15–37)
BASOPHILS # BLD AUTO: 0.05 X10(3) UL (ref 0–0.2)
BASOPHILS NFR BLD AUTO: 0.4 %
BILIRUB SERPL-MCNC: 0.7 MG/DL (ref 0.1–2)
BUN BLD-MCNC: 16 MG/DL (ref 7–18)
BUN/CREAT SERPL: 16.7 (ref 10–20)
CALCIUM BLD-MCNC: 8.8 MG/DL (ref 8.5–10.1)
CHLORIDE SERPL-SCNC: 107 MMOL/L (ref 98–112)
CO2 SERPL-SCNC: 25 MMOL/L (ref 21–32)
CREAT BLD-MCNC: 0.96 MG/DL (ref 0.55–1.02)
DEPRECATED RDW RBC AUTO: 42.3 FL (ref 35.1–46.3)
EOSINOPHIL # BLD AUTO: 0.04 X10(3) UL (ref 0–0.7)
EOSINOPHIL NFR BLD AUTO: 0.3 %
ERYTHROCYTE [DISTWIDTH] IN BLOOD BY AUTOMATED COUNT: 13.9 % (ref 11–15)
GLOBULIN PLAS-MCNC: 3.4 G/DL (ref 2.8–4.4)
GLUCOSE BLD-MCNC: 114 MG/DL (ref 70–99)
HAV IGM SER QL: 2 MG/DL (ref 1.6–2.6)
HCT VFR BLD AUTO: 37.3 % (ref 35–48)
HGB BLD-MCNC: 12.7 G/DL (ref 12–16)
IMM GRANULOCYTES # BLD AUTO: 0.05 X10(3) UL (ref 0–1)
IMM GRANULOCYTES NFR BLD: 0.4 %
LIPASE SERPL-CCNC: 365 U/L (ref 73–393)
LYMPHOCYTES # BLD AUTO: 2.32 X10(3) UL (ref 1–4)
LYMPHOCYTES NFR BLD AUTO: 19.9 %
M PROTEIN MFR SERPL ELPH: 6.8 G/DL (ref 6.4–8.2)
MCH RBC QN AUTO: 29.9 PG (ref 26–34)
MCHC RBC AUTO-ENTMCNC: 34 G/DL (ref 31–37)
MCV RBC AUTO: 87.8 FL (ref 80–100)
MONOCYTES # BLD AUTO: 0.82 X10(3) UL (ref 0.1–1)
MONOCYTES NFR BLD AUTO: 7 %
NEUTROPHILS # BLD AUTO: 8.4 X10 (3) UL (ref 1.5–7.7)
NEUTROPHILS # BLD AUTO: 8.4 X10(3) UL (ref 1.5–7.7)
NEUTROPHILS NFR BLD AUTO: 72 %
OSMOLALITY SERPL CALC.SUM OF ELEC: 278 MOSM/KG (ref 275–295)
PLATELET # BLD AUTO: 264 10(3)UL (ref 150–450)
POTASSIUM SERPL-SCNC: 5.1 MMOL/L (ref 3.5–5.1)
POTASSIUM SERPL-SCNC: 5.1 MMOL/L (ref 3.5–5.1)
RBC # BLD AUTO: 4.25 X10(6)UL (ref 3.8–5.3)
SODIUM SERPL-SCNC: 133 MMOL/L (ref 136–145)
WBC # BLD AUTO: 11.7 X10(3) UL (ref 4–11)

## 2020-07-15 PROCEDURE — 97166 OT EVAL MOD COMPLEX 45 MIN: CPT

## 2020-07-15 PROCEDURE — 84132 ASSAY OF SERUM POTASSIUM: CPT | Performed by: HOSPITALIST

## 2020-07-15 PROCEDURE — 80053 COMPREHEN METABOLIC PANEL: CPT | Performed by: HOSPITALIST

## 2020-07-15 PROCEDURE — 97530 THERAPEUTIC ACTIVITIES: CPT

## 2020-07-15 PROCEDURE — 83735 ASSAY OF MAGNESIUM: CPT | Performed by: HOSPITALIST

## 2020-07-15 PROCEDURE — 85025 COMPLETE CBC W/AUTO DIFF WBC: CPT | Performed by: HOSPITALIST

## 2020-07-15 PROCEDURE — 83690 ASSAY OF LIPASE: CPT | Performed by: INTERNAL MEDICINE

## 2020-07-15 PROCEDURE — 94640 AIRWAY INHALATION TREATMENT: CPT

## 2020-07-15 PROCEDURE — 76775 US EXAM ABDO BACK WALL LIM: CPT | Performed by: INTERNAL MEDICINE

## 2020-07-15 RX ORDER — BUTALBITAL, ACETAMINOPHEN AND CAFFEINE 50; 325; 40 MG/1; MG/1; MG/1
1 TABLET ORAL EVERY 4 HOURS PRN
Status: DISCONTINUED | OUTPATIENT
Start: 2020-07-15 | End: 2020-07-18

## 2020-07-15 RX ORDER — MELATONIN
325
Status: DISCONTINUED | OUTPATIENT
Start: 2020-07-15 | End: 2020-07-18

## 2020-07-15 NOTE — ED NOTES
Pt has been frequently reminded to stay in bed, PT has, on multiple occasions tried to climb out but is easily redirectable. Nursing Supervisor notified for appropriate bed placement.

## 2020-07-15 NOTE — ED NOTES
Son, Jovan Veliz, called for update on PT, Jovan Veliz reported when PT takes pain medications she can become disoriented. Recent RX for given to help with Shingles.

## 2020-07-15 NOTE — H&P
[facility] Hospitalist H&P       CC: No chief complaint on file. PCP: Julianne Sanchez MD    History of Present Illness:  Ms. Pillo Sarmiento is an 77 yo female with PMH of afib (on coumadin s/p PPM), CAD, COPD who presented to the ED with fall and back pain.  Tiffani Hoskins 9-25-13    AHI 55 RDI 60  Sao2 giovana 88% CPAP 12 [employer]   • Visual impairment         PSH  Past Surgical History:   Procedure Laterality Date   • BYPASS SURGERY      4/28/13   • CHOLECYSTECTOMY     • COLONOSCOPY  7/2010   • COLONOSCOPY N/A 10/28/2013    P tablet, Rfl: 5  ERGOCALCIFEROL 1.25 MG (99439 UT) Oral Cap, TAKE ONE CAPSULE BY MOUTH EVERY WEEK, Disp: 30 capsule, Rfl: 0  Memantine HCl (NAMENDA) 10 MG Oral Tab, Take 1 tablet (10 mg total) by mouth 2 (two) times daily. , Disp: 180 tablet, Rfl: 3  clonaze Quit date: 1993        Years since quittin.1      Smokeless tobacco: Former User      Tobacco comment: pt quit 13-14 years ago.     Alcohol use: No       Fam Hx  Family History   Problem Relation Age of Onset   • Cancer Mother         breast ca 07/14/20  1630 07/15/20  0558   ALT 24 21   AST 23 23   ALB 3.7 3.4       Recent Labs   Lab 07/14/20  1630   TROP <0.045       Additional Diagnostics: ECG: afib    CXR: image personally reviewed no conosolidaiton or edema    Radiology: Ct Brain Or Head (70 FINDINGS:   CERVICAL SPINE: There is no evidence for cervical spine fracture, traumatic subluxation, prevertebral swelling, scoliosis or other malalignment. FACETS:  Facet articulations show no acute offset, or acute appearing asymmetry.   CRANIOCERVICAL deformity. No paraspinal hematoma. Central spinal canal stenosis present L3-L4 secondary to disc bulging, facet arthropathy, thickening of the ligamentum flavum and the mild degenerative subluxation mentioned above.   This stenosis was present on myelogra with fall and back pain.      # Confusion / Metabolic encephalopathy  - suspect encephalopathy related to UTI  - Treating UTI empirically  - delirium precuations  - monitor    # Fall  - PT / OT  - CT head and cervical/lumbar spine ok  - SW c/s -- lives with

## 2020-07-15 NOTE — CM/SW NOTE
SW received order for discharge planning. SW will speak with pt's family regarding discharge plan and disposition after PT/OT provide their discharge recommendation. SW/CM will continue to assist with discharge plan needs.

## 2020-07-15 NOTE — PROGRESS NOTES
Assumed care of patient at [phone]. Alert and oriented x4. Vital signs stable, NSR on telemetry (converted from Afib at 1608), EKG to confirm. Lung sounds clear bilaterally. Call light within reach, will continue to monitor.

## 2020-07-15 NOTE — OCCUPATIONAL THERAPY NOTE
OCCUPATIONAL THERAPY EVALUATION - INPATIENT     Room Number: 1873/0405-T  Evaluation Date: 7/15/2020  Type of Evaluation: Initial  Presenting Problem: fall at home, shingles    Physician Order: IP Consult to Occupational Therapy  Reason for Therapy: ADL/IA essential hypertension    • Unspecified sleep apnea    • Unspecified sleep apnea SPLIT 9-25-13    AHI 55 RDI 60  Sao2 giovana 88% CPAP 12 [employer]   • Visual impairment        Past Surgical History  Past Surgical History:   Procedure Laterality Date   • BYPAS deficits    PERCEPTION  Overall Perception Status:   WFL - within functional limits    SENSATION  Light touch:  intact    Communication: Pt is able to communicate all basic needs and wants    Behavioral/Emotional/Social: Pt was participated in and was lani addressed;SCDs in place; Alarm set    ASSESSMENT     Patient is a 78year old female admitted on 7/14/2020 for fall. Complete medical history and occupational profile noted above. Functional outcome measures completed include AMPAC, MMT, ROM.  In this OT digna supervision    Functional Transfer Goals  Patient will transfer from supine to sit:  with supervision  Patient will transfer from sit to stand:  with supervision  Patient will transfer to toilet:  with supervision    UE Exercise Program Goal  Patient will

## 2020-07-15 NOTE — PLAN OF CARE
Patient alert and oriented to self and place, disoriented to time and situation, very lethargic. Does not follow instructions very well when ambulating to the commode, gait belt and 2 person assist used to pivot.  Vital signs stable, Afib on telemetry, Eliq effectiveness of antiarrhythmic and heart rate control medications as ordered  - Initiate emergency measures for life threatening arrhythmias  - Monitor electrolytes and administer replacement therapy as ordered  Outcome: Progressing     Problem: METABOLIC Encourage pt to monitor pain and request assistance  - Assess pain using appropriate pain scale  - Administer analgesics based on type and severity of pain and evaluate response  - Implement non-pharmacological measures as appropriate and evaluate response

## 2020-07-15 NOTE — PLAN OF CARE
Received pt from er w/ headache and back pain s/p fall last wk. Pt very poor historian. Alert. Oriented x2-3. Denies cp, sob. Shingles noted on lt hand. Pt just recently completed famciclovir 7/14. Afib per tele. Hr 90s. Tylenol given for c/o headache.  Dec

## 2020-07-15 NOTE — PROGRESS NOTES
07/14/20 2225 07/14/20 2227 07/14/20 2228   Vital Signs   Pulse 85 82 81   Heart Rate Source Monitor Monitor Monitor   Resp 18  --   --    Respiratory Quality Normal  --   --    /83 (!) 164/83 129/72   BP Location Right arm Right arm Right arm   B

## 2020-07-16 PROCEDURE — 97162 PT EVAL MOD COMPLEX 30 MIN: CPT

## 2020-07-16 PROCEDURE — 87040 BLOOD CULTURE FOR BACTERIA: CPT | Performed by: INTERNAL MEDICINE

## 2020-07-16 PROCEDURE — 97530 THERAPEUTIC ACTIVITIES: CPT

## 2020-07-16 PROCEDURE — 97535 SELF CARE MNGMENT TRAINING: CPT

## 2020-07-16 NOTE — CM/SW NOTE
Noted that PT/OT are recommending ELOISE upon discharge. SW sent referrals to [facility] via [facility]. Will follow up with pt/family with a discharge assessment on where pt is accepted at for ELOISE. DON was requested.      &  to remain available

## 2020-07-16 NOTE — PHYSICAL THERAPY NOTE
PHYSICAL THERAPY EVALUATION - INPATIENT     Room Number: 0632/4422-Q  Evaluation Date: 7/16/2020  Type of Evaluation: Initial  Physician Order: PT Eval and Treat    Presenting Problem: confusion, fall, UTI  Reason for Therapy: Mobility Dysfunction an unspecified    • MCI (mild cognitive impairment) 2013    stable on Aricept   • Neuropathy 1/31/2018   • Occlusion and stenosis of carotid artery without mention of cerebral infarction     right CEA 2000   • Other and unspecified hyperlipidemia    • PERIPHE receive Fioricet and further details obtained from chart review. Pt lives on 2nd floor with daughter, son lives on 1st floor. SUBJECTIVE  \"Can I just go to bed? \" pt reporting severe migraine    Patient self-stated goal is be in less pain    OBJECTIVE Assessment   Gait Assistance: Moderate assistance  Distance (ft): 4' x 2  Assistive Device: Rolling walker  Pattern: Shuffle          Skilled Therapy Provided: Pt up on commode with nursing. Pt performed sit to stand from commode with mod A.  Pt ambulated s benefit from skilled inpatient PT to address the above deficits to assist patient in returning to prior to level of function. Subacute rehab is recommended for 12-14 days.   After this period of rehabilitation patient should achieve supervision level in bed

## 2020-07-16 NOTE — PLAN OF CARE
Assumed pt care around 0730. Pt denies CP, SOB, dizziness, or lightheadedness. Pt up w/ 2 and walker, continent of b/b. Pt education provided on medication, fall precautions, and POC. Pt verbalized understanding. All needs met. Will continue to monitor. for signs and symptoms of volume excess or deficit  - Monitor intake, output and patient weight  - Monitor urine specific gravity, serum osmolarity and serum sodium as indicated or ordered  - Monitor response to interventions for patient's volume status, i self care with guidance from PT/OT  - Encourage visually impaired, hearing impaired and aphasic patients to use assistive/communication devices  Outcome: Progressing     Problem: PAIN - ADULT  Goal: Verbalizes/displays adequate comfort level or patient's s precautions during self-care     Outcome: Progressing

## 2020-07-16 NOTE — CONSULTS
INFECTIOUS DISEASE CONSULTATION    Nikos Greenberg Patient Status:  Inpatient    8/10/1940 MRN [id number]   [facility] 8NE-A Attending Stephen Estrada MD   Hosp Day # 2 PCP Lianna Reeves, impairment      Past Surgical History:   Procedure Laterality Date   • BYPASS SURGERY      4/28/13   • CHOLECYSTECTOMY     • COLONOSCOPY  7/2010   • COLONOSCOPY N/A 10/28/2013    Performed by Tracy Cornejo MD at [facility] ENDOSCOPY   • ESOPHAGOGASTRODUODENOSCOPY ( BREATH  Pollen                    Quinolones                Shellfish               ANAPHYLAXIS    Medications:    Current Facility-Administered Medications:   •  ferrous sulfate EC tab 325 mg, 325 mg, Oral, Daily with breakfast  •  Butalbital-APAP-Caffein HCl (NAMENDA) 10 MG Oral Tab, Take 1 tablet (10 mg total) by mouth 2 (two) times daily. , Disp: 180 tablet, Rfl: 3  clonazePAM 1 MG Oral Tab, TAKE HALF TABLET BY MOUTH EVERY MORNING AND 1 TABLET BY MOUTH EVERY NIGHT AT BEDTIME, Disp: 45 tablet, Rfl: 2  ator BEDTIME, Disp: 60 tablet, Rfl: 5  IPRATROPIUM-ALBUTEROL 0.5-2.5 (3) MG/3ML Inhalation Solution, USE 1 VIAL VIA NEBULIZER EVERY 4 HOURS AS NEEDED, Disp: 1620 mL, Rfl: 1  VENTOLIN  (90 Base) MCG/ACT Inhalation Aero Soln, INHALE 2 PUFFS INTO THE LUNGS 133* 133*   K 3.1* 5.1  5.1    107   CO2 28.0 25.0   ALKPHO 73 68   AST 23 23   ALT 24 21   BILT 0.8 0.7   TP 7.3 6.8                Microbiologic Data:   Hospital Encounter on 07/14/20   1.  URINE CULTURE, ROUTINE     Status: Abnormal (Preliminary re Closed fracture of right hip, initial encounter ([facility] [address]  Global 10/29/2019     Hypoxia     Atrial fibrillation, chronic (HCC)     DUNCAN on CPAP     Orthopedic aftercare     Thrombocytopenia (HCC)     Metabolic alkalosis     COPD exacerbation (HCC)     Viral s

## 2020-07-16 NOTE — PROGRESS NOTES
[facility] Hospitalist note    PCP: Glenna Thomas MD    Chief Complaint:  F/u encephalopathy/UTI    SUBJECTIVE:  Pt able to respond appropriately to me this morning but appears sleepy  States she has a HA  Feels cold    OBJECTIVE:  Temp:  [98 °F (36.7 °C) mg Oral TID   • levETIRAcetam  250 mg Oral BID   • lisinopril  20 mg Oral BID   • Memantine HCl  10 mg Oral BID   • Pantoprazole Sodium  40 mg Oral QAM AC   • Pramipexole Dihydrochloride  0.25 mg Oral Nightly   • Sertraline HCl  200 mg Oral Daily   • Umecl

## 2020-07-16 NOTE — PLAN OF CARE
Pt denies c/o pain, malaise, or cardiac symptoms. A&Ox2, oriented to person and place, disoriented to time and situation, drowsy and confused. Lungs clear bilaterally with equal expansion, on room air. Pt in afib on monitor with irregular rate.  Abdomen sof appropriate  Outcome: Progressing  Goal: Hemodynamic stability and optimal renal function maintained  Description  INTERVENTIONS:  - Monitor labs and assess for signs and symptoms of volume excess or deficit  - Monitor intake, output and patient weight  - care  Description  INTERVENTIONS  - Monitor swallowing and airway patency with patient fatigue and changes in neurological status  - Encourage and assist patient to increase activity and self care with guidance from PT/OT  - Encourage visually impaired, he maximize function  - Promote sitting position while performing ADLs such as feeding, grooming, and bathing  - Educate and encourage patient/family in tolerated functional activity level and precautions during self-care     Outcome: Progressing

## 2020-07-16 NOTE — OCCUPATIONAL THERAPY NOTE
OCCUPATIONAL THERAPY TREATMENT NOTE - INPATIENT     Room Number: 5318/2938-Q  Session: 1   Number of Visits to Meet Established Goals: 5    Presenting Problem: fall at home, shingles    History related to current admission: Pt admitted from home s/p fall. 1/31/2018   • Occlusion and stenosis of carotid artery without mention of cerebral infarction     right CEA 2000   • Other and unspecified hyperlipidemia    • PERIPHERAL VASCULAR DISEASE    • Peripheral vascular disease ([facility] [place] [address]    • Peripheral vascular disea Inpatient Daily Activity Short Form  How much help from another person does the patient currently need…  -   Putting on and taking off regular lower body clothing?: A Lot  -   Bathing (including washing, rinsing, drying)?: A Lot  -   Toileting, which inclu training;IADL training;Continued evaluation; Compensatory technique education;Equipment eval/education;Patient/Family education;Patient/Family training; Endurance training;UE strengthening/ROM; Functional transfer training  Rehab Potential : Good  Frequency (

## 2020-07-17 RX ORDER — SULFAMETHOXAZOLE AND TRIMETHOPRIM 800; 160 MG/1; MG/1
1 TABLET ORAL DAILY
Qty: 7 TABLET | Refills: 0 | Status: SHIPPED | OUTPATIENT
Start: 2020-07-17 | End: 2020-07-18

## 2020-07-17 RX ORDER — DIPHENHYDRAMINE HYDROCHLORIDE, ZINC ACETATE 2; .1 G/100G; G/100G
1 CREAM TOPICAL 3 TIMES DAILY PRN
Status: DISCONTINUED | OUTPATIENT
Start: 2020-07-17 | End: 2020-07-18

## 2020-07-17 NOTE — CM/SW NOTE
07/17/20 1200   CM/SW Referral Data   Referral Source Social Work (self-referral)   Reason for Referral Discharge planning   Informant Patient   Patient Info   Patient's Mental Status Memory Impairments; Alert;Oriented   Patient's [address]

## 2020-07-17 NOTE — PROGRESS NOTES
[facility] Hospitalist note    PCP: Wendi Kramer MD    Chief Complaint:  F/u encephalopathy/UTI    SUBJECTIVE:  No sob, more conversational and alert today  States LORA is getting a little better  Discussed with daughter over phone while I was at bedside. Oral BID with meals   • Donepezil HCl  10 mg Oral Nightly   • hydrALAzine HCl  25 mg Oral TID   • levETIRAcetam  250 mg Oral BID   • lisinopril  20 mg Oral BID   • Memantine HCl  10 mg Oral BID   • Pantoprazole Sodium  40 mg Oral QAM AC   • Pramipexole Dih

## 2020-07-17 NOTE — PROGRESS NOTES
[facility]                INFECTIOUS DISEASE PROGRESS NOTE    Sheri Breath Patient Status:  Inpatient    8/10/1940 MRN [id number]   [facility] 8NE-A Attending Sarah Carvajal MD   Hosp Day # 3 PCP Chi Zepeda MD     Antibiot Urine Culture >100,000 CFU/ML Escherichia coli (A) N/A       Susceptibility    Escherichia coli -  (no method available)     Ampicillin <=2 Sensitive      Cefazolin <=4 Sensitive      Ciprofloxacin <=0.25 Sensitive      Gentamicin <=1 Sensitive      Merope headache syndrome     Concussion with no loss of consciousness     Acute post-traumatic headache, not intractable     Neuropathy     Closed head injury     Closed head injury, initial encounter     Contusion of right hip, initial encounter     Inability to

## 2020-07-17 NOTE — PLAN OF CARE
Patient is alert and oriented. On room air, Afib on the tele. Patient denies any pain. Discharge planning in place for tomorrow. POC updated with patient, all questions answered. Call light within reach, will continue to monitor.

## 2020-07-17 NOTE — PLAN OF CARE
Pt denies c/o pain, malaise, or cardiac symptoms. A&Ox2, oriented to person and place, disoriented to time and situation, drowsy. Lungs clear bilaterally with equal expansion, on room air. Pt afib on monitor with occasional vpaced beats and frequent PVCs. nutrition restrictions as appropriate  Outcome: Progressing  Goal: Hemodynamic stability and optimal renal function maintained  Description  INTERVENTIONS:  - Monitor labs and assess for signs and symptoms of volume excess or deficit  - Monitor intake, out care  Description  INTERVENTIONS  - Monitor swallowing and airway patency with patient fatigue and changes in neurological status  - Encourage and assist patient to increase activity and self care with guidance from PT/OT  - Encourage visually impaired, he maximize function  - Promote sitting position while performing ADLs such as feeding, grooming, and bathing  - Educate and encourage patient/family in tolerated functional activity level and precautions during self-care     Outcome: Progressing     Problem:

## 2020-07-18 VITALS
TEMPERATURE: 98 F | DIASTOLIC BLOOD PRESSURE: 53 MMHG | SYSTOLIC BLOOD PRESSURE: 129 MMHG | WEIGHT: 137.56 LBS | OXYGEN SATURATION: 100 % | RESPIRATION RATE: 16 BRPM | HEIGHT: 65 IN | HEART RATE: 74 BPM | BODY MASS INDEX: 22.92 KG/M2

## 2020-07-18 RX ORDER — CLONAZEPAM 1 MG/1
TABLET ORAL
Qty: 45 TABLET | Refills: 0 | Status: SHIPPED | OUTPATIENT
Start: 2020-07-18 | End: 2020-12-11

## 2020-07-18 RX ORDER — SULFAMETHOXAZOLE AND TRIMETHOPRIM 800; 160 MG/1; MG/1
1 TABLET ORAL DAILY
Qty: 7 TABLET | Refills: 0 | Status: SHIPPED | OUTPATIENT
Start: 2020-07-18 | End: 2020-07-25 | Stop reason: WASHOUT

## 2020-07-18 RX ORDER — TRAZODONE HYDROCHLORIDE 100 MG/1
100 TABLET ORAL NIGHTLY PRN
Qty: 30 TABLET | Refills: 0 | Status: SHIPPED | OUTPATIENT
Start: 2020-07-18 | End: 2020-08-20

## 2020-07-18 NOTE — PLAN OF CARE
Neuro: AOx4. Patient was able to state her name, , that she is at [facility], and the she came in because she was week and sick. Moves all extremities. Resp: CTA. Room air. Denies any cough. Cardiac: A-fib on tele. Pedal pulses palpable.  No edema Adequate for Discharge  Goal: Hemodynamic stability and optimal renal function maintained  Description  INTERVENTIONS:  - Monitor labs and assess for signs and symptoms of volume excess or deficit  - Monitor intake, output and patient weight  - Monitor uri self care  Description  INTERVENTIONS  - Monitor swallowing and airway patency with patient fatigue and changes in neurological status  - Encourage and assist patient to increase activity and self care with guidance from PT/OT  - Encourage visually impaire Discharge     Problem: SAFETY ADULT - FALL  Goal: Free from fall injury  Description  INTERVENTIONS:  - Assess pt frequently for physical needs  - Identify cognitive and physical deficits and behaviors that affect risk of falls.   - [place] fall precautio

## 2020-07-18 NOTE — PLAN OF CARE
Assumed care of pt. At [address]. Pt. Resting in bed. Son present in room. Call light within reach, pt. Unable to use / forgets to use. Bed alarm in place for safety. Alert to self only. RA sats upper 90s. . Tele leads give pt.  An allergic skin reaction, symptoms of electrolyte imbalances  - Administer electrolyte replacement as ordered  - Monitor response to electrolyte replacements, including rhythm and repeat lab results as appropriate  - Fluid restriction as ordered  - Instruct patient on fluid and nut Seizure pads on all 4 side rails  - Instruct patient/family to notify RN of any seizure activity  - Instruct patient/family to call for assistance with activity based on assessment  Outcome: Progressing  Goal: Achieves maximal functionality and self care toileting schedule  Outcome: Progressing     Problem: Impaired Activities of Daily Living  Goal: Achieve highest/safest level of independence in self care  Description  Interventions:  - Assess ability and encourage patient to participate in ADLs to [facility]

## 2020-07-18 NOTE — DISCHARGE SUMMARY
[facility] Discharge Summary    Patient ID:  Luis Martinez  [id number]  52 year old  8/10/1940    Admit date: 7/14/2020  Discharge date and time: 7/18/20  Attending Physician: Francois Vela MD  Primary Care Physician: Mercedes Sicard, MD Discharge meds     Medication List      START taking these medications    Sulfamethoxazole-TMP -160 MG Tabs per tablet  Commonly known as:  BACTRIM DS  Take 1 tablet by mouth daily for 7 days.         CHANGE how you take these medications    clonazePA TAKE ONE CAPSULE BY MOUTH EVERY WEEK     * ferrous sulfate 325 (65 FE) MG Tbec     * FeroSul 325 (65 Fe) MG Tabs  Generic drug:  Ferrous Sulfate  TAKE 1 TABLET BY MOUTH DAILY WITH BREAKFAST     hydrALAzine HCl 25 MG Tabs  Commonly known as:  APRESOLINE · traZODone HCl 100 MG Tabs       Consults: IP CONSULT TO HOSPITALIST  IP CONSULT TO SOCIAL WORK  IP CONSULT TO INFECTIOUS DISEASE  Radiology: Ct Brain Or Head ([id number])    Result Date: 7/14/2020  PROCEDURE:  CT BRAIN OR HEAD (95526)  COMPARISON:  [facility] PROCEDURE:  CT SPINE CERVICAL (CPT=72125)  COMPARISON:  [facility], CT SPINE CERVICAL (CPT=72125), 8/06/2019, 10:42 PM.  INDICATIONS:  fall/back pain  TECHNIQUE:  Noncontrast CT scanning of the cervical spine is performed from the skull base through C7. PROCEDURE:  CT SPINE LUMBAR (CPT=72131)  COMPARISON:  [facility] , CT, CT SPINE LUMBAR(CONTRAST ONLY) (CPT=72132), 2/14/2014, 12:25 PM.  INDICATIONS:  fall/back pain  TECHNIQUE:  Noncontrast CT scanning is performed through the lumbar spine.   Multiplanar recon PROCEDURE:  US KIDNEYS (LPK=23583)  COMPARISON:  None. INDICATIONS:  UTI, flank pain, evaulate for hydronephrosis  TECHNIQUE:  Transabdominal gray scale ultrasound imaging of the bilateral kidneys and bladder was performed.   Routine technique was utilized Total Time Coordinating Care: Greater than 30 minutes Patient had opportunity to ask questions and state understand and agree with therapeutic plan as outlined

## 2020-07-18 NOTE — CM/SW NOTE
[facility] has a bed today,  W:[phone]     Discussed with unit RN, arranged medicar for 2pm and informed Pt's dtr of d/c time. PCS form completed in [employer] and faxed to [facility] per protocol for medicaid.       [place], [address] [employer] [facility]

## 2020-07-21 ENCOUNTER — INITIAL APN SNF VISIT (OUTPATIENT)
Dept: INTERNAL MEDICINE CLINIC | Age: 80
End: 2020-07-21

## 2020-07-21 VITALS
RESPIRATION RATE: 20 BRPM | SYSTOLIC BLOOD PRESSURE: 136 MMHG | HEART RATE: 82 BPM | WEIGHT: 136.19 LBS | TEMPERATURE: 98 F | BODY MASS INDEX: 23 KG/M2 | OXYGEN SATURATION: 95 % | DIASTOLIC BLOOD PRESSURE: 76 MMHG

## 2020-07-21 DIAGNOSIS — Z79.899 MEDICATION MANAGEMENT: ICD-10-CM

## 2020-07-21 DIAGNOSIS — I48.20 CHRONIC ATRIAL FIBRILLATION (HCC): ICD-10-CM

## 2020-07-21 DIAGNOSIS — I10 ESSENTIAL HYPERTENSION, BENIGN: ICD-10-CM

## 2020-07-21 DIAGNOSIS — N39.0 URINARY TRACT INFECTION WITH HEMATURIA, SITE UNSPECIFIED: Primary | ICD-10-CM

## 2020-07-21 DIAGNOSIS — T78.40XD ALLERGIC REACTION, SUBSEQUENT ENCOUNTER: ICD-10-CM

## 2020-07-21 DIAGNOSIS — R31.9 URINARY TRACT INFECTION WITH HEMATURIA, SITE UNSPECIFIED: Primary | ICD-10-CM

## 2020-07-21 DIAGNOSIS — Z91.81 HISTORY OF FALLING: ICD-10-CM

## 2020-07-21 DIAGNOSIS — Z76.0 PRESCRIPTION REFILL: ICD-10-CM

## 2020-07-21 PROCEDURE — 1111F DSCHRG MED/CURRENT MED MERGE: CPT | Performed by: NURSE PRACTITIONER

## 2020-07-21 PROCEDURE — 99310 SBSQ NF CARE HIGH MDM 45: CPT | Performed by: NURSE PRACTITIONER

## 2020-07-21 RX ORDER — GARLIC EXTRACT 500 MG
1 CAPSULE ORAL DAILY
COMMUNITY
Start: 2020-07-20 | End: 2020-08-02

## 2020-07-21 RX ORDER — DOCUSATE SODIUM 100 MG/1
100 CAPSULE, LIQUID FILLED ORAL 2 TIMES DAILY PRN
COMMUNITY

## 2020-07-21 RX ORDER — DIPHENHYDRAMINE HYDROCHLORIDE, ZINC ACETATE 2; .1 G/100G; G/100G
1 CREAM TOPICAL 3 TIMES DAILY PRN
COMMUNITY
Start: 2020-07-21 | End: 2020-07-28

## 2020-07-21 RX ORDER — ACETAMINOPHEN 325 MG/1
325 TABLET ORAL EVERY 6 HOURS PRN
COMMUNITY
End: 2021-05-11

## 2020-07-21 NOTE — PROGRESS NOTES
Caro Vivas  : 8/10/1940  Age 78year old  female patient is admitted to Facility: [facility] for [address] [place] [address] date:  20  Discharge date to [facility]:  20  ELOS:  12-14 days  Anticipated discharge date:  20  Advanced Directive FIBRILLATION     s/p pacemaker 2005. on coumadin. • Calculus of kidney    • CONGESTIVE HEART FAILURE    • COPD    • CORONARY ARTERY DISEASE     .  CABG 4/28/13 due to [place] disease with LIMA to LAD and SVG to OM     • Coronary atherosclerosis    • SUZIEES HISTORY      CAROTIDENDARTERECTOMY, RIGHT   • OTHER SURGICAL HISTORY      BLADDER LIFT   • PACEMAKER  9/2005    Medtronic pacemaker   • REMOVAL GALLBLADDER     • TOTAL ABDOM HYSTERECTOMY       Family History   Problem Relation Age of Onset   • Cancer Mothe • Sulfamethoxazole-TMP -160 MG Oral Tab per tablet Take 1 tablet by mouth daily for 7 days.  7 tablet 0   • DONEPEZIL HCL 10 MG Oral Tab TAKE 1 TABLET BY MOUTH EVERY NIGHT 90 tablet 0   • LEVETIRACETAM 250 MG Oral Tab TAKE 1 TABLET(250 MG) BY MOUTH • carvedilol 25 MG Oral Tab Take 1 tablet (25 mg total) by mouth 2 (two) times daily with meals.  60 tablet 11   • ferrous sulfate 325 (65 FE) MG Oral Tab EC Take 325 mg by mouth daily with breakfast.         VITALS:  /76   Pulse 82   Temp 98 °F (36 with ADLs  EXTREMITIES/VASCULAR:no cyanosis, clubbing or edema  NEUROLOGIC: follows commands  PSYCHIATRIC: alert and oriented x 3; affect appropriate    MEDICAL DECISION MAKING    DIAGNOSTICS REVIEWED AT THIS VISIT:    Lab Results   Component Value Date Bactrim 800-160mg tab po daily; end date 7/24/20  Probiotic po daily; end date 8/2/20    Left hand shingles; cover if blistered/open    CAD, HTN and A Fib; pacemaker+, hx CABG  VS q shift  Cardiac diet  Eliquis 2.5mg po BID  Hydralazine 25mg po TID  Lisino

## 2020-07-28 ENCOUNTER — SNF VISIT (OUTPATIENT)
Dept: INTERNAL MEDICINE CLINIC | Age: 80
End: 2020-07-28

## 2020-07-28 DIAGNOSIS — Z87.440 HISTORY OF UTI: ICD-10-CM

## 2020-07-28 DIAGNOSIS — E87.1 HYPONATREMIA: Primary | ICD-10-CM

## 2020-07-28 DIAGNOSIS — Z79.899 MEDICATION MANAGEMENT: ICD-10-CM

## 2020-07-28 DIAGNOSIS — Z86.19 HISTORY OF SHINGLES: ICD-10-CM

## 2020-07-28 PROCEDURE — 99308 SBSQ NF CARE LOW MDM 20: CPT | Performed by: NURSE PRACTITIONER

## 2020-07-30 ENCOUNTER — SNF DISCHARGE (OUTPATIENT)
Dept: INTERNAL MEDICINE CLINIC | Age: 80
End: 2020-07-30

## 2020-07-30 VITALS
HEART RATE: 67 BPM | SYSTOLIC BLOOD PRESSURE: 100 MMHG | TEMPERATURE: 98 F | RESPIRATION RATE: 20 BRPM | OXYGEN SATURATION: 95 % | DIASTOLIC BLOOD PRESSURE: 56 MMHG

## 2020-07-30 VITALS
SYSTOLIC BLOOD PRESSURE: 103 MMHG | RESPIRATION RATE: 18 BRPM | DIASTOLIC BLOOD PRESSURE: 61 MMHG | HEART RATE: 66 BPM | TEMPERATURE: 98 F

## 2020-07-30 DIAGNOSIS — Z87.440 HISTORY OF UTI: ICD-10-CM

## 2020-07-30 DIAGNOSIS — Z76.0 PRESCRIPTION REFILL: ICD-10-CM

## 2020-07-30 DIAGNOSIS — Z86.19 HISTORY OF SHINGLES: ICD-10-CM

## 2020-07-30 DIAGNOSIS — E87.1 HYPONATREMIA: Primary | ICD-10-CM

## 2020-07-30 PROCEDURE — 99316 NF DSCHRG MGMT 30 MIN+: CPT | Performed by: NURSE PRACTITIONER

## 2020-07-30 RX ORDER — OMEPRAZOLE 20 MG/1
20 CAPSULE, DELAYED RELEASE ORAL EVERY MORNING
Status: ON HOLD | COMMUNITY
End: 2021-11-18 | Stop reason: ALTCHOICE

## 2020-07-30 NOTE — PROGRESS NOTES
Veronica Rojas, 8/10/1940, 78year old, female is being discharged from Facility: [address] [place] [address]    Date of Admission: 7/18/20  Date of Discharge: 8/1/20                             Admitting Diagnoses:  UTI, recent shingles, weakness  Rip Alan will need home health nursing for disease and medication management, PT/OT/ST to evaluate and treat, CNA/bath aid,  and DME as recommended by rehab therapy staff.     · Prescriptions provided; refills as per PCP/specialists    Discharge Diagnos spent in coordination of care in preparation for discharge.     Macrina Barajas, LUPE  7/30/2020  10:40 AM

## 2020-07-30 NOTE — PROGRESS NOTES
Jesus Alberto Richmond, 8/10/1940, 78year old, female    Chief Complaint:  Patient presents with:   Follow - Up  Weakness  Lab Results     [address] [place] [address] [address] date:  7/14/20  Discharge date to [facility]:  7/18/20  ELOS:  12-14 days  Anticipated discharge date:  8/1/20 masses; nontender, no guarding  :Deferred  MUSCULOSKELETAL: weakness improved as detailed above; will continue therapies to improve strength, endurance and independence with ADLs  EXTREMITIES/VASCULAR: no cyanosis, clubbing or edema  NEUROLOGIC: follows BID  Carvedilol 25mg po BID; take with meals  Atorvastatin 40mg po daily   Labs weekly in ELOISE; more often if clinically indicated  Follow-up with PCP/Cards post-ELOISE     Skin irritation; localized allergic response to adhesive from EKG leads---improved  Dip

## 2020-12-11 PROBLEM — K68.3 RETROPERITONEAL HEMATOMA: Status: RESOLVED | Noted: 2018-03-20 | Resolved: 2020-12-11

## 2020-12-11 PROBLEM — D68.9 COAGULOPATHY (HCC): Status: RESOLVED | Noted: 2017-04-24 | Resolved: 2020-12-11

## 2020-12-11 PROBLEM — S09.90XA CLOSED HEAD INJURY: Status: RESOLVED | Noted: 2018-03-19 | Resolved: 2020-12-11

## 2020-12-11 PROBLEM — R26.2 INABILITY TO AMBULATE DUE TO HIP: Status: RESOLVED | Noted: 2018-03-20 | Resolved: 2020-12-11

## 2020-12-11 PROBLEM — Z47.89 ORTHOPEDIC AFTERCARE: Status: RESOLVED | Noted: 2019-08-05 | Resolved: 2020-12-11

## 2020-12-11 PROBLEM — J44.1 COPD EXACERBATION (HCC): Status: RESOLVED | Noted: 2019-10-31 | Resolved: 2020-12-11

## 2020-12-11 PROBLEM — R09.02 HYPOXIA: Status: RESOLVED | Noted: 2019-07-31 | Resolved: 2020-12-11

## 2020-12-11 PROBLEM — K66.1 RETROPERITONEAL HEMATOMA: Status: RESOLVED | Noted: 2018-03-20 | Resolved: 2020-12-11

## 2020-12-11 PROBLEM — R58 RETROPERITONEAL HEMORRHAGE: Status: RESOLVED | Noted: 2018-03-20 | Resolved: 2020-12-11

## 2020-12-11 PROBLEM — S70.01XA CONTUSION OF RIGHT HIP, INITIAL ENCOUNTER: Status: RESOLVED | Noted: 2018-03-20 | Resolved: 2020-12-11

## 2020-12-11 PROBLEM — N39.0 URINARY TRACT INFECTION, SITE UNSPECIFIED: Status: RESOLVED | Noted: 2017-05-25 | Resolved: 2020-12-11

## 2020-12-11 PROBLEM — R19.7 NAUSEA VOMITING AND DIARRHEA: Status: RESOLVED | Noted: 2017-05-25 | Resolved: 2020-12-11

## 2020-12-11 PROBLEM — S72.001A CLOSED FRACTURE OF RIGHT HIP, INITIAL ENCOUNTER (HCC): Status: RESOLVED | Noted: 2019-07-31 | Resolved: 2020-12-11

## 2020-12-11 PROBLEM — R26.9 GAIT ABNORMALITY: Status: RESOLVED | Noted: 2017-01-10 | Resolved: 2020-12-11

## 2020-12-11 PROBLEM — S01.81XA FACIAL LACERATION: Status: RESOLVED | Noted: 2017-11-23 | Resolved: 2020-12-11

## 2020-12-11 PROBLEM — I48.91 ATRIAL FIBRILLATION, UNSPECIFIED TYPE (HCC): Status: RESOLVED | Noted: 2017-04-12 | Resolved: 2020-12-11

## 2020-12-11 PROBLEM — S72.001A CLOSED FRACTURE OF RIGHT HIP (HCC): Status: RESOLVED | Noted: 2019-07-31 | Resolved: 2020-12-11

## 2020-12-11 PROBLEM — S09.90XA CLOSED HEAD INJURY, INITIAL ENCOUNTER: Status: RESOLVED | Noted: 2018-03-20 | Resolved: 2020-12-11

## 2020-12-11 PROBLEM — I60.9 SAH (SUBARACHNOID HEMORRHAGE) (HCC): Status: RESOLVED | Noted: 2017-11-22 | Resolved: 2020-12-11

## 2020-12-11 PROBLEM — F03.91 DEMENTIA WITH BEHAVIORAL DISTURBANCE, UNSPECIFIED DEMENTIA TYPE: Status: ACTIVE | Noted: 2020-12-11

## 2020-12-11 PROBLEM — B34.9 VIRAL SYNDROME: Status: RESOLVED | Noted: 2019-10-31 | Resolved: 2020-12-11

## 2020-12-11 PROBLEM — D72.829 LEUKOCYTOSIS: Status: RESOLVED | Noted: 2020-07-14 | Resolved: 2020-12-11

## 2020-12-11 PROBLEM — E87.1 HYPONATREMIA: Status: RESOLVED | Noted: 2020-07-14 | Resolved: 2020-12-11

## 2020-12-11 PROBLEM — R11.2 NAUSEA VOMITING AND DIARRHEA: Status: RESOLVED | Noted: 2017-05-25 | Resolved: 2020-12-11

## 2020-12-11 PROBLEM — F03.91 DEMENTIA WITH BEHAVIORAL DISTURBANCE, UNSPECIFIED DEMENTIA TYPE (HCC): Status: ACTIVE | Noted: 2020-12-11

## 2020-12-11 PROBLEM — N39.0 UTI (URINARY TRACT INFECTION): Status: RESOLVED | Noted: 2020-07-14 | Resolved: 2020-12-11

## 2020-12-11 PROBLEM — I48.20 ATRIAL FIBRILLATION, CHRONIC (HCC): Status: RESOLVED | Noted: 2019-07-31 | Resolved: 2020-12-11

## 2020-12-11 PROBLEM — N39.0 URINARY TRACT INFECTION WITHOUT HEMATURIA, SITE UNSPECIFIED: Status: RESOLVED | Noted: 2020-07-14 | Resolved: 2020-12-11

## 2020-12-11 PROBLEM — R53.1 WEAKNESS GENERALIZED: Status: RESOLVED | Noted: 2017-05-25 | Resolved: 2020-12-11

## 2020-12-11 PROBLEM — E87.6 HYPOKALEMIA: Status: RESOLVED | Noted: 2020-07-14 | Resolved: 2020-12-11

## 2020-12-11 PROBLEM — S60.211A CONTUSION OF RIGHT WRIST, INITIAL ENCOUNTER: Status: RESOLVED | Noted: 2017-04-24 | Resolved: 2020-12-11

## 2020-12-11 PROBLEM — E87.3 METABOLIC ALKALOSIS: Status: RESOLVED | Noted: 2019-10-31 | Resolved: 2020-12-11

## 2021-04-30 ENCOUNTER — APPOINTMENT (OUTPATIENT)
Dept: CT IMAGING | Facility: HOSPITAL | Age: 81
End: 2021-04-30
Attending: EMERGENCY MEDICINE
Payer: MEDICARE

## 2021-04-30 ENCOUNTER — APPOINTMENT (OUTPATIENT)
Dept: GENERAL RADIOLOGY | Facility: HOSPITAL | Age: 81
End: 2021-04-30
Attending: EMERGENCY MEDICINE
Payer: MEDICARE

## 2021-04-30 ENCOUNTER — HOSPITAL ENCOUNTER (EMERGENCY)
Facility: HOSPITAL | Age: 81
Discharge: HOME | End: 2021-04-30
Attending: EMERGENCY MEDICINE
Payer: MEDICARE

## 2021-04-30 VITALS
OXYGEN SATURATION: 96 % | HEART RATE: 83 BPM | BODY MASS INDEX: 21 KG/M2 | DIASTOLIC BLOOD PRESSURE: 58 MMHG | SYSTOLIC BLOOD PRESSURE: 117 MMHG | RESPIRATION RATE: 18 BRPM | WEIGHT: 130.06 LBS | TEMPERATURE: 98 F

## 2021-04-30 DIAGNOSIS — S16.1XXA STRAIN OF NECK MUSCLE, INITIAL ENCOUNTER: ICD-10-CM
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
## 2021-04-30 DIAGNOSIS — S39.012A LUMBAR STRAIN, INITIAL ENCOUNTER: ICD-10-CM

## 2021-04-30 DIAGNOSIS — S70.01XA CONTUSION OF RIGHT HIP, INITIAL ENCOUNTER: ICD-10-CM

## 2021-04-30 PROCEDURE — 70450 CT HEAD/BRAIN W/O DYE: CPT | Performed by: EMERGENCY MEDICINE

## 2021-04-30 PROCEDURE — 96361 HYDRATE IV INFUSION ADD-ON: CPT

## 2021-04-30 PROCEDURE — 99285 EMERGENCY DEPT VISIT HI MDM: CPT

## 2021-04-30 PROCEDURE — 72100 X-RAY EXAM L-S SPINE 2/3 VWS: CPT | Performed by: EMERGENCY MEDICINE

## 2021-04-30 PROCEDURE — 99284 EMERGENCY DEPT VISIT MOD MDM: CPT

## 2021-04-30 PROCEDURE — 72125 CT NECK SPINE W/O DYE: CPT | Performed by: EMERGENCY MEDICINE

## 2021-04-30 PROCEDURE — 96374 THER/PROPH/DIAG INJ IV PUSH: CPT

## 2021-04-30 PROCEDURE — 73502 X-RAY EXAM HIP UNI 2-3 VIEWS: CPT | Performed by: EMERGENCY MEDICINE

## 2021-04-30 RX ORDER — KETOROLAC TROMETHAMINE 30 MG/ML
15 INJECTION, SOLUTION INTRAMUSCULAR; INTRAVENOUS ONCE
Status: COMPLETED | OUTPATIENT
Start: 2021-04-30 | End: 2021-04-30

## 2021-04-30 NOTE — ED QUICK NOTES
Pt recently had hip surgery, does not remember when, pt fell x2 after hip surgery , pt uses walker at home, pt c/p back pain, able to move extremities in bed, wf xray

## 2021-04-30 NOTE — ED QUICK NOTES
Spoke w/ family ilda  [phone], pt fell in the middle of the night, pt c/o back and head pain, hx dementia, pt c/o pain no sign of alert mental status, pt family updated

## 2021-04-30 NOTE — ED INITIAL ASSESSMENT (HPI)
Per ems, Pt fall at home does not remember falling last night , pt arrived to home and found pt in bed, pt family called ambulance d./t pt c/o neck and back pain, pt hx dementia on blood thinners, unsure if pt is at baseline     Per pt does not remember fa

## 2021-04-30 NOTE — ED PROVIDER NOTES
Patient Seen in: [facility] Emergency Department      History   Patient presents with:  Fall: pt c/o neck, back and rt hip pain    Stated Complaint: Pt fell last night, was found in bed, pt on blood thinners asprin     HPI/Subjective:   HPI    80-ye Location: [facility] ENDOSCOPY   • HIP REPLACEMENT SURGERY     • HYSTERECTOMY     • OPEN HEART SURGERY (PBP)      2002 CABG x 2   • OTHER SURGICAL HISTORY      pacemaker   • OTHER SURGICAL HISTORY      L SINUS TISSUE REMOVED   • OTHER SURGICAL HISTORY      CAROTID tenderness without crepitations or step-offs. Extremities: Both upper extremities and left lower extremity are atraumatic.   Right lower extremity–tenderness with decreased active range of motion over the right groin area but no shortening or external rota most consistent with chronic small vessel ischemic change within the deep white matter.    Dictated by (CST): Anika Mcguire MD on 4/30/2021 at 4:38 PM     Finalized by (CST): Anika Mcguire MD on 4/30/2021 at 4:40 PM       CT SPINE CERVICAL (CPT=72125)    Result D 4/30/2021 at 4:45 PM       XR LUMBAR SPINE (MIN 2 VIEWS) (CPT=72100)    Result Date: 4/30/2021  PROCEDURE:  XR LUMBAR SPINE (MIN 2 VIEWS) (CPT=72100)  TECHNIQUE:  AP, lateral, and coned down L5-S1 views were obtained.   COMPARISON:  [facility] , XR, XR LUMBAR S Prescribed:  Current Discharge Medication List

## 2021-05-03 ENCOUNTER — HOSPITAL ENCOUNTER (OUTPATIENT)
Facility: HOSPITAL | Age: 81
Setting detail: OBSERVATION
Discharge: NURSING, REHAB OR LONG-TERM CARE | End: 2021-05-06
Attending: EMERGENCY MEDICINE | Admitting: HOSPITALIST
Payer: MEDICARE

## 2021-05-03 DIAGNOSIS — R53.1 WEAKNESS GENERALIZED: ICD-10-CM

## 2021-05-03 DIAGNOSIS — N39.0 URINARY TRACT INFECTION WITHOUT HEMATURIA, SITE UNSPECIFIED: Primary | ICD-10-CM

## 2021-05-03 PROCEDURE — 87086 URINE CULTURE/COLONY COUNT: CPT | Performed by: EMERGENCY MEDICINE

## 2021-05-03 PROCEDURE — 80053 COMPREHEN METABOLIC PANEL: CPT | Performed by: EMERGENCY MEDICINE

## 2021-05-03 PROCEDURE — 81001 URINALYSIS AUTO W/SCOPE: CPT | Performed by: EMERGENCY MEDICINE

## 2021-05-03 PROCEDURE — 87186 SC STD MICRODIL/AGAR DIL: CPT | Performed by: EMERGENCY MEDICINE

## 2021-05-03 PROCEDURE — 81001 URINALYSIS AUTO W/SCOPE: CPT

## 2021-05-03 PROCEDURE — 96374 THER/PROPH/DIAG INJ IV PUSH: CPT

## 2021-05-03 PROCEDURE — 85025 COMPLETE CBC W/AUTO DIFF WBC: CPT | Performed by: EMERGENCY MEDICINE

## 2021-05-03 PROCEDURE — 36415 COLL VENOUS BLD VENIPUNCTURE: CPT

## 2021-05-03 PROCEDURE — 87077 CULTURE AEROBIC IDENTIFY: CPT | Performed by: EMERGENCY MEDICINE

## 2021-05-03 PROCEDURE — 99285 EMERGENCY DEPT VISIT HI MDM: CPT

## 2021-05-03 RX ORDER — LISINOPRIL 20 MG/1
20 TABLET ORAL 2 TIMES DAILY
Status: DISCONTINUED | OUTPATIENT
Start: 2021-05-03 | End: 2021-05-06

## 2021-05-03 RX ORDER — ONDANSETRON 2 MG/ML
4 INJECTION INTRAMUSCULAR; INTRAVENOUS EVERY 6 HOURS PRN
Status: DISCONTINUED | OUTPATIENT
Start: 2021-05-03 | End: 2021-05-06

## 2021-05-03 RX ORDER — SERTRALINE HYDROCHLORIDE 100 MG/1
200 TABLET, FILM COATED ORAL DAILY
Status: DISCONTINUED | OUTPATIENT
Start: 2021-05-04 | End: 2021-05-06

## 2021-05-03 RX ORDER — SULFAMETHOXAZOLE AND TRIMETHOPRIM 800; 160 MG/1; MG/1
1 TABLET ORAL ONCE
Status: COMPLETED | OUTPATIENT
Start: 2021-05-03 | End: 2021-05-03

## 2021-05-03 RX ORDER — DONEPEZIL HYDROCHLORIDE 10 MG/1
10 TABLET, FILM COATED ORAL NIGHTLY
Status: DISCONTINUED | OUTPATIENT
Start: 2021-05-03 | End: 2021-05-06

## 2021-05-03 RX ORDER — HYDRALAZINE HYDROCHLORIDE 25 MG/1
25 TABLET, FILM COATED ORAL 3 TIMES DAILY
Status: DISCONTINUED | OUTPATIENT
Start: 2021-05-03 | End: 2021-05-06

## 2021-05-03 RX ORDER — ATORVASTATIN CALCIUM 40 MG/1
40 TABLET, FILM COATED ORAL NIGHTLY
Status: DISCONTINUED | OUTPATIENT
Start: 2021-05-03 | End: 2021-05-06

## 2021-05-03 RX ORDER — BISACODYL 10 MG
10 SUPPOSITORY, RECTAL RECTAL
Status: DISCONTINUED | OUTPATIENT
Start: 2021-05-03 | End: 2021-05-06

## 2021-05-03 RX ORDER — ACETAMINOPHEN 325 MG/1
650 TABLET ORAL EVERY 6 HOURS PRN
Status: DISCONTINUED | OUTPATIENT
Start: 2021-05-03 | End: 2021-05-06

## 2021-05-03 RX ORDER — SODIUM PHOSPHATE, DIBASIC AND SODIUM PHOSPHATE, MONOBASIC 7; 19 G/133ML; G/133ML
1 ENEMA RECTAL ONCE AS NEEDED
Status: DISCONTINUED | OUTPATIENT
Start: 2021-05-03 | End: 2021-05-06

## 2021-05-03 RX ORDER — PRAMIPEXOLE DIHYDROCHLORIDE 0.25 MG/1
0.25 TABLET ORAL NIGHTLY
Status: DISCONTINUED | OUTPATIENT
Start: 2021-05-03 | End: 2021-05-06

## 2021-05-03 RX ORDER — CARVEDILOL 12.5 MG/1
25 TABLET ORAL 2 TIMES DAILY WITH MEALS
Status: DISCONTINUED | OUTPATIENT
Start: 2021-05-04 | End: 2021-05-06

## 2021-05-03 RX ORDER — LEVETIRACETAM 250 MG/1
250 TABLET ORAL 2 TIMES DAILY
Status: DISCONTINUED | OUTPATIENT
Start: 2021-05-03 | End: 2021-05-06

## 2021-05-03 RX ORDER — PANTOPRAZOLE SODIUM 40 MG/1
40 TABLET, DELAYED RELEASE ORAL
Status: DISCONTINUED | OUTPATIENT
Start: 2021-05-04 | End: 2021-05-06

## 2021-05-03 RX ORDER — METOCLOPRAMIDE HYDROCHLORIDE 5 MG/ML
10 INJECTION INTRAMUSCULAR; INTRAVENOUS EVERY 8 HOURS PRN
Status: DISCONTINUED | OUTPATIENT
Start: 2021-05-03 | End: 2021-05-06

## 2021-05-03 RX ORDER — MEMANTINE HYDROCHLORIDE 10 MG/1
10 TABLET ORAL 2 TIMES DAILY
Status: DISCONTINUED | OUTPATIENT
Start: 2021-05-03 | End: 2021-05-06

## 2021-05-03 RX ORDER — POLYETHYLENE GLYCOL 3350 17 G/17G
17 POWDER, FOR SOLUTION ORAL DAILY PRN
Status: DISCONTINUED | OUTPATIENT
Start: 2021-05-03 | End: 2021-05-06

## 2021-05-04 PROCEDURE — 94640 AIRWAY INHALATION TREATMENT: CPT

## 2021-05-04 PROCEDURE — 97162 PT EVAL MOD COMPLEX 30 MIN: CPT

## 2021-05-04 PROCEDURE — 85025 COMPLETE CBC W/AUTO DIFF WBC: CPT | Performed by: HOSPITALIST

## 2021-05-04 PROCEDURE — 97535 SELF CARE MNGMENT TRAINING: CPT

## 2021-05-04 PROCEDURE — 87040 BLOOD CULTURE FOR BACTERIA: CPT | Performed by: EMERGENCY MEDICINE

## 2021-05-04 PROCEDURE — 83735 ASSAY OF MAGNESIUM: CPT | Performed by: HOSPITALIST

## 2021-05-04 PROCEDURE — 97530 THERAPEUTIC ACTIVITIES: CPT

## 2021-05-04 PROCEDURE — 97166 OT EVAL MOD COMPLEX 45 MIN: CPT

## 2021-05-04 PROCEDURE — 80048 BASIC METABOLIC PNL TOTAL CA: CPT | Performed by: HOSPITALIST

## 2021-05-04 RX ORDER — SULFAMETHOXAZOLE AND TRIMETHOPRIM 800; 160 MG/1; MG/1
1 TABLET ORAL EVERY 12 HOURS SCHEDULED
Status: DISCONTINUED | OUTPATIENT
Start: 2021-05-04 | End: 2021-05-06

## 2021-05-04 NOTE — ED INITIAL ASSESSMENT (HPI)
Patient brought in for minimal urine output and foul smelling urine. Patient also c/o lower pelvic pain.

## 2021-05-04 NOTE — ED PROVIDER NOTES
Patient Seen in: [facility] Emergency Department      History   Patient presents with:  Urinary Symptoms    Stated Complaint: uti    HPI/Subjective:   HPI    Patient is an 26-year-old female presents emergency room for evaluation of urinary tract in Pulmonary embolism (HCC)    • SAH (subarachnoid hemorrhage) ([facility] [address] 11/22/2017   • Seizure disorder (HCC)    • Unspecified essential hypertension    • Unspecified sleep apnea SPLIT 9-25-13    AHI 55 RDI 60  Sao2 giovana 88% CPAP 12 [employer]   • Visual impairmen Normocephalic, atraumatic. Moist mucous membranes. Pupils equal round reactive to light and accommodation, extraocular motion is intact, sclerae white, conjunctiva is pink. Oropharynx is unremarkable, no exudate.   NECK: Supple, trachea midline, no lymph GREEN   RAINBOW DRAW GOLD   BLOOD CULTURE   BLOOD CULTURE   URINE CULTURE, ROUTINE   RAPID SARS-COV-2 BY PCR          Medications   Sulfamethoxazole-TMP DS (BACTRIM DS) 800-160 MG per tab 1 tablet (has no administration in time range)              MDM

## 2021-05-04 NOTE — PROGRESS NOTES
NURSING ADMISSION NOTE      Patient admitted via Cart  Oriented to room. Safety precautions initiated. Bed in low position. Call light in reach. Pt A/Ox3-4. VSS. Afebrile. Admission navigator completed with pt and family via phone call.  Pt has

## 2021-05-04 NOTE — OCCUPATIONAL THERAPY NOTE
OCCUPATIONAL THERAPY EVALUATION - INPATIENT     Room Number: 424/424-A  Evaluation Date: 5/4/2021  Type of Evaluation: Initial  Presenting Problem: UTI    Physician Order: IP Consult to Occupational Therapy  Reason for Therapy: ADL/IADL Dysfunction and Dis Laterality Date   • BYPASS SURGERY      4/28/13   • CHOLECYSTECTOMY     • COLONOSCOPY  7/2010   • COLONOSCOPY  10/28/2013    Procedure: COLONOSCOPY;  Surgeon: Kathleen Boyce MD;  Location: [facility] ENDOSCOPY   • HIP REPLACEMENT SURGERY     • HYSTERECTOMY     • OPE today.    RANGE OF MOTION AND STRENGTH ASSESSMENT  Upper extremity ROM is within functional limits     Upper extremity strength is within functional limits     COORDINATION  Gross Motor    [facility]    Fine Motor    [facility]      ADDITIONAL TESTS chair;Needs met;Call light within reach; All patient questions and concerns addressed;SCDs in place; Family present    ASSESSMENT     Patient is a 78year old female admitted on 5/3/2021 for UTI. Complete medical history and occupational profile noted above. supervision  Patient will perform toileting: with supervision    Functional Transfer Goals  Patient will transfer from supine to sit:  with supervision  Patient will transfer from sit to stand:  with supervision  Patient will transfer to toilet:  with supe

## 2021-05-04 NOTE — PHYSICAL THERAPY NOTE
PHYSICAL THERAPY EVALUATION - INPATIENT     Room Number: 424/424-A  Evaluation Date: 5/4/2021  Type of Evaluation: Initial  Physician Order: PT Eval and Treat    Presenting Problem: UTI  Reason for Therapy: Mobility Dysfunction and Discharge Planning BYPASS SURGERY      4/28/13   • CHOLECYSTECTOMY     • COLONOSCOPY  7/2010   • COLONOSCOPY  10/28/2013    Procedure: COLONOSCOPY;  Surgeon: Salome Giang MD;  Location: [address] [place] [address] ENDOSCOPY   • HIP REPLACEMENT SURGERY     • HYSTERECTOMY     • OPEN HEART SURGERY (PBP and decreased awareness of need for safety  · Awareness of Errors:  decreased awareness of errors     RANGE OF MOTION AND STRENGTH ASSESSMENT  Upper extremity ROM and strength- see OT eval    Lower extremity ROM is within functional limits     Lower extrem and requires VC to stay on task. Pt demonstrates fair static sitting balance at EOB. Pt sit-stand with RW and MIN assist for balance.  Pt able to perform standing dynamic balance activities- alternating marching with MIN assist. Pt encouraged to attempt to and overall the evaluation complexity is considered moderate. These impairments and comorbidities manifest themselves as functional limitations in independent bed mobility, transfers, and gait.  The patient is below baseline and would benefit from skilled i

## 2021-05-04 NOTE — CONSULTS
INFECTIOUS DISEASE CONSULTATION    [facility] Patient Status:  Observation    8/10/1940 MRN [id number]   [facility] 4NW-A Attending Billy Hays MD   Hosp Day # 0 PCP Jim Perdomo Visual impairment      Past Surgical History:   Procedure Laterality Date   • BYPASS SURGERY      4/28/13   • CHOLECYSTECTOMY     • COLONOSCOPY  7/2010   • COLONOSCOPY  10/28/2013    Procedure: COLONOSCOPY;  Surgeon: Jamee Rosado MD;  Location: [address] [place] [address] Inhalation, Daily  •  apixaban (ELIQUIS) tab 2.5 mg, 2.5 mg, Oral, BID  •  atorvastatin (LIPITOR) tab 40 mg, 40 mg, Oral, Nightly  •  carvedilol (COREG) tab 25 mg, 25 mg, Oral, BID with meals  •  Donepezil HCl (ARICEPT) tab 10 mg, 10 mg, Oral, Nightly  • 40 MG Oral Tab EC, Take 1 tablet (40 mg total) by mouth every morning before breakfast., Disp: 30 tablet, Rfl: 11  Sertraline HCl 100 MG Oral Tab, Take 2 tablets (200 mg total) by mouth daily. , Disp: 180 tablet, Rfl: 3  clonazePAM 1 MG Oral Tab, TAKE HALF Soln, INHALE 2 PUFFS INTO THE LUNGS EVERY 6 HOURS AS NEEDED FOR WHEEZING OR SHORTNESS OF BREATH, Disp: 54 g, Rfl: 0  docusate sodium 100 MG Oral Cap, Take 100 mg by mouth 2 (two) times daily. , Disp: , Rfl:   FEROSUL 325 (65 Fe) MG Oral Tab, TAKE 1 TABLET B List:     Sinoatrial node dysfunction     Cardiac pacemaker in situ     Essential hypertension, benign     Atrial fibrillation (HCC)     Asthma     Depression     CAD (coronary artery disease)     S/P CABG (coronary artery bypass graft)     COPD (chronic o

## 2021-05-04 NOTE — H&P
General Medicine H&P     Patient presents with:  Urinary Symptoms       PCP: Emily Cruz MD    History of Present Illness: Patient is a 78year old female with PMH including but not limited to asthma, afib, COPD, CAD, depression, GERD, HTN, HL, DUNCAN HYSTERECTOMY     • OPEN HEART SURGERY (PBP)      2002 CABG x 2   • OTHER SURGICAL HISTORY      pacemaker   • OTHER SURGICAL HISTORY      L SINUS TISSUE REMOVED   • OTHER SURGICAL HISTORY      CAROTIDENDARTERECTOMY, RIGHT   • OTHER SURGICAL HISTORY      ESTEBAN 34        diagnosed 3 different times   • Other (Other) Mother    • Other (Other) Father         alzheimers   • Cancer Brother        Review of Systems  Comprehensive ROS reviewed and negative except for what's stated above.  \    OBJECTIVE:  /76 (BP hemorrhage. Periventricular and subcortical low attenuation are nonspecific but most consistent with chronic small vessel ischemic change. SINUSES:           No sign of acute sinusitis. MASTOIDS:          No sign of acute inflammation.   SKULL: pleural parenchymal scarring. Partially imaged left-sided pacemaker device causes beam hardening artifact limiting evaluation of the surrounding area. CONCLUSION:  1.  Degenerative change as detailed above without significant change when compar CONCLUSION:  Postsurgical changes of right total hip arthroplasty. There is demineralization of the pelvis and proximal right femur. No evidence for acute displaced fracture.    Dictated by (CST): Nohemy Sullivan MD on 4/30/2021 at 5:51 PM

## 2021-05-04 NOTE — PLAN OF CARE
Applied posey vest restraint on patient after order obtained from MD & after informing patient's daughter due to patient was so restless & climbing out of bed. Frequent redirection done but patient has no safety awareness.  Patient still on bactrim oral for

## 2021-05-05 PROCEDURE — 83735 ASSAY OF MAGNESIUM: CPT | Performed by: INTERNAL MEDICINE

## 2021-05-05 PROCEDURE — 85027 COMPLETE CBC AUTOMATED: CPT | Performed by: INTERNAL MEDICINE

## 2021-05-05 PROCEDURE — 80048 BASIC METABOLIC PNL TOTAL CA: CPT | Performed by: INTERNAL MEDICINE

## 2021-05-05 NOTE — PROGRESS NOTES
[facility] Hospitalist Team  Progress Note      Safia Rivera  8/10/1940    Assessment/Plan:       78year old female with PMH including but not limited to asthma, afib, COPD, CAD, depression, GERD, HTN, HL, DUNCAN [facility] weakness, dysuria, foul smelling ur 133*   K 4.8 4.0 4.0    102 101   CO2 24.0 25.0 28.0   BUN 16 15 21*   CREATSERUM 0.78 0.81 0.86   CA 9.0 8.6 8.7   MG  --  2.1 2.2   GLU 93 106* 110*       Recent Labs   Lab 05/03/21 2010   ALT 26   AST 32   ALB 3.4       No results for input(s): P

## 2021-05-05 NOTE — PROGRESS NOTES
[facility]                INFECTIOUS DISEASE PROGRESS NOTE    Jaja Sarmiento Patient Status:  Observation    8/10/1940 MRN [id number]   [place] 4NW-A Attending Lus Bloch, [address] [facility] Day # 0 PCP Eddie Mendoza MD     An 4.8 4.0 4.0    102 101   CO2 24.0 25.0 28.0   ALKPHO 100  --   --    AST 32  --   --    ALT 26  --   --    BILT 0.9  --   --    TP 8.1  --   --        No results found for: [facility] Encounter on 05/03/21   1.  BLOOD CULTURE

## 2021-05-05 NOTE — CM/SW NOTE
SW spoke with patient's daughter, Terry Hernandez ([phone]) and introduced self and role. Patient's daughter was friendly and responsive.  Patient's daughter stated she wants patient to return home upon discharge and does not want ELOISE as 'she has a history of ELOISE

## 2021-05-05 NOTE — PLAN OF CARE
PT ALERT/FORGETFUL, IMPULSIVE, RESTLESS, PT PULLED IV OUT, AFIB/PACED, HYPOTENSIVE, HELD HYDRALAZINE, LISINOPRIL IN EVENING, UP VOIDING IN BATHROOM, UP WITH SB ASSIST/WALKER, BED ALARM ON, POSEY VEST IN PLACE, LABS IN AM, INSTRUCTED PT ON POC     Problem:

## 2021-05-06 VITALS
HEIGHT: 65 IN | HEART RATE: 72 BPM | WEIGHT: 137 LBS | TEMPERATURE: 98 F | RESPIRATION RATE: 18 BRPM | OXYGEN SATURATION: 96 % | BODY MASS INDEX: 22.82 KG/M2 | SYSTOLIC BLOOD PRESSURE: 91 MMHG | DIASTOLIC BLOOD PRESSURE: 48 MMHG

## 2021-05-06 PROCEDURE — 97116 GAIT TRAINING THERAPY: CPT

## 2021-05-06 PROCEDURE — 97110 THERAPEUTIC EXERCISES: CPT

## 2021-05-06 RX ORDER — SULFAMETHOXAZOLE AND TRIMETHOPRIM 800; 160 MG/1; MG/1
1 TABLET ORAL EVERY 12 HOURS SCHEDULED
Qty: 6 TABLET | Refills: 0 | Status: SHIPPED | OUTPATIENT
Start: 2021-05-06 | End: 2021-05-06

## 2021-05-06 RX ORDER — SULFAMETHOXAZOLE AND TRIMETHOPRIM 800; 160 MG/1; MG/1
1 TABLET ORAL EVERY 12 HOURS SCHEDULED
Qty: 6 TABLET | Refills: 0 | Status: SHIPPED | OUTPATIENT
Start: 2021-05-06 | End: 2021-05-09 | Stop reason: WASHOUT

## 2021-05-06 NOTE — DISCHARGE SUMMARY
General Medicine Discharge Summary     Patient ID:  Jo Al  78year old  8/10/1940    Admit date: 5/3/2021    Discharge date and time: 5/6/21    Attending Physician: DO KALPANA Reich incruse, breo     # Seizure hx?  - keppra  Consults: IP CONSULT TO HOSPITALIST  IP CONSULT TO INFECTIOUS DISEASE    Operative Procedures:        Patient instructions:      I reviewed and reconciled the discharge medications on the day of discharge.      Dis 100 mg by mouth 2 (two) times daily. Refills: 0     Donepezil HCl 10 MG Tabs  Commonly known as: ARICEPT      Take 1 tablet (10 mg total) by mouth nightly.    Quantity: 90 tablet  Refills: 3     ergocalciferol 1.25 MG (67893 UT) Caps  Commonly known as: D Tabs  Commonly known as: ULTRAM      Take 1 tablet (50 mg total) by mouth 2 (two) times daily as needed for Pain.    Quantity: 60 tablet  Refills: 3     traZODone HCl 100 MG Tabs  Commonly known as: DESYREL      Take 1 tablet (100 mg total) by mouth nightly

## 2021-05-06 NOTE — PLAN OF CARE
Problem: RISK FOR INFECTION - ADULT  Goal: Absence of fever/infection during anticipated neutropenic period  Description: INTERVENTIONS  - Monitor WBC  - Administer growth factors as ordered  - Implement neutropenic guidelines  Outcome: Progressing     P

## 2021-05-06 NOTE — CM/SW NOTE
Patient accepted at [facility]. SW contacted liaison and she informed SW they have a bed available today. SW spoke to patient's daughter, FABIOLA ([phone]) and informed her patient was accepted to [facility].  Patient's daughter expressed gratitude and

## 2021-05-06 NOTE — PLAN OF CARE
Patient up sitting in the recliner chair. Awake & alert,conversant & able to make needs known. Calm & quiet,n restlessness noted. Posey vest discontinued,close monitoring rendered. Fall/seizure precautions observed.  Remains on oral antibiotics for UTI w/ no

## 2021-05-06 NOTE — PROGRESS NOTES
[facility] Hospitalist Team  Progress Note      Rogelio Meredith  8/10/1940    Assessment/Plan:       78year old female with PMH including but not limited to asthma, afib, COPD, CAD, depression, GERD, HTN, HL, DUNCAN who p/t [facility] ED c weakness, dysuria, foul smelling ur 133*   K 4.8 4.0 4.0    102 101   CO2 24.0 25.0 28.0   BUN 16 15 21*   CREATSERUM 0.78 0.81 0.86   CA 9.0 8.6 8.7   MG  --  2.1 2.2   GLU 93 106* 110*       Recent Labs   Lab 05/03/21 2010   ALT 26   AST 32   ALB 3.4       No results for input(s): P

## 2021-05-06 NOTE — PLAN OF CARE
Problem: RISK FOR INFECTION - ADULT  Goal: Absence of fever/infection during anticipated neutropenic period  Description: INTERVENTIONS  - Monitor WBC  - Administer growth factors as ordered  - Implement neutropenic guidelines  Outcome: Isabella Louis Graft Cartilage Fenestration Text: The cartilage was fenestrated with a 2mm punch biopsy to help facilitate graft survival and healing.

## 2021-05-06 NOTE — PHYSICAL THERAPY NOTE
PHYSICAL THERAPY TREATMENT NOTE - INPATIENT    Room Number: 424/424-A     Session: 1   Number of Visits to Meet Established Goals: 5    Presenting Problem: UTI    History related to current admission: Pt is a 78 y.o. female admitted 5/3/21 with weakness a 10/28/2013    Procedure: COLONOSCOPY;  Surgeon: Marylu Zapata MD;  Location: [facility] ENDOSCOPY   • HIP REPLACEMENT SURGERY     • HYSTERECTOMY     • OPEN HEART SURGERY (PBP)      2002 CABG x 2   • OTHER SURGICAL HISTORY      pacemaker   • OTHER SURGICAL HISTORY Assessment   Gait Assistance: Minimum assistance; Moderate assistance  Distance (ft): 3,20x2  Assistive Device: Rolling walker  Pattern: Shuffle  Stoop/Curb Assistance: Not tested       Skilled Therapy Provided: RN approved session.   Pt presents in semi sup 3-5x/week    CURRENT GOALS   Goal #1 Patient is able to demonstrate supine - sit EOB @ level: supervision      Goal #2 Patient is able to demonstrate transfers EOB to/from [facility] at assistance level: supervision      Goal #3 Patient is able to ambulate 100 fe

## 2021-05-12 NOTE — PROGRESS NOTES
Sara Tony  : 8/10/1940  Age 78year old  female patient is admitted to Facility: [facility].     [address] [place] [address] Drive date:  5/3/21  Discharge date to [facility]:  21  ELOS:  14 days  Anticipated discharge og [facility])    • Unspecified essential hypertension    • Unspecified sleep apnea SPLIT 9-25-13    AHI 55 RDI 60  Sao2 giovana 88% CPAP 12 [employer]   • Visual impairment      Past Surgical History:   Procedure Laterality Date   • BYPASS SURGERY      4/28/13   • CHOL [Bicillin L-A]          Comment:SHORTNESS OF BREATH  Pollen                    Quinolones                Shellfish               ANAPHYLAXIS    CODE STATUS:  DNR    ADVANCED CARE PLANNING TEAM: None      CURRENT MEDICATIONS   Current Outpatient Medications NEEDED FOR WHEEZING OR SHORTNESS OF BREATH 54 g 0   • traZODone HCl 100 MG Oral Tab Take 1 tablet (100 mg total) by mouth nightly as needed for Sleep. 90 tablet 1   • omeprazole 20 MG Oral Capsule Delayed Release Take 20 mg by mouth every morning.      • do extremities  NEURO:---Poor historian, unable to answer question  PSYCHE: no symptoms of depression or anxiety  HEMATOLOGY:--- Poor historian, unable to answer question  ENDOCRINE: denies excessive thirst or urination; denies unexpected wt gain or wt loss reviewed. Medication reconciliation completed.       Post hospital discharge readmission risk assessment tool:   Pertinent medical history:   Obey Peeling all that apply with \"x\"  [  ]  Heart failure/pulmonary edema                      [  ]  Cardiac surgery on 5/9    Weakness/Back Pain/Left Hand Pain  -PT/OT to eval and treat  -Tylenol 650 mg q6h prn for fever/pain, if given for fever, notify MD/NP  -Diclofenac gel qid to left hand  -Fall Precautions  -ELOS:  14 days  -Anticipated Discharge Date:  5/20/21-

## 2021-05-13 NOTE — PROGRESS NOTES
Víctor Hindsswick, 8/10/1940, 78year old, female    Chief Complaint:  Patient presents with:   Follow - Up: S/p UTI, Weakness, Back Pain and Hypotension       Subjective:  79 y/o female with PMH of Dementia, A. Fib, HTN, HLD, Depression, Asthma and COPD came t normal, RRR; no S3, no S4; , no click, no murmur  ABDOMEN:  normal active BS+, soft, nondistended; no organomegaly, no masses; no bruits; nontender, no guarding, no rebound tenderness. :Deferred  LYMPHATIC:no lymphedema  MUSCULOSKELETAL: ---+Back pain. shift  -Bleeding precautions  -Hydralazine 25 mg tid  -Eliquis 2.5 mg bid  -Atorvastatin 40 mg every day  -Lisinopril 20 mg bid, hold for sbp<100 or hr<60  -Coreg 25 mg bid, hold for sbp<100 or hr <60     GERD  -Omeprazole 20 mg qd     Hearing Impairment

## 2021-05-18 ENCOUNTER — SNF VISIT (OUTPATIENT)
Dept: INTERNAL MEDICINE CLINIC | Age: 81
End: 2021-05-18

## 2021-05-18 VITALS
HEART RATE: 76 BPM | RESPIRATION RATE: 18 BRPM | SYSTOLIC BLOOD PRESSURE: 112 MMHG | OXYGEN SATURATION: 94 % | DIASTOLIC BLOOD PRESSURE: 64 MMHG | TEMPERATURE: 97 F

## 2021-05-18 DIAGNOSIS — R53.1 WEAKNESS: Primary | ICD-10-CM

## 2021-05-18 DIAGNOSIS — N30.00 ACUTE CYSTITIS WITHOUT HEMATURIA: ICD-10-CM

## 2021-05-18 DIAGNOSIS — Z78.9 IMPAIRED MOBILITY AND ADLS: ICD-10-CM

## 2021-05-18 DIAGNOSIS — M54.40 LOW BACK PAIN WITH SCIATICA, SCIATICA LATERALITY UNSPECIFIED, UNSPECIFIED BACK PAIN LATERALITY, UNSPECIFIED CHRONICITY: ICD-10-CM

## 2021-05-18 DIAGNOSIS — Z74.09 IMPAIRED MOBILITY AND ADLS: ICD-10-CM

## 2021-05-18 PROCEDURE — 99309 SBSQ NF CARE MODERATE MDM 30: CPT | Performed by: NURSE PRACTITIONER

## 2021-05-18 NOTE — PROGRESS NOTES
Sheri García, 8/10/1940, 78year old, female    Chief Complaint:  Patient presents with:   Follow - Up: S/p UTI, Weakness, Back Pain and Hypotension  Nausea/Vomiting/Diarrhea       Subjective:   PMH of Dementia, A. Fib, HTN, HLD, Depression, Asthma and  eyes  HENT: normocephalic; normal nose, no nasal drainage, mucous membranes pink, moist, pharynx no exudate, no visible cerumen.   NECK: supple; FROM; no JVD, no TMG, no carotid bruits  BREAST: ---deferred  RESPIRATORY:clear to auscultation anteriorly and p bedtime     Depression/Anxiety  -Sertraline 200 mg qd  -Clonazepam 0.5 mg qam and 1 mg qpm     Asthma/COPD  -RT to eval  -Advair 250/50 mg 1 p q12h   -Ventolin HFA 2 p q6h prn   -Ipratropium 0.5-2.5 mg qid prn for wheezing  -Spiriva 1 p every day     A.  Fi

## 2021-05-20 ENCOUNTER — SNF DISCHARGE (OUTPATIENT)
Dept: INTERNAL MEDICINE CLINIC | Age: 81
End: 2021-05-20

## 2021-05-20 VITALS
SYSTOLIC BLOOD PRESSURE: 109 MMHG | DIASTOLIC BLOOD PRESSURE: 60 MMHG | TEMPERATURE: 97 F | RESPIRATION RATE: 18 BRPM | OXYGEN SATURATION: 97 % | HEART RATE: 76 BPM

## 2021-05-20 DIAGNOSIS — J44.9 CHRONIC OBSTRUCTIVE PULMONARY DISEASE, UNSPECIFIED COPD TYPE (HCC): ICD-10-CM

## 2021-05-20 DIAGNOSIS — G31.84 MILD COGNITIVE IMPAIRMENT: ICD-10-CM

## 2021-05-20 DIAGNOSIS — G25.81 RLS (RESTLESS LEGS SYNDROME): ICD-10-CM

## 2021-05-20 DIAGNOSIS — I48.20 CHRONIC ATRIAL FIBRILLATION (HCC): ICD-10-CM

## 2021-05-20 DIAGNOSIS — Z87.440 HISTORY OF UTI: ICD-10-CM

## 2021-05-20 DIAGNOSIS — R53.1 WEAKNESS: Primary | ICD-10-CM

## 2021-05-20 DIAGNOSIS — F03.91 DEMENTIA WITH BEHAVIORAL DISTURBANCE, UNSPECIFIED DEMENTIA TYPE (HCC): ICD-10-CM

## 2021-05-20 DIAGNOSIS — N30.00 ACUTE CYSTITIS WITHOUT HEMATURIA: ICD-10-CM

## 2021-05-20 PROCEDURE — 99316 NF DSCHRG MGMT 30 MIN+: CPT | Performed by: NURSE PRACTITIONER

## 2021-05-20 NOTE — PROGRESS NOTES
Makayla Jaramillo, 8/10/1940, 78year old, female is being discharged from Facility: [address] [place] [address]    Date of Admission:5/7/21    Date of Discharge:5/21/21                                 Admitting Diagnoses:UTI, Weakness    Reason for Adm auscultation anteriorly and posteriorly, no wheezing/cough/accessory muscle use; on room air  CARDIOVASCULAR: S1, S2 normal, RRR; no S3, no S4; , no click, no murmur  ABDOMEN:  normal active BS+, soft, nondistended; no organomegaly, no masses; no bruits; n q8h prn for fever/pain, if given for fever, notify MD/NP  -Lidocaine Patch 5%, 12h on and 12h off  -Diclofenac gel qid to left hand  -Discontinue Tramadol d/t hypotension  -Anticipated Discharge Date:  5/21/21-SESAR to assist with discharge planning     Multi

## 2021-06-04 NOTE — ED NOTES
Pt up to washroom without incident. Peng Advancement Flap Text: The defect edges were debeveled with a #15 scalpel blade.  Given the location of the defect, shape of the defect and the proximity to free margins a Peng advancement flap was deemed most appropriate.  Using a sterile surgical marker, an appropriate advancement flap was drawn incorporating the defect and placing the expected incisions within the relaxed skin tension lines where possible. The area thus outlined was incised deep to adipose tissue with a #15 scalpel blade.  The skin margins were undermined to an appropriate distance in all directions utilizing iris scissors.

## 2021-07-11 ENCOUNTER — HOSPITAL ENCOUNTER (INPATIENT)
Facility: HOSPITAL | Age: 81
LOS: 6 days | Discharge: NURSING, REHAB OR LONG-TERM CARE | DRG: 192 | End: 2021-07-17
Attending: EMERGENCY MEDICINE | Admitting: INTERNAL MEDICINE
Payer: MEDICARE

## 2021-07-11 ENCOUNTER — APPOINTMENT (OUTPATIENT)
Dept: GENERAL RADIOLOGY | Facility: HOSPITAL | Age: 81
DRG: 192 | End: 2021-07-11
Attending: EMERGENCY MEDICINE
Payer: MEDICARE

## 2021-07-11 DIAGNOSIS — J44.1 COPD EXACERBATION (HCC): Primary | ICD-10-CM

## 2021-07-11 LAB
ALBUMIN SERPL-MCNC: 3.7 G/DL (ref 3.4–5)
ALBUMIN/GLOB SERPL: 0.8 {RATIO} (ref 1–2)
ALP LIVER SERPL-CCNC: 108 U/L
ALT SERPL-CCNC: 21 U/L
ANION GAP SERPL CALC-SCNC: 6 MMOL/L (ref 0–18)
AST SERPL-CCNC: 39 U/L (ref 15–37)
BASOPHILS # BLD AUTO: 0.04 X10(3) UL (ref 0–0.2)
BASOPHILS NFR BLD AUTO: 0.8 %
BILIRUB SERPL-MCNC: 0.8 MG/DL (ref 0.1–2)
BUN BLD-MCNC: 11 MG/DL (ref 7–18)
BUN/CREAT SERPL: 15.1 (ref 10–20)
CALCIUM BLD-MCNC: 8.8 MG/DL (ref 8.5–10.1)
CHLORIDE SERPL-SCNC: 104 MMOL/L (ref 98–112)
CO2 SERPL-SCNC: 26 MMOL/L (ref 21–32)
CREAT BLD-MCNC: 0.73 MG/DL
DEPRECATED RDW RBC AUTO: 41.5 FL (ref 35.1–46.3)
EOSINOPHIL # BLD AUTO: 0.12 X10(3) UL (ref 0–0.7)
EOSINOPHIL NFR BLD AUTO: 2.5 %
ERYTHROCYTE [DISTWIDTH] IN BLOOD BY AUTOMATED COUNT: 13.1 % (ref 11–15)
GLOBULIN PLAS-MCNC: 4.4 G/DL (ref 2.8–4.4)
GLUCOSE BLD-MCNC: 103 MG/DL (ref 70–99)
HAV IGM SER QL: 2.2 MG/DL (ref 1.6–2.6)
HCT VFR BLD AUTO: 38.7 %
HGB BLD-MCNC: 13.5 G/DL
IMM GRANULOCYTES # BLD AUTO: 0.01 X10(3) UL (ref 0–1)
IMM GRANULOCYTES NFR BLD: 0.2 %
LYMPHOCYTES # BLD AUTO: 1.22 X10(3) UL (ref 1–4)
LYMPHOCYTES NFR BLD AUTO: 25.1 %
M PROTEIN MFR SERPL ELPH: 8.1 G/DL (ref 6.4–8.2)
MCH RBC QN AUTO: 30.5 PG (ref 26–34)
MCHC RBC AUTO-ENTMCNC: 34.9 G/DL (ref 31–37)
MCV RBC AUTO: 87.4 FL
MONOCYTES # BLD AUTO: 0.73 X10(3) UL (ref 0.1–1)
MONOCYTES NFR BLD AUTO: 15 %
NEUTROPHILS # BLD AUTO: 2.74 X10 (3) UL (ref 1.5–7.7)
NEUTROPHILS # BLD AUTO: 2.74 X10(3) UL (ref 1.5–7.7)
NEUTROPHILS NFR BLD AUTO: 56.4 %
OSMOLALITY SERPL CALC.SUM OF ELEC: 282 MOSM/KG (ref 275–295)
PLATELET # BLD AUTO: 178 10(3)UL (ref 150–450)
POTASSIUM SERPL-SCNC: 4.5 MMOL/L (ref 3.5–5.1)
RBC # BLD AUTO: 4.43 X10(6)UL
SARS-COV-2 RNA RESP QL NAA+PROBE: NOT DETECTED
SODIUM SERPL-SCNC: 136 MMOL/L (ref 136–145)
TROPONIN I SERPL-MCNC: <0.045 NG/ML (ref ?–0.04)
WBC # BLD AUTO: 4.9 X10(3) UL (ref 4–11)

## 2021-07-11 PROCEDURE — 71045 X-RAY EXAM CHEST 1 VIEW: CPT | Performed by: EMERGENCY MEDICINE

## 2021-07-11 RX ORDER — SODIUM CHLORIDE 9 MG/ML
INJECTION, SOLUTION INTRAVENOUS CONTINUOUS
Status: DISCONTINUED | OUTPATIENT
Start: 2021-07-11 | End: 2021-07-12

## 2021-07-11 RX ORDER — METOCLOPRAMIDE HYDROCHLORIDE 5 MG/ML
10 INJECTION INTRAMUSCULAR; INTRAVENOUS EVERY 8 HOURS PRN
Status: DISCONTINUED | OUTPATIENT
Start: 2021-07-11 | End: 2021-07-17

## 2021-07-11 RX ORDER — METHYLPREDNISOLONE SODIUM SUCCINATE 125 MG/2ML
125 INJECTION, POWDER, LYOPHILIZED, FOR SOLUTION INTRAMUSCULAR; INTRAVENOUS ONCE
Status: COMPLETED | OUTPATIENT
Start: 2021-07-11 | End: 2021-07-11

## 2021-07-11 RX ORDER — ONDANSETRON 2 MG/ML
4 INJECTION INTRAMUSCULAR; INTRAVENOUS EVERY 6 HOURS PRN
Status: DISCONTINUED | OUTPATIENT
Start: 2021-07-11 | End: 2021-07-17

## 2021-07-11 RX ORDER — HYDRALAZINE HYDROCHLORIDE 20 MG/ML
10 INJECTION INTRAMUSCULAR; INTRAVENOUS ONCE
Status: COMPLETED | OUTPATIENT
Start: 2021-07-11 | End: 2021-07-11

## 2021-07-11 RX ORDER — BISACODYL 10 MG
10 SUPPOSITORY, RECTAL RECTAL
Status: DISCONTINUED | OUTPATIENT
Start: 2021-07-11 | End: 2021-07-17

## 2021-07-11 RX ORDER — SODIUM PHOSPHATE, DIBASIC AND SODIUM PHOSPHATE, MONOBASIC 7; 19 G/133ML; G/133ML
1 ENEMA RECTAL ONCE AS NEEDED
Status: DISCONTINUED | OUTPATIENT
Start: 2021-07-11 | End: 2021-07-17

## 2021-07-11 RX ORDER — ACETAMINOPHEN 325 MG/1
650 TABLET ORAL EVERY 6 HOURS PRN
Status: DISCONTINUED | OUTPATIENT
Start: 2021-07-11 | End: 2021-07-17

## 2021-07-11 RX ORDER — POLYETHYLENE GLYCOL 3350 17 G/17G
17 POWDER, FOR SOLUTION ORAL DAILY PRN
Status: DISCONTINUED | OUTPATIENT
Start: 2021-07-11 | End: 2021-07-17

## 2021-07-11 RX ORDER — CARVEDILOL 12.5 MG/1
25 TABLET ORAL 2 TIMES DAILY WITH MEALS
Status: DISCONTINUED | OUTPATIENT
Start: 2021-07-11 | End: 2021-07-11

## 2021-07-12 LAB
ADENOVIRUS PCR:: NOT DETECTED
ANION GAP SERPL CALC-SCNC: 3 MMOL/L (ref 0–18)
ATRIAL RATE: 394 BPM
B PARAPERT DNA SPEC QL NAA+PROBE: NOT DETECTED
B PERT DNA SPEC QL NAA+PROBE: NOT DETECTED
BASOPHILS # BLD AUTO: 0.02 X10(3) UL (ref 0–0.2)
BASOPHILS NFR BLD AUTO: 0.4 %
BUN BLD-MCNC: 15 MG/DL (ref 7–18)
BUN/CREAT SERPL: 22.4 (ref 10–20)
C DIFF TOX B STL QL: NEGATIVE
C PNEUM DNA SPEC QL NAA+PROBE: NOT DETECTED
CALCIUM BLD-MCNC: 8.9 MG/DL (ref 8.5–10.1)
CHLORIDE SERPL-SCNC: 106 MMOL/L (ref 98–112)
CO2 SERPL-SCNC: 26 MMOL/L (ref 21–32)
CORONAVIRUS 229E PCR:: NOT DETECTED
CORONAVIRUS HKU1 PCR:: NOT DETECTED
CORONAVIRUS NL63 PCR:: NOT DETECTED
CORONAVIRUS OC43 PCR:: NOT DETECTED
CREAT BLD-MCNC: 0.67 MG/DL
DEPRECATED RDW RBC AUTO: 42.4 FL (ref 35.1–46.3)
EOSINOPHIL # BLD AUTO: 0 X10(3) UL (ref 0–0.7)
EOSINOPHIL NFR BLD AUTO: 0 %
ERYTHROCYTE [DISTWIDTH] IN BLOOD BY AUTOMATED COUNT: 13.1 % (ref 11–15)
FLUAV RNA SPEC QL NAA+PROBE: NOT DETECTED
FLUBV RNA SPEC QL NAA+PROBE: NOT DETECTED
GLUCOSE BLD-MCNC: 163 MG/DL (ref 70–99)
HAV IGM SER QL: 2.2 MG/DL (ref 1.6–2.6)
HCT VFR BLD AUTO: 37.2 %
HGB BLD-MCNC: 12.7 G/DL
IMM GRANULOCYTES # BLD AUTO: 0.02 X10(3) UL (ref 0–1)
IMM GRANULOCYTES NFR BLD: 0.4 %
LYMPHOCYTES # BLD AUTO: 0.68 X10(3) UL (ref 1–4)
LYMPHOCYTES NFR BLD AUTO: 12.3 %
MCH RBC QN AUTO: 30.3 PG (ref 26–34)
MCHC RBC AUTO-ENTMCNC: 34.1 G/DL (ref 31–37)
MCV RBC AUTO: 88.8 FL
METAPNEUMOVIRUS PCR:: NOT DETECTED
MONOCYTES # BLD AUTO: 0.09 X10(3) UL (ref 0.1–1)
MONOCYTES NFR BLD AUTO: 1.6 %
MYCOPLASMA PNEUMONIA PCR:: NOT DETECTED
NEUTROPHILS # BLD AUTO: 4.7 X10 (3) UL (ref 1.5–7.7)
NEUTROPHILS # BLD AUTO: 4.7 X10(3) UL (ref 1.5–7.7)
NEUTROPHILS NFR BLD AUTO: 85.3 %
NT-PROBNP SERPL-MCNC: 1928 PG/ML (ref ?–450)
OSMOLALITY SERPL CALC.SUM OF ELEC: 284 MOSM/KG (ref 275–295)
PARAINFLUENZA 1 PCR:: NOT DETECTED
PARAINFLUENZA 2 PCR:: NOT DETECTED
PARAINFLUENZA 3 PCR:: DETECTED
PARAINFLUENZA 4 PCR:: NOT DETECTED
PLATELET # BLD AUTO: 153 10(3)UL (ref 150–450)
POTASSIUM SERPL-SCNC: 4.8 MMOL/L (ref 3.5–5.1)
PROCALCITONIN SERPL-MCNC: <0.05 NG/ML (ref ?–0.16)
Q-T INTERVAL: 394 MS
QRS DURATION: 86 MS
QTC CALCULATION (BEZET): 434 MS
R AXIS: -39 DEGREES
RBC # BLD AUTO: 4.19 X10(6)UL
RHINOVIRUS/ENTERO PCR:: NOT DETECTED
RSV RNA SPEC QL NAA+PROBE: NOT DETECTED
SARS-COV-2 RNA NPH QL NAA+NON-PROBE: NOT DETECTED
SODIUM SERPL-SCNC: 135 MMOL/L (ref 136–145)
T AXIS: -77 DEGREES
VENTRICULAR RATE: 73 BPM
WBC # BLD AUTO: 5.5 X10(3) UL (ref 4–11)

## 2021-07-12 PROCEDURE — 99291 CRITICAL CARE FIRST HOUR: CPT | Performed by: NURSE PRACTITIONER

## 2021-07-12 RX ORDER — IPRATROPIUM BROMIDE AND ALBUTEROL SULFATE 2.5; .5 MG/3ML; MG/3ML
3 SOLUTION RESPIRATORY (INHALATION) EVERY 6 HOURS PRN
Status: DISCONTINUED | OUTPATIENT
Start: 2021-07-12 | End: 2021-07-17

## 2021-07-12 RX ORDER — MEMANTINE HYDROCHLORIDE 10 MG/1
10 TABLET ORAL 2 TIMES DAILY
Status: DISCONTINUED | OUTPATIENT
Start: 2021-07-12 | End: 2021-07-17

## 2021-07-12 RX ORDER — DOCUSATE SODIUM 100 MG/1
100 CAPSULE, LIQUID FILLED ORAL 2 TIMES DAILY
Status: DISCONTINUED | OUTPATIENT
Start: 2021-07-12 | End: 2021-07-17

## 2021-07-12 RX ORDER — HYDRALAZINE HYDROCHLORIDE 25 MG/1
25 TABLET, FILM COATED ORAL 3 TIMES DAILY
Status: DISCONTINUED | OUTPATIENT
Start: 2021-07-12 | End: 2021-07-17

## 2021-07-12 RX ORDER — METHYLPREDNISOLONE SODIUM SUCCINATE 125 MG/2ML
80 INJECTION, POWDER, LYOPHILIZED, FOR SOLUTION INTRAMUSCULAR; INTRAVENOUS EVERY 8 HOURS
Status: DISCONTINUED | OUTPATIENT
Start: 2021-07-12 | End: 2021-07-13

## 2021-07-12 RX ORDER — PRAMIPEXOLE DIHYDROCHLORIDE 0.25 MG/1
0.25 TABLET ORAL NIGHTLY
Status: DISCONTINUED | OUTPATIENT
Start: 2021-07-12 | End: 2021-07-17

## 2021-07-12 RX ORDER — LISINOPRIL 20 MG/1
20 TABLET ORAL 2 TIMES DAILY
Status: DISCONTINUED | OUTPATIENT
Start: 2021-07-12 | End: 2021-07-17

## 2021-07-12 RX ORDER — PANTOPRAZOLE SODIUM 20 MG/1
20 TABLET, DELAYED RELEASE ORAL
Status: DISCONTINUED | OUTPATIENT
Start: 2021-07-12 | End: 2021-07-17

## 2021-07-12 RX ORDER — IPRATROPIUM BROMIDE AND ALBUTEROL SULFATE 2.5; .5 MG/3ML; MG/3ML
3 SOLUTION RESPIRATORY (INHALATION) EVERY 6 HOURS
Status: DISCONTINUED | OUTPATIENT
Start: 2021-07-12 | End: 2021-07-17

## 2021-07-12 RX ORDER — TRAZODONE HYDROCHLORIDE 50 MG/1
100 TABLET ORAL NIGHTLY PRN
Status: DISCONTINUED | OUTPATIENT
Start: 2021-07-12 | End: 2021-07-17

## 2021-07-12 RX ORDER — METHYLPREDNISOLONE SODIUM SUCCINATE 125 MG/2ML
80 INJECTION, POWDER, LYOPHILIZED, FOR SOLUTION INTRAMUSCULAR; INTRAVENOUS EVERY 8 HOURS
Status: DISCONTINUED | OUTPATIENT
Start: 2021-07-12 | End: 2021-07-12

## 2021-07-12 RX ORDER — CARVEDILOL 12.5 MG/1
25 TABLET ORAL 2 TIMES DAILY WITH MEALS
Status: DISCONTINUED | OUTPATIENT
Start: 2021-07-12 | End: 2021-07-17

## 2021-07-12 RX ORDER — DONEPEZIL HYDROCHLORIDE 5 MG/1
10 TABLET, FILM COATED ORAL NIGHTLY
Status: DISCONTINUED | OUTPATIENT
Start: 2021-07-12 | End: 2021-07-17

## 2021-07-12 RX ORDER — LEVETIRACETAM 250 MG/1
250 TABLET ORAL 2 TIMES DAILY
Status: DISCONTINUED | OUTPATIENT
Start: 2021-07-12 | End: 2021-07-17

## 2021-07-12 RX ORDER — SERTRALINE HYDROCHLORIDE 100 MG/1
200 TABLET, FILM COATED ORAL DAILY
Status: DISCONTINUED | OUTPATIENT
Start: 2021-07-12 | End: 2021-07-17

## 2021-07-12 RX ORDER — ATORVASTATIN CALCIUM 40 MG/1
40 TABLET, FILM COATED ORAL DAILY
Status: DISCONTINUED | OUTPATIENT
Start: 2021-07-12 | End: 2021-07-17

## 2021-07-12 NOTE — DIETARY NOTE
[address]     Admitting diagnosis:  COPD exacerbation ([facility] [J44.1]    Ht:  165.1 cm (5' 5\")   Wt: 60.6 kg (133 lb 9.6 oz). Body mass index is 22.23 kg/m².   IBW: 56.8kg    Wt Readings from Last 6 Encounters:

## 2021-07-12 NOTE — CM/SW NOTE
07/12/21 1500   CM/SW Referral Data   Referral Source Social Work (self-referral)   Reason for Referral Discharge planning   Informant Patient   Patient Info   Patient's Mental Status Alert;Oriented   Patient's Home Environment House   Patient lives wit

## 2021-07-12 NOTE — CONSULTS
Pulmonary H&P/Consult       NAME: Caro Vivas - ROOM: [address] [place] [address]- - MRN: [id number] - Age: 78year old - :  8/10/1940    Date of Admission: 2021  8:55 PM  Admission Diagnosis: COPD exacerbation ([facility] [J44.1]  Reason for consult: COPD exacerbation hemorrhage) ([facility] 11/22/2017   • Seizure disorder [facility])    • Unspecified essential hypertension    • Unspecified sleep apnea SPLIT 9-25-13    AHI 55 RDI 60  Sao2 giovana 88% CPAP 12 [employer]   • Visual impairment       Past Surgical History:   Procedure Later TWICE DAILY, Disp: 180 tablet, Rfl: 0, 7/11/2021 at 0900  acetaminophen 500 MG Oral Tab, Take 1,000 mg by mouth every 8 (eight) hours as needed for Pain., Disp: , Rfl: , 7/11/2021 at 0900  lidocaine 5 % External Patch, Place 1 patch onto the skin daily.  To (three) times daily. , Disp: , Rfl: , 7/11/2021 at 0900  apixaban 2.5 MG Oral Tab, Take 1 tablet (2.5 mg total) by mouth 2 (two) times daily. , Disp: 60 tablet, Rfl: 0, 7/11/2021 at 0900  carvedilol 25 MG Oral Tab, Take 1 tablet (25 mg total) by mouth 2 (two Caffeine Concern: No        Occupational Exposure: Not Asked        Hobby Hazards: Not Asked        Sleep Concern: Not Asked        Stress Concern: Not Asked        Weight Concern: Not Asked        Special Diet: Not Asked        Back Care: Not Asked tablet, Rfl: 0  TRAZODONE  MG Oral Tab, TAKE 1 TABLET(100 MG) BY MOUTH EVERY NIGHT AS NEEDED FOR SLEEP, Disp: 90 tablet, Rfl: 1  traMADol HCl 50 MG Oral Tab, Take 1 tablet (50 mg total) by mouth 2 (two) times daily as needed for Pain., Disp: 60 tabl atorvastatin 40 MG Oral Tab, Take 1 tablet (40 mg total) by mouth daily. , Disp: 90 tablet, Rfl: 3  Umeclidinium Bromide (INCRUSE ELLIPTA) 62.5 MCG/INH Inhalation Aerosol Powder, Breath Activated, Inhale 1 puff into the lungs daily. , Disp: 3 each, Rfl: 1 Nasal cannula  O2 Flow Rate (L/min): 2 L/min  Pulse Oximetry Type: Continuous  Oximetry Probe Site Changed: Yes  Pulse Ox Probe Location: Left hand                  Wt Readings from Last 3 Encounters:  07/11/21 : 133 lb 9.6 oz (60.6 kg)  05/13/21 : 131 lb treat  -will place on CPAP protocol  5. Proph  -LMWH  6.  Dispo  -if BP can be maintained at < 057 systolic pt is stable to transfer to floor  -will follow                   Armando Kelly  [facility] Pulmonary and Critical Care

## 2021-07-12 NOTE — ED PROVIDER NOTES
Patient Seen in: [facility] Emergency Department      History   Patient presents with:  Fatigue    Stated Complaint: weakness    HPI/Subjective:   HPI    39-year-old female with history of COPD, atrial fibrillation, hypertension, pacemaker, presents History:   Procedure Laterality Date   • BYPASS SURGERY      4/28/13   • CHOLECYSTECTOMY     • COLONOSCOPY  7/2010   • COLONOSCOPY  10/28/2013    Procedure: COLONOSCOPY;  Surgeon: Hugh Shaffer MD;  Location: [facility] ENDOSCOPY   • HIP REPLACEMENT SURGERY     • H SARS-COV-2 BY PCR - Normal   CBC WITH DIFFERENTIAL WITH PLATELET    Narrative: The following orders were created for panel order CBC With Differential With Platelet.   Procedure                               Abnormality         Status of these in the emergency department, patient blood pressure did drop, patient did require IV fluid boluses and patient blood pressure improved.   On reevaluation patient states she is feeling better, she still has diffuse wheezing, will require admission t

## 2021-07-12 NOTE — PROGRESS NOTES
ICU  Critical Care APRN Progress Note    NAME: Earle Bonilla - ROOM: [address] [place] [address] - MRN: [id number] - Age: 78year old - :8/10/1940    History Of Present Illness:  Earle Bonilla is a 78year old female with PMHx significant for Afib s/p pacer, COPD, asthma, RDI 60  Sao2 giovana 88% CPAP 12 [employer]   • Visual impairment        Social Hx:  Social History    Tobacco Use      Smoking status: Former Smoker        Packs/day: 1.00        Years: 30.00        Pack years: 30        Types: Cigarettes        Quit date: 5/1 and RV. No acute infiltrate or consolidation. No pleural effusion. No pneumothorax.     Dictated by (CST): Lucio Clifford MD on 7/11/2021 at 9:43 PM     Finalized by (CST): Lucio Clifford MD on 7/11/2021 at 9:51 PM         Labs:  Lab Results   Component

## 2021-07-12 NOTE — H&P
[facility]    History and Physical     Ifeanyi Jarrell Patient Status:  Inpatient    8/10/1940 MRN [id number]   [facility] 4SW-A Attending Chu Perry MD   Hosp Day # 1 PCP Magdalene Estrada MD     Chief Complaint: Patient present right CEA 2000   • Other and unspecified hyperlipidemia    • Peripheral vascular disease, unspecified ([facility]    • Pulmonary embolism ([facility]    • SAH (subarachnoid hemorrhage) ([facility] 11/22/2017   • Seizure disorder ([facility]    • Unspecified essential hypertension vomiting  Milk                    OTHER (SEE COMMENTS)    Comment:Difficulty breathing. Pt states she can have 2%             milk.   Pcn [Bicillin L-A]          Comment:SHORTNESS OF BREATH  Pollen                    Quinolones                Shellfish by mouth nightly., Disp: 90 tablet, Rfl: 3  Diclofenac Sodium 1 % Transdermal Gel, Apply 2 g topically 4 (four) times daily.  To left hand, Disp: 100 g, Rfl: 1  ipratropium-albuterol 0.5-2.5 (3) MG/3ML Inhalation Solution, Take 3 mL by nebulization every 4 Regular rate and rhythm. No murmurs appreciated   Chest and Back: No tenderness or deformity. Abdomen: Soft, NTND, no guarding or rebound  Neurologic: No focal neurological deficits. CNII-XII grossly intact. Musculoskeletal: Moves all extremities.   [facility] Center[facility]  [phone]

## 2021-07-12 NOTE — PLAN OF CARE
Assumed pt care at 0730. Pt in bed resting quietly on 2L/NC. No s/s of acute distress noted at this time. VSS. Pt reporting pain of 5/10, c/o headache, will medicate per MAR. Pt is A&Ox4, moves all extremities, and follows commands.  Pt

## 2021-07-12 NOTE — HOME CARE LIAISON
[facility] for RN,PT, OT, and HHA services. Patient will need JACINTA order entered on or before date of discharge if returning home with home health.

## 2021-07-12 NOTE — PLAN OF CARE
Received the patient from ER at 0731 40 44 23. Patient awake, alert and oriented x3. BP and HR stable. On 2L NC sats 98%. Some CYRIL present with exertion. Nausea present, gave zofran, Tylenol given for head ache.

## 2021-07-13 LAB
ALBUMIN SERPL-MCNC: 3.3 G/DL (ref 3.4–5)
ALBUMIN/GLOB SERPL: 0.9 {RATIO} (ref 1–2)
ALP LIVER SERPL-CCNC: 87 U/L
ALT SERPL-CCNC: 18 U/L
ANION GAP SERPL CALC-SCNC: 5 MMOL/L (ref 0–18)
AST SERPL-CCNC: 19 U/L (ref 15–37)
BASOPHILS # BLD AUTO: 0.01 X10(3) UL (ref 0–0.2)
BASOPHILS NFR BLD AUTO: 0.2 %
BILIRUB SERPL-MCNC: 0.5 MG/DL (ref 0.1–2)
BUN BLD-MCNC: 23 MG/DL (ref 7–18)
BUN/CREAT SERPL: 34.3 (ref 10–20)
CALCIUM BLD-MCNC: 8.5 MG/DL (ref 8.5–10.1)
CHLORIDE SERPL-SCNC: 103 MMOL/L (ref 98–112)
CO2 SERPL-SCNC: 26 MMOL/L (ref 21–32)
CREAT BLD-MCNC: 0.67 MG/DL
DEPRECATED RDW RBC AUTO: 41.8 FL (ref 35.1–46.3)
EOSINOPHIL # BLD AUTO: 0 X10(3) UL (ref 0–0.7)
EOSINOPHIL NFR BLD AUTO: 0 %
ERYTHROCYTE [DISTWIDTH] IN BLOOD BY AUTOMATED COUNT: 13 % (ref 11–15)
GLOBULIN PLAS-MCNC: 3.7 G/DL (ref 2.8–4.4)
GLUCOSE BLD-MCNC: 155 MG/DL (ref 70–99)
HCT VFR BLD AUTO: 33.7 %
HGB BLD-MCNC: 11.1 G/DL
IMM GRANULOCYTES # BLD AUTO: 0.01 X10(3) UL (ref 0–1)
IMM GRANULOCYTES NFR BLD: 0.2 %
LYMPHOCYTES # BLD AUTO: 1.02 X10(3) UL (ref 1–4)
LYMPHOCYTES NFR BLD AUTO: 15.9 %
M PROTEIN MFR SERPL ELPH: 7 G/DL (ref 6.4–8.2)
MCH RBC QN AUTO: 29.1 PG (ref 26–34)
MCHC RBC AUTO-ENTMCNC: 32.9 G/DL (ref 31–37)
MCV RBC AUTO: 88.2 FL
MONOCYTES # BLD AUTO: 0.16 X10(3) UL (ref 0.1–1)
MONOCYTES NFR BLD AUTO: 2.5 %
NEUTROPHILS # BLD AUTO: 5.2 X10 (3) UL (ref 1.5–7.7)
NEUTROPHILS # BLD AUTO: 5.2 X10(3) UL (ref 1.5–7.7)
NEUTROPHILS NFR BLD AUTO: 81.2 %
OSMOLALITY SERPL CALC.SUM OF ELEC: 285 MOSM/KG (ref 275–295)
PLATELET # BLD AUTO: 180 10(3)UL (ref 150–450)
POTASSIUM SERPL-SCNC: 4.4 MMOL/L (ref 3.5–5.1)
RBC # BLD AUTO: 3.82 X10(6)UL
SODIUM SERPL-SCNC: 134 MMOL/L (ref 136–145)
WBC # BLD AUTO: 6.4 X10(3) UL (ref 4–11)

## 2021-07-13 PROCEDURE — 5A09457 ASSISTANCE WITH RESPIRATORY VENTILATION, 24-96 CONSECUTIVE HOURS, CONTINUOUS POSITIVE AIRWAY PRESSURE: ICD-10-PCS | Performed by: INTERNAL MEDICINE

## 2021-07-13 RX ORDER — PREDNISONE 10 MG/1
40 TABLET ORAL
Status: DISCONTINUED | OUTPATIENT
Start: 2021-07-14 | End: 2021-07-17

## 2021-07-13 RX ORDER — LOPERAMIDE HYDROCHLORIDE 2 MG/1
2 CAPSULE ORAL 4 TIMES DAILY PRN
Status: DISCONTINUED | OUTPATIENT
Start: 2021-07-13 | End: 2021-07-17

## 2021-07-13 NOTE — PROGRESS NOTES
Pulmonary Progress Note        NAME: Alda Washnigton - ROOM: 775/130-Y - MRN: [id number] - Age: 78year old - : 8/10/1940        Last 24hrs: No events overnight, HTNsive again this AM, denies complaints    OBJECTIVE:   21  0400 21  0600  virus  -steroids  -BD protocol  -Breo  2. Hypoxia  -due to above  -wean O2 as tolerated  3. HTN  -monitor BP, improved  -meds per primary  4. DUNCAN  -not treating as opt though willing to treat here  -will place on CPAP protocol  5. Proph  -LMWH  6.  Dispo  -

## 2021-07-13 NOTE — PROGRESS NOTES
[facility]    Progress Note     Lilly Gibson Patient Status:  Inpatient    8/10/1940 MRN [id number]   [facility] 4SW-A Attending Herve Banegas MD   Hosp Day # 2 PCP Reena Carranza MD     Chief Complaint: Patient presents with: • docusate sodium  100 mg Oral BID   • Donepezil HCl  10 mg Oral Nightly   • hydrALAzine HCl  25 mg Oral TID   • levETIRAcetam  250 mg Oral BID   • lisinopril  20 mg Oral BID   • Memantine HCl  10 mg Oral BID   • Pantoprazole Sodium  20 mg Oral QAM AC

## 2021-07-13 NOTE — PLAN OF CARE
Patient is awake, alert and oriented x3 but forgetful at times. Gets up to commode with minimal assist. Vital signs remains stable. Patient has transfer order to floor, awaiting bed.

## 2021-07-13 NOTE — PLAN OF CARE
Assumed pt care at 0730. Pt sitting up in bed, waiting on breakfast. No s/s of acute distress noted at this time. VSS. Pt reports pain of 5/10, c/o headache, will medicate per MAR.  Pts SBP is elevated also, so possibly, her H/A is worsene

## 2021-07-13 NOTE — RESPIRATORY THERAPY NOTE
Patient received on 2 liter nasal cannula sating 95%. Breath sounds- diminished. Nebs given as ordered. Will continue to monitor.

## 2021-07-14 LAB
ALBUMIN SERPL-MCNC: 3.4 G/DL (ref 3.4–5)
ALBUMIN/GLOB SERPL: 1 {RATIO} (ref 1–2)
ALP LIVER SERPL-CCNC: 78 U/L
ALT SERPL-CCNC: 18 U/L
ANION GAP SERPL CALC-SCNC: 5 MMOL/L (ref 0–18)
AST SERPL-CCNC: 18 U/L (ref 15–37)
BASOPHILS # BLD AUTO: 0.02 X10(3) UL (ref 0–0.2)
BASOPHILS NFR BLD AUTO: 0.2 %
BILIRUB SERPL-MCNC: 0.4 MG/DL (ref 0.1–2)
BUN BLD-MCNC: 22 MG/DL (ref 7–18)
BUN/CREAT SERPL: 29.7 (ref 10–20)
CALCIUM BLD-MCNC: 8.7 MG/DL (ref 8.5–10.1)
CHLORIDE SERPL-SCNC: 104 MMOL/L (ref 98–112)
CO2 SERPL-SCNC: 29 MMOL/L (ref 21–32)
CREAT BLD-MCNC: 0.74 MG/DL
DEPRECATED RDW RBC AUTO: 42.2 FL (ref 35.1–46.3)
EOSINOPHIL # BLD AUTO: 0.01 X10(3) UL (ref 0–0.7)
EOSINOPHIL NFR BLD AUTO: 0.1 %
ERYTHROCYTE [DISTWIDTH] IN BLOOD BY AUTOMATED COUNT: 13.2 % (ref 11–15)
GLOBULIN PLAS-MCNC: 3.4 G/DL (ref 2.8–4.4)
GLUCOSE BLD-MCNC: 105 MG/DL (ref 70–99)
HCT VFR BLD AUTO: 37.7 %
HGB BLD-MCNC: 12.5 G/DL
IMM GRANULOCYTES # BLD AUTO: 0.02 X10(3) UL (ref 0–1)
IMM GRANULOCYTES NFR BLD: 0.2 %
LYMPHOCYTES # BLD AUTO: 2.44 X10(3) UL (ref 1–4)
LYMPHOCYTES NFR BLD AUTO: 28.4 %
M PROTEIN MFR SERPL ELPH: 6.8 G/DL (ref 6.4–8.2)
MCH RBC QN AUTO: 29.1 PG (ref 26–34)
MCHC RBC AUTO-ENTMCNC: 33.2 G/DL (ref 31–37)
MCV RBC AUTO: 87.9 FL
MONOCYTES # BLD AUTO: 0.85 X10(3) UL (ref 0.1–1)
MONOCYTES NFR BLD AUTO: 9.9 %
NEUTROPHILS # BLD AUTO: 5.26 X10 (3) UL (ref 1.5–7.7)
NEUTROPHILS # BLD AUTO: 5.26 X10(3) UL (ref 1.5–7.7)
NEUTROPHILS NFR BLD AUTO: 61.2 %
OSMOLALITY SERPL CALC.SUM OF ELEC: 290 MOSM/KG (ref 275–295)
PLATELET # BLD AUTO: 226 10(3)UL (ref 150–450)
POTASSIUM SERPL-SCNC: 3.9 MMOL/L (ref 3.5–5.1)
RBC # BLD AUTO: 4.29 X10(6)UL
SODIUM SERPL-SCNC: 138 MMOL/L (ref 136–145)
WBC # BLD AUTO: 8.6 X10(3) UL (ref 4–11)

## 2021-07-14 NOTE — PLAN OF CARE
Received pt AOX4 with periods of forgetfulness but easily reoriented. Pt on 2L oxygen via nasal cannula but later transitioned to room air.   Pt able to ambulate in room with standby assist.  Pt had an uneventful evening, waiting to be transferred out to  Progressing  Goal: Achieves appropriate nutritional intake (bariatric)  Description: INTERVENTIONS:  - Monitor for over-consumption  - Identify factors contributing to increased intake, treat as appropriate  - Monitor I&O, WT and lab values  - Obtain nutri

## 2021-07-14 NOTE — PROGRESS NOTES
Pulmonary Progress Note        NAME: Tirso Max - ROOM: 826/883-D - MRN: [id number] - Age: 78year old - : 8/10/1940        Last 24hrs: No events overnight, BP, in general, is better, denies complaints but states they aren't getting her out of bed virus  -steroids  -BD protocol  -Breo  2. Hypoxia  -due to above  -weaned off O2 at rest  3. HTN  -monitor BP, improved  -meds per primary  4. DUNCAN  -not treating as opt though willing to treat here  -will place on CPAP protocol  5. Proph  -LMWH  6.  Dispo

## 2021-07-14 NOTE — CM/SW NOTE
Pt current with [facility]. JACINTA orders obtained.  Noted orders for therapy eval. SW to follow

## 2021-07-14 NOTE — PLAN OF CARE
Problem: RESPIRATORY - ADULT  Goal: Achieves optimal ventilation and oxygenation  Description: INTERVENTIONS:  - Assess for changes in respiratory status  - Assess for changes in mentation and behavior  - Position to facilitate oxygenation and minim

## 2021-07-14 NOTE — PROGRESS NOTES
[facility]    Progress Note     Claudia Arreaga Patient Status:  Inpatient    8/10/1940 MRN [id number]   [facility] 4SW-A Attending Farhana Flores MD   Hosp Day # 3 PCP Herbert Baca MD     Chief Complaint: Patient presents with: predniSONE  40 mg Oral Daily with breakfast   • ipratropium-albuterol  3 mL Nebulization Q6H   • Fluticasone Furoate-Vilanterol  1 puff Inhalation Daily   • apixaban  2.5 mg Oral BID   • atorvastatin  40 mg Oral Daily   • carvedilol  25 mg Oral BID with me

## 2021-07-14 NOTE — PLAN OF CARE
Assumed care of patient at approximately 1500. Alert and oriented x4. Denies pain. Vitals stable. Resting on 2L oxygen, saturations remain above 90%. Tolerating diet.  Up to bathroom with standby assist and walker, pt states feeling weak, but denies any ga

## 2021-07-15 LAB
ALBUMIN SERPL-MCNC: 3.4 G/DL (ref 3.4–5)
ALBUMIN/GLOB SERPL: 0.9 {RATIO} (ref 1–2)
ALP LIVER SERPL-CCNC: 79 U/L
ALT SERPL-CCNC: 19 U/L
ANION GAP SERPL CALC-SCNC: 6 MMOL/L (ref 0–18)
AST SERPL-CCNC: 15 U/L (ref 15–37)
BASOPHILS # BLD AUTO: 0.02 X10(3) UL (ref 0–0.2)
BASOPHILS NFR BLD AUTO: 0.2 %
BILIRUB SERPL-MCNC: 0.5 MG/DL (ref 0.1–2)
BUN BLD-MCNC: 21 MG/DL (ref 7–18)
BUN/CREAT SERPL: 25.9 (ref 10–20)
CALCIUM BLD-MCNC: 8.6 MG/DL (ref 8.5–10.1)
CHLORIDE SERPL-SCNC: 105 MMOL/L (ref 98–112)
CO2 SERPL-SCNC: 27 MMOL/L (ref 21–32)
CREAT BLD-MCNC: 0.81 MG/DL
DEPRECATED RDW RBC AUTO: 40.7 FL (ref 35.1–46.3)
EOSINOPHIL # BLD AUTO: 0.02 X10(3) UL (ref 0–0.7)
EOSINOPHIL NFR BLD AUTO: 0.2 %
ERYTHROCYTE [DISTWIDTH] IN BLOOD BY AUTOMATED COUNT: 13.1 % (ref 11–15)
GLOBULIN PLAS-MCNC: 3.7 G/DL (ref 2.8–4.4)
GLUCOSE BLD-MCNC: 101 MG/DL (ref 70–99)
HCT VFR BLD AUTO: 36.2 %
HGB BLD-MCNC: 12.6 G/DL
IMM GRANULOCYTES # BLD AUTO: 0.03 X10(3) UL (ref 0–1)
IMM GRANULOCYTES NFR BLD: 0.3 %
LYMPHOCYTES # BLD AUTO: 2.48 X10(3) UL (ref 1–4)
LYMPHOCYTES NFR BLD AUTO: 22.9 %
M PROTEIN MFR SERPL ELPH: 7.1 G/DL (ref 6.4–8.2)
MCH RBC QN AUTO: 30 PG (ref 26–34)
MCHC RBC AUTO-ENTMCNC: 34.8 G/DL (ref 31–37)
MCV RBC AUTO: 86.2 FL
MONOCYTES # BLD AUTO: 0.96 X10(3) UL (ref 0.1–1)
MONOCYTES NFR BLD AUTO: 8.9 %
NEUTROPHILS # BLD AUTO: 7.32 X10 (3) UL (ref 1.5–7.7)
NEUTROPHILS # BLD AUTO: 7.32 X10(3) UL (ref 1.5–7.7)
NEUTROPHILS NFR BLD AUTO: 67.5 %
OSMOLALITY SERPL CALC.SUM OF ELEC: 289 MOSM/KG (ref 275–295)
PLATELET # BLD AUTO: 259 10(3)UL (ref 150–450)
POTASSIUM SERPL-SCNC: 3.6 MMOL/L (ref 3.5–5.1)
POTASSIUM SERPL-SCNC: 5.1 MMOL/L (ref 3.5–5.1)
RBC # BLD AUTO: 4.2 X10(6)UL
SODIUM SERPL-SCNC: 138 MMOL/L (ref 136–145)
WBC # BLD AUTO: 10.8 X10(3) UL (ref 4–11)

## 2021-07-15 RX ORDER — PREDNISONE 20 MG/1
TABLET ORAL
Qty: 14 TABLET | Refills: 0 | Status: ON HOLD | OUTPATIENT
Start: 2021-07-15 | End: 2021-08-29

## 2021-07-15 RX ORDER — CALCIUM CARBONATE 200(500)MG
1000 TABLET,CHEWABLE ORAL 3 TIMES DAILY PRN
Status: DISCONTINUED | OUTPATIENT
Start: 2021-07-15 | End: 2021-07-17

## 2021-07-15 RX ORDER — POTASSIUM CHLORIDE 20 MEQ/1
40 TABLET, EXTENDED RELEASE ORAL EVERY 4 HOURS
Status: COMPLETED | OUTPATIENT
Start: 2021-07-15 | End: 2021-07-15

## 2021-07-15 NOTE — PHYSICAL THERAPY NOTE
PHYSICAL THERAPY EVALUATION - INPATIENT     Room Number: 0183/3782-J  Evaluation Date: 7/15/2021  Type of Evaluation: Initial  Physician Order: PT Eval and Treat    Presenting Problem: COPD exacerbation.  Parainfluenza  Reason for Therapy: Mobility Dy SURGERY      4/28/13   • CHOLECYSTECTOMY     • COLONOSCOPY  7/2010   • COLONOSCOPY  10/28/2013    Procedure: COLONOSCOPY;  Surgeon: Joyce Driver MD;  Location: [facility] ENDOSCOPY   • HIP REPLACEMENT SURGERY     • HYSTERECTOMY     • OPEN HEART SURGERY (PBP) rest    Rest on 1L/nc: 98% sat        O2 WALK  Oxygen Therapy  SPO2% on Oxygen at Rest: 98  At rest oxygen flow (liters per minute): 1  SPO2% Ambulation on Room Air: 80  SPO2% Ambulation on Oxygen: 85  Ambulation oxygen flow (liters per minute): 2    AM-PA Provided:  Bed mobility  Body mechanics  Energy conservation  Functional activity tolerated  Manual therapy    Patient End of Session: In bed;Needs met;Call light within reach; All patient questions and concerns addressed    ASSESSMENT   Patient is a 78 yea HEP for B LE while seated/supine     Goal #5    Goal #6    Goal Comments: Goals established on 7/15/2021

## 2021-07-15 NOTE — OCCUPATIONAL THERAPY NOTE
OCCUPATIONAL THERAPY EVALUATION - INPATIENT     Room Number: 9646/0454-H  Evaluation Date: 7/15/2021  Type of Evaluation: Initial  Presenting Problem: COPD exacerbation    Physician Order: IP Consult to Occupational Therapy  Reason for Therapy: ADL/IADL Dy Laterality Date   • BYPASS SURGERY      4/28/13   • CHOLECYSTECTOMY     • COLONOSCOPY  7/2010   • COLONOSCOPY  10/28/2013    Procedure: COLONOSCOPY;  Surgeon: Sheliah Dandy, MD;  Location: [facility] ENDOSCOPY   • HIP REPLACEMENT SURGERY     • HYSTERECTOMY     • OPE commands with increased time  Initiation: appears intact  Safety Judgement:  good awareness of safety precautions  Awareness of Errors:  good awareness of errors made    VISION  Current Vision: wears glasses only for reading    PERCEPTION  Overall Percepti plan of care, recommendations and safety. Patient alert and oriented x 3.   Patient is able to follow simple motor commands with consistency, increased volume d/t [place]  Prior ot act: supine to EOB: min A  EOB to supine after walk: mod A  Supine to EOB w/ us function. Subacute rehab is recommended for 7-10 days. After this period of rehabilitation patient should achieve mod I to supervision level in BADLs.       Patient Complexity  Occupational Profile/Medical History MODERATE - Expanded review of history i

## 2021-07-15 NOTE — PROGRESS NOTES
07/15/21 1300   Mobility   SPO2% on Room Air at Rest 93   SPO2% on Oxygen at Rest 98   At rest oxygen flow (liters per minute) 1   SPO2% Ambulation on Room Air 80   SPO2% Ambulation on Oxygen 85   Ambulation oxygen flow (liters per minute) 2

## 2021-07-15 NOTE — CM/SW NOTE
PT/OT are recommending ELOISE at discharge. Per conversation with RN, pt is agreeable to ELOISE. [facility]. SW to provide the ELOISE list to pt once available.      Gabriella Hooks MSW, LSW   / Discharge Planner  x

## 2021-07-15 NOTE — PLAN OF CARE
assumed care of patient at 2030 from ICU. Resting comfortably. Weaned oxygen as appropriate. Tolerates RA when resting in bed BUT upon any type of exertion she becomes quite sob. Pt states that this has been happening for a while.

## 2021-07-15 NOTE — PROGRESS NOTES
Enoch Patient Status:  Inpatient    8/10/1940 MRN [id number]   [facility] 8NE-A Attending Yolanda Lugo MD   Hosp Day # 4 PCP Noah Lynch MD     Pulm / Critical Care Progress Note     S: pt states she is diminished bilat   Chest wall: No tenderness or deformity. Heart: Regular rate and rhythm, normal S1S2, no murmur. Abdomen: soft, non-tender, non-distended, positive BS.    Extremity: no edema     Recent Labs   Lab 07/13/21  0437 07/14/21  0432 07/15/21

## 2021-07-15 NOTE — PROGRESS NOTES
07/15/21 0815   Mobility   O2 walk?  Yes   SPO2% on Room Air at Rest 93   SPO2% Ambulation on Room Air 85

## 2021-07-15 NOTE — PLAN OF CARE
Assumed care of patient at 0730. Alert and oriented x4. Tele rhythm a-fib and v-paced, on eliquis. On room air, maintaining sats above 93%. Saturation dropped to 85% on room air while ambulated, recovered quickly. Breath sounds diminished bilaterally.  Bed Maintain adequate hydration with IV or PO as ordered and tolerated  - Nasogastric tube to low intermittent suction as ordered  - Evaluate effectiveness of ordered antiemetic medications  - Provide nonpharmacologic comfort measures as appropriate  - Advance

## 2021-07-15 NOTE — PLAN OF CARE
A&Ox4. Forgetful   On RA, O2 sat WNL. Dyspnea noted on exertion. CPAP at night. Occasional Vpaced on tele.  Mostly afib   Voiding without difficultly  Up x1 and walker           Problem: RESPIRATORY - ADULT  Goal: Achieves optimal ventilation and oxygenat

## 2021-07-15 NOTE — PROGRESS NOTES
[facility]    Progress Note     Darcus Max Patient Status:  Inpatient    8/10/1940 MRN [id number]   [facility] 4SW-A Attending Magdy Martines MD   Hosp Day # 4 PCP Becky Hilario MD     Chief Complaint: Patient presents with: Furoate-Vilanterol  1 puff Inhalation Daily   • apixaban  2.5 mg Oral BID   • atorvastatin  40 mg Oral Daily   • carvedilol  25 mg Oral BID with meals   • docusate sodium  100 mg Oral BID   • Donepezil HCl  10 mg Oral Nightly   • hydrALAzine HCl  25 mg Ora

## 2021-07-16 LAB
ALBUMIN SERPL-MCNC: 3.3 G/DL (ref 3.4–5)
ALBUMIN/GLOB SERPL: 0.9 {RATIO} (ref 1–2)
ALP LIVER SERPL-CCNC: 72 U/L
ALT SERPL-CCNC: 16 U/L
ANION GAP SERPL CALC-SCNC: 5 MMOL/L (ref 0–18)
AST SERPL-CCNC: 12 U/L (ref 15–37)
BASOPHILS # BLD AUTO: 0.03 X10(3) UL (ref 0–0.2)
BASOPHILS NFR BLD AUTO: 0.4 %
BILIRUB SERPL-MCNC: 0.6 MG/DL (ref 0.1–2)
BUN BLD-MCNC: 17 MG/DL (ref 7–18)
BUN/CREAT SERPL: 22.7 (ref 10–20)
CALCIUM BLD-MCNC: 8.7 MG/DL (ref 8.5–10.1)
CHLORIDE SERPL-SCNC: 105 MMOL/L (ref 98–112)
CO2 SERPL-SCNC: 26 MMOL/L (ref 21–32)
CREAT BLD-MCNC: 0.75 MG/DL
DEPRECATED RDW RBC AUTO: 38.7 FL (ref 35.1–46.3)
EOSINOPHIL # BLD AUTO: 0.11 X10(3) UL (ref 0–0.7)
EOSINOPHIL NFR BLD AUTO: 1.4 %
ERYTHROCYTE [DISTWIDTH] IN BLOOD BY AUTOMATED COUNT: 12.6 % (ref 11–15)
GLOBULIN PLAS-MCNC: 3.5 G/DL (ref 2.8–4.4)
GLUCOSE BLD-MCNC: 98 MG/DL (ref 70–99)
HCT VFR BLD AUTO: 32.4 %
HGB BLD-MCNC: 11.3 G/DL
IMM GRANULOCYTES # BLD AUTO: 0.03 X10(3) UL (ref 0–1)
IMM GRANULOCYTES NFR BLD: 0.4 %
LYMPHOCYTES # BLD AUTO: 2.35 X10(3) UL (ref 1–4)
LYMPHOCYTES NFR BLD AUTO: 29 %
M PROTEIN MFR SERPL ELPH: 6.8 G/DL (ref 6.4–8.2)
MCH RBC QN AUTO: 29.6 PG (ref 26–34)
MCHC RBC AUTO-ENTMCNC: 34.9 G/DL (ref 31–37)
MCV RBC AUTO: 84.8 FL
MONOCYTES # BLD AUTO: 0.86 X10(3) UL (ref 0.1–1)
MONOCYTES NFR BLD AUTO: 10.6 %
NEUTROPHILS # BLD AUTO: 4.73 X10 (3) UL (ref 1.5–7.7)
NEUTROPHILS # BLD AUTO: 4.73 X10(3) UL (ref 1.5–7.7)
NEUTROPHILS NFR BLD AUTO: 58.2 %
OSMOLALITY SERPL CALC.SUM OF ELEC: 284 MOSM/KG (ref 275–295)
PLATELET # BLD AUTO: 241 10(3)UL (ref 150–450)
POTASSIUM SERPL-SCNC: 3.9 MMOL/L (ref 3.5–5.1)
RBC # BLD AUTO: 3.82 X10(6)UL
SODIUM SERPL-SCNC: 136 MMOL/L (ref 136–145)
WBC # BLD AUTO: 8.1 X10(3) UL (ref 4–11)

## 2021-07-16 NOTE — CM/SW NOTE
SW met with pt at bedside to provide her with the accepting ELOISE list from [facility] [place]. Pt agreeable to ELOISE and requested that I speak to her daughter, Karla Weir, about ELOISE.  SW spoke with Karla Weir, provided the accepting agency choices by phone, as she did not have an [place]

## 2021-07-16 NOTE — PROGRESS NOTES
Enoch Patient Status:  Inpatient    8/10/1940 MRN [id number]   [facility] 8NE-A Attending Zack Newsome MD   Hosp Day # 5 PCP Kendall Cobos MD     Pulm / Critical Care Progress Note     S: pt states that sh distress. Head: Normocephalic, without obvious abnormality, atraumatic. Lungs: ctab   Chest wall: No tenderness or deformity. Heart: Regular rate and rhythm, normal S1S2, no murmur. Abdomen: soft, non-tender, non-distended, positive BS.    Extremity

## 2021-07-16 NOTE — PLAN OF CARE
Up in chair eating breakfast  Denies any pain,sob noted with ambulation to bathroom  2l /nc with activity, room air resting  Uses cpap last night, aflutter /v paced on the monitor HR controlled rate  Droplet isolation for para influenza  Discharge pending

## 2021-07-16 NOTE — PLAN OF CARE
A&Ox4, forgetful   O2 sat WNL on RA at rest. CPAP at night  2-3L needed when pt is ambulating long distances in hallway- noted to drop to 82% earlier in the day   Vpaced with underlying afib on tele   Continent of bladder and bowel.  Denies sob when ambulat nonpharmacologic comfort measures as appropriate  - Advance diet as tolerated, if ordered  - Obtain nutritional consult as needed  - Evaluate fluid balance  Outcome: Progressing  Goal: Achieves appropriate nutritional intake (bariatric)  Description: INTER

## 2021-07-16 NOTE — PROGRESS NOTES
[facility]    Progress Note     Armando Dewey Patient Status:  Inpatient    8/10/1940 MRN [id number]   [facility] 4SW-A Attending Colin Hillman MD   Hosp Day # 5 PCP Selene Du MD     Chief Complaint: Patient presents with: Imaging data reviewed in Epic.     Medications:   • predniSONE  40 mg Oral Daily with breakfast   • ipratropium-albuterol  3 mL Nebulization Q6H   • Fluticasone Furoate-Vilanterol  1 puff Inhalation Daily   • apixaban  2.5 mg Oral BID   • atorvastatin  40 m

## 2021-07-17 VITALS
HEART RATE: 61 BPM | RESPIRATION RATE: 20 BRPM | TEMPERATURE: 99 F | SYSTOLIC BLOOD PRESSURE: 123 MMHG | BODY MASS INDEX: 22 KG/M2 | DIASTOLIC BLOOD PRESSURE: 66 MMHG | OXYGEN SATURATION: 95 % | WEIGHT: 133.63 LBS

## 2021-07-17 LAB
ALBUMIN SERPL-MCNC: 3.3 G/DL (ref 3.4–5)
ALBUMIN/GLOB SERPL: 0.9 {RATIO} (ref 1–2)
ALP LIVER SERPL-CCNC: 75 U/L
ALT SERPL-CCNC: 18 U/L
ANION GAP SERPL CALC-SCNC: 5 MMOL/L (ref 0–18)
AST SERPL-CCNC: 17 U/L (ref 15–37)
BASOPHILS # BLD AUTO: 0.02 X10(3) UL (ref 0–0.2)
BASOPHILS NFR BLD AUTO: 0.2 %
BILIRUB SERPL-MCNC: 0.5 MG/DL (ref 0.1–2)
BUN BLD-MCNC: 18 MG/DL (ref 7–18)
BUN/CREAT SERPL: 26.5 (ref 10–20)
CALCIUM BLD-MCNC: 8.6 MG/DL (ref 8.5–10.1)
CHLORIDE SERPL-SCNC: 103 MMOL/L (ref 98–112)
CO2 SERPL-SCNC: 27 MMOL/L (ref 21–32)
CREAT BLD-MCNC: 0.68 MG/DL
DEPRECATED RDW RBC AUTO: 38.3 FL (ref 35.1–46.3)
EOSINOPHIL # BLD AUTO: 0.19 X10(3) UL (ref 0–0.7)
EOSINOPHIL NFR BLD AUTO: 1.8 %
ERYTHROCYTE [DISTWIDTH] IN BLOOD BY AUTOMATED COUNT: 12.5 % (ref 11–15)
GLOBULIN PLAS-MCNC: 3.7 G/DL (ref 2.8–4.4)
GLUCOSE BLD-MCNC: 102 MG/DL (ref 70–99)
HCT VFR BLD AUTO: 33.8 %
HGB BLD-MCNC: 12 G/DL
IMM GRANULOCYTES # BLD AUTO: 0.05 X10(3) UL (ref 0–1)
IMM GRANULOCYTES NFR BLD: 0.5 %
LYMPHOCYTES # BLD AUTO: 2.37 X10(3) UL (ref 1–4)
LYMPHOCYTES NFR BLD AUTO: 22.4 %
M PROTEIN MFR SERPL ELPH: 7 G/DL (ref 6.4–8.2)
MCH RBC QN AUTO: 29.6 PG (ref 26–34)
MCHC RBC AUTO-ENTMCNC: 35.5 G/DL (ref 31–37)
MCV RBC AUTO: 83.5 FL
MONOCYTES # BLD AUTO: 0.98 X10(3) UL (ref 0.1–1)
MONOCYTES NFR BLD AUTO: 9.3 %
NEUTROPHILS # BLD AUTO: 6.98 X10 (3) UL (ref 1.5–7.7)
NEUTROPHILS # BLD AUTO: 6.98 X10(3) UL (ref 1.5–7.7)
NEUTROPHILS NFR BLD AUTO: 65.8 %
OSMOLALITY SERPL CALC.SUM OF ELEC: 282 MOSM/KG (ref 275–295)
PLATELET # BLD AUTO: 272 10(3)UL (ref 150–450)
POTASSIUM SERPL-SCNC: 3.5 MMOL/L (ref 3.5–5.1)
POTASSIUM SERPL-SCNC: 4.9 MMOL/L (ref 3.5–5.1)
RBC # BLD AUTO: 4.05 X10(6)UL
SODIUM SERPL-SCNC: 135 MMOL/L (ref 136–145)
WBC # BLD AUTO: 10.6 X10(3) UL (ref 4–11)

## 2021-07-17 RX ORDER — TRAMADOL HYDROCHLORIDE 50 MG/1
50 TABLET ORAL 2 TIMES DAILY PRN
Qty: 4 TABLET | Refills: 0 | Status: SHIPPED | OUTPATIENT
Start: 2021-07-17 | End: 2021-08-06

## 2021-07-17 RX ORDER — POTASSIUM CHLORIDE 20 MEQ/1
40 TABLET, EXTENDED RELEASE ORAL EVERY 4 HOURS
Status: COMPLETED | OUTPATIENT
Start: 2021-07-17 | End: 2021-07-17

## 2021-07-17 RX ORDER — BUTALBITAL, ACETAMINOPHEN AND CAFFEINE 50; 325; 40 MG/1; MG/1; MG/1
1 TABLET ORAL DAILY PRN
Qty: 3 TABLET | Refills: 0 | Status: SHIPPED | OUTPATIENT
Start: 2021-07-17 | End: 2021-08-06

## 2021-07-17 RX ORDER — CLONAZEPAM 1 MG/1
TABLET ORAL
Qty: 3 TABLET | Refills: 0 | Status: SHIPPED | OUTPATIENT
Start: 2021-07-17 | End: 2021-07-27

## 2021-07-17 NOTE — PROGRESS NOTES
[facility] Hospitalist Progress Note     [facility]      SUBJECTIVE:  Feeling better  Still runny nose    OBJECTIVE:  Temp:  [98 °F (36.7 °C)-99.7 °F (37.6 °C)] 98.5 °F (36.9 °C)  Pulse:  [72-94] 72  Resp:  [18-19] 18  BP: (105-172)/(51-84) 172/84    Intak 07/17/21  0505   ALT 18 18 19 16 18   AST 19 18 15 12* 17   ALB 3.3* 3.4 3.4 3.3* 3.3*       No results for input(s): PGLU in the last 168 hours.     Imaging:  XR CHEST AP PORTABLE  (CPT=71045)    Result Date: 7/11/2021  PROCEDURE:  XR CHEST AP PORTABLE  (C Daily  carvedilol (COREG) tab 25 mg, 25 mg, Oral, BID with meals  docusate sodium (COLACE) cap 100 mg, 100 mg, Oral, BID  Donepezil HCl (ARICEPT) tab 10 mg, 10 mg, Oral, Nightly  hydrALAzine HCl (APRESOLINE) tab 25 mg, 25 mg, Oral, TID  levETIRAcetam (Anival Christelle reading high this AM, but has otherwise been good - monitor and if not better can increase hydralazine does     # Afib  - cont apixaban bid  - tele      # CAD  - stable, cont home meds    Prophy:  DVT: eliquis  Deconditioning prevention: PT/OT -- ELOISE Flores

## 2021-07-17 NOTE — PLAN OF CARE
Problem: Patient/Family Goals  Goal: Patient/Family Long Term Goal  Description: Patient's Long Term Goal: \"to discharge home\"    Interventions:  - activity as tolerated  - nebs  - wean oxygen as tolerated  - See additional Care Plan goals for specific treat as appropriate  - Monitor I&O, WT and lab values  - Obtain nutritional consult as needed  - Evaluate psychosocial factors contributing to over-consumption  Outcome: Completed   Assumed care at 0700.  Patient is pleasant, A&O x4, very [place],  Room air, s

## 2021-07-17 NOTE — PROGRESS NOTES
Nursing report given to [facility], [address] at [address] [facility] rehab/nursing . Ambulance trasportations set up at [facility] today.

## 2021-07-17 NOTE — PROGRESS NOTES
NURSING DISCHARGE NOTE    Discharged Rehab facility via Ambulance. Accompanied by Support staff  Belongings return to patient     Saline lock removed. Rolf stevens and instructions given to paramedics . John Ward

## 2021-07-17 NOTE — CM/SW NOTE
07/17/21 1421   Discharge disposition   Expected discharge disposition Skilled Nurs   Name of [address]   Discharge transportation [employer] w/silver[facility] at [facility], patient can admit today.    rn to call re

## 2021-07-17 NOTE — PLAN OF CARE
Pt. Alert and oriented , forgetful , uses Cpap at HS, . Resp. Pattern easy and non labored , denies any pain or discomfort. Tele shows V paced on the monitor. Cont. Nebs / PO steroid. Cont. Monitor per tele/labs/v/s. Meds as ordered.  Bed al Provide nonpharmacologic comfort measures as appropriate  - Advance diet as tolerated, if ordered  - Obtain nutritional consult as needed  - Evaluate fluid balance  Outcome: Progressing  Goal: Achieves appropriate nutritional intake (bariatric)  Descriptio

## 2021-07-18 NOTE — DISCHARGE SUMMARY
General Medicine Discharge Summary     Patient ID:  Ifeanyi Jarrell  78year old  8/10/1940    Admit date: 7/11/2021    Discharge date and time: 7/17/2021  4:40 PM     Attending Physician: Darren Guardado MD    Primary Care Physician: Magdalene Estrada MD Script, Disp-3 tablet, R-0    clonazePAM 1 MG Oral Tab  TAKE 1/2 TABLET BY MOUTH EVERY MORNING AND 1 TABLET EVERY NIGHT AT BEDTIME, Print Script, Disp-3 tablet, R-0    traMADol HCl 50 MG Oral Tab  Take 1 tablet (50 mg total) by mouth 2 (two) times daily as Normal, Disp-90 mL, R-0    VENTOLIN  (90 Base) MCG/ACT Inhalation Aero Soln  INHALE 2 PUFFS INTO THE LUNGS EVERY 6 HOURS AS NEEDED FOR WHEEZING OR SHORTNESS OF BREATH, Normal, Disp-54 g, R-0    omeprazole 20 MG Oral Capsule Delayed Release  Take 20

## 2021-08-24 ENCOUNTER — HOSPITAL ENCOUNTER (EMERGENCY)
Facility: HOSPITAL | Age: 81
Discharge: HOME | End: 2021-08-25
Attending: EMERGENCY MEDICINE
Payer: MEDICARE

## 2021-08-24 DIAGNOSIS — S01.511A LIP LACERATION, INITIAL ENCOUNTER: ICD-10-CM

## 2021-08-24 DIAGNOSIS — N39.0 URINARY TRACT INFECTION WITHOUT HEMATURIA, SITE UNSPECIFIED: ICD-10-CM
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
## 2021-08-24 DIAGNOSIS — Z79.01 ANTICOAGULATED: ICD-10-CM
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
## 2021-08-24 PROCEDURE — 36415 COLL VENOUS BLD VENIPUNCTURE: CPT

## 2021-08-24 PROCEDURE — 99284 EMERGENCY DEPT VISIT MOD MDM: CPT

## 2021-08-25 ENCOUNTER — APPOINTMENT (OUTPATIENT)
Dept: CT IMAGING | Facility: HOSPITAL | Age: 81
End: 2021-08-25
Attending: EMERGENCY MEDICINE
Payer: MEDICARE

## 2021-08-25 VITALS
OXYGEN SATURATION: 94 % | HEIGHT: 65 IN | SYSTOLIC BLOOD PRESSURE: 149 MMHG | HEART RATE: 76 BPM | BODY MASS INDEX: 22.26 KG/M2 | TEMPERATURE: 98 F | WEIGHT: 133.63 LBS | DIASTOLIC BLOOD PRESSURE: 88 MMHG | RESPIRATION RATE: 16 BRPM

## 2021-08-25 LAB
ALBUMIN SERPL-MCNC: 3.3 G/DL (ref 3.4–5)
ALBUMIN/GLOB SERPL: 0.7 {RATIO} (ref 1–2)
ALP LIVER SERPL-CCNC: 108 U/L
ALT SERPL-CCNC: 14 U/L
ANION GAP SERPL CALC-SCNC: 5 MMOL/L (ref 0–18)
AST SERPL-CCNC: 21 U/L (ref 15–37)
BASOPHILS # BLD AUTO: 0.09 X10(3) UL (ref 0–0.2)
BASOPHILS NFR BLD AUTO: 1 %
BILIRUB SERPL-MCNC: 0.8 MG/DL (ref 0.1–2)
BILIRUB UR QL STRIP.AUTO: NEGATIVE
BUN BLD-MCNC: 13 MG/DL (ref 7–18)
CALCIUM BLD-MCNC: 9.1 MG/DL (ref 8.5–10.1)
CHLORIDE SERPL-SCNC: 103 MMOL/L (ref 98–112)
CO2 SERPL-SCNC: 28 MMOL/L (ref 21–32)
COLOR UR AUTO: YELLOW
CREAT BLD-MCNC: 0.84 MG/DL
EOSINOPHIL # BLD AUTO: 0.18 X10(3) UL (ref 0–0.7)
EOSINOPHIL NFR BLD AUTO: 2 %
ERYTHROCYTE [DISTWIDTH] IN BLOOD BY AUTOMATED COUNT: 13 %
GLOBULIN PLAS-MCNC: 4.6 G/DL (ref 2.8–4.4)
GLUCOSE BLD-MCNC: 111 MG/DL (ref 70–99)
GLUCOSE UR STRIP.AUTO-MCNC: NEGATIVE MG/DL
HCT VFR BLD AUTO: 36 %
HGB BLD-MCNC: 12 G/DL
HYALINE CASTS #/AREA URNS AUTO: PRESENT /LPF
IMM GRANULOCYTES # BLD AUTO: 0.03 X10(3) UL (ref 0–1)
IMM GRANULOCYTES NFR BLD: 0.3 %
KETONES UR STRIP.AUTO-MCNC: NEGATIVE MG/DL
LYMPHOCYTES # BLD AUTO: 0.91 X10(3) UL (ref 1–4)
LYMPHOCYTES NFR BLD AUTO: 9.9 %
M PROTEIN MFR SERPL ELPH: 7.9 G/DL (ref 6.4–8.2)
MCH RBC QN AUTO: 28.8 PG (ref 26–34)
MCHC RBC AUTO-ENTMCNC: 33.3 G/DL (ref 31–37)
MCV RBC AUTO: 86.3 FL
MONOCYTES # BLD AUTO: 1.08 X10(3) UL (ref 0.1–1)
MONOCYTES NFR BLD AUTO: 11.8 %
NEUTROPHILS # BLD AUTO: 6.9 X10 (3) UL (ref 1.5–7.7)
NEUTROPHILS # BLD AUTO: 6.9 X10(3) UL (ref 1.5–7.7)
NEUTROPHILS NFR BLD AUTO: 75 %
NITRITE UR QL STRIP.AUTO: NEGATIVE
OSMOLALITY SERPL CALC.SUM OF ELEC: 283 MOSM/KG (ref 275–295)
PH UR STRIP.AUTO: 6 [PH] (ref 5–8)
PLATELET # BLD AUTO: 251 10(3)UL (ref 150–450)
POTASSIUM SERPL-SCNC: 3.8 MMOL/L (ref 3.5–5.1)
PROT UR STRIP.AUTO-MCNC: 30 MG/DL
RBC # BLD AUTO: 4.17 X10(6)UL
SARS-COV-2 RNA RESP QL NAA+PROBE: NOT DETECTED
SODIUM SERPL-SCNC: 136 MMOL/L (ref 136–145)
SP GR UR STRIP.AUTO: 1.01 (ref 1–1.03)
UROBILINOGEN UR STRIP.AUTO-MCNC: 2 MG/DL
WBC # BLD AUTO: 9.2 X10(3) UL (ref 4–11)
WBC #/AREA URNS AUTO: >50 /HPF
WBC CLUMPS UR QL AUTO: PRESENT /HPF

## 2021-08-25 PROCEDURE — 87086 URINE CULTURE/COLONY COUNT: CPT | Performed by: EMERGENCY MEDICINE

## 2021-08-25 PROCEDURE — 72125 CT NECK SPINE W/O DYE: CPT | Performed by: EMERGENCY MEDICINE

## 2021-08-25 PROCEDURE — 87186 SC STD MICRODIL/AGAR DIL: CPT | Performed by: EMERGENCY MEDICINE

## 2021-08-25 PROCEDURE — 70450 CT HEAD/BRAIN W/O DYE: CPT | Performed by: EMERGENCY MEDICINE

## 2021-08-25 PROCEDURE — 81001 URINALYSIS AUTO W/SCOPE: CPT | Performed by: EMERGENCY MEDICINE

## 2021-08-25 PROCEDURE — 87088 URINE BACTERIA CULTURE: CPT | Performed by: EMERGENCY MEDICINE

## 2021-08-25 PROCEDURE — 80053 COMPREHEN METABOLIC PANEL: CPT | Performed by: EMERGENCY MEDICINE

## 2021-08-25 PROCEDURE — 85025 COMPLETE CBC W/AUTO DIFF WBC: CPT | Performed by: EMERGENCY MEDICINE

## 2021-08-25 RX ORDER — NITROFURANTOIN 25; 75 MG/1; MG/1
100 CAPSULE ORAL 2 TIMES DAILY
Qty: 20 CAPSULE | Refills: 0 | Status: ON HOLD | OUTPATIENT
Start: 2021-08-25 | End: 2021-08-29

## 2021-08-25 RX ORDER — DIPHENHYDRAMINE HCL 25 MG
25 CAPSULE ORAL ONCE
Status: COMPLETED | OUTPATIENT
Start: 2021-08-25 | End: 2021-08-25

## 2021-08-25 RX ORDER — TRAMADOL HYDROCHLORIDE 50 MG/1
50 TABLET ORAL ONCE
Status: COMPLETED | OUTPATIENT
Start: 2021-08-25 | End: 2021-08-25

## 2021-08-25 NOTE — ED INITIAL ASSESSMENT (HPI)
Brought in by EMS, reported patient fell forward exiting bathroom. Unwitnessed by family, reports decreased activity since yesterday. Patient does not remember falling. Denies N/V. Patient reports headache 4/10.

## 2021-08-25 NOTE — ED PROVIDER NOTES
Patient Seen in: [facility] Emergency Department      History   Patient presents with:  Trauma    Stated Complaint: Fall    HPI/Subjective:   HPI  Patient fell forward and possibly struck head, definitely struck left upper lip yesterday when leaving CHOLECYSTECTOMY     • COLONOSCOPY  7/2010   • COLONOSCOPY  10/28/2013    Procedure: COLONOSCOPY;  Surgeon: Sarah Junior MD;  Location: [address] [place] [address] ENDOSCOPY   • HIP REPLACEMENT SURGERY     • HYSTERECTOMY     • OPEN HEART SURGERY (PBP)      2002 CABG x 2   • OTHER S cross the vermilion border but wound edges are well approximated  Eyes:      General: No scleral icterus. Right eye: No discharge. Left eye: No discharge.       Conjunctiva/sclera: Conjunctivae normal.      Pupils: Pupils are equal, round, an Urobilinogen Urine 2.0 (*)     Leukocyte Esterase Urine Large (*)     WBC Urine >50 (*)     Bacteria Urine 2+ (*)     Hyaline Casts Present (*)     WBC Clump Present (*)     All other components within normal limits   URINE CULTURE, ROUTINE - Abnormal; Not used. Dose information is transmitted to the ACR     ([facility]) [address])     which includes the Dose Index     Registry.          PATIENT STATED HISTORY: (As transcribed by Technologist)  Patient had     u from the skull base through C7. Multiplanar reconstructions are     generated. Dose reduction techniques were used.  Dose information is     transmitted to the [facility] ([facility]     [facility]) [address]) which includes discharged home in improved condition                             Disposition and Plan     Clinical Impression:  Fall at home, initial encounter  (primary encounter diagnosis)  Lip laceration, initial encounter  Anticoagulated  Urinary tract infection with

## 2021-08-26 ENCOUNTER — HOSPITAL ENCOUNTER (EMERGENCY)
Facility: HOSPITAL | Age: 81
Discharge: HOME | End: 2021-08-26
Attending: EMERGENCY MEDICINE
Payer: MEDICARE

## 2021-08-26 ENCOUNTER — APPOINTMENT (OUTPATIENT)
Dept: CT IMAGING | Facility: HOSPITAL | Age: 81
End: 2021-08-26
Attending: EMERGENCY MEDICINE
Payer: MEDICARE

## 2021-08-26 VITALS
BODY MASS INDEX: 23.04 KG/M2 | DIASTOLIC BLOOD PRESSURE: 86 MMHG | WEIGHT: 130 LBS | HEART RATE: 108 BPM | RESPIRATION RATE: 24 BRPM | HEIGHT: 63 IN | TEMPERATURE: 99 F | OXYGEN SATURATION: 94 % | SYSTOLIC BLOOD PRESSURE: 137 MMHG

## 2021-08-26 DIAGNOSIS — N39.0 URINARY TRACT INFECTION WITHOUT HEMATURIA, SITE UNSPECIFIED: Primary | ICD-10-CM

## 2021-08-26 LAB
ALBUMIN SERPL-MCNC: 3 G/DL (ref 3.4–5)
ALBUMIN/GLOB SERPL: 0.7 {RATIO} (ref 1–2)
ALP LIVER SERPL-CCNC: 98 U/L
ALT SERPL-CCNC: 16 U/L
ANION GAP SERPL CALC-SCNC: 3 MMOL/L (ref 0–18)
AST SERPL-CCNC: 22 U/L (ref 15–37)
BASOPHILS # BLD AUTO: 0.07 X10(3) UL (ref 0–0.2)
BASOPHILS NFR BLD AUTO: 0.8 %
BILIRUB SERPL-MCNC: 0.7 MG/DL (ref 0.1–2)
BUN BLD-MCNC: 12 MG/DL (ref 7–18)
CALCIUM BLD-MCNC: 9.1 MG/DL (ref 8.5–10.1)
CHLORIDE SERPL-SCNC: 104 MMOL/L (ref 98–112)
CO2 SERPL-SCNC: 26 MMOL/L (ref 21–32)
CREAT BLD-MCNC: 0.66 MG/DL
EOSINOPHIL # BLD AUTO: 0.37 X10(3) UL (ref 0–0.7)
EOSINOPHIL NFR BLD AUTO: 4.2 %
ERYTHROCYTE [DISTWIDTH] IN BLOOD BY AUTOMATED COUNT: 13.1 %
GLOBULIN PLAS-MCNC: 4.4 G/DL (ref 2.8–4.4)
GLUCOSE BLD-MCNC: 103 MG/DL (ref 70–99)
HCT VFR BLD AUTO: 32.6 %
HGB BLD-MCNC: 11.3 G/DL
IMM GRANULOCYTES # BLD AUTO: 0.02 X10(3) UL (ref 0–1)
IMM GRANULOCYTES NFR BLD: 0.2 %
LACTATE SERPL-SCNC: 0.9 MMOL/L (ref 0.4–2)
LYMPHOCYTES # BLD AUTO: 1.1 X10(3) UL (ref 1–4)
LYMPHOCYTES NFR BLD AUTO: 12.5 %
M PROTEIN MFR SERPL ELPH: 7.4 G/DL (ref 6.4–8.2)
MCH RBC QN AUTO: 29.3 PG (ref 26–34)
MCHC RBC AUTO-ENTMCNC: 34.7 G/DL (ref 31–37)
MCV RBC AUTO: 84.5 FL
MONOCYTES # BLD AUTO: 0.98 X10(3) UL (ref 0.1–1)
MONOCYTES NFR BLD AUTO: 11.2 %
NEUTROPHILS # BLD AUTO: 6.23 X10 (3) UL (ref 1.5–7.7)
NEUTROPHILS # BLD AUTO: 6.23 X10(3) UL (ref 1.5–7.7)
NEUTROPHILS NFR BLD AUTO: 71.1 %
OSMOLALITY SERPL CALC.SUM OF ELEC: 276 MOSM/KG (ref 275–295)
PLATELET # BLD AUTO: 276 10(3)UL (ref 150–450)
POTASSIUM SERPL-SCNC: 3.7 MMOL/L (ref 3.5–5.1)
PROCALCITONIN SERPL-MCNC: <0.05 NG/ML (ref ?–0.16)
RBC # BLD AUTO: 3.86 X10(6)UL
SARS-COV-2 RNA RESP QL NAA+PROBE: NOT DETECTED
SODIUM SERPL-SCNC: 133 MMOL/L (ref 136–145)
WBC # BLD AUTO: 8.8 X10(3) UL (ref 4–11)

## 2021-08-26 PROCEDURE — 99284 EMERGENCY DEPT VISIT MOD MDM: CPT | Performed by: EMERGENCY MEDICINE

## 2021-08-26 PROCEDURE — 85025 COMPLETE CBC W/AUTO DIFF WBC: CPT | Performed by: EMERGENCY MEDICINE

## 2021-08-26 PROCEDURE — 93010 ELECTROCARDIOGRAM REPORT: CPT | Performed by: EMERGENCY MEDICINE

## 2021-08-26 PROCEDURE — 80053 COMPREHEN METABOLIC PANEL: CPT | Performed by: EMERGENCY MEDICINE

## 2021-08-26 PROCEDURE — 70450 CT HEAD/BRAIN W/O DYE: CPT | Performed by: EMERGENCY MEDICINE

## 2021-08-26 PROCEDURE — 96361 HYDRATE IV INFUSION ADD-ON: CPT | Performed by: EMERGENCY MEDICINE

## 2021-08-26 PROCEDURE — 83605 ASSAY OF LACTIC ACID: CPT | Performed by: EMERGENCY MEDICINE

## 2021-08-26 PROCEDURE — 93005 ELECTROCARDIOGRAM TRACING: CPT

## 2021-08-26 PROCEDURE — 96365 THER/PROPH/DIAG IV INF INIT: CPT | Performed by: EMERGENCY MEDICINE

## 2021-08-26 PROCEDURE — 84145 PROCALCITONIN (PCT): CPT | Performed by: EMERGENCY MEDICINE

## 2021-08-26 RX ORDER — SODIUM CHLORIDE 9 MG/ML
125 INJECTION, SOLUTION INTRAVENOUS CONTINUOUS
Status: DISCONTINUED | OUTPATIENT
Start: 2021-08-26 | End: 2021-08-27

## 2021-08-27 ENCOUNTER — HOSPITAL ENCOUNTER (OUTPATIENT)
Facility: HOSPITAL | Age: 81
Setting detail: OBSERVATION
LOS: 1 days | Discharge: HOME | End: 2021-08-29
Attending: EMERGENCY MEDICINE | Admitting: INTERNAL MEDICINE
Payer: MEDICARE

## 2021-08-27 DIAGNOSIS — R53.83 FATIGUE, UNSPECIFIED TYPE: ICD-10-CM

## 2021-08-27 DIAGNOSIS — N39.0 URINARY TRACT INFECTION WITHOUT HEMATURIA, SITE UNSPECIFIED: Primary | ICD-10-CM

## 2021-08-27 LAB
ALBUMIN SERPL-MCNC: 3 G/DL (ref 3.4–5)
ALBUMIN/GLOB SERPL: 0.7 {RATIO} (ref 1–2)
ALP LIVER SERPL-CCNC: 98 U/L
ALT SERPL-CCNC: 15 U/L
ANION GAP SERPL CALC-SCNC: 6 MMOL/L (ref 0–18)
AST SERPL-CCNC: 21 U/L (ref 15–37)
ATRIAL RATE: 94 BPM
BASOPHILS # BLD AUTO: 0.06 X10(3) UL (ref 0–0.2)
BASOPHILS NFR BLD AUTO: 0.8 %
BILIRUB SERPL-MCNC: 0.8 MG/DL (ref 0.1–2)
BUN BLD-MCNC: 14 MG/DL (ref 7–18)
CALCIUM BLD-MCNC: 8.9 MG/DL (ref 8.5–10.1)
CHLORIDE SERPL-SCNC: 105 MMOL/L (ref 98–112)
CO2 SERPL-SCNC: 24 MMOL/L (ref 21–32)
CREAT BLD-MCNC: 0.74 MG/DL
EOSINOPHIL # BLD AUTO: 0.34 X10(3) UL (ref 0–0.7)
EOSINOPHIL NFR BLD AUTO: 4.5 %
ERYTHROCYTE [DISTWIDTH] IN BLOOD BY AUTOMATED COUNT: 12.9 %
GLOBULIN PLAS-MCNC: 4.4 G/DL (ref 2.8–4.4)
GLUCOSE BLD-MCNC: 85 MG/DL (ref 70–99)
HCT VFR BLD AUTO: 32 %
HGB BLD-MCNC: 10.8 G/DL
IMM GRANULOCYTES # BLD AUTO: 0.03 X10(3) UL (ref 0–1)
IMM GRANULOCYTES NFR BLD: 0.4 %
LYMPHOCYTES # BLD AUTO: 1 X10(3) UL (ref 1–4)
LYMPHOCYTES NFR BLD AUTO: 13.2 %
M PROTEIN MFR SERPL ELPH: 7.4 G/DL (ref 6.4–8.2)
MCH RBC QN AUTO: 28.6 PG (ref 26–34)
MCHC RBC AUTO-ENTMCNC: 33.8 G/DL (ref 31–37)
MCV RBC AUTO: 84.9 FL
MONOCYTES # BLD AUTO: 0.85 X10(3) UL (ref 0.1–1)
MONOCYTES NFR BLD AUTO: 11.3 %
NEUTROPHILS # BLD AUTO: 5.27 X10 (3) UL (ref 1.5–7.7)
NEUTROPHILS # BLD AUTO: 5.27 X10(3) UL (ref 1.5–7.7)
NEUTROPHILS NFR BLD AUTO: 69.8 %
OSMOLALITY SERPL CALC.SUM OF ELEC: 280 MOSM/KG (ref 275–295)
PLATELET # BLD AUTO: 275 10(3)UL (ref 150–450)
POTASSIUM SERPL-SCNC: 3.9 MMOL/L (ref 3.5–5.1)
Q-T INTERVAL: 342 MS
QRS DURATION: 88 MS
QTC CALCULATION (BEZET): 456 MS
R AXIS: -40 DEGREES
RBC # BLD AUTO: 3.77 X10(6)UL
SARS-COV-2 RNA RESP QL NAA+PROBE: NOT DETECTED
SODIUM SERPL-SCNC: 135 MMOL/L (ref 136–145)
T AXIS: 270 DEGREES
VENTRICULAR RATE: 107 BPM
WBC # BLD AUTO: 7.6 X10(3) UL (ref 4–11)

## 2021-08-27 PROCEDURE — 96361 HYDRATE IV INFUSION ADD-ON: CPT

## 2021-08-27 PROCEDURE — 99285 EMERGENCY DEPT VISIT HI MDM: CPT

## 2021-08-27 PROCEDURE — 80053 COMPREHEN METABOLIC PANEL: CPT | Performed by: EMERGENCY MEDICINE

## 2021-08-27 PROCEDURE — 96375 TX/PRO/DX INJ NEW DRUG ADDON: CPT

## 2021-08-27 PROCEDURE — 96365 THER/PROPH/DIAG IV INF INIT: CPT

## 2021-08-27 PROCEDURE — 94660 CPAP INITIATION&MGMT: CPT

## 2021-08-27 PROCEDURE — 85025 COMPLETE CBC W/AUTO DIFF WBC: CPT | Performed by: EMERGENCY MEDICINE

## 2021-08-27 RX ORDER — ALBUTEROL SULFATE 90 UG/1
2 AEROSOL, METERED RESPIRATORY (INHALATION) EVERY 6 HOURS PRN
Status: DISCONTINUED | OUTPATIENT
Start: 2021-08-27 | End: 2021-08-29

## 2021-08-27 RX ORDER — LISINOPRIL 20 MG/1
20 TABLET ORAL 2 TIMES DAILY
Status: DISCONTINUED | OUTPATIENT
Start: 2021-08-27 | End: 2021-08-29

## 2021-08-27 RX ORDER — PRAMIPEXOLE DIHYDROCHLORIDE 0.25 MG/1
0.25 TABLET ORAL NIGHTLY
Status: DISCONTINUED | OUTPATIENT
Start: 2021-08-27 | End: 2021-08-29

## 2021-08-27 RX ORDER — MEMANTINE HYDROCHLORIDE 10 MG/1
10 TABLET ORAL 2 TIMES DAILY
Status: DISCONTINUED | OUTPATIENT
Start: 2021-08-27 | End: 2021-08-29

## 2021-08-27 RX ORDER — METOCLOPRAMIDE HYDROCHLORIDE 5 MG/ML
10 INJECTION INTRAMUSCULAR; INTRAVENOUS EVERY 8 HOURS PRN
Status: DISCONTINUED | OUTPATIENT
Start: 2021-08-27 | End: 2021-08-29

## 2021-08-27 RX ORDER — LEVETIRACETAM 250 MG/1
250 TABLET ORAL 2 TIMES DAILY
Status: DISCONTINUED | OUTPATIENT
Start: 2021-08-27 | End: 2021-08-29

## 2021-08-27 RX ORDER — SODIUM PHOSPHATE, DIBASIC AND SODIUM PHOSPHATE, MONOBASIC 7; 19 G/133ML; G/133ML
1 ENEMA RECTAL ONCE AS NEEDED
Status: DISCONTINUED | OUTPATIENT
Start: 2021-08-27 | End: 2021-08-29

## 2021-08-27 RX ORDER — TRAZODONE HYDROCHLORIDE 100 MG/1
100 TABLET ORAL NIGHTLY PRN
Status: DISCONTINUED | OUTPATIENT
Start: 2021-08-27 | End: 2021-08-29

## 2021-08-27 RX ORDER — PANTOPRAZOLE SODIUM 20 MG/1
20 TABLET, DELAYED RELEASE ORAL
Status: DISCONTINUED | OUTPATIENT
Start: 2021-08-28 | End: 2021-08-29

## 2021-08-27 RX ORDER — ATORVASTATIN CALCIUM 40 MG/1
40 TABLET, FILM COATED ORAL DAILY
Status: DISCONTINUED | OUTPATIENT
Start: 2021-08-27 | End: 2021-08-29

## 2021-08-27 RX ORDER — POLYETHYLENE GLYCOL 3350 17 G/17G
17 POWDER, FOR SOLUTION ORAL DAILY PRN
Status: DISCONTINUED | OUTPATIENT
Start: 2021-08-27 | End: 2021-08-29

## 2021-08-27 RX ORDER — CARVEDILOL 12.5 MG/1
25 TABLET ORAL 2 TIMES DAILY WITH MEALS
Status: DISCONTINUED | OUTPATIENT
Start: 2021-08-27 | End: 2021-08-29

## 2021-08-27 RX ORDER — SERTRALINE HYDROCHLORIDE 100 MG/1
200 TABLET, FILM COATED ORAL DAILY
Status: DISCONTINUED | OUTPATIENT
Start: 2021-08-27 | End: 2021-08-29

## 2021-08-27 RX ORDER — HYDRALAZINE HYDROCHLORIDE 25 MG/1
25 TABLET, FILM COATED ORAL 3 TIMES DAILY
Status: DISCONTINUED | OUTPATIENT
Start: 2021-08-27 | End: 2021-08-29

## 2021-08-27 RX ORDER — ONDANSETRON 2 MG/ML
4 INJECTION INTRAMUSCULAR; INTRAVENOUS EVERY 6 HOURS PRN
Status: DISCONTINUED | OUTPATIENT
Start: 2021-08-27 | End: 2021-08-29

## 2021-08-27 RX ORDER — ACETAMINOPHEN 325 MG/1
650 TABLET ORAL EVERY 6 HOURS PRN
Status: DISCONTINUED | OUTPATIENT
Start: 2021-08-27 | End: 2021-08-29

## 2021-08-27 RX ORDER — DONEPEZIL HYDROCHLORIDE 10 MG/1
10 TABLET, FILM COATED ORAL NIGHTLY
Status: DISCONTINUED | OUTPATIENT
Start: 2021-08-27 | End: 2021-08-29

## 2021-08-27 RX ORDER — SODIUM CHLORIDE 9 MG/ML
INJECTION, SOLUTION INTRAVENOUS CONTINUOUS
Status: DISCONTINUED | OUTPATIENT
Start: 2021-08-27 | End: 2021-08-29

## 2021-08-27 RX ORDER — DOCUSATE SODIUM 100 MG/1
100 CAPSULE, LIQUID FILLED ORAL 2 TIMES DAILY
Status: DISCONTINUED | OUTPATIENT
Start: 2021-08-27 | End: 2021-08-29

## 2021-08-27 RX ORDER — BISACODYL 10 MG
10 SUPPOSITORY, RECTAL RECTAL
Status: DISCONTINUED | OUTPATIENT
Start: 2021-08-27 | End: 2021-08-29

## 2021-08-27 NOTE — PROGRESS NOTES
[facility] Pharmacy Note: Antimicrobial Weight Based Dose Adjustment for: meropenem (MERREM)    Sara Jimenez is a 78year old patient who has been prescribed meropenem (MERREM) 1000 mg IV x 1 in ED.     Estimated Creatinine Clearance: 53.4 mL/min (based on SCr of

## 2021-08-27 NOTE — ED INITIAL ASSESSMENT (HPI)
Pt was in the Er early this week and was treated for a UTI. Family stated she was weaker today and called EMS.

## 2021-08-27 NOTE — PROGRESS NOTES
Patient admitted via cart from ED. Oriented to room. Safety precautions initiated. Call light in reach. Spoke with daughter FABIOLA on phone - updated with plan of care.  Adriana stated that patient was admitted to hospital about 1 month ago for a similar issu

## 2021-08-27 NOTE — H&P
[facility] Hospitalist H&P       CC: poor appetite, not eating, generally weak, UTI    PCP: Eliot Mayfield MD    History of Present Illness: Pt is an 79 yo with mmp including but not limited to CAD, HTN/HL, afib on AC, COPD, seizure disorder, dementia, Surgeon: Aysha Nicole MD;  Location: [facility] ENDOSCOPY   • HIP REPLACEMENT SURGERY     • HYSTERECTOMY     • OPEN HEART SURGERY (PBP)      2002 CABG x 2   • OTHER SURGICAL HISTORY      pacemaker   • OTHER SURGICAL HISTORY      L SINUS TISSUE REMOVED   • OTHER OMER Cancer Brother        Review of Systems  Comprehensive ROS reviewed and negative except for what's stated above.   Including negative for chest pain, shortness of breath,       OBJECTIVE:  /83   Pulse 82   Temp 97.4 °F (36.3 °C) (Temporal)   Resp 16 **general weakness, poor appetite, fatigue suspect secondary to  **UTI  -UC noted from a few days ago. Multiple abx allergies.  Tolerated IV meropenem yesterday- continue  -does not meet sepsis criteria  -PT/OT consult, discharge planning  -rapid covid

## 2021-08-27 NOTE — ED INITIAL ASSESSMENT (HPI)
Pt to the ED via [facility] with c/o urinary symptoms. Per EMS pt has been to the ED three times in the last three-to-four days for same. Pt is being treated with antibiotics, but per EMS pt is being noncompliant and not taking her antibxs.

## 2021-08-27 NOTE — ED PROVIDER NOTES
Patient Seen in: [facility] Emergency Department      History   Patient presents with:  Urinary Symptoms  Medical Non-compliance    Stated Complaint: UTI being treated with antibx, but noncomplient per PMD.     HPI/Subjective:   HPI    25-year-old f Neuropathy 1/31/2018   • Occlusion and stenosis of carotid artery without mention of cerebral infarction     right CEA 2000   • Other and unspecified hyperlipidemia    • Peripheral vascular disease, unspecified ([facility] [address]    • Pulmonary embolism (HCC)    • [facility] Device None (Room air)       Current:/87   Pulse 96   Temp 97.4 °F (36.3 °C) (Temporal)   Resp 18   Ht 165.1 cm (5' 5\")   Wt 59 kg   SpO2 100%   BMI 21.64 kg/m²         Physical Exam    General:  Vitals as listed.   No acute distress   HEENT: Sclerae She tolerated meropenem yesterday so I will give that today. Possible outpatient treatment would appear to include Bactrim which she is susceptible to and I do not see any reports of allergy.   Admission disposition: 8/27/2021  4:34 PM

## 2021-08-28 ENCOUNTER — APPOINTMENT (OUTPATIENT)
Dept: GENERAL RADIOLOGY | Facility: HOSPITAL | Age: 81
End: 2021-08-28
Attending: HOSPITALIST
Payer: MEDICARE

## 2021-08-28 LAB
ANION GAP SERPL CALC-SCNC: 4 MMOL/L (ref 0–18)
ATRIAL RATE: 234 BPM
BASOPHILS # BLD AUTO: 0.07 X10(3) UL (ref 0–0.2)
BASOPHILS NFR BLD AUTO: 1.1 %
BUN BLD-MCNC: 15 MG/DL (ref 7–18)
CALCIUM BLD-MCNC: 8.5 MG/DL (ref 8.5–10.1)
CHLORIDE SERPL-SCNC: 109 MMOL/L (ref 98–112)
CO2 SERPL-SCNC: 26 MMOL/L (ref 21–32)
CREAT BLD-MCNC: 0.78 MG/DL
EOSINOPHIL # BLD AUTO: 0.4 X10(3) UL (ref 0–0.7)
EOSINOPHIL NFR BLD AUTO: 6.4 %
ERYTHROCYTE [DISTWIDTH] IN BLOOD BY AUTOMATED COUNT: 12.9 %
GLUCOSE BLD-MCNC: 84 MG/DL (ref 70–99)
HAV IGM SER QL: 2.1 MG/DL (ref 1.6–2.6)
HCT VFR BLD AUTO: 30.2 %
HGB BLD-MCNC: 10 G/DL
IMM GRANULOCYTES # BLD AUTO: 0.03 X10(3) UL (ref 0–1)
IMM GRANULOCYTES NFR BLD: 0.5 %
LYMPHOCYTES # BLD AUTO: 0.78 X10(3) UL (ref 1–4)
LYMPHOCYTES NFR BLD AUTO: 12.4 %
MCH RBC QN AUTO: 28.6 PG (ref 26–34)
MCHC RBC AUTO-ENTMCNC: 33.1 G/DL (ref 31–37)
MCV RBC AUTO: 86.3 FL
MONOCYTES # BLD AUTO: 0.83 X10(3) UL (ref 0.1–1)
MONOCYTES NFR BLD AUTO: 13.2 %
NEUTROPHILS # BLD AUTO: 4.18 X10 (3) UL (ref 1.5–7.7)
NEUTROPHILS # BLD AUTO: 4.18 X10(3) UL (ref 1.5–7.7)
NEUTROPHILS NFR BLD AUTO: 66.4 %
NT-PROBNP SERPL-MCNC: 1833 PG/ML (ref ?–450)
OSMOLALITY SERPL CALC.SUM OF ELEC: 288 MOSM/KG (ref 275–295)
PLATELET # BLD AUTO: 234 10(3)UL (ref 150–450)
POTASSIUM SERPL-SCNC: 3.8 MMOL/L (ref 3.5–5.1)
Q-T INTERVAL: 370 MS
QRS DURATION: 96 MS
QTC CALCULATION (BEZET): 457 MS
R AXIS: -18 DEGREES
RBC # BLD AUTO: 3.5 X10(6)UL
SODIUM SERPL-SCNC: 139 MMOL/L (ref 136–145)
T AXIS: -31 DEGREES
TROPONIN I SERPL-MCNC: <0.045 NG/ML (ref ?–0.04)
VENTRICULAR RATE: 92 BPM
WBC # BLD AUTO: 6.3 X10(3) UL (ref 4–11)

## 2021-08-28 PROCEDURE — 83880 ASSAY OF NATRIURETIC PEPTIDE: CPT | Performed by: HOSPITALIST

## 2021-08-28 PROCEDURE — 96376 TX/PRO/DX INJ SAME DRUG ADON: CPT

## 2021-08-28 PROCEDURE — 83735 ASSAY OF MAGNESIUM: CPT | Performed by: HOSPITALIST

## 2021-08-28 PROCEDURE — 93010 ELECTROCARDIOGRAM REPORT: CPT | Performed by: INTERNAL MEDICINE

## 2021-08-28 PROCEDURE — 85025 COMPLETE CBC W/AUTO DIFF WBC: CPT | Performed by: HOSPITALIST

## 2021-08-28 PROCEDURE — 96366 THER/PROPH/DIAG IV INF ADDON: CPT

## 2021-08-28 PROCEDURE — 71045 X-RAY EXAM CHEST 1 VIEW: CPT | Performed by: HOSPITALIST

## 2021-08-28 PROCEDURE — 84484 ASSAY OF TROPONIN QUANT: CPT | Performed by: HOSPITALIST

## 2021-08-28 PROCEDURE — 96375 TX/PRO/DX INJ NEW DRUG ADDON: CPT

## 2021-08-28 PROCEDURE — 94640 AIRWAY INHALATION TREATMENT: CPT

## 2021-08-28 PROCEDURE — 80048 BASIC METABOLIC PNL TOTAL CA: CPT | Performed by: HOSPITALIST

## 2021-08-28 PROCEDURE — 93005 ELECTROCARDIOGRAM TRACING: CPT

## 2021-08-28 RX ORDER — IPRATROPIUM BROMIDE AND ALBUTEROL SULFATE 2.5; .5 MG/3ML; MG/3ML
3 SOLUTION RESPIRATORY (INHALATION) EVERY 6 HOURS PRN
Status: DISCONTINUED | OUTPATIENT
Start: 2021-08-28 | End: 2021-08-29

## 2021-08-28 RX ORDER — SULFAMETHOXAZOLE AND TRIMETHOPRIM 800; 160 MG/1; MG/1
1 TABLET ORAL EVERY 12 HOURS SCHEDULED
Status: DISCONTINUED | OUTPATIENT
Start: 2021-08-28 | End: 2021-08-29

## 2021-08-28 NOTE — PROGRESS NOTES
Pt c/o dizziness when up in bathroom, put pt back in bed. BP stable. Pt states, \" I don't feel well. \"  RN asked pt how she feels, pt not able to indicate. Pt states, \"I just don't feel well. \"  Pt went back to sleep in bed.   Paged Dr. Joe Milton to info

## 2021-08-28 NOTE — PHYSICAL THERAPY NOTE
PT eval orders received, chart reviewed. Attempted to see pt for eval. Pt initially agreable, but then c/o nausea. Pt sits up to EOB without assist, then begins to dry heave. Pt returns to supine. Pt /76, HR 90 .  Pt positioned for comfort and pt cont

## 2021-08-28 NOTE — PLAN OF CARE
Pt A & O x3, hx dementia. Forgetful at times. /IS. DUNCAN with CPAP. Tele-afib. SCDs. Eliquis. Last BM 8/27. Cardiac diet. IVF. PT/OT. Up x sba/walker. Pt c/o \"dizziness\" when up in bathroom, VSS when put back in bed.   Pt does not want to sit u

## 2021-08-28 NOTE — PROGRESS NOTES
[facility] Hospitalist Progress Note     PCP: Herbert Baca MD    CC:  Follow up    SUBJECTIVE:  Laying on side in bed, next to emesis basin. Says she feels nauseated and started today. No emesis. Denies sob though appears mildly sob.  Otherwise no other com mg Oral TID   • levETIRAcetam  250 mg Oral BID   • lisinopril  20 mg Oral BID   • Memantine HCl  10 mg Oral BID   • pantoprazole  20 mg Oral QAM AC   • Pramipexole Dihydrochloride  0.25 mg Oral Nightly   • sertraline  200 mg Oral Daily   • meropenem  500 m

## 2021-08-28 NOTE — PLAN OF CARE
A/O VSS on room air. DUNCAN w/cpap tele. Voiding per bedside commode. Denies any pain. IV antibiotics as ordered. Plan of care reviewed. Bed alarm on. Call light within reach.

## 2021-08-28 NOTE — OCCUPATIONAL THERAPY NOTE
OT eval orders received, chart reviewed. Attempted to see pt for eval. Pt initially agreable, but then c/o nausea. Pt sits up to EOB without assist, then begins to dry heave. Pt returns to supine. Pt /76, HR 90 .  Pt positioned for comfort and pt cont

## 2021-08-28 NOTE — PROGRESS NOTES
Pharmacy Note: Renal dose adjustment of Bactrim    Sara Jimenez is a 78year old female who has been prescribed Bactrim 400/80 1 tab every 12 hours.   CrCl is 49.2 ml/min so the dose has been adjusted  to Bactrim 800/160 1 tab every 12 hours per hospital r

## 2021-08-29 ENCOUNTER — APPOINTMENT (OUTPATIENT)
Dept: CT IMAGING | Facility: HOSPITAL | Age: 81
End: 2021-08-29
Attending: HOSPITALIST
Payer: MEDICARE

## 2021-08-29 VITALS
TEMPERATURE: 99 F | DIASTOLIC BLOOD PRESSURE: 51 MMHG | BODY MASS INDEX: 21.67 KG/M2 | SYSTOLIC BLOOD PRESSURE: 122 MMHG | OXYGEN SATURATION: 97 % | WEIGHT: 130.06 LBS | HEART RATE: 88 BPM | RESPIRATION RATE: 18 BRPM | HEIGHT: 65 IN

## 2021-08-29 LAB
D-DIMER: 1.61 UG/ML FEU (ref ?–0.83)
ERYTHROCYTE [DISTWIDTH] IN BLOOD BY AUTOMATED COUNT: 13.2 %
HCT VFR BLD AUTO: 29.3 %
HGB BLD-MCNC: 10.1 G/DL
MCH RBC QN AUTO: 29 PG (ref 26–34)
MCHC RBC AUTO-ENTMCNC: 34.5 G/DL (ref 31–37)
MCV RBC AUTO: 84.2 FL
PLATELET # BLD AUTO: 231 10(3)UL (ref 150–450)
RBC # BLD AUTO: 3.48 X10(6)UL
WBC # BLD AUTO: 7.7 X10(3) UL (ref 4–11)

## 2021-08-29 PROCEDURE — 97161 PT EVAL LOW COMPLEX 20 MIN: CPT

## 2021-08-29 PROCEDURE — 71275 CT ANGIOGRAPHY CHEST: CPT | Performed by: HOSPITALIST

## 2021-08-29 PROCEDURE — 97165 OT EVAL LOW COMPLEX 30 MIN: CPT

## 2021-08-29 PROCEDURE — 85027 COMPLETE CBC AUTOMATED: CPT | Performed by: HOSPITALIST

## 2021-08-29 PROCEDURE — 97535 SELF CARE MNGMENT TRAINING: CPT

## 2021-08-29 PROCEDURE — 97530 THERAPEUTIC ACTIVITIES: CPT

## 2021-08-29 PROCEDURE — 96375 TX/PRO/DX INJ NEW DRUG ADDON: CPT

## 2021-08-29 PROCEDURE — 85379 FIBRIN DEGRADATION QUANT: CPT | Performed by: HOSPITALIST

## 2021-08-29 RX ORDER — SULFAMETHOXAZOLE AND TRIMETHOPRIM 800; 160 MG/1; MG/1
1 TABLET ORAL EVERY 12 HOURS SCHEDULED
Qty: 6 TABLET | Refills: 0 | Status: SHIPPED | OUTPATIENT
Start: 2021-08-29 | End: 2021-11-18

## 2021-08-29 NOTE — CM/SW NOTE
Francisco J spoke to daughter- Pt is current with [facility] for RN. ,PT and CNA. Daughter states they will need transport with lift assist to get her up the 7 stairs at home. Ok with [place] of [id number] [place] Last called they will send ambulance but bill at  TRISHA Jkae

## 2021-08-29 NOTE — PROGRESS NOTES
[facility] Hospitalist Progress Note     PCP: Reena Carranza MD    CC:  Follow up    SUBJECTIVE:  Sitting up in chair, feels great. No nausea, cp, sob, dizziness, palpitations. Wants to go home.  dw [employer]- upon exertion- HR to 120s and POX goes down to 40 mg Oral Daily   • carvedilol  25 mg Oral BID with meals   • donepezil  10 mg Oral Nightly   • docusate sodium  100 mg Oral BID   • hydrALAZINE  25 mg Oral TID   • levETIRAcetam  250 mg Oral BID   • lisinopril  20 mg Oral BID   • Memantine HCl  10 mg Ora pending brook [facility]    Questions/concerns were discussed with patient and/or family by bedside.          Thank Jayshree Shaw MD    [facility] Hospitalist  Answering Service number: [phone]

## 2021-08-29 NOTE — HOME CARE LIAISON
Patient is current with [facility] and will need JACINTA order (RN/PT/HHA) at discharge should status change to inpatient and [facility] be continued.

## 2021-08-29 NOTE — IMAGING NOTE
Pt has a topical iodine allergy listed in Epic. Pt cannot recall allergy to injectable Iodine,however we have a prior exam with a 13 hour premed for anaphylaxis in PACS. Called Dr. Rhonda Diaz. Ok to proceed with injection.  Immediately after pt has some tickle in

## 2021-08-29 NOTE — PROGRESS NOTES
Pt denies itchy throat, VSS. CTA results noted by ginny Byrd for Dc home. Called [facility] at this time for ride.

## 2021-08-29 NOTE — OCCUPATIONAL THERAPY NOTE
OCCUPATIONAL THERAPY QUICK EVALUATION - INPATIENT    Room Number: 351/351-A  Evaluation Date: 8/29/2021     Type of Evaluation: Quick Eval  Presenting Problem: fatigue, weakness, recent UTI diagnosis    Physician Order: IP Consult to Occupational Therapy hypertension    • Unspecified sleep apnea SPLIT 9-25-13    AHI 55 RDI 60  Sao2 giovana 88% CPAP 12 [employer]   • Visual impairment        Past Surgical History  Past Surgical History:   Procedure Laterality Date   • BYPASS SURGERY      4/28/13   • CHOLECYSTECT Restriction: None                PAIN ASSESSMENT  Ratin  Location: hip pain, reports chronic since hip fracture  Management Techniques: Activity promotion; Body mechanics;Breathing techniques;Relaxation;Repositioning    COGNITION  WFL, oriented x4, no d I; education on toileting and toilet transfer techniques, performed transfer with standby assist to commode (has at home) over standard height toilet with use of armrests required, toileting via SBA.  Patient also educated on OT role, safety, fall preventio Device Recommendations: None    PLAN   Patient has been evaluated and presents with no skilled Occupational Therapy needs at this time. Patient discharged from Occupational Therapy services.   Please re-order if a new functional limitation presents during

## 2021-08-29 NOTE — PLAN OF CARE
Noticed red marks on patients chest from the tele electrodes. Notified Dr Kailee Youssef to see if tele can be dc'd. Per Dr Kailee Youssef keep the tele on and she will inform Dr Yamel Davis in the morning.

## 2021-08-29 NOTE — PROGRESS NOTES
Spoke with ginny Christensen to discharge pt home. Called pt's daughter Humaira Berg and informed that pt is being discharged at this time. Ambulance here to take pt home. Script given to pt for bactrim.   Went over DC instructions with pt and her daughter on the ph

## 2021-08-29 NOTE — PHYSICAL THERAPY NOTE
PHYSICAL THERAPY EVALUATION - INPATIENT     Room Number: 351/351-A  Evaluation Date: 8/29/2021  Type of Evaluation: Initial  Physician Order: PT Eval and Treat    Presenting Problem: UTI  Reason for Therapy: Mobility Dysfunction and Discharge Plannin sleep apnea SPLIT 9-25-13    AHI 55 RDI 60  Sao2 giovana 88% CPAP 12 [employer]   • Visual impairment        Past Surgical History  Past Surgical History:   Procedure Laterality Date   • BYPASS SURGERY      4/28/13   • CHOLECYSTECTOMY     • COLONOSCOPY  7/2010 chronic)  Management Techniques: Activity promotion; Body mechanics;Repositioning    COGNITION  · Orientation Level:  oriented x4    RANGE OF MOTION AND STRENGTH ASSESSMENT  Upper extremity ROM and strength: see OT note    Lower extremity ROM is within func of session.   Mobility as follows:  Supine to sit: supervision  Sit to supine: supervision  Sit to stand : supervision with good sequencing noted for hand placment  Stand to sit: supervision again with good safety awareness for hand placement and alignment see 1-2 additional session to continue treatment for activity tolerance, balance and strengthening. Based on this evaluation, patient's clinical presentation is stable and overall the evaluation complexity is considered low.   These impairments and comor

## 2021-08-29 NOTE — CM/SW NOTE
SW spoke to RN- probable dc today. PT recs [facility]. Pt previously with [facility]- referral for [place] campus AT [facility] in [place]. SW left message for daughter.     Carolin Huff, [facility]  /Discharge Planner  [phone]

## 2021-09-01 NOTE — DISCHARGE SUMMARY
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   PATIENT STATED HISTORY: (As transcribed by Technologist)  Patient had unwitnessed fall walking out of bat to the [facility]) [address] ([address]) which includes the Dose Index Registry.   PATIENT STATED HISTORY:(As transcribed by Technologist)    CONTRAST USED:   FINDINGS:  VASCULATURE:  No visible pulmonary arterial t by (CST): Clemencia Ayala MD on 8/29/2021 at 4:32 PM       CT SPINE CERVICAL (CPT=72125)    Result Date: 8/25/2021  PROCEDURE:  CT SPINE CERVICAL (CPT=72125)  COMPARISON:  [facility] , CT, CT SPINE CERVICAL (CPT=72125), 7/14/2020, 5:12 PM.  [facility] , CT, CT SPINE XR CHEST AP PORTABLE  (CPT=71045)  TECHNIQUE:  AP chest radiograph was obtained.   COMPARISON:  [facility] , XR, XR CHEST AP PORTABLE  (CPT=71045), 7/11/2021, 9:29 PM.  INDICATIONS:  mildly sob  PATIENT STATED HISTORY: (As transcribed by Technologist)  Patient Tab  TAKE 1 TABLET(20 MG) BY MOUTH TWICE DAILY, Normal, Disp-180 tablet, R-0    ERGOCALCIFEROL 1.25 MG (85071 UT) Oral Cap  TAKE 1 CAPSULE BY MOUTH EVERY WEEK, Normal, Disp-12 capsule, R-0    TRAZODONE  MG Oral Tab  TAKE 1 TABLET(100 MG) BY MOUTH EV Tab  Take 1 tablet (25 mg total) by mouth 2 (two) times daily with meals. , Normal, Disp-60 tablet, R-11    ferrous sulfate 325 (65 FE) MG Oral Tab EC  Take 325 mg by mouth daily with breakfast., Historical    Diclofenac Sodium 1 % Transdermal Gel  Apply 2

## 2021-09-03 PROBLEM — F33.1 MODERATE EPISODE OF RECURRENT MAJOR DEPRESSIVE DISORDER (HCC): Status: ACTIVE | Noted: 2021-09-03

## 2021-11-13 ENCOUNTER — HOSPITAL ENCOUNTER (INPATIENT)
Facility: HOSPITAL | Age: 81
LOS: 5 days | Discharge: NURSING, REHAB OR LONG-TERM CARE | DRG: 690 | End: 2021-11-18
Attending: EMERGENCY MEDICINE | Admitting: HOSPITALIST
Payer: MEDICARE

## 2021-11-13 ENCOUNTER — APPOINTMENT (OUTPATIENT)
Dept: CT IMAGING | Facility: HOSPITAL | Age: 81
DRG: 690 | End: 2021-11-13
Attending: EMERGENCY MEDICINE
Payer: MEDICARE

## 2021-11-13 ENCOUNTER — APPOINTMENT (OUTPATIENT)
Dept: GENERAL RADIOLOGY | Facility: HOSPITAL | Age: 81
DRG: 690 | End: 2021-11-13
Attending: EMERGENCY MEDICINE
Payer: MEDICARE

## 2021-11-13 DIAGNOSIS — I48.91 ATRIAL FIBRILLATION WITH RAPID VENTRICULAR RESPONSE (HCC): Primary | ICD-10-CM

## 2021-11-13 DIAGNOSIS — F03.91 DEMENTIA WITH BEHAVIORAL DISTURBANCE, UNSPECIFIED DEMENTIA TYPE (HCC): ICD-10-CM

## 2021-11-13 DIAGNOSIS — R11.2 NON-INTRACTABLE VOMITING WITH NAUSEA, UNSPECIFIED VOMITING TYPE: ICD-10-CM

## 2021-11-13 DIAGNOSIS — E87.6 HYPOKALEMIA: ICD-10-CM

## 2021-11-13 PROCEDURE — 84484 ASSAY OF TROPONIN QUANT: CPT | Performed by: EMERGENCY MEDICINE

## 2021-11-13 PROCEDURE — 96365 THER/PROPH/DIAG IV INF INIT: CPT

## 2021-11-13 PROCEDURE — 87086 URINE CULTURE/COLONY COUNT: CPT | Performed by: EMERGENCY MEDICINE

## 2021-11-13 PROCEDURE — 80053 COMPREHEN METABOLIC PANEL: CPT | Performed by: EMERGENCY MEDICINE

## 2021-11-13 PROCEDURE — 74177 CT ABD & PELVIS W/CONTRAST: CPT | Performed by: EMERGENCY MEDICINE

## 2021-11-13 PROCEDURE — 87077 CULTURE AEROBIC IDENTIFY: CPT | Performed by: EMERGENCY MEDICINE

## 2021-11-13 PROCEDURE — 85610 PROTHROMBIN TIME: CPT | Performed by: EMERGENCY MEDICINE

## 2021-11-13 PROCEDURE — 93010 ELECTROCARDIOGRAM REPORT: CPT

## 2021-11-13 PROCEDURE — 99285 EMERGENCY DEPT VISIT HI MDM: CPT

## 2021-11-13 PROCEDURE — 87186 SC STD MICRODIL/AGAR DIL: CPT | Performed by: EMERGENCY MEDICINE

## 2021-11-13 PROCEDURE — 83690 ASSAY OF LIPASE: CPT | Performed by: EMERGENCY MEDICINE

## 2021-11-13 PROCEDURE — 83735 ASSAY OF MAGNESIUM: CPT | Performed by: EMERGENCY MEDICINE

## 2021-11-13 PROCEDURE — 85730 THROMBOPLASTIN TIME PARTIAL: CPT | Performed by: EMERGENCY MEDICINE

## 2021-11-13 PROCEDURE — 96361 HYDRATE IV INFUSION ADD-ON: CPT

## 2021-11-13 PROCEDURE — 71045 X-RAY EXAM CHEST 1 VIEW: CPT | Performed by: EMERGENCY MEDICINE

## 2021-11-13 PROCEDURE — 96375 TX/PRO/DX INJ NEW DRUG ADDON: CPT

## 2021-11-13 PROCEDURE — 93005 ELECTROCARDIOGRAM TRACING: CPT

## 2021-11-13 PROCEDURE — 85025 COMPLETE CBC W/AUTO DIFF WBC: CPT | Performed by: EMERGENCY MEDICINE

## 2021-11-13 PROCEDURE — 81001 URINALYSIS AUTO W/SCOPE: CPT | Performed by: EMERGENCY MEDICINE

## 2021-11-13 RX ORDER — HYDRALAZINE HYDROCHLORIDE 50 MG/1
25 TABLET, FILM COATED ORAL ONCE
Status: COMPLETED | OUTPATIENT
Start: 2021-11-13 | End: 2021-11-13

## 2021-11-13 RX ORDER — DONEPEZIL HYDROCHLORIDE 10 MG/1
10 TABLET, FILM COATED ORAL NIGHTLY
Status: DISCONTINUED | OUTPATIENT
Start: 2021-11-14 | End: 2021-11-18

## 2021-11-13 RX ORDER — ONDANSETRON 2 MG/ML
4 INJECTION INTRAMUSCULAR; INTRAVENOUS ONCE
Status: COMPLETED | OUTPATIENT
Start: 2021-11-13 | End: 2021-11-13

## 2021-11-13 RX ORDER — LISINOPRIL 20 MG/1
20 TABLET ORAL 2 TIMES DAILY
Status: DISCONTINUED | OUTPATIENT
Start: 2021-11-14 | End: 2021-11-18

## 2021-11-13 RX ORDER — ONDANSETRON 2 MG/ML
4 INJECTION INTRAMUSCULAR; INTRAVENOUS EVERY 4 HOURS PRN
Status: ACTIVE | OUTPATIENT
Start: 2021-11-13 | End: 2021-11-13

## 2021-11-13 RX ORDER — POTASSIUM CHLORIDE 20 MEQ/1
40 TABLET, EXTENDED RELEASE ORAL ONCE
Status: COMPLETED | OUTPATIENT
Start: 2021-11-13 | End: 2021-11-13

## 2021-11-13 RX ORDER — CARVEDILOL 12.5 MG/1
25 TABLET ORAL 2 TIMES DAILY WITH MEALS
Status: DISCONTINUED | OUTPATIENT
Start: 2021-11-14 | End: 2021-11-18

## 2021-11-13 RX ORDER — ALBUTEROL SULFATE 90 UG/1
2 AEROSOL, METERED RESPIRATORY (INHALATION) EVERY 6 HOURS PRN
Status: DISCONTINUED | OUTPATIENT
Start: 2021-11-13 | End: 2021-11-18

## 2021-11-13 RX ORDER — LISINOPRIL 20 MG/1
20 TABLET ORAL ONCE
Status: COMPLETED | OUTPATIENT
Start: 2021-11-13 | End: 2021-11-13

## 2021-11-13 RX ORDER — PRAMIPEXOLE DIHYDROCHLORIDE 0.25 MG/1
0.25 TABLET ORAL NIGHTLY
Status: DISCONTINUED | OUTPATIENT
Start: 2021-11-14 | End: 2021-11-18

## 2021-11-13 RX ORDER — CLONAZEPAM 0.5 MG/1
1 TABLET ORAL NIGHTLY
Status: DISCONTINUED | OUTPATIENT
Start: 2021-11-14 | End: 2021-11-18

## 2021-11-13 RX ORDER — HYDRALAZINE HYDROCHLORIDE 50 MG/1
25 TABLET, FILM COATED ORAL EVERY 8 HOURS SCHEDULED
Status: DISCONTINUED | OUTPATIENT
Start: 2021-11-13 | End: 2021-11-13

## 2021-11-13 RX ORDER — DOCUSATE SODIUM 100 MG/1
100 CAPSULE, LIQUID FILLED ORAL 2 TIMES DAILY
Status: DISCONTINUED | OUTPATIENT
Start: 2021-11-14 | End: 2021-11-18

## 2021-11-13 RX ORDER — IPRATROPIUM BROMIDE AND ALBUTEROL SULFATE 2.5; .5 MG/3ML; MG/3ML
3 SOLUTION RESPIRATORY (INHALATION) EVERY 4 HOURS PRN
Status: DISCONTINUED | OUTPATIENT
Start: 2021-11-13 | End: 2021-11-18

## 2021-11-13 RX ORDER — HYDRALAZINE HYDROCHLORIDE 25 MG/1
25 TABLET, FILM COATED ORAL 3 TIMES DAILY
Status: DISCONTINUED | OUTPATIENT
Start: 2021-11-14 | End: 2021-11-14

## 2021-11-13 RX ORDER — LEVETIRACETAM 250 MG/1
250 TABLET ORAL 2 TIMES DAILY
Status: DISCONTINUED | OUTPATIENT
Start: 2021-11-14 | End: 2021-11-18

## 2021-11-13 RX ORDER — CARVEDILOL 12.5 MG/1
25 TABLET ORAL ONCE
Status: COMPLETED | OUTPATIENT
Start: 2021-11-13 | End: 2021-11-13

## 2021-11-13 RX ORDER — PANTOPRAZOLE SODIUM 20 MG/1
20 TABLET, DELAYED RELEASE ORAL
Status: DISCONTINUED | OUTPATIENT
Start: 2021-11-14 | End: 2021-11-18

## 2021-11-13 RX ORDER — SODIUM CHLORIDE 9 MG/ML
125 INJECTION, SOLUTION INTRAVENOUS CONTINUOUS
Status: DISCONTINUED | OUTPATIENT
Start: 2021-11-13 | End: 2021-11-15

## 2021-11-13 RX ORDER — CARVEDILOL 12.5 MG/1
25 TABLET ORAL 2 TIMES DAILY WITH MEALS
Status: DISCONTINUED | OUTPATIENT
Start: 2021-11-13 | End: 2021-11-13

## 2021-11-13 RX ORDER — ATORVASTATIN CALCIUM 40 MG/1
40 TABLET, FILM COATED ORAL DAILY
Status: DISCONTINUED | OUTPATIENT
Start: 2021-11-14 | End: 2021-11-18

## 2021-11-13 RX ORDER — ACETAMINOPHEN 500 MG
1000 TABLET ORAL EVERY 8 HOURS PRN
Status: DISCONTINUED | OUTPATIENT
Start: 2021-11-13 | End: 2021-11-18

## 2021-11-13 RX ORDER — MEMANTINE HYDROCHLORIDE 10 MG/1
10 TABLET ORAL 2 TIMES DAILY
Status: DISCONTINUED | OUTPATIENT
Start: 2021-11-14 | End: 2021-11-18

## 2021-11-13 NOTE — ED PROVIDER NOTES
Patient Seen in: [facility] Emergency Department      History   Patient presents with:  Nausea/Vomiting/Diarrhea    Stated Complaint: vomiting    Subjective:   HPI    Patient is an 24-year-old female, who arrives via EMS for evaluation of nausea/vom Procedure: COLONOSCOPY;  Surgeon: Eleonora Ambriz MD;  Location: [facility] ENDOSCOPY   • HIP REPLACEMENT SURGERY     • HYSTERECTOMY     • OPEN HEART SURGERY (PBP)      2002 CABG x 2   • OTHER SURGICAL HISTORY      pacemaker   • OTHER SURGICAL HISTORY      L SINUS TI Bowel sounds present. SKIN: Skin is pink warm and dry. There are no rashes. EXTREMITIES: The patient moves all 4 extremities freely. No cyanosis, clubbing, or edema. NEUROLOGIC: The patient is awake, alert, and oriented x3.  Cranial nerves are grossly -----------         ------                     CBC W/ DIFFERENTIAL[[id number]]          Abnormal            Final result                 Please view results for these tests on the individual orders.    URINE CULTURE, ROUTINE   C. DIFFICILE(TOXIGENIC)PCR   OC HISTORY:(As transcribed by Technologist)  Patient has had persistent nausea and vomiting. CONTRAST USED:  80cc of Omnipaque 350  FINDINGS:  LIVER:  No enlargement, atrophy, abnormal density, or significant focal lesion.   BILIARY:  No visible dilatation o does raise the possibility of a pathologic compression fracture. This is stable since 8/29/2021. LUNG BASES:  No visible pulmonary or pleural disease. CONCLUSION:   1. No acute abdominal pelvic process.    2. Diverticulosis of the sigmoid colon urinalysis is consistent with UTI, with contaminated collection technique. IV Rocephin was administered for empiric treatment. Patient also had 2 episodes of diarrhea while in the ED. This will be sent for stool culture/C. difficile testing.     Prior

## 2021-11-13 NOTE — ED INITIAL ASSESSMENT (HPI)
Arrives via [facility] from home with c/o vomiting x 24 hours. Other people in the home have the same symptoms. EMS administered Zofran IV en route.

## 2021-11-13 NOTE — ED QUICK NOTES
CT calls [facility], reports that patient has iodine topical allergy.  EDMD Ensing wishes to proceed with CT with IV contrast.

## 2021-11-14 ENCOUNTER — APPOINTMENT (OUTPATIENT)
Dept: CV DIAGNOSTICS | Facility: HOSPITAL | Age: 81
DRG: 690 | End: 2021-11-14
Attending: INTERNAL MEDICINE
Payer: MEDICARE

## 2021-11-14 PROCEDURE — 87427 SHIGA-LIKE TOXIN AG IA: CPT | Performed by: EMERGENCY MEDICINE

## 2021-11-14 PROCEDURE — 85025 COMPLETE CBC W/AUTO DIFF WBC: CPT | Performed by: HOSPITALIST

## 2021-11-14 PROCEDURE — 87045 FECES CULTURE AEROBIC BACT: CPT | Performed by: EMERGENCY MEDICINE

## 2021-11-14 PROCEDURE — 93306 TTE W/DOPPLER COMPLETE: CPT | Performed by: INTERNAL MEDICINE

## 2021-11-14 PROCEDURE — 82272 OCCULT BLD FECES 1-3 TESTS: CPT | Performed by: EMERGENCY MEDICINE

## 2021-11-14 PROCEDURE — 80048 BASIC METABOLIC PNL TOTAL CA: CPT | Performed by: HOSPITALIST

## 2021-11-14 PROCEDURE — 87046 STOOL CULTR AEROBIC BACT EA: CPT | Performed by: EMERGENCY MEDICINE

## 2021-11-14 PROCEDURE — 87493 C DIFF AMPLIFIED PROBE: CPT | Performed by: EMERGENCY MEDICINE

## 2021-11-14 PROCEDURE — 94640 AIRWAY INHALATION TREATMENT: CPT

## 2021-11-14 RX ORDER — ONDANSETRON 2 MG/ML
4 INJECTION INTRAMUSCULAR; INTRAVENOUS EVERY 6 HOURS PRN
Status: DISCONTINUED | OUTPATIENT
Start: 2021-11-14 | End: 2021-11-18

## 2021-11-14 NOTE — PLAN OF CARE
I assumed care of this patient at 0730. Vitals stable, RA, paced, and afebrile. Pt is resting. Call light within reach. Hospitalist and Cardiologist at bedside.

## 2021-11-14 NOTE — PLAN OF CARE
Problem: Patient/Family Goals  Goal: Patient/Family Short Term Goal  Description: Patient's Short Term Goal: strength    Interventions:   - Pt evaluation  - See additional Care Plan goals for specific interventions  Note: Pt safety, decreased falls at [facility]

## 2021-11-14 NOTE — H&P
[facility] Hospitalist Team  History and Physical       Assessment/Plan:     #N/V/D  -seems improved currently  -ivf given, zofran prn  -stool culture, cdiff if diarrhea  -CLD advance as tolerated  -CT abdo no acute findings    #Afib with RVR  -improved  -cards o impairment     no hearing aids   • HYPERLIPIDEMIA    • HYPERTENSION    • Insomnia, unspecified    • MCI (mild cognitive impairment) 2013    stable on Aricept   • Neuropathy 1/31/2018   • Occlusion and stenosis of carotid artery without mention of cerebral L-A]          Comment:SHORTNESS OF BREATH  Pollen                    Quinolones                Shellfish               ANAPHYLAXIS  Shellfish-Derived P*    OTHER (SEE COMMENTS)     No current outpatient medications on file.       Social History    Tobacco U symmetrical, trachea midline   Lungs:   Clear to auscultation bilaterally. Normal effort   Chest wall:  No tenderness or deformity   Heart:  IRIR   Abdomen:   Soft, non-tender. Bowel sounds normal. No masses,  No organomegaly.  Non distended   Extremities: ORAL (ER)  COMPARISON:  [facility] , CT, CT ANGIOGRAPHY, CHEST (CPT=71275), 8/29/2021, 3:25 PM.  [facility] , CT, CT ABDOMEN+PELVIS(CPT=74176), 3/19/2018, 10:05 PM.  INDICATIONS:  vomiting  TECHNIQUE:  CT scanning was performed from the dome of the diaphragm to th diverticulitis. The visualized small bowel is unremarkable. ABDOMINAL WALL:  No mass or hernia. URINARY BLADDER:  No visible focal wall thickening, lesion, or calculus.   Small amount a gas within the urinary bladder could be related to catheterization an

## 2021-11-14 NOTE — ED QUICK NOTES
Orders for admission, patient is aware of plan and ready to go upstairs. Any questions, please call ED WILY Cochran at [address] [facility]. Vaccinated?  Yes  Type of COVID test sent: Rapid  COVID Suspicion level: Low      Titratable drug(s) infusing: NA  Rate:

## 2021-11-14 NOTE — CONSULTS
[facility] Cardiology Consultation    Jaja Sarmiento Patient Status:  Inpatient    8/10/1940 MRN [id number]   [facility] 7NE-A Attending Lus Bloch, [address] [facility] Day # 1 PCP Eddie Mendoza MD     Reason for Consultation:  Afib with RVR essential hypertension    • Unspecified sleep apnea SPLIT 9-25-13    AHI 55 RDI 60  Sao2 giovana 88% CPAP 12 [employer]   • Visual impairment      Past Surgical History:   Procedure Laterality Date   • BYPASS SURGERY      4/28/13   • CHOLECYSTECTOMY     • COLON Nightly   • docusate sodium  100 mg Oral BID   • donepezil  10 mg Oral Nightly   • apixaban  2.5 mg Oral BID   • fluticasone furoate-vilanterol  1 puff Inhalation Daily   • hydrALAZINE  25 mg Oral TID   • levETIRAcetam  250 mg Oral BID   • lisinopril  20 m 11/13/21  1530   ALT 21   AST 17   ALB 3.8       Recent Labs   Lab 11/13/21  1654   PTP 14.3   INR 1.09       Recent Labs   Lab 11/13/21  1530   TROP <0.045         Impression:   1. N/V/diarrhea - per hospitalist.  2. Prior CABG  3.  Chronic afib/PPM -rate

## 2021-11-14 NOTE — PLAN OF CARE
NURSING ADMISSION NOTE      Patient admitted via Cart  Oriented to room. Safety precautions initiated. Bed in low position. Call light in reach. Pt alert 2-3  RA, Afib.  VSS  Multiple small loose bm  C/o nausea and stomach cramping  Stool samples

## 2021-11-15 PROCEDURE — 84145 PROCALCITONIN (PCT): CPT | Performed by: INTERNAL MEDICINE

## 2021-11-15 PROCEDURE — 97116 GAIT TRAINING THERAPY: CPT

## 2021-11-15 PROCEDURE — 80048 BASIC METABOLIC PNL TOTAL CA: CPT | Performed by: INTERNAL MEDICINE

## 2021-11-15 PROCEDURE — 36410 VNPNXR 3YR/> PHY/QHP DX/THER: CPT

## 2021-11-15 PROCEDURE — 85025 COMPLETE CBC W/AUTO DIFF WBC: CPT | Performed by: INTERNAL MEDICINE

## 2021-11-15 PROCEDURE — 97162 PT EVAL MOD COMPLEX 30 MIN: CPT

## 2021-11-15 PROCEDURE — 76937 US GUIDE VASCULAR ACCESS: CPT

## 2021-11-15 RX ORDER — LOPERAMIDE HYDROCHLORIDE 2 MG/1
4 CAPSULE ORAL EVERY 6 HOURS PRN
Status: DISCONTINUED | OUTPATIENT
Start: 2021-11-15 | End: 2021-11-18

## 2021-11-15 RX ORDER — DIPHENHYDRAMINE HCL 25 MG
25 CAPSULE ORAL EVERY 6 HOURS PRN
Status: DISCONTINUED | OUTPATIENT
Start: 2021-11-15 | End: 2021-11-18

## 2021-11-15 RX ORDER — SODIUM CHLORIDE 9 MG/ML
INJECTION, SOLUTION INTRAVENOUS CONTINUOUS
Status: DISCONTINUED | OUTPATIENT
Start: 2021-11-15 | End: 2021-11-18

## 2021-11-15 NOTE — PLAN OF CARE
Assumed care 1900  Patient alert, oriented x4  Drowsy after nighttime klonopin  No loose stools  Cream applied to buttocks for pain  Fall precautions in place

## 2021-11-15 NOTE — PROGRESS NOTES
11/15/21 1150   Clinical Encounter Type   Visited With Patient   Routine Visit Introduction   Continue Visiting No   Mormon Encounters   Mormon Needs Prayer    listened to patient, provided pastoral care and prayed with patient for peace a

## 2021-11-15 NOTE — CM/SW NOTE
11/15/21 1400   CM/SW Referral Data   Referral Source Social Work (self-referral)   Reason for Referral Discharge planning   Informant Patient; Son   Patient [address]   Number of Levels in Home 3   Patient lives with Son;Da

## 2021-11-15 NOTE — PHYSICAL THERAPY NOTE
PHYSICAL THERAPY EVALUATION - INPATIENT     Room Number: 4611/2176-S  Evaluation Date: 11/15/2021  Type of Evaluation: Initial  Physician Order: PT Eval and Treat    Presenting Problem: N&V, Afib with RVR     Reason for Therapy: Mobility Dysfunction evaluation, patient's clinical presentation is evolving and overall the evaluation complexity is considered moderate. DISCHARGE RECOMMENDATIONS  PT Discharge Recommendations: Sub-acute rehabilitation    PLAN  PT Treatment Plan: Bed mobility; Body mechani Judgement:  decreased awareness of need for safety    RANGE OF MOTION AND STRENGTH ASSESSMENT  Upper extremity ROM and strength are within functional limits     Lower extremity ROM is within functional limits     Lower extremity strength is within function no chair was present     Exercise/Education Provided:  Bed mobility  Body mechanics  Energy conservation  Functional activity tolerated  Gait training  Transfer training    Patient End of Session: In bed;Needs met;Call light within reach;RN aware of alexis

## 2021-11-15 NOTE — PROGRESS NOTES
[facility] Hospitalist Team  Progress Note      Ifeanyi Jarrell  8/10/1940    Assessment/Plan:         #N/V/D  -seems improved currently, suspect viral etiology  -ivf given, zofran prn  -stool culture and cdiff negative  -CLD advance as tolerated  -CT abdo no acute 23* 29* 19*   CREATSERUM 1.07* 1.02 1.07*   CA 9.9 9.0 8.9   MG 2.3  --   --    * 113* 150*       Recent Labs   Lab 11/13/21  1530   ALT 21   AST 17   ALB 3.8       No results for input(s): PGLU in the last 168 hours.     Meds:   Scheduled:   • cefTR

## 2021-11-16 PROCEDURE — 97530 THERAPEUTIC ACTIVITIES: CPT

## 2021-11-16 PROCEDURE — 97165 OT EVAL LOW COMPLEX 30 MIN: CPT

## 2021-11-16 NOTE — CM/SW NOTE
Went to give pt her [facility] choice list and she asked that I speak to her son Mimi Kraus about [facility] choice. Eulalia Palacios who said pt like [facility] in the past and would be happy to go there. [facility] reserved in [place].

## 2021-11-16 NOTE — OCCUPATIONAL THERAPY NOTE
OCCUPATIONAL THERAPY EVALUATION - INPATIENT     Room Number: 5767/2983-H  Evaluation Date: 11/16/2021  Type of Evaluation: Initial  Presenting Problem: AF with rapid ventricular response    Physician Order: IP Consult to Occupational Therapy  Reason for Th CPAP 08/13/2019   • Other and unspecified hyperlipidemia    • Peripheral vascular disease, unspecified ([facility]    • Pulmonary embolism ([facility]    • SAH (subarachnoid hemorrhage) ([facility] 11/22/2017   • Seizure disorder ([facility]    • Thrombocytopenia ([facility] 10/31/2019 Relaxation;Repositioning    COGNITION  Arousal/Alertness:  appropriate responses to stimuli  Attention Span:  difficulty dividing attention  Orientation Level:  oriented to place, oriented to situation and oriented to person  Memory:  impaired working alexia ASSESSMENT  Supine to Sit : Contact guard assist  Sit to Stand: Contact guard assist    Skilled Therapy Provided:   Patient received in commode next to bed, she reported that she transferred herself onto commode . OT assisted patient with CGA to stand.  Damaso Betancur Activity Short Form was completed and  this patient  is demonstrating a  47% degree impairment in activities of daily living. Research supports that patients with this level of impairment often benefit from home. Recommend [facility] when stable for discharge.

## 2021-11-16 NOTE — PROGRESS NOTES
[facility] Hospitalist Team  Progress Note      Rafael Mchugh  8/10/1940    Assessment/Plan:         #N/V/D  -very slightly improved   -ivf given, zofran prn  -stool culture and cdiff negative  -CLD advance as tolerated  -CT abdo no acute findings  -start gentle 150*       Recent Labs   Lab 11/13/21  1530   ALT 21   AST 17   ALB 3.8       No results for input(s): PGLU in the last 168 hours.     Meds:   Scheduled:   • cefTRIAXone  1 g Intravenous Q24H   • atorvastatin  40 mg Oral Daily   • carvedilol  25 mg Oral BID

## 2021-11-16 NOTE — PLAN OF CARE
Assumed care at 0700  Pt AxOx4, forgetful and drowsy at times  A-fib/v-paced on tele, HR stable  Room air  C/o HA and back pain, tylenol given w/ relief  C/o nausea. Improvement w/ zofran. Multiple loose BM's.  Imodium given  Tolerating soft diet  IVF and

## 2021-11-16 NOTE — PROGRESS NOTES
[facility] Cardiology  Progress Note    Nikkie Bolus Patient Status:  Inpatient    8/10/1940 MRN [id number]   [facility] 7NE-A Attending Adriano Leone MD   Hosp Day # 3 PCP Enrique Agudelo MD     Subjective:   No chest mary jane HALLUCINATION  Bees                    ANAPHYLAXIS  Carbapenems               Cephalosporins            Chocolate                 Cipro [Ciprofloxaci*        Comment:SEVERE RECTAL BLEEDING  [employer]               OTHER (SEE COMMENTS)  Eggs Or Egg-Derived* 92  --   --    AST 17  --   --    ALT 21  --   --    BILT 0.7  --   --    TP 8.5*  --   --        Recent Labs   Lab 11/13/21  1530 11/14/21  0546 11/15/21  1018   RBC 5.51* 4.56 4.44   HGB 15.9 13.2 12.8   HCT 45.1 39.0 37.6   MCV 81.9 85.5 84.7   MCH 28. 9 IV fluids  4. Advance diet as tolerates  5. Statin  6. Monitor volume status.   7.  Monitor rate - controlled on present Rx.  8.  Continue to hold antihypertensives    Aleksandra Coleman MD  11/16/2021

## 2021-11-16 NOTE — PROGRESS NOTES
[facility] Cardiology  Progress Note    Makayla Jaramillo Patient Status:  Inpatient    8/10/1940 MRN [id number]   [facility] 7NE-A Attending Shanae Burroughs MD   Hosp Day # 3 PCP Malgorzata Owens MD     Subjective:   Weak.   No ch ANAPHYLAXIS  Carbapenems               Cephalosporins            Chocolate                 Cipro [Ciprofloxaci*        Comment:SEVERE RECTAL BLEEDING  [employer]               OTHER (SEE COMMENTS)  Eggs Or Egg-Derived*    OTHER (SEE COMMENTS)  Iodine (Top ALT 21  --   --    BILT 0.7  --   --    TP 8.5*  --   --        Recent Labs   Lab 11/13/21  1530 11/14/21  0546 11/15/21  1018   RBC 5.51* 4.56 4.44   HGB 15.9 13.2 12.8   HCT 45.1 39.0 37.6   MCV 81.9 85.5 84.7   MCH 28.9 28.9 28.8   MCHC 35.3 33.8 34. 0 tolerates  5. Statin  6. Monitor volume status.     Maura Serrano MD  11/16/2021  3:26 PM

## 2021-11-17 PROCEDURE — 97116 GAIT TRAINING THERAPY: CPT

## 2021-11-17 PROCEDURE — 97530 THERAPEUTIC ACTIVITIES: CPT

## 2021-11-17 NOTE — PROGRESS NOTES
[facility] Cardiology  Progress Note    Rafael Mchugh Patient Status:  Inpatient    8/10/1940 MRN [id number]   [facility] 7NE-A Attending Hawk Alexandra MD   Hosp Day # 4 PCP Haleigh Martin MD     Subjective:   Feels a bit HALLUCINATION  Hydrocodone             HALLUCINATION  Hydrocodone-Acetami*    HALLUCINATION  Bees                    ANAPHYLAXIS  Carbapenems               Cephalosporins            Chocolate                 Cipro [Ciprofloxaci*        Comment:SEVERE RECTA  136 137   K 3.3* 3.9 3.5    103 103   CO2 28.0 25.0 27.0   ALKPHO 92  --   --    AST 17  --   --    ALT 21  --   --    BILT 0.7  --   --    TP 8.5*  --   --        Recent Labs   Lab 11/13/21  1530 11/14/21  0546 11/15/21  1018   RBC 5.51* 4. NL LV function      Plan:   1. Continue current BB rate control  2. Eliquis  3. Advance diet as tolerates  4. Statin  5. Monitor volume status.   6.  Monitor rate - controlled on present Rx.  7.  Continue to hold antihypertensives   -May require reintr

## 2021-11-17 NOTE — PROGRESS NOTES
[facility] Hospitalist Team  Progress Note      Agnes Humphreys  8/10/1940    Assessment/Plan:         #N/V/D  -improved sig since yesterday and has an apptetie  -ivf given, zofran prn, immodium, stool now with some consistencey  -stool culture and cdiff negative 11/15/21  1018    136 137   K 3.3* 3.9 3.5    103 103   CO2 28.0 25.0 27.0   BUN 23* 29* 19*   CREATSERUM 1.07* 1.02 1.07*   CA 9.9 9.0 8.9   MG 2.3  --   --    * 113* 150*       Recent Labs   Lab 11/13/21  1530   ALT 21   AST 17   ALB 3

## 2021-11-17 NOTE — PLAN OF CARE
Patient with redness to chest from Tele stickers, sensitive stickers applied.  Patient complains of itching this AM, Benadryl given per request. Patient also with continued diarrhea, immodium given and IV Zofran given for nausea as patient complains this mo

## 2021-11-17 NOTE — PLAN OF CARE
Assumed care at 0730. Alert and orientated X3-4. A-fib/ v-paced on tele, RA. Reports 4/10 back pain. Patient denies pain medications. Complaint of nausea, Zofran given with relief. IV fluids infusing per order. Patient updated on plan of care.   Call

## 2021-11-17 NOTE — PHYSICAL THERAPY NOTE
PHYSICAL THERAPY TREATMENT NOTE - INPATIENT    Room Number: 6584/4014-U     Session: 1  History related to current admission: Patient is a 78year old female admitted on 11/13/2021 from home for N&V, noted to be in Afib with RVR. Pt diagnosed with UTI. supervision assistance.     Goal #5     Goal #6     Goal Comments: Goals established on 11/15/2021         11/17/2021 all goals ongoing      SUBJECTIVE    \"I'm not awake enough to know if I'm in any pain or not\"    OBJECTIVE  Precautions: Bed/chair alarm safety       THERAPEUTIC EXERCISES: LAQ, ankle pumps, seated marching x5 each side     Patient End of Session: In bed;Needs met;Call light within reach; All patient questions and concerns addressed; Alarm set       Therapist PPE: surgical mask, goggles and g

## 2021-11-18 VITALS
HEIGHT: 65 IN | TEMPERATURE: 98 F | BODY MASS INDEX: 21.67 KG/M2 | WEIGHT: 130.06 LBS | SYSTOLIC BLOOD PRESSURE: 161 MMHG | OXYGEN SATURATION: 96 % | DIASTOLIC BLOOD PRESSURE: 72 MMHG | HEART RATE: 96 BPM | RESPIRATION RATE: 16 BRPM

## 2021-11-18 PROCEDURE — 80048 BASIC METABOLIC PNL TOTAL CA: CPT | Performed by: HOSPITALIST

## 2021-11-18 PROCEDURE — 85025 COMPLETE CBC W/AUTO DIFF WBC: CPT | Performed by: HOSPITALIST

## 2021-11-18 RX ORDER — CLONAZEPAM 1 MG/1
1 TABLET ORAL NIGHTLY PRN
Qty: 7 TABLET | Refills: 0 | Status: SHIPPED | OUTPATIENT
Start: 2021-11-18 | End: 2021-11-25

## 2021-11-18 RX ORDER — PANTOPRAZOLE SODIUM 40 MG/1
40 TABLET, DELAYED RELEASE ORAL
COMMUNITY
End: 2022-01-13

## 2021-11-18 NOTE — CM/SW NOTE
11/18/21 1224   Discharge disposition   Expected discharge disposition [address] Austyn Provider [address] [facility]   Discharge transportation [employer]     Patient clear for dc. Call to Carmen at [facility], can admit today.  Require COVID

## 2021-11-18 NOTE — PROGRESS NOTES
[facility] Cardiology  Progress Note    Niall Box Patient Status:  Inpatient    8/10/1940 MRN [id number]   [facility] 7NE-A Attending Maryanne Coreas MD   Hosp Day # 5 PCP Phoebe Méndez MD     Subjective:   Denies CV is kg)      Allergies:    Codeine                 HALLUCINATION  Hydrocodone             HALLUCINATION  Hydrocodone-Acetami*    HALLUCINATION  Bees                    ANAPHYLAXIS  Carbapenems               Cephalosporins            Chocolate                 C 1.07*   < > 1.02 1.07* 0.92   GFRAA 55*   < > 59* 55* 67   GFRNAA 48*   < > 51* 48* 58*   CA 9.9   < > 9.0 8.9 8.4*   ALB 3.8  --   --   --   --       < > 136 137 141   K 3.3*   < > 3.9 3.5 4.0      < > 103 103 111   CO2 28.0   < > 25.0 27.0 25 (greater than or equal to 50%). 9. Pericardium, extracardiac: There was no pericardial effusion. Assessment:    1. Chronic AFib   -Rate controlled at this time   -On Eliquis  2. CAD   -S/P CABG  3. HTN   -Rx on hold  4. HL   -On statin  5.   DUNCAN

## 2021-11-18 NOTE — PLAN OF CARE
Assumed care at 1930  Pt A&Ox4   RA VSS  Pt developed a rash from tele stickers, hospitalist notified benadryl given  No c/o nausea or pain   POC discussed with pt  Call light in reach, pt resting comfortably, will continue to monitor

## 2021-11-18 NOTE — PROGRESS NOTES
[facility] Hospitalist Team  Progress Note      Sheri Breath  8/10/1940    Assessment/Plan:         #N/V/D  -improved  -stool culture and cdiff negative  -CT abdo no acute findings  -likely viral- GI array pending       #Afib with RVR  -improved  -cards on consu No results for input(s): PGLU in the last 168 hours.     Meds:   Scheduled:   • cefTRIAXone  1 g Intravenous Q24H   • atorvastatin  40 mg Oral Daily   • carvedilol  25 mg Oral BID with meals   • clonazePAM  1 mg Oral Nightly   • docusate sodium  100 m

## 2021-11-19 NOTE — DISCHARGE SUMMARY
General Medicine Discharge Summary     Patient ID:  Jo Ann Magaña  78year old  8/10/1940    Admit date: 11/13/2021    Discharge date and time: 11/18/2021  3:30 PM     Attending Physician: No att. providers found     Consults: IP CONSULT TO CARDIOLOGY  IP mouth daily.      butalbital-acetaminophen-caffeine -40 MG Tabs  Commonly known as: FIORICET  TAKE 1 TABLET BY MOUTH DAILY AS NEEDED FOR HEADACHE     carvedilol 25 MG Tabs  Commonly known as: COREG  Take 1 tablet (25 mg total) by mouth 2 (two) times d known as: DESYREL  Take 1 tablet (100 mg total) by mouth nightly as needed for Sleep. Umeclidinium Bromide 62.5 MCG/INH Aepb  Commonly known as: Incruse Ellipta  Inhale 1 puff into the lungs daily.      Ventolin  (90 Base) MCG/ACT Aers  Generic d

## 2021-12-16 PROBLEM — I34.0 NONRHEUMATIC MITRAL VALVE REGURGITATION: Status: ACTIVE | Noted: 2021-12-16

## 2021-12-16 PROBLEM — I48.91 ATRIAL FIBRILLATION WITH RAPID VENTRICULAR RESPONSE (HCC): Status: RESOLVED | Noted: 2021-11-13 | Resolved: 2021-12-16

## 2021-12-24 ENCOUNTER — APPOINTMENT (OUTPATIENT)
Dept: CT IMAGING | Facility: HOSPITAL | Age: 81
End: 2021-12-24
Attending: EMERGENCY MEDICINE
Payer: MEDICARE

## 2021-12-24 ENCOUNTER — HOSPITAL ENCOUNTER (EMERGENCY)
Facility: HOSPITAL | Age: 81
Discharge: HOME | End: 2021-12-24
Attending: EMERGENCY MEDICINE
Payer: MEDICARE

## 2021-12-24 VITALS
HEART RATE: 78 BPM | RESPIRATION RATE: 18 BRPM | TEMPERATURE: 99 F | SYSTOLIC BLOOD PRESSURE: 139 MMHG | DIASTOLIC BLOOD PRESSURE: 80 MMHG | OXYGEN SATURATION: 96 %

## 2021-12-24 DIAGNOSIS — T50.905A MEDICATION REACTION, INITIAL ENCOUNTER: Primary | ICD-10-CM
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
## 2021-12-24 PROCEDURE — 85025 COMPLETE CBC W/AUTO DIFF WBC: CPT | Performed by: EMERGENCY MEDICINE

## 2021-12-24 PROCEDURE — 87186 SC STD MICRODIL/AGAR DIL: CPT | Performed by: EMERGENCY MEDICINE

## 2021-12-24 PROCEDURE — 81001 URINALYSIS AUTO W/SCOPE: CPT | Performed by: EMERGENCY MEDICINE

## 2021-12-24 PROCEDURE — 99284 EMERGENCY DEPT VISIT MOD MDM: CPT

## 2021-12-24 PROCEDURE — 36415 COLL VENOUS BLD VENIPUNCTURE: CPT

## 2021-12-24 PROCEDURE — 70450 CT HEAD/BRAIN W/O DYE: CPT | Performed by: EMERGENCY MEDICINE

## 2021-12-24 PROCEDURE — 87086 URINE CULTURE/COLONY COUNT: CPT | Performed by: EMERGENCY MEDICINE

## 2021-12-24 PROCEDURE — 80053 COMPREHEN METABOLIC PANEL: CPT | Performed by: EMERGENCY MEDICINE

## 2021-12-24 PROCEDURE — 87077 CULTURE AEROBIC IDENTIFY: CPT | Performed by: EMERGENCY MEDICINE

## 2021-12-24 NOTE — ED INITIAL ASSESSMENT (HPI)
Pt arrives after falling yesterday afternoon while in the bathtub showering. Pt is on apixiban, pt arrives with pinpoint pupils with family complaints of increasing lethargy and confusion; AOx4.

## 2021-12-25 NOTE — ED PROVIDER NOTES
Patient Seen in: [facility] Emergency Department      History   Patient presents with:  Fall    Stated Complaint:     Subjective:   HPI    78-year-old female presents emergency department after falling yesterday afternoon in the bathtub while shower CHOLECYSTECTOMY     • COLONOSCOPY  7/2010   • COLONOSCOPY  10/28/2013    Procedure: COLONOSCOPY;  Surgeon: Eleonora Ambriz MD;  Location: [facility] ENDOSCOPY   • HIP REPLACEMENT SURGERY     • HYSTERECTOMY     • OPEN HEART SURGERY (PBP)      2002 CABG x 2   • OTHER S cleaned with super sani-cloth germicidal wipes following the exam.         Vital signs reviewed  General appearance: Patient is alert and in no acute distress  HEENT: Pupils equal react to light extraocular muscles intact no scleral icterus, mucous membran CBC W/ DIFFERENTIAL[[id number]]          Abnormal            Final result                 Please view results for these tests on the individual orders.    URINE CULTURE, ROUTINE          CT BRAIN OR HEAD ([id number])    Result Date: 12/24/2021  CONCLUSIO going home. This note was prepared using [employer] voice recognition dictation software. As a result errors may occur. When identified these errors have been corrected.  While every attempt is made to correct errors during dictation discrepancies

## 2021-12-29 RX ORDER — SULFAMETHOXAZOLE AND TRIMETHOPRIM 800; 160 MG/1; MG/1
1 TABLET ORAL 2 TIMES DAILY
Qty: 14 TABLET | Refills: 0 | Status: SHIPPED | OUTPATIENT
Start: 2021-12-29 | End: 2022-01-03

## 2022-01-01 ENCOUNTER — APPOINTMENT (OUTPATIENT)
Dept: CT IMAGING | Facility: HOSPITAL | Age: 82
End: 2022-01-01
Attending: EMERGENCY MEDICINE
Payer: MEDICARE

## 2022-01-01 ENCOUNTER — HOSPITAL ENCOUNTER (OUTPATIENT)
Facility: HOSPITAL | Age: 82
Setting detail: OBSERVATION
Discharge: HOME | End: 2022-01-03
Attending: EMERGENCY MEDICINE | Admitting: HOSPITALIST
Payer: MEDICARE

## 2022-01-01 DIAGNOSIS — N39.0 URINARY TRACT INFECTION WITHOUT HEMATURIA, SITE UNSPECIFIED: ICD-10-CM

## 2022-01-01 DIAGNOSIS — R29.6 FREQUENT FALLS: Primary | ICD-10-CM

## 2022-01-01 DIAGNOSIS — N17.9 ACUTE RENAL FAILURE, UNSPECIFIED ACUTE RENAL FAILURE TYPE (HCC): ICD-10-CM

## 2022-01-01 LAB
ALBUMIN SERPL-MCNC: 3.8 G/DL (ref 3.4–5)
ALBUMIN/GLOB SERPL: 1 {RATIO} (ref 1–2)
ALP LIVER SERPL-CCNC: 94 U/L
ALT SERPL-CCNC: 22 U/L
ANION GAP SERPL CALC-SCNC: 5 MMOL/L (ref 0–18)
AST SERPL-CCNC: 24 U/L (ref 15–37)
BASOPHILS # BLD AUTO: 0.03 X10(3) UL (ref 0–0.2)
BASOPHILS NFR BLD AUTO: 0.5 %
BILIRUB SERPL-MCNC: 0.4 MG/DL (ref 0.1–2)
BILIRUB UR QL STRIP.AUTO: NEGATIVE
BUN BLD-MCNC: 21 MG/DL (ref 7–18)
CALCIUM BLD-MCNC: 9.3 MG/DL (ref 8.5–10.1)
CHLORIDE SERPL-SCNC: 105 MMOL/L (ref 98–112)
CO2 SERPL-SCNC: 26 MMOL/L (ref 21–32)
COLOR UR AUTO: YELLOW
CREAT BLD-MCNC: 1.62 MG/DL
EOSINOPHIL # BLD AUTO: 0.12 X10(3) UL (ref 0–0.7)
EOSINOPHIL NFR BLD AUTO: 2 %
ERYTHROCYTE [DISTWIDTH] IN BLOOD BY AUTOMATED COUNT: 13.2 %
GLOBULIN PLAS-MCNC: 3.7 G/DL (ref 2.8–4.4)
GLUCOSE BLD-MCNC: 99 MG/DL (ref 70–99)
GLUCOSE UR STRIP.AUTO-MCNC: NEGATIVE MG/DL
HCT VFR BLD AUTO: 35.2 %
HGB BLD-MCNC: 12 G/DL
IMM GRANULOCYTES # BLD AUTO: 0.01 X10(3) UL (ref 0–1)
IMM GRANULOCYTES NFR BLD: 0.2 %
KETONES UR STRIP.AUTO-MCNC: NEGATIVE MG/DL
LEUKOCYTE ESTERASE UR QL STRIP.AUTO: NEGATIVE
LYMPHOCYTES # BLD AUTO: 1.07 X10(3) UL (ref 1–4)
LYMPHOCYTES NFR BLD AUTO: 17.7 %
MCH RBC QN AUTO: 29.6 PG (ref 26–34)
MCHC RBC AUTO-ENTMCNC: 34.1 G/DL (ref 31–37)
MCV RBC AUTO: 86.7 FL
MONOCYTES # BLD AUTO: 0.55 X10(3) UL (ref 0.1–1)
MONOCYTES NFR BLD AUTO: 9.1 %
NEUTROPHILS # BLD AUTO: 4.27 X10 (3) UL (ref 1.5–7.7)
NEUTROPHILS # BLD AUTO: 4.27 X10(3) UL (ref 1.5–7.7)
NEUTROPHILS NFR BLD AUTO: 70.5 %
NITRITE UR QL STRIP.AUTO: NEGATIVE
OSMOLALITY SERPL CALC.SUM OF ELEC: 285 MOSM/KG (ref 275–295)
PH UR STRIP.AUTO: 5 [PH] (ref 5–8)
PLATELET # BLD AUTO: 183 10(3)UL (ref 150–450)
POTASSIUM SERPL-SCNC: 4.3 MMOL/L (ref 3.5–5.1)
PROT SERPL-MCNC: 7.5 G/DL (ref 6.4–8.2)
PROT UR STRIP.AUTO-MCNC: NEGATIVE MG/DL
RBC # BLD AUTO: 4.06 X10(6)UL
RBC UR QL AUTO: NEGATIVE
SARS-COV-2 RNA RESP QL NAA+PROBE: NOT DETECTED
SODIUM SERPL-SCNC: 136 MMOL/L (ref 136–145)
SP GR UR STRIP.AUTO: 1.01 (ref 1–1.03)
UROBILINOGEN UR STRIP.AUTO-MCNC: <2 MG/DL
WBC # BLD AUTO: 6.1 X10(3) UL (ref 4–11)

## 2022-01-01 PROCEDURE — 99285 EMERGENCY DEPT VISIT HI MDM: CPT

## 2022-01-01 PROCEDURE — 96360 HYDRATION IV INFUSION INIT: CPT

## 2022-01-01 PROCEDURE — 82272 OCCULT BLD FECES 1-3 TESTS: CPT

## 2022-01-01 PROCEDURE — 81003 URINALYSIS AUTO W/O SCOPE: CPT | Performed by: EMERGENCY MEDICINE

## 2022-01-01 PROCEDURE — 70450 CT HEAD/BRAIN W/O DYE: CPT | Performed by: EMERGENCY MEDICINE

## 2022-01-01 PROCEDURE — 93005 ELECTROCARDIOGRAM TRACING: CPT

## 2022-01-01 PROCEDURE — 93010 ELECTROCARDIOGRAM REPORT: CPT

## 2022-01-01 PROCEDURE — 80053 COMPREHEN METABOLIC PANEL: CPT | Performed by: EMERGENCY MEDICINE

## 2022-01-01 PROCEDURE — 96361 HYDRATE IV INFUSION ADD-ON: CPT

## 2022-01-01 PROCEDURE — 85025 COMPLETE CBC W/AUTO DIFF WBC: CPT | Performed by: EMERGENCY MEDICINE

## 2022-01-01 RX ORDER — SULFAMETHOXAZOLE AND TRIMETHOPRIM 800; 160 MG/1; MG/1
1 TABLET ORAL 2 TIMES DAILY
Status: DISCONTINUED | OUTPATIENT
Start: 2022-01-01 | End: 2022-01-03

## 2022-01-01 RX ORDER — ACETAMINOPHEN 500 MG
1000 TABLET ORAL EVERY 8 HOURS PRN
Status: DISCONTINUED | OUTPATIENT
Start: 2022-01-01 | End: 2022-01-03

## 2022-01-01 RX ORDER — SERTRALINE HYDROCHLORIDE 100 MG/1
200 TABLET, FILM COATED ORAL DAILY
Status: DISCONTINUED | OUTPATIENT
Start: 2022-01-01 | End: 2022-01-03

## 2022-01-01 RX ORDER — SODIUM CHLORIDE 9 MG/ML
INJECTION, SOLUTION INTRAVENOUS CONTINUOUS
Status: DISCONTINUED | OUTPATIENT
Start: 2022-01-01 | End: 2022-01-03

## 2022-01-01 RX ORDER — ATORVASTATIN CALCIUM 40 MG/1
40 TABLET, FILM COATED ORAL NIGHTLY
Status: DISCONTINUED | OUTPATIENT
Start: 2022-01-01 | End: 2022-01-03

## 2022-01-01 RX ORDER — PANTOPRAZOLE SODIUM 40 MG/1
40 TABLET, DELAYED RELEASE ORAL
Status: DISCONTINUED | OUTPATIENT
Start: 2022-01-02 | End: 2022-01-03

## 2022-01-01 RX ORDER — IPRATROPIUM BROMIDE AND ALBUTEROL SULFATE 2.5; .5 MG/3ML; MG/3ML
3 SOLUTION RESPIRATORY (INHALATION) EVERY 4 HOURS PRN
Status: DISCONTINUED | OUTPATIENT
Start: 2022-01-01 | End: 2022-01-03

## 2022-01-01 RX ORDER — CARVEDILOL 12.5 MG/1
25 TABLET ORAL 2 TIMES DAILY WITH MEALS
Status: DISCONTINUED | OUTPATIENT
Start: 2022-01-02 | End: 2022-01-03

## 2022-01-01 RX ORDER — LEVETIRACETAM 250 MG/1
250 TABLET ORAL 2 TIMES DAILY
Status: DISCONTINUED | OUTPATIENT
Start: 2022-01-01 | End: 2022-01-03

## 2022-01-01 RX ORDER — ONDANSETRON 2 MG/ML
4 INJECTION INTRAMUSCULAR; INTRAVENOUS EVERY 6 HOURS PRN
Status: DISCONTINUED | OUTPATIENT
Start: 2022-01-01 | End: 2022-01-03

## 2022-01-01 RX ORDER — MEMANTINE HYDROCHLORIDE 10 MG/1
10 TABLET ORAL 2 TIMES DAILY
Status: DISCONTINUED | OUTPATIENT
Start: 2022-01-01 | End: 2022-01-03

## 2022-01-01 RX ORDER — METOCLOPRAMIDE HYDROCHLORIDE 5 MG/ML
5 INJECTION INTRAMUSCULAR; INTRAVENOUS EVERY 8 HOURS PRN
Status: DISCONTINUED | OUTPATIENT
Start: 2022-01-01 | End: 2022-01-03

## 2022-01-01 RX ORDER — DONEPEZIL HYDROCHLORIDE 10 MG/1
10 TABLET, FILM COATED ORAL NIGHTLY
Status: DISCONTINUED | OUTPATIENT
Start: 2022-01-01 | End: 2022-01-03

## 2022-01-02 LAB
ANION GAP SERPL CALC-SCNC: 8 MMOL/L (ref 0–18)
ATRIAL RATE: 312 BPM
BASOPHILS # BLD AUTO: 0.04 X10(3) UL (ref 0–0.2)
BASOPHILS NFR BLD AUTO: 0.7 %
BUN BLD-MCNC: 19 MG/DL (ref 7–18)
CALCIUM BLD-MCNC: 8.5 MG/DL (ref 8.5–10.1)
CHLORIDE SERPL-SCNC: 109 MMOL/L (ref 98–112)
CO2 SERPL-SCNC: 21 MMOL/L (ref 21–32)
CREAT BLD-MCNC: 1.26 MG/DL
EOSINOPHIL # BLD AUTO: 0.1 X10(3) UL (ref 0–0.7)
EOSINOPHIL NFR BLD AUTO: 1.6 %
ERYTHROCYTE [DISTWIDTH] IN BLOOD BY AUTOMATED COUNT: 13.2 %
GLUCOSE BLD-MCNC: 84 MG/DL (ref 70–99)
HCT VFR BLD AUTO: 31.6 %
HGB BLD-MCNC: 10.4 G/DL
IMM GRANULOCYTES # BLD AUTO: 0.01 X10(3) UL (ref 0–1)
IMM GRANULOCYTES NFR BLD: 0.2 %
LYMPHOCYTES # BLD AUTO: 1.11 X10(3) UL (ref 1–4)
LYMPHOCYTES NFR BLD AUTO: 18.3 %
MCH RBC QN AUTO: 29.4 PG (ref 26–34)
MCHC RBC AUTO-ENTMCNC: 32.9 G/DL (ref 31–37)
MCV RBC AUTO: 89.3 FL
MONOCYTES # BLD AUTO: 0.57 X10(3) UL (ref 0.1–1)
MONOCYTES NFR BLD AUTO: 9.4 %
NEUTROPHILS # BLD AUTO: 4.24 X10 (3) UL (ref 1.5–7.7)
NEUTROPHILS # BLD AUTO: 4.24 X10(3) UL (ref 1.5–7.7)
NEUTROPHILS NFR BLD AUTO: 69.8 %
OSMOLALITY SERPL CALC.SUM OF ELEC: 287 MOSM/KG (ref 275–295)
PLATELET # BLD AUTO: 149 10(3)UL (ref 150–450)
POTASSIUM SERPL-SCNC: 4.2 MMOL/L (ref 3.5–5.1)
Q-T INTERVAL: 350 MS
QRS DURATION: 94 MS
QTC CALCULATION (BEZET): 408 MS
R AXIS: -45 DEGREES
RBC # BLD AUTO: 3.54 X10(6)UL
SODIUM SERPL-SCNC: 138 MMOL/L (ref 136–145)
T AXIS: -78 DEGREES
VENTRICULAR RATE: 82 BPM
WBC # BLD AUTO: 6.1 X10(3) UL (ref 4–11)

## 2022-01-02 PROCEDURE — 97162 PT EVAL MOD COMPLEX 30 MIN: CPT

## 2022-01-02 PROCEDURE — 96374 THER/PROPH/DIAG INJ IV PUSH: CPT

## 2022-01-02 PROCEDURE — 80048 BASIC METABOLIC PNL TOTAL CA: CPT | Performed by: HOSPITALIST

## 2022-01-02 PROCEDURE — 97530 THERAPEUTIC ACTIVITIES: CPT

## 2022-01-02 PROCEDURE — 97116 GAIT TRAINING THERAPY: CPT

## 2022-01-02 PROCEDURE — 96375 TX/PRO/DX INJ NEW DRUG ADDON: CPT

## 2022-01-02 PROCEDURE — 94640 AIRWAY INHALATION TREATMENT: CPT

## 2022-01-02 PROCEDURE — 85025 COMPLETE CBC W/AUTO DIFF WBC: CPT | Performed by: HOSPITALIST

## 2022-01-02 PROCEDURE — 96361 HYDRATE IV INFUSION ADD-ON: CPT

## 2022-01-02 RX ORDER — HALOPERIDOL 5 MG/ML
1 INJECTION INTRAMUSCULAR ONCE
Status: COMPLETED | OUTPATIENT
Start: 2022-01-02 | End: 2022-01-02

## 2022-01-02 NOTE — ED PROVIDER NOTES
Patient Seen in: [facility] Emergency Department      History   Patient presents with:  Trauma  Fall    Stated Complaint: Fall in bathrtoom, hit cheek on floor or bathtub. Pt arrives A&Ox4.  C-collar ap*    Subjective:   HPI    This is an 78year-old unspecified hyperlipidemia    • Peripheral vascular disease, unspecified ([facility]    • Pulmonary embolism ([facility]    • SAH (subarachnoid hemorrhage) ([facility] [place] [facility] 11/22/2017   • Seizure disorder ([facility] [place] [facility]    • Thrombocytopenia ([place] [address].) 10/31/2019   • Unspecified essential hyp SpO2 94%   BMI 21.14 kg/m²         Physical Exam    GENERAL: Awake, alert oriented x3, nontoxic appearing. SKIN: Normal, warm, and dry. HEENT: Atraumatic. Pupils equally round and reactive to light. Conjuctiva clear.   Oropharynx is clear and moist.  N Patient placed on cardiac monitor, continuous pulse oximetry and IV line was established of normal saline. Basic labs were obtained. CBC: White blood cell count 6.1. Hemoglobin 12. Platelet 270. CMP:. BUN 21. Creatinine 1.62. Glucose 99.   B[facility]b Hospital Problems             Present on Admission  Date Reviewed: 9/3/2021          ICD-10-CM Noted POA    * (Principal) Frequent falls R29.6 1/1/2022 Unknown

## 2022-01-02 NOTE — ED INITIAL ASSESSMENT (HPI)
Pt arrives to the ED via [facility]. Per EMS, pt daughter called 9-11 after pt had a fall in the bathroom. Pt states when she fell she hit her right cheek, but is not sure if she hit it on the bathtub or bathroom floor.  Pt arrives in a C-Collar that was appl

## 2022-01-02 NOTE — H&P
[facility] and [facility]      Patient presents with:  Trauma  Fall       PCP: Lucia Pradhan MD      History of Present Illness: Patient is a 78year old female with PMH sig for afib sp PPM, afib on eliquis, dementia, siezure do presents COLONOSCOPY  7/2010   • COLONOSCOPY  10/28/2013    Procedure: COLONOSCOPY;  Surgeon: Tyrel Davies MD;  Location: [facility] ENDOSCOPY   • HIP REPLACEMENT SURGERY     • HYSTERECTOMY     • OPEN HEART SURGERY (PBP)      2002 CABG x 2   • OTHER SURGICAL HISTORY      p Brother        Review of Systems  Comprehensive ROS reviewed and negative except for what is stated in HPI.       OBJECTIVE:  /64 (BP Location: Right arm)   Pulse 77   Temp 97.4 °F (36.3 °C) (Oral)   Resp 17   Ht 5' 6\" (1.676 m)   Wt 131 lb (59.4 kg) bathrtoom, hit cheek on floor or bathtub. Pt arrives A&Ox4. C-collar applied. Pt did not lose consciousness  TECHNIQUE:  Noncontrast CT scanning is performed through the brain. Dose reduction techniques were used.  Dose information is transmitted to the [facility]) NRDR ([address]) which includes the Dose Index Registry.   PATIENT STATED HISTORY: (As transcribed by Technologist)  Vladislav Abad yesterday    FINDINGS:  VENTRICLES/SULCI:  Ventricles and sulci are prominent in size consistent with

## 2022-01-02 NOTE — PLAN OF CARE
ER admit 1/1 frequent falls, Pt is AAOX3-4, forgetful at times, VSS, room air, TELE, paced, SCD's, up MIN assist w/ 1 and RW, Pt can be unsteady when ambulating, PT / OT following, recommending discharge home w/ HH, IVF, no pain noted, MD requested Eliquis

## 2022-01-02 NOTE — PLAN OF CARE
In bed,alert and oriented,forgetful at times,easily reoriented,V/S stable,tele,rhythm stable,,room air,no coughing or shortness of breathing noted. Able to follow instructions,hard of hearing and left both hearing aid at home. Denies pain or discomfort. Pl

## 2022-01-02 NOTE — ED QUICK NOTES
Orders for admission, patient is aware of plan and ready to go upstairs. Any questions, please call ED WILY Vargas  at extension [phone]. Vaccinated? Yes  Type of COVID test sent: Rapid  COVID Suspicion level: Low      Titratable drug(s) infusing:  Rate: 0.

## 2022-01-02 NOTE — PHYSICAL THERAPY NOTE
PHYSICAL THERAPY EVALUATION - INPATIENT     Room Number: 361/361-A  Evaluation Date: 1/2/2022  Type of Evaluation: Initial  Physician Order: PT Eval and Treat    Presenting Problem: fall  Co-Morbidities : COPD,dememtia, fequent falls, sz d/o, odessa herzog Treatment Plan: Bed mobility; Body mechanics; Endurance; Patient education; Family education;Gait training;Range of motion;Strengthening;Transfer training;Balance training  Rehab Potential : Good  Frequency (Obs): 3-5x/week  Number of Visits to Meet [facility] does the patient currently have. ..   Patient Difficulty: Turning over in bed (including adjusting bedclothes, sheets and blankets)?: None   Patient Difficulty: Sitting down on and standing up from a chair with arms (e.g., wheelchair, bedside commode, etc.): recommend close supervision at all times for home safety.  Does with nursing after living pt on the recliner and made comfortable    Exercise/Education Provided:  Bed mobility  Body mechanics  Functional activity tolerated  Posture  Transfer training    Damon Ellis

## 2022-01-02 NOTE — PLAN OF CARE
Pt becoming more confused and anxious as the day has gone on. Phoned Pt's family to see if that would help re-orient Pt, but this was to no avail.

## 2022-01-03 VITALS
WEIGHT: 131 LBS | DIASTOLIC BLOOD PRESSURE: 75 MMHG | RESPIRATION RATE: 18 BRPM | HEIGHT: 66 IN | TEMPERATURE: 98 F | BODY MASS INDEX: 21.05 KG/M2 | SYSTOLIC BLOOD PRESSURE: 120 MMHG | HEART RATE: 79 BPM | OXYGEN SATURATION: 96 %

## 2022-01-03 PROCEDURE — 97535 SELF CARE MNGMENT TRAINING: CPT

## 2022-01-03 PROCEDURE — 97165 OT EVAL LOW COMPLEX 30 MIN: CPT

## 2022-01-03 NOTE — CM/SW NOTE
Recommendations for HH after dc. Referrals started, orders written. SW/CM to follow up with pt/family.      Lori 106, LSW

## 2022-01-03 NOTE — DISCHARGE SUMMARY
General Medicine Discharge Summary     Patient ID:  Kamini Rodriguez  78year old  8/10/1940    Admit date: 1/1/2022    Discharge date and time:1/3/2022    Attending Physician: Bel Vargas MD List    CONTINUE these medications which have NOT CHANGED    BUTALBITAL-ACETAMINOPHEN-CAFFEINE -40 MG Oral Tab  TAKE 1 TABLET BY MOUTH DAILY AS NEEDED FOR HEADACHE    traMADol 50 MG Oral Tab  Take 1 tablet (50 mg total) by mouth 2 (two) times daily a (65 FE) MG Oral Tab EC  Take 325 mg by mouth daily with breakfast.    clonazePAM 1 MG Oral Tab  Take 1 tablet (1 mg total) by mouth nightly as needed for Anxiety.  TAKE 1/2 TABLET BY MOUTH EVERY MORNING AND 1 TABLET AT BEDTIME    Pramipexole Dihydrochloride

## 2022-01-03 NOTE — PLAN OF CARE
Pt in bed resting. A/OX3 on 2L of oxygen via nasal cannula. Tele- A fib and paced. Bilateral SCD. PO medication given for pain. Last bowel movement on 1/2/22. Call light within reach. Bed alarm on. Safety precautions in place.

## 2022-01-03 NOTE — OCCUPATIONAL THERAPY NOTE
OCCUPATIONAL THERAPY EVALUATION - INPATIENT     Room Number: 361/361-A  Evaluation Date: 1/3/2022  Type of Evaluation: Initial  Presenting Problem: Frequent falls    Physician Order: IP Consult to Occupational Therapy  Reason for Therapy: ADL/IADL Dysfunct patient. Recommend DC home to familiar environment with increased 24hr SV/assist. If family unable to provide this level of SV, recommend considering LTC.       Evaluation complexity (determined through occupational profile/medical history, specific perform addressed    OCCUPATIONAL PROFILE    HOME SITUATION  Type of Home: House  Home Layout: One level  Lives With: Daughter (son and DIL live 2nd floor)    Toilet and Equipment: Comfort height toilet  Shower/Tub and Equipment: Tub-shower combo; Shower chair  Oth perform toileting with supervision and AE PRN  Patient will perform LB dressing with supervision and AE PRN    Functional Transfer Goals  Patient will perform bed mobility supine to sit with supervision  Patient will perform bed mobility sit to supine with

## 2022-01-03 NOTE — PLAN OF CARE
NURSING DISCHARGE NOTE    Discharged Home via Ambulance. Accompanied by Support staff  Belongings Taken by patient/family. AVS printed and discussed, IV removed, Pt ready to discharge home.

## 2022-01-03 NOTE — CM/SW NOTE
Patient is current with [facility]  P:[phone]  F:[phone]. Orders written. Patient anticipated to dc today.      Saba Espinoza LCSW

## 2022-01-03 NOTE — CM/SW NOTE
MSW requested [facility] for discharge today. They are sending a kulwinder for lette as medicar is not running today. [facility] to arrive at 3:15. PCS form completed.     Ольга Weller LCSW

## 2022-01-03 NOTE — PLAN OF CARE
ER admit 1/1 frequent falls, Pt is AAOX2-3, more confused and forgetful this morning, VSS, room air, TELE, paced, SCD's, up MIN assist w/ 1 and RW, Pt can be unsteady when ambulating, PT / OT following, discharge TBD, IV SL, Pt complains of back pain, Pt d

## 2022-01-25 ENCOUNTER — APPOINTMENT (OUTPATIENT)
Dept: GENERAL RADIOLOGY | Facility: HOSPITAL | Age: 82
End: 2022-01-25
Attending: EMERGENCY MEDICINE
Payer: MEDICARE

## 2022-01-25 ENCOUNTER — HOSPITAL ENCOUNTER (EMERGENCY)
Facility: HOSPITAL | Age: 82
Discharge: HOME | End: 2022-01-25
Attending: EMERGENCY MEDICINE
Payer: MEDICARE

## 2022-01-25 VITALS
RESPIRATION RATE: 19 BRPM | OXYGEN SATURATION: 91 % | DIASTOLIC BLOOD PRESSURE: 77 MMHG | TEMPERATURE: 99 F | SYSTOLIC BLOOD PRESSURE: 178 MMHG | BODY MASS INDEX: 22.41 KG/M2 | HEART RATE: 94 BPM | WEIGHT: 134.5 LBS | HEIGHT: 65 IN

## 2022-01-25 DIAGNOSIS — U07.1 COVID-19: Primary | ICD-10-CM

## 2022-01-25 LAB
ALBUMIN SERPL-MCNC: 3.4 G/DL (ref 3.4–5)
ALBUMIN/GLOB SERPL: 1 {RATIO} (ref 1–2)
ALP LIVER SERPL-CCNC: 90 U/L
ALT SERPL-CCNC: 14 U/L
ANION GAP SERPL CALC-SCNC: 8 MMOL/L (ref 0–18)
AST SERPL-CCNC: 23 U/L (ref 15–37)
BASOPHILS # BLD AUTO: 0.02 X10(3) UL (ref 0–0.2)
BASOPHILS NFR BLD AUTO: 0.7 %
BILIRUB SERPL-MCNC: 0.5 MG/DL (ref 0.1–2)
BUN BLD-MCNC: 16 MG/DL (ref 7–18)
CALCIUM BLD-MCNC: 8.9 MG/DL (ref 8.5–10.1)
CHLORIDE SERPL-SCNC: 102 MMOL/L (ref 98–112)
CO2 SERPL-SCNC: 27 MMOL/L (ref 21–32)
CREAT BLD-MCNC: 1.17 MG/DL
EOSINOPHIL # BLD AUTO: 0.04 X10(3) UL (ref 0–0.7)
EOSINOPHIL NFR BLD AUTO: 1.4 %
ERYTHROCYTE [DISTWIDTH] IN BLOOD BY AUTOMATED COUNT: 13.2 %
GLOBULIN PLAS-MCNC: 3.3 G/DL (ref 2.8–4.4)
GLUCOSE BLD-MCNC: 87 MG/DL (ref 70–99)
HCT VFR BLD AUTO: 30.8 %
HGB BLD-MCNC: 10.7 G/DL
IMM GRANULOCYTES # BLD AUTO: 0.01 X10(3) UL (ref 0–1)
IMM GRANULOCYTES NFR BLD: 0.3 %
LYMPHOCYTES # BLD AUTO: 0.41 X10(3) UL (ref 1–4)
LYMPHOCYTES NFR BLD AUTO: 13.9 %
MCH RBC QN AUTO: 30.3 PG (ref 26–34)
MCHC RBC AUTO-ENTMCNC: 34.7 G/DL (ref 31–37)
MCV RBC AUTO: 87.3 FL
MONOCYTES # BLD AUTO: 0.31 X10(3) UL (ref 0.1–1)
MONOCYTES NFR BLD AUTO: 10.5 %
NEUTROPHILS # BLD AUTO: 2.15 X10 (3) UL (ref 1.5–7.7)
NEUTROPHILS # BLD AUTO: 2.15 X10(3) UL (ref 1.5–7.7)
NEUTROPHILS NFR BLD AUTO: 73.2 %
OSMOLALITY SERPL CALC.SUM OF ELEC: 285 MOSM/KG (ref 275–295)
PLATELET # BLD AUTO: 84 10(3)UL (ref 150–450)
POTASSIUM SERPL-SCNC: 4.1 MMOL/L (ref 3.5–5.1)
PROT SERPL-MCNC: 6.7 G/DL (ref 6.4–8.2)
RBC # BLD AUTO: 3.53 X10(6)UL
SARS-COV-2 RNA RESP QL NAA+PROBE: DETECTED
SODIUM SERPL-SCNC: 137 MMOL/L (ref 136–145)
WBC # BLD AUTO: 2.9 X10(3) UL (ref 4–11)

## 2022-01-25 PROCEDURE — 99285 EMERGENCY DEPT VISIT HI MDM: CPT

## 2022-01-25 PROCEDURE — 80053 COMPREHEN METABOLIC PANEL: CPT | Performed by: EMERGENCY MEDICINE

## 2022-01-25 PROCEDURE — 93010 ELECTROCARDIOGRAM REPORT: CPT

## 2022-01-25 PROCEDURE — 93005 ELECTROCARDIOGRAM TRACING: CPT

## 2022-01-25 PROCEDURE — 71045 X-RAY EXAM CHEST 1 VIEW: CPT | Performed by: EMERGENCY MEDICINE

## 2022-01-25 PROCEDURE — 85025 COMPLETE CBC W/AUTO DIFF WBC: CPT | Performed by: EMERGENCY MEDICINE

## 2022-01-25 NOTE — ED INITIAL ASSESSMENT (HPI)
PT PRESENTS TO ED AFTER HER DAUGHTER CALLED EMS, BELIEVES SHE HAS COVID, PT LIVES WITH SON WHO TESTED POSITIVE FOR COVID 1 WEEK AGO, PT HAS HAD COUGH X7 DAYS, SHORTNESS OF BREATH, DENIES CHEST PAIN, DENIES FEVERS.

## 2022-01-26 LAB
ATRIAL RATE: 80 BPM
Q-T INTERVAL: 366 MS
QRS DURATION: 94 MS
QTC CALCULATION (BEZET): 397 MS
R AXIS: -44 DEGREES
T AXIS: -77 DEGREES
VENTRICULAR RATE: 71 BPM

## 2022-01-26 NOTE — CM/SW NOTE
I informed Margie Salazar (daughter) that I reached out to Dr. Jaye Kaye office about the patient not feeling well and she now states that her oxygen level drops to 84 percent without any movement. I did inform Adriana to bring the patient back to the ER.  She states th

## 2022-01-26 NOTE — ED PROVIDER NOTES
Patient Seen in: [facility] Emergency Department      History   Patient presents with:  Cough/URI    Stated Complaint: Sick, covid exposure.      Subjective:   HPI    This is an 43-year-old female past medical history of anemia, A. fib status post pa apnea SPLIT 9-25-13    AHI 55 RDI 60  Sao2 giovana 88% CPAP 12 [employer]   • Visual impairment               Past Surgical History:   Procedure Laterality Date   • BYPASS SURGERY      4/28/13   • CHOLECYSTECTOMY     • COLONOSCOPY  7/2010   • COLONOSCOPY  10/28 Oropharynx is clear and moist.   Lungs: Clear to auscultation bilaterally with no rales, no retractions, and no wheezing. HEART: Irregular rate and rhythm. S1 and S2. No murmurs, no rubs or gallops. ABDOMEN: Soft, nontender and nondistended.  [employer] ([facility]) NRDR ([address]) which includes the Dose Index Registry.   PATIENT STATED HISTORY: (As transcribed by Technologist)   Fall    FINDINGS:  VENTRICLES/SULCI:  The ventricles, sulci and cisterns are all pro effusion. Dictated by (CST): Cain Ellis MD on 1/25/2022 at 7:53 PM     Finalized by (CST): Cain Ellis MD on 1/25/2022 at 7:54 PM                   EKG    Rate, intervals and axes as noted on EKG Report.   Rate:71  Rhythm: Atrial fib  Reading: Inc

## 2022-01-26 NOTE — CM/SW NOTE
Called patient and spoke to daughter Vester Felty) who states she was up with the patient all night. She is saying that the patient has been vomiting.  Per daughter she has been checking her pulse ox but states that the patient's ox typically runs low because she

## 2022-01-26 NOTE — CM/SW NOTE
ALISHA RECEIVED A CALL FROM DR Pavithra Jordan AND ASKED IF WE COULD PLEASE DO A FOLLOW UP CALL ON THIS PATIENT IN THE MORNING,TO MAKE SURE SHE IS DOING OKAY AT HOME. ALISHA ASSURED MD THAT WE WILL MOST DEFINITELY CALL PATIENT IN THE MORNING.

## 2022-01-28 ENCOUNTER — HOSPITAL ENCOUNTER (EMERGENCY)
Facility: HOSPITAL | Age: 82
Discharge: HOME | End: 2022-01-28
Attending: EMERGENCY MEDICINE
Payer: MEDICARE

## 2022-01-28 ENCOUNTER — APPOINTMENT (OUTPATIENT)
Dept: GENERAL RADIOLOGY | Facility: HOSPITAL | Age: 82
End: 2022-01-28
Attending: EMERGENCY MEDICINE
Payer: MEDICARE

## 2022-01-28 VITALS
HEART RATE: 89 BPM | RESPIRATION RATE: 18 BRPM | TEMPERATURE: 98 F | SYSTOLIC BLOOD PRESSURE: 168 MMHG | OXYGEN SATURATION: 94 % | DIASTOLIC BLOOD PRESSURE: 59 MMHG

## 2022-01-28 DIAGNOSIS — U07.1 COVID-19: Primary | ICD-10-CM

## 2022-01-28 DIAGNOSIS — R53.1 WEAKNESS: ICD-10-CM

## 2022-01-28 DIAGNOSIS — N30.00 ACUTE CYSTITIS WITHOUT HEMATURIA: ICD-10-CM

## 2022-01-28 LAB
ALBUMIN SERPL-MCNC: 3.5 G/DL (ref 3.4–5)
ALBUMIN/GLOB SERPL: 0.9 {RATIO} (ref 1–2)
ALP LIVER SERPL-CCNC: 83 U/L
ALT SERPL-CCNC: 18 U/L
ANION GAP SERPL CALC-SCNC: 5 MMOL/L (ref 0–18)
AST SERPL-CCNC: 23 U/L (ref 15–37)
ATRIAL RATE: 267 BPM
BASOPHILS # BLD AUTO: 0.02 X10(3) UL (ref 0–0.2)
BASOPHILS NFR BLD AUTO: 0.6 %
BILIRUB SERPL-MCNC: 0.4 MG/DL (ref 0.1–2)
BILIRUB UR QL STRIP.AUTO: NEGATIVE
BUN BLD-MCNC: 22 MG/DL (ref 7–18)
CALCIUM BLD-MCNC: 8.7 MG/DL (ref 8.5–10.1)
CHLORIDE SERPL-SCNC: 103 MMOL/L (ref 98–112)
CO2 SERPL-SCNC: 29 MMOL/L (ref 21–32)
COLOR UR AUTO: YELLOW
CREAT BLD-MCNC: 1.37 MG/DL
EOSINOPHIL # BLD AUTO: 0.14 X10(3) UL (ref 0–0.7)
EOSINOPHIL NFR BLD AUTO: 3.9 %
ERYTHROCYTE [DISTWIDTH] IN BLOOD BY AUTOMATED COUNT: 13.5 %
GLOBULIN PLAS-MCNC: 3.7 G/DL (ref 2.8–4.4)
GLUCOSE BLD-MCNC: 105 MG/DL (ref 70–99)
GLUCOSE UR STRIP.AUTO-MCNC: NEGATIVE MG/DL
HCT VFR BLD AUTO: 34.2 %
HGB BLD-MCNC: 11.8 G/DL
HYALINE CASTS #/AREA URNS AUTO: PRESENT /LPF
IMM GRANULOCYTES # BLD AUTO: 0.01 X10(3) UL (ref 0–1)
IMM GRANULOCYTES NFR BLD: 0.3 %
KETONES UR STRIP.AUTO-MCNC: NEGATIVE MG/DL
LYMPHOCYTES # BLD AUTO: 0.8 X10(3) UL (ref 1–4)
LYMPHOCYTES NFR BLD AUTO: 22.3 %
MCH RBC QN AUTO: 30.2 PG (ref 26–34)
MCHC RBC AUTO-ENTMCNC: 34.5 G/DL (ref 31–37)
MCV RBC AUTO: 87.5 FL
MONOCYTES # BLD AUTO: 0.43 X10(3) UL (ref 0.1–1)
MONOCYTES NFR BLD AUTO: 12 %
NEUTROPHILS # BLD AUTO: 2.19 X10 (3) UL (ref 1.5–7.7)
NEUTROPHILS # BLD AUTO: 2.19 X10(3) UL (ref 1.5–7.7)
NEUTROPHILS NFR BLD AUTO: 60.9 %
NITRITE UR QL STRIP.AUTO: NEGATIVE
OSMOLALITY SERPL CALC.SUM OF ELEC: 288 MOSM/KG (ref 275–295)
PH UR STRIP.AUTO: 5 [PH] (ref 5–8)
PLATELET # BLD AUTO: 113 10(3)UL (ref 150–450)
POTASSIUM SERPL-SCNC: 3.8 MMOL/L (ref 3.5–5.1)
PROT SERPL-MCNC: 7.2 G/DL (ref 6.4–8.2)
PROT UR STRIP.AUTO-MCNC: 30 MG/DL
Q-T INTERVAL: 394 MS
QRS DURATION: 92 MS
QTC CALCULATION (BEZET): 443 MS
R AXIS: -54 DEGREES
RBC # BLD AUTO: 3.91 X10(6)UL
SODIUM SERPL-SCNC: 137 MMOL/L (ref 136–145)
SP GR UR STRIP.AUTO: 1.02 (ref 1–1.03)
T AXIS: -77 DEGREES
UROBILINOGEN UR STRIP.AUTO-MCNC: 2 MG/DL
VENTRICULAR RATE: 76 BPM
WBC # BLD AUTO: 3.6 X10(3) UL (ref 4–11)

## 2022-01-28 PROCEDURE — 96360 HYDRATION IV INFUSION INIT: CPT

## 2022-01-28 PROCEDURE — 99285 EMERGENCY DEPT VISIT HI MDM: CPT

## 2022-01-28 PROCEDURE — 96361 HYDRATE IV INFUSION ADD-ON: CPT

## 2022-01-28 PROCEDURE — 93010 ELECTROCARDIOGRAM REPORT: CPT

## 2022-01-28 PROCEDURE — 87086 URINE CULTURE/COLONY COUNT: CPT | Performed by: EMERGENCY MEDICINE

## 2022-01-28 PROCEDURE — 85025 COMPLETE CBC W/AUTO DIFF WBC: CPT | Performed by: EMERGENCY MEDICINE

## 2022-01-28 PROCEDURE — 80053 COMPREHEN METABOLIC PANEL: CPT | Performed by: EMERGENCY MEDICINE

## 2022-01-28 PROCEDURE — 81001 URINALYSIS AUTO W/SCOPE: CPT | Performed by: EMERGENCY MEDICINE

## 2022-01-28 PROCEDURE — 71045 X-RAY EXAM CHEST 1 VIEW: CPT | Performed by: EMERGENCY MEDICINE

## 2022-01-28 PROCEDURE — 99284 EMERGENCY DEPT VISIT MOD MDM: CPT

## 2022-01-28 PROCEDURE — 93005 ELECTROCARDIOGRAM TRACING: CPT

## 2022-01-28 RX ORDER — SULFAMETHOXAZOLE AND TRIMETHOPRIM 800; 160 MG/1; MG/1
1 TABLET ORAL ONCE
Status: DISCONTINUED | OUTPATIENT
Start: 2022-01-28 | End: 2022-01-28

## 2022-01-28 RX ORDER — SODIUM CHLORIDE 9 MG/ML
INJECTION, SOLUTION INTRAVENOUS CONTINUOUS
Status: DISCONTINUED | OUTPATIENT
Start: 2022-01-28 | End: 2022-01-28

## 2022-01-28 RX ORDER — SULFAMETHOXAZOLE AND TRIMETHOPRIM 800; 160 MG/1; MG/1
1 TABLET ORAL 2 TIMES DAILY
Qty: 14 TABLET | Refills: 0 | Status: SHIPPED | OUTPATIENT
Start: 2022-01-28 | End: 2022-02-04

## 2022-01-28 NOTE — ED QUICK NOTES
Spoke with pts daughter Edilma Andrews, pt lives with her daughter. Per daughter pt is eating very little at home is up with a walker.  Daughter reports that everyone at home has covid, daughter states that she herself utilizes a cane and has a recent shoulder injury

## 2022-01-28 NOTE — ED INITIAL ASSESSMENT (HPI)
Pt here due to family concerns pt isn't eating. Pt dx with covid 3 days ago. Pt denies complaints. Pt reports thirsty.

## 2022-01-28 NOTE — ED QUICK NOTES
Spoke with pts daughter, requesting for pt to be taken home by [employer] and states that she will find someone to help get pt up the stairs. Discharge instructions reviewed with daughter.

## 2022-01-28 NOTE — ED PROVIDER NOTES
Patient Seen in: [facility] Emergency Department      History   Patient presents with:  Covid    Stated Complaint: patient with covid- not eating    Subjective:   HPI    22-year-old female vaccinated but not boosted with multiple medical problems in SURGERY      4/28/13   • CHOLECYSTECTOMY     • COLONOSCOPY  7/2010   • COLONOSCOPY  10/28/2013    Procedure: COLONOSCOPY;  Surgeon: Bernarda Francois MD;  Location: [facility] ENDOSCOPY   • HIP REPLACEMENT SURGERY     • HYSTERECTOMY     • OPEN HEART SURGERY (PBP) atraumatic. Skin is dry without rashes or lesions. Neuro exam: Awake and conversive but confused. Moving all 4 extremities well.     ED Course     Labs Reviewed   COMP METABOLIC PANEL (14) - Abnormal; Notable for the following components:       Result [facility] AP PORTABLE  (CPT=71045)    Result Date: 1/28/2022  PROCEDURE:  XR CHEST AP PORTABLE  (CPT=71045)  TECHNIQUE:  AP chest radiograph was obtained.   COMPARISON:  [facility] , XR, XR CHEST AP PORTABLE  (CPT=71045), 1/25/2022, 7:02 PM.  [facility] , XR, XR CHEST AP POR Discharge Medication List    START taking these medications    sulfamethoxazole-trimethoprim -160 MG Oral Tab per tablet  Take 1 tablet by mouth 2 (two) times daily for 7 days.   Qty: 14 tablet Refills: 0

## 2022-06-16 ENCOUNTER — HOSPITAL ENCOUNTER (INPATIENT)
Facility: HOSPITAL | Age: 82
LOS: 2 days | Discharge: HOME HEALTH | End: 2022-06-18
Attending: EMERGENCY MEDICINE | Admitting: INTERNAL MEDICINE
Payer: MEDICARE

## 2022-06-16 ENCOUNTER — APPOINTMENT (OUTPATIENT)
Dept: CT IMAGING | Facility: HOSPITAL | Age: 82
DRG: 689 | End: 2022-06-16
Attending: EMERGENCY MEDICINE
Payer: MEDICARE

## 2022-06-16 ENCOUNTER — HOSPITAL ENCOUNTER (INPATIENT)
Facility: HOSPITAL | Age: 82
LOS: 2 days | Discharge: HOME HEALTH | DRG: 689 | End: 2022-06-18
Attending: EMERGENCY MEDICINE | Admitting: INTERNAL MEDICINE
Payer: MEDICARE

## 2022-06-16 ENCOUNTER — APPOINTMENT (OUTPATIENT)
Dept: GENERAL RADIOLOGY | Facility: HOSPITAL | Age: 82
DRG: 689 | End: 2022-06-16
Attending: EMERGENCY MEDICINE
Payer: MEDICARE

## 2022-06-16 ENCOUNTER — APPOINTMENT (OUTPATIENT)
Dept: GENERAL RADIOLOGY | Facility: HOSPITAL | Age: 82
End: 2022-06-16
Attending: EMERGENCY MEDICINE
Payer: MEDICARE

## 2022-06-16 ENCOUNTER — HOSPITAL ENCOUNTER (INPATIENT)
Facility: HOSPITAL | Age: 82
LOS: 2 days | Discharge: HOME | End: 2022-06-18
Attending: EMERGENCY MEDICINE | Admitting: INTERNAL MEDICINE
Payer: MEDICARE

## 2022-06-16 ENCOUNTER — APPOINTMENT (OUTPATIENT)
Dept: CT IMAGING | Facility: HOSPITAL | Age: 82
End: 2022-06-16
Attending: EMERGENCY MEDICINE
Payer: MEDICARE

## 2022-06-16 DIAGNOSIS — S09.90XA INJURY OF HEAD, INITIAL ENCOUNTER: ICD-10-CM

## 2022-06-16 DIAGNOSIS — D72.829 LEUKOCYTOSIS, UNSPECIFIED TYPE: ICD-10-CM

## 2022-06-16 DIAGNOSIS — R50.9 FEVER, UNSPECIFIED FEVER CAUSE: ICD-10-CM

## 2022-06-16 DIAGNOSIS — N39.0 URINARY TRACT INFECTION WITHOUT HEMATURIA, SITE UNSPECIFIED: Primary | ICD-10-CM

## 2022-06-16 PROBLEM — E87.29 RESPIRATORY ACIDOSIS: Status: ACTIVE | Noted: 2022-06-16

## 2022-06-16 PROBLEM — E87.2 RESPIRATORY ACIDOSIS: Status: ACTIVE | Noted: 2022-06-16

## 2022-06-16 PROBLEM — R79.89 AZOTEMIA: Status: ACTIVE | Noted: 2022-06-16

## 2022-06-16 PROBLEM — E87.3 METABOLIC ALKALOSIS: Status: ACTIVE | Noted: 2022-06-16

## 2022-06-16 LAB
ALBUMIN SERPL-MCNC: 3.4 G/DL (ref 3.4–5)
ALBUMIN/GLOB SERPL: 1 {RATIO} (ref 1–2)
ALP LIVER SERPL-CCNC: 77 U/L
ALT SERPL-CCNC: 14 U/L
ANION GAP SERPL CALC-SCNC: 5 MMOL/L (ref 0–18)
ARTERIAL PATENCY WRIST A: POSITIVE
ARTERIAL PATENCY WRIST A: POSITIVE
AST SERPL-CCNC: 18 U/L (ref 15–37)
ATRIAL RATE: 96 BPM
BASE EXCESS BLDA CALC-SCNC: 2.1 MMOL/L (ref ?–2)
BASE EXCESS BLDA CALC-SCNC: 2.8 MMOL/L (ref ?–2)
BASOPHILS # BLD AUTO: 0.05 X10(3) UL (ref 0–0.2)
BASOPHILS NFR BLD AUTO: 0.4 %
BILIRUB SERPL-MCNC: 0.4 MG/DL (ref 0.1–2)
BILIRUB UR QL STRIP.AUTO: NEGATIVE
BODY TEMPERATURE: 98.6 F
BODY TEMPERATURE: 98.6 F
BUN BLD-MCNC: 20 MG/DL (ref 7–18)
CA-I BLD-SCNC: 1 MMOL/L (ref 0.95–1.32)
CA-I BLD-SCNC: 1.15 MMOL/L (ref 0.95–1.32)
CALCIUM BLD-MCNC: 8.6 MG/DL (ref 8.5–10.1)
CHLORIDE SERPL-SCNC: 107 MMOL/L (ref 98–112)
CO2 SERPL-SCNC: 29 MMOL/L (ref 21–32)
COHGB MFR BLD: 1.9 % SAT (ref 0–3)
COHGB MFR BLD: 7.1 % SAT (ref 0–3)
COLOR UR AUTO: YELLOW
CREAT BLD-MCNC: 0.96 MG/DL
EOSINOPHIL # BLD AUTO: 0.13 X10(3) UL (ref 0–0.7)
EOSINOPHIL NFR BLD AUTO: 0.9 %
ERYTHROCYTE [DISTWIDTH] IN BLOOD BY AUTOMATED COUNT: 12.6 %
GLOBULIN PLAS-MCNC: 3.5 G/DL (ref 2.8–4.4)
GLUCOSE BLD-MCNC: 149 MG/DL (ref 70–99)
GLUCOSE UR STRIP.AUTO-MCNC: NEGATIVE MG/DL
HCO3 BLDA-SCNC: 26.3 MEQ/L (ref 21–27)
HCO3 BLDA-SCNC: 26.9 MEQ/L (ref 21–27)
HCT VFR BLD AUTO: 39.8 %
HGB BLD-MCNC: 11.5 G/DL
HGB BLD-MCNC: 13.2 G/DL
HGB BLD-MCNC: 13.4 G/DL
HYALINE CASTS #/AREA URNS AUTO: PRESENT /LPF
IMM GRANULOCYTES # BLD AUTO: 0.04 X10(3) UL (ref 0–1)
IMM GRANULOCYTES NFR BLD: 0.3 %
KETONES UR STRIP.AUTO-MCNC: NEGATIVE MG/DL
L/M: 4 L/MIN
LACTATE BLD-SCNC: 0.8 MMOL/L (ref 0.5–2)
LACTATE BLD-SCNC: 1.5 MMOL/L (ref 0.5–2)
LACTATE SERPL-SCNC: 1 MMOL/L (ref 0.4–2)
LYMPHOCYTES # BLD AUTO: 0.98 X10(3) UL (ref 1–4)
LYMPHOCYTES NFR BLD AUTO: 6.9 %
MCH RBC QN AUTO: 29.7 PG (ref 26–34)
MCHC RBC AUTO-ENTMCNC: 33.2 G/DL (ref 31–37)
MCV RBC AUTO: 89.4 FL
METHGB MFR BLD: 0.3 % SAT (ref 0.4–1.5)
METHGB MFR BLD: 0.7 % SAT (ref 0.4–1.5)
MONOCYTES # BLD AUTO: 0.93 X10(3) UL (ref 0.1–1)
MONOCYTES NFR BLD AUTO: 6.5 %
NEUTROPHILS # BLD AUTO: 12.11 X10 (3) UL (ref 1.5–7.7)
NEUTROPHILS # BLD AUTO: 12.11 X10(3) UL (ref 1.5–7.7)
NEUTROPHILS NFR BLD AUTO: 85 %
NITRITE UR QL STRIP.AUTO: POSITIVE
OSMOLALITY SERPL CALC.SUM OF ELEC: 297 MOSM/KG (ref 275–295)
OXYHGB MFR BLDA: 87 % (ref 92–100)
OXYHGB MFR BLDA: 90 % (ref 92–100)
PCO2 BLDA: 47 MM HG (ref 35–45)
PCO2 BLDA: 55 MM HG (ref 35–45)
PH BLDA: 7.33 [PH] (ref 7.35–7.45)
PH BLDA: 7.39 [PH] (ref 7.35–7.45)
PH UR STRIP.AUTO: 5 [PH] (ref 5–8)
PLATELET # BLD AUTO: 138 10(3)UL (ref 150–450)
PO2 BLDA: 55 MM HG (ref 80–100)
PO2 BLDA: 67 MM HG (ref 80–100)
POTASSIUM BLD-SCNC: 4.3 MMOL/L (ref 3.6–5.1)
POTASSIUM BLD-SCNC: 4.8 MMOL/L (ref 3.6–5.1)
POTASSIUM SERPL-SCNC: 4.1 MMOL/L (ref 3.5–5.1)
PROT SERPL-MCNC: 6.9 G/DL (ref 6.4–8.2)
PROT UR STRIP.AUTO-MCNC: NEGATIVE MG/DL
Q-T INTERVAL: 342 MS
QRS DURATION: 94 MS
QTC CALCULATION (BEZET): 427 MS
R AXIS: -39 DEGREES
RBC # BLD AUTO: 4.45 X10(6)UL
RBC UR QL AUTO: NEGATIVE
SARS-COV-2 RNA RESP QL NAA+PROBE: NOT DETECTED
SODIUM BLD-SCNC: 136 MMOL/L (ref 135–145)
SODIUM BLD-SCNC: 140 MMOL/L (ref 135–145)
SODIUM SERPL-SCNC: 141 MMOL/L (ref 136–145)
SP GR UR STRIP.AUTO: 1.01 (ref 1–1.03)
T AXIS: -74 DEGREES
UROBILINOGEN UR STRIP.AUTO-MCNC: <2 MG/DL
VENTRICULAR RATE: 94 BPM
WBC # BLD AUTO: 14.2 X10(3) UL (ref 4–11)

## 2022-06-16 PROCEDURE — 87086 URINE CULTURE/COLONY COUNT: CPT | Performed by: EMERGENCY MEDICINE

## 2022-06-16 PROCEDURE — 85018 HEMOGLOBIN: CPT | Performed by: EMERGENCY MEDICINE

## 2022-06-16 PROCEDURE — 80053 COMPREHEN METABOLIC PANEL: CPT | Performed by: EMERGENCY MEDICINE

## 2022-06-16 PROCEDURE — 87077 CULTURE AEROBIC IDENTIFY: CPT | Performed by: EMERGENCY MEDICINE

## 2022-06-16 PROCEDURE — 96365 THER/PROPH/DIAG IV INF INIT: CPT

## 2022-06-16 PROCEDURE — 82375 ASSAY CARBOXYHB QUANT: CPT | Performed by: EMERGENCY MEDICINE

## 2022-06-16 PROCEDURE — 85025 COMPLETE CBC W/AUTO DIFF WBC: CPT | Performed by: EMERGENCY MEDICINE

## 2022-06-16 PROCEDURE — 36415 COLL VENOUS BLD VENIPUNCTURE: CPT

## 2022-06-16 PROCEDURE — 83605 ASSAY OF LACTIC ACID: CPT | Performed by: EMERGENCY MEDICINE

## 2022-06-16 PROCEDURE — 70450 CT HEAD/BRAIN W/O DYE: CPT | Performed by: EMERGENCY MEDICINE

## 2022-06-16 PROCEDURE — 93010 ELECTROCARDIOGRAM REPORT: CPT

## 2022-06-16 PROCEDURE — 94640 AIRWAY INHALATION TREATMENT: CPT

## 2022-06-16 PROCEDURE — 87040 BLOOD CULTURE FOR BACTERIA: CPT | Performed by: EMERGENCY MEDICINE

## 2022-06-16 PROCEDURE — 84132 ASSAY OF SERUM POTASSIUM: CPT | Performed by: EMERGENCY MEDICINE

## 2022-06-16 PROCEDURE — 72125 CT NECK SPINE W/O DYE: CPT | Performed by: EMERGENCY MEDICINE

## 2022-06-16 PROCEDURE — 81001 URINALYSIS AUTO W/SCOPE: CPT | Performed by: EMERGENCY MEDICINE

## 2022-06-16 PROCEDURE — 87186 SC STD MICRODIL/AGAR DIL: CPT | Performed by: EMERGENCY MEDICINE

## 2022-06-16 PROCEDURE — 82803 BLOOD GASES ANY COMBINATION: CPT | Performed by: EMERGENCY MEDICINE

## 2022-06-16 PROCEDURE — 36600 WITHDRAWAL OF ARTERIAL BLOOD: CPT | Performed by: EMERGENCY MEDICINE

## 2022-06-16 PROCEDURE — 82330 ASSAY OF CALCIUM: CPT | Performed by: EMERGENCY MEDICINE

## 2022-06-16 PROCEDURE — 71045 X-RAY EXAM CHEST 1 VIEW: CPT | Performed by: EMERGENCY MEDICINE

## 2022-06-16 PROCEDURE — 83050 HGB METHEMOGLOBIN QUAN: CPT | Performed by: EMERGENCY MEDICINE

## 2022-06-16 PROCEDURE — 96361 HYDRATE IV INFUSION ADD-ON: CPT

## 2022-06-16 PROCEDURE — 93005 ELECTROCARDIOGRAM TRACING: CPT

## 2022-06-16 PROCEDURE — 99285 EMERGENCY DEPT VISIT HI MDM: CPT

## 2022-06-16 PROCEDURE — 84295 ASSAY OF SERUM SODIUM: CPT | Performed by: EMERGENCY MEDICINE

## 2022-06-16 RX ORDER — SODIUM CHLORIDE 9 MG/ML
INJECTION, SOLUTION INTRAVENOUS CONTINUOUS
Status: ACTIVE | OUTPATIENT
Start: 2022-06-16 | End: 2022-06-16

## 2022-06-16 RX ORDER — METOCLOPRAMIDE HYDROCHLORIDE 5 MG/ML
10 INJECTION INTRAMUSCULAR; INTRAVENOUS EVERY 8 HOURS PRN
Status: DISCONTINUED | OUTPATIENT
Start: 2022-06-16 | End: 2022-06-18

## 2022-06-16 RX ORDER — ATORVASTATIN CALCIUM 40 MG/1
40 TABLET, FILM COATED ORAL NIGHTLY
Status: DISCONTINUED | OUTPATIENT
Start: 2022-06-16 | End: 2022-06-18

## 2022-06-16 RX ORDER — ONDANSETRON 2 MG/ML
4 INJECTION INTRAMUSCULAR; INTRAVENOUS EVERY 4 HOURS PRN
Status: DISCONTINUED | OUTPATIENT
Start: 2022-06-16 | End: 2022-06-16

## 2022-06-16 RX ORDER — PANTOPRAZOLE SODIUM 40 MG/1
40 TABLET, DELAYED RELEASE ORAL
Status: DISCONTINUED | OUTPATIENT
Start: 2022-06-17 | End: 2022-06-18

## 2022-06-16 RX ORDER — LEVETIRACETAM 250 MG/1
250 TABLET ORAL 2 TIMES DAILY
Status: DISCONTINUED | OUTPATIENT
Start: 2022-06-16 | End: 2022-06-18

## 2022-06-16 RX ORDER — SERTRALINE HYDROCHLORIDE 100 MG/1
200 TABLET, FILM COATED ORAL DAILY
Status: DISCONTINUED | OUTPATIENT
Start: 2022-06-16 | End: 2022-06-18

## 2022-06-16 RX ORDER — ONDANSETRON 2 MG/ML
4 INJECTION INTRAMUSCULAR; INTRAVENOUS EVERY 6 HOURS PRN
Status: DISCONTINUED | OUTPATIENT
Start: 2022-06-16 | End: 2022-06-18

## 2022-06-16 RX ORDER — HEPARIN SODIUM 5000 [USP'U]/ML
5000 INJECTION, SOLUTION INTRAVENOUS; SUBCUTANEOUS EVERY 8 HOURS SCHEDULED
Status: DISCONTINUED | OUTPATIENT
Start: 2022-06-16 | End: 2022-06-18

## 2022-06-16 RX ORDER — CARVEDILOL 12.5 MG/1
25 TABLET ORAL 2 TIMES DAILY WITH MEALS
Status: DISCONTINUED | OUTPATIENT
Start: 2022-06-16 | End: 2022-06-18

## 2022-06-16 RX ORDER — LEVETIRACETAM 250 MG/1
250 TABLET ORAL ONCE
Status: COMPLETED | OUTPATIENT
Start: 2022-06-16 | End: 2022-06-16

## 2022-06-16 RX ORDER — ACETAMINOPHEN 650 MG/1
1300 SUPPOSITORY RECTAL ONCE
Status: COMPLETED | OUTPATIENT
Start: 2022-06-16 | End: 2022-06-16

## 2022-06-16 RX ORDER — DONEPEZIL HYDROCHLORIDE 10 MG/1
10 TABLET, FILM COATED ORAL NIGHTLY
Status: DISCONTINUED | OUTPATIENT
Start: 2022-06-16 | End: 2022-06-18

## 2022-06-16 RX ORDER — MEMANTINE HYDROCHLORIDE 10 MG/1
10 TABLET ORAL 2 TIMES DAILY
Status: DISCONTINUED | OUTPATIENT
Start: 2022-06-16 | End: 2022-06-18

## 2022-06-16 RX ORDER — FLUTICASONE FUROATE AND VILANTEROL 200; 25 UG/1; UG/1
1 POWDER RESPIRATORY (INHALATION) DAILY
Refills: 0 | Status: DISCONTINUED | OUTPATIENT
Start: 2022-06-16 | End: 2022-06-18

## 2022-06-16 NOTE — ED INITIAL ASSESSMENT (HPI)
Patient to ER via ambulance from home. Per ems, patient had a found on floor last night by family, increased AMS, nausea/vomitting.   Hx of frequent UTI's    In c collar from ems

## 2022-06-16 NOTE — ED QUICK NOTES
Adriana, daughter of pt, updated on patients status/results. Family recommend to have her gas levels checked at her house.

## 2022-06-16 NOTE — ED QUICK NOTES
.Orders for admission, patient is aware of plan and ready to go upstairs. Any questions, please call ED RN Sahara Ulloa at extension [phone].      Patient Covid vaccination status: Fully vaccinated     COVID Test Ordered in ED: Rapid SARS-CoV-2 by PCR    COVID Suspicion at Admission: N/A    Running Infusions:  None    Mental Status/LOC at time of transport: a/ox2    Other pertinent information:   CIWA score: N/A   NIH score:  N/A     3L o2 baseline  incont of urine  Fall risk  Lives w/ family  On 15L nonbreather per RT at present d/t abg lab results

## 2022-06-16 NOTE — PROGRESS NOTES
Pt A&Ox3, disoriented to time. Hard of hearing, states she forgot hearing aids at home. Up with one person and walker. Fall precuations in place, call light within reach. Notified daughter of hospitalization. Admission navigator completed.

## 2022-06-17 LAB
ADENOVIRUS PCR:: NOT DETECTED
ALBUMIN SERPL-MCNC: 2.7 G/DL (ref 3.4–5)
ALBUMIN/GLOB SERPL: 0.9 {RATIO} (ref 1–2)
ALP LIVER SERPL-CCNC: 61 U/L
ALT SERPL-CCNC: 11 U/L
ANION GAP SERPL CALC-SCNC: 1 MMOL/L (ref 0–18)
AST SERPL-CCNC: 12 U/L (ref 15–37)
B PARAPERT DNA SPEC QL NAA+PROBE: NOT DETECTED
B PERT DNA SPEC QL NAA+PROBE: NOT DETECTED
BASOPHILS # BLD AUTO: 0.03 X10(3) UL (ref 0–0.2)
BASOPHILS NFR BLD AUTO: 0.4 %
BILIRUB SERPL-MCNC: 0.5 MG/DL (ref 0.1–2)
BUN BLD-MCNC: 15 MG/DL (ref 7–18)
C PNEUM DNA SPEC QL NAA+PROBE: NOT DETECTED
CALCIUM BLD-MCNC: 8.4 MG/DL (ref 8.5–10.1)
CHLORIDE SERPL-SCNC: 108 MMOL/L (ref 98–112)
CO2 SERPL-SCNC: 31 MMOL/L (ref 21–32)
CORONAVIRUS 229E PCR:: NOT DETECTED
CORONAVIRUS HKU1 PCR:: NOT DETECTED
CORONAVIRUS NL63 PCR:: NOT DETECTED
CORONAVIRUS OC43 PCR:: NOT DETECTED
CREAT BLD-MCNC: 0.82 MG/DL
EOSINOPHIL # BLD AUTO: 0.11 X10(3) UL (ref 0–0.7)
EOSINOPHIL NFR BLD AUTO: 1.6 %
ERYTHROCYTE [DISTWIDTH] IN BLOOD BY AUTOMATED COUNT: 12.7 %
FLUAV RNA SPEC QL NAA+PROBE: NOT DETECTED
FLUBV RNA SPEC QL NAA+PROBE: NOT DETECTED
GLOBULIN PLAS-MCNC: 3 G/DL (ref 2.8–4.4)
GLUCOSE BLD-MCNC: 94 MG/DL (ref 70–99)
HCT VFR BLD AUTO: 30.4 %
HGB BLD-MCNC: 10 G/DL
IMM GRANULOCYTES # BLD AUTO: 0.02 X10(3) UL (ref 0–1)
IMM GRANULOCYTES NFR BLD: 0.3 %
LYMPHOCYTES # BLD AUTO: 1.16 X10(3) UL (ref 1–4)
LYMPHOCYTES NFR BLD AUTO: 17.3 %
MAGNESIUM SERPL-MCNC: 1.9 MG/DL (ref 1.6–2.6)
MCH RBC QN AUTO: 29.8 PG (ref 26–34)
MCHC RBC AUTO-ENTMCNC: 32.9 G/DL (ref 31–37)
MCV RBC AUTO: 90.5 FL
METAPNEUMOVIRUS PCR:: NOT DETECTED
MONOCYTES # BLD AUTO: 0.54 X10(3) UL (ref 0.1–1)
MONOCYTES NFR BLD AUTO: 8.1 %
MYCOPLASMA PNEUMONIA PCR:: NOT DETECTED
NEUTROPHILS # BLD AUTO: 4.83 X10 (3) UL (ref 1.5–7.7)
NEUTROPHILS # BLD AUTO: 4.83 X10(3) UL (ref 1.5–7.7)
NEUTROPHILS NFR BLD AUTO: 72.3 %
OSMOLALITY SERPL CALC.SUM OF ELEC: 291 MOSM/KG (ref 275–295)
PARAINFLUENZA 1 PCR:: NOT DETECTED
PARAINFLUENZA 2 PCR:: NOT DETECTED
PARAINFLUENZA 3 PCR:: NOT DETECTED
PARAINFLUENZA 4 PCR:: NOT DETECTED
PLATELET # BLD AUTO: 97 10(3)UL (ref 150–450)
POTASSIUM SERPL-SCNC: 3.9 MMOL/L (ref 3.5–5.1)
PROT SERPL-MCNC: 5.7 G/DL (ref 6.4–8.2)
RBC # BLD AUTO: 3.36 X10(6)UL
RHINOVIRUS/ENTERO PCR:: NOT DETECTED
RSV RNA SPEC QL NAA+PROBE: NOT DETECTED
SARS-COV-2 RNA NPH QL NAA+NON-PROBE: NOT DETECTED
SODIUM SERPL-SCNC: 140 MMOL/L (ref 136–145)
WBC # BLD AUTO: 6.7 X10(3) UL (ref 4–11)

## 2022-06-17 PROCEDURE — 97116 GAIT TRAINING THERAPY: CPT

## 2022-06-17 PROCEDURE — 97161 PT EVAL LOW COMPLEX 20 MIN: CPT

## 2022-06-17 PROCEDURE — 0202U NFCT DS 22 TRGT SARS-COV-2: CPT | Performed by: HOSPITALIST

## 2022-06-17 PROCEDURE — 83735 ASSAY OF MAGNESIUM: CPT | Performed by: HOSPITALIST

## 2022-06-17 PROCEDURE — 80053 COMPREHEN METABOLIC PANEL: CPT | Performed by: HOSPITALIST

## 2022-06-17 PROCEDURE — 93005 ELECTROCARDIOGRAM TRACING: CPT

## 2022-06-17 PROCEDURE — 85025 COMPLETE CBC W/AUTO DIFF WBC: CPT | Performed by: HOSPITALIST

## 2022-06-17 PROCEDURE — 93010 ELECTROCARDIOGRAM REPORT: CPT | Performed by: INTERNAL MEDICINE

## 2022-06-17 PROCEDURE — 97166 OT EVAL MOD COMPLEX 45 MIN: CPT

## 2022-06-17 PROCEDURE — 87999 UNLISTED MICROBIOLOGY PX: CPT

## 2022-06-17 PROCEDURE — 97535 SELF CARE MNGMENT TRAINING: CPT

## 2022-06-17 RX ORDER — BUTALBITAL, ACETAMINOPHEN AND CAFFEINE 50; 325; 40 MG/1; MG/1; MG/1
1 TABLET ORAL EVERY 6 HOURS PRN
Status: DISCONTINUED | OUTPATIENT
Start: 2022-06-17 | End: 2022-06-18

## 2022-06-17 NOTE — CM/SW NOTE
SW notified by [facility] that patient is current with their services. SW reserved in [facility]. SW will continue to remain available for any additional DC needs or concerns.      Danette Stovall MSW, LSW  Discharge Planner   [phone]

## 2022-06-17 NOTE — PLAN OF CARE
Pt alert and oriented x3. VSS, afebrile. Denies pain or nausea. SOB with exertion. Desaturating with sleep, rebounds quickly. Ambulatory to the bathroom with assist. Fall precautions in place. Call light within reach. Will continue to monitor.

## 2022-06-17 NOTE — CM/SW NOTE
06/17/22 1000   CM/SW Referral Data   Referral Source Social Work (self-referral)   Reason for Referral Discharge planning   Informant Patient;EMR;Clinical Staff Member   Patient [address]   Patient lives with Daughter     SW met with patient at bedside to discuss home needs and assessment. Patient reported that she lives at home with her daughter and son. Her daughter is home all the time and her son works during the day. Patient reported that she used to have a nurse through the [facility] that would come to her home but that service has not been active for a while. Patient reported that she uses a walker at home but is independent with ADL's. Patient reported that she would like to return home with home health services. Patient is not interested in rehab or [facility] placement.  will send referral for [facility] services for patient via [facility] and will present list of accepting [facility] agencies once available. SW will continue to remain available for any additional DC needs or concerns.      Minoo Hollowya MSW, LSW  Discharge Planner   [phone]

## 2022-06-18 VITALS
TEMPERATURE: 99 F | BODY MASS INDEX: 25 KG/M2 | DIASTOLIC BLOOD PRESSURE: 87 MMHG | HEART RATE: 78 BPM | RESPIRATION RATE: 18 BRPM | WEIGHT: 149.63 LBS | SYSTOLIC BLOOD PRESSURE: 150 MMHG | OXYGEN SATURATION: 96 %

## 2022-06-18 LAB
ANION GAP SERPL CALC-SCNC: 6 MMOL/L (ref 0–18)
ATRIAL RATE: 84 BPM
BASOPHILS # BLD AUTO: 0.02 X10(3) UL (ref 0–0.2)
BASOPHILS NFR BLD AUTO: 0.3 %
BUN BLD-MCNC: 12 MG/DL (ref 7–18)
CALCIUM BLD-MCNC: 9.3 MG/DL (ref 8.5–10.1)
CHLORIDE SERPL-SCNC: 105 MMOL/L (ref 98–112)
CO2 SERPL-SCNC: 29 MMOL/L (ref 21–32)
CREAT BLD-MCNC: 0.73 MG/DL
EOSINOPHIL # BLD AUTO: 0.24 X10(3) UL (ref 0–0.7)
EOSINOPHIL NFR BLD AUTO: 4 %
ERYTHROCYTE [DISTWIDTH] IN BLOOD BY AUTOMATED COUNT: 12 %
GLUCOSE BLD-MCNC: 95 MG/DL (ref 70–99)
HCT VFR BLD AUTO: 31.1 %
HGB BLD-MCNC: 10.5 G/DL
IMM GRANULOCYTES # BLD AUTO: 0.01 X10(3) UL (ref 0–1)
IMM GRANULOCYTES NFR BLD: 0.2 %
LYMPHOCYTES # BLD AUTO: 1.03 X10(3) UL (ref 1–4)
LYMPHOCYTES NFR BLD AUTO: 17.4 %
MAGNESIUM SERPL-MCNC: 1.9 MG/DL (ref 1.6–2.6)
MCH RBC QN AUTO: 29.3 PG (ref 26–34)
MCHC RBC AUTO-ENTMCNC: 33.8 G/DL (ref 31–37)
MCV RBC AUTO: 86.9 FL
MONOCYTES # BLD AUTO: 0.43 X10(3) UL (ref 0.1–1)
MONOCYTES NFR BLD AUTO: 7.3 %
NEUTROPHILS # BLD AUTO: 4.2 X10 (3) UL (ref 1.5–7.7)
NEUTROPHILS # BLD AUTO: 4.2 X10(3) UL (ref 1.5–7.7)
NEUTROPHILS NFR BLD AUTO: 70.8 %
OSMOLALITY SERPL CALC.SUM OF ELEC: 290 MOSM/KG (ref 275–295)
PLATELET # BLD AUTO: 101 10(3)UL (ref 150–450)
POTASSIUM SERPL-SCNC: 3.7 MMOL/L (ref 3.5–5.1)
Q-T INTERVAL: 414 MS
QRS DURATION: 96 MS
QTC CALCULATION (BEZET): 449 MS
R AXIS: -39 DEGREES
RBC # BLD AUTO: 3.58 X10(6)UL
SODIUM SERPL-SCNC: 140 MMOL/L (ref 136–145)
T AXIS: -50 DEGREES
VENTRICULAR RATE: 71 BPM
WBC # BLD AUTO: 5.9 X10(3) UL (ref 4–11)

## 2022-06-18 PROCEDURE — 83735 ASSAY OF MAGNESIUM: CPT | Performed by: HOSPITALIST

## 2022-06-18 PROCEDURE — 80048 BASIC METABOLIC PNL TOTAL CA: CPT | Performed by: HOSPITALIST

## 2022-06-18 PROCEDURE — 85025 COMPLETE CBC W/AUTO DIFF WBC: CPT | Performed by: HOSPITALIST

## 2022-06-18 PROCEDURE — 97116 GAIT TRAINING THERAPY: CPT

## 2022-06-18 PROCEDURE — 97110 THERAPEUTIC EXERCISES: CPT

## 2022-06-18 RX ORDER — POTASSIUM CHLORIDE 20 MEQ/1
40 TABLET, EXTENDED RELEASE ORAL ONCE
Status: COMPLETED | OUTPATIENT
Start: 2022-06-18 | End: 2022-06-18

## 2022-06-18 RX ORDER — ALBUTEROL SULFATE 2.5 MG/3ML
2.5 SOLUTION RESPIRATORY (INHALATION) EVERY 6 HOURS PRN
Qty: 30 EACH | Refills: 0 | Status: SHIPPED | OUTPATIENT
Start: 2022-06-18 | End: 2022-07-02

## 2022-06-18 RX ORDER — CEFDINIR 300 MG/1
300 CAPSULE ORAL 2 TIMES DAILY
Qty: 5 CAPSULE | Refills: 0 | Status: ON HOLD | OUTPATIENT
Start: 2022-06-19 | End: 2022-10-27 | Stop reason: CLARIF

## 2022-06-18 NOTE — CM/SW NOTE
RN states pt is ready for discharge, and pt needs transport home. SW spoke with pt's dtr and explained costs of medicar, and she is agreeable. SW arranged Medicar, and made [facility] aware of discharge. Sasha Mayte [phone] has been requested to arrange medicar for  time of 3:00. PCS form completed.

## 2022-06-18 NOTE — PROGRESS NOTES
A0x3, VSS. Afebrile. No complaint of pain. Desaturates with exertion. Pace maker not pacing. Tele tech called and can still see rhythm. MD notified and ECG completed and sent to MD. No new orders. meds given per [facility]. Tolerated well. Will continue to monitor.

## 2022-06-18 NOTE — PLAN OF CARE
Patient A&Ox3, reported no pain. O2 walk completed, on RA O2 sat=88%, then patient recovered on RA W3uff=33%.

## 2022-06-18 NOTE — DISCHARGE INSTRUCTIONS
Resume services with [facility]  [phone]    Take cefdinir twice daily starting tomorrow morning     Try following up with Dr. Cheryle Mercury -- for PCP    [id number] [place] [address]    [facility]    [place], [address] [address]    [phone]

## 2022-06-18 NOTE — PROGRESS NOTES
NURSING DISCHARGE NOTE    Discharged Home via Ambulance. Accompanied by Support staff  Belongings Taken by patient/family. Patient A&Ox3, reported no pain. AVS, home medications, and follow-up appts gone over w/patient, all questions answered. PIV taken out, tolerated well.

## 2022-07-15 ENCOUNTER — APPOINTMENT (OUTPATIENT)
Dept: CT IMAGING | Facility: HOSPITAL | Age: 82
End: 2022-07-15
Attending: EMERGENCY MEDICINE
Payer: MEDICARE

## 2022-07-15 ENCOUNTER — APPOINTMENT (OUTPATIENT)
Dept: GENERAL RADIOLOGY | Facility: HOSPITAL | Age: 82
End: 2022-07-15
Attending: EMERGENCY MEDICINE
Payer: MEDICARE

## 2022-07-15 ENCOUNTER — HOSPITAL ENCOUNTER (EMERGENCY)
Facility: HOSPITAL | Age: 82
Discharge: HOME | End: 2022-07-15
Attending: EMERGENCY MEDICINE
Payer: MEDICARE

## 2022-07-15 VITALS
TEMPERATURE: 97 F | OXYGEN SATURATION: 95 % | SYSTOLIC BLOOD PRESSURE: 138 MMHG | HEART RATE: 90 BPM | BODY MASS INDEX: 24.64 KG/M2 | RESPIRATION RATE: 20 BRPM | DIASTOLIC BLOOD PRESSURE: 96 MMHG | WEIGHT: 149.69 LBS | HEIGHT: 65.5 IN

## 2022-07-15 DIAGNOSIS — I10 HYPERTENSION, UNSPECIFIED TYPE: Primary | ICD-10-CM
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
## 2022-07-15 LAB
ALBUMIN SERPL-MCNC: 3.2 G/DL (ref 3.4–5)
ALBUMIN/GLOB SERPL: 0.8 {RATIO} (ref 1–2)
ALP LIVER SERPL-CCNC: 110 U/L
ALT SERPL-CCNC: 25 U/L
ANION GAP SERPL CALC-SCNC: 7 MMOL/L (ref 0–18)
APTT PPP: 36.1 SECONDS (ref 23.3–35.6)
AST SERPL-CCNC: 28 U/L (ref 15–37)
ATRIAL RATE: 147 BPM
BASOPHILS # BLD AUTO: 0.08 X10(3) UL (ref 0–0.2)
BASOPHILS NFR BLD AUTO: 1.1 %
BILIRUB SERPL-MCNC: 0.8 MG/DL (ref 0.1–2)
BILIRUB UR QL STRIP.AUTO: NEGATIVE
BUN BLD-MCNC: 15 MG/DL (ref 7–18)
CALCIUM BLD-MCNC: 8.7 MG/DL (ref 8.5–10.1)
CHLORIDE SERPL-SCNC: 105 MMOL/L (ref 98–112)
CLARITY UR REFRACT.AUTO: CLEAR
CO2 SERPL-SCNC: 25 MMOL/L (ref 21–32)
COLOR UR AUTO: YELLOW
CREAT BLD-MCNC: 1.05 MG/DL
EOSINOPHIL # BLD AUTO: 0.11 X10(3) UL (ref 0–0.7)
EOSINOPHIL NFR BLD AUTO: 1.5 %
ERYTHROCYTE [DISTWIDTH] IN BLOOD BY AUTOMATED COUNT: 15.2 %
GLOBULIN PLAS-MCNC: 3.8 G/DL (ref 2.8–4.4)
GLUCOSE BLD-MCNC: 92 MG/DL (ref 70–99)
GLUCOSE UR STRIP.AUTO-MCNC: NEGATIVE MG/DL
HCT VFR BLD AUTO: 35 %
HGB BLD-MCNC: 11.2 G/DL
HYALINE CASTS #/AREA URNS AUTO: PRESENT /LPF
IMM GRANULOCYTES # BLD AUTO: 0.02 X10(3) UL (ref 0–1)
IMM GRANULOCYTES NFR BLD: 0.3 %
INR BLD: 1.42 (ref 0.8–1.2)
KETONES UR STRIP.AUTO-MCNC: NEGATIVE MG/DL
LACTATE SERPL-SCNC: 1.1 MMOL/L (ref 0.4–2)
LEUKOCYTE ESTERASE UR QL STRIP.AUTO: NEGATIVE
LYMPHOCYTES # BLD AUTO: 1.14 X10(3) UL (ref 1–4)
LYMPHOCYTES NFR BLD AUTO: 15.9 %
MCH RBC QN AUTO: 29.4 PG (ref 26–34)
MCHC RBC AUTO-ENTMCNC: 32 G/DL (ref 31–37)
MCV RBC AUTO: 91.9 FL
MONOCYTES # BLD AUTO: 0.61 X10(3) UL (ref 0.1–1)
MONOCYTES NFR BLD AUTO: 8.5 %
NEUTROPHILS # BLD AUTO: 5.22 X10 (3) UL (ref 1.5–7.7)
NEUTROPHILS # BLD AUTO: 5.22 X10(3) UL (ref 1.5–7.7)
NEUTROPHILS NFR BLD AUTO: 72.7 %
NITRITE UR QL STRIP.AUTO: NEGATIVE
NT-PROBNP SERPL-MCNC: ABNORMAL PG/ML (ref ?–450)
OSMOLALITY SERPL CALC.SUM OF ELEC: 284 MOSM/KG (ref 275–295)
PH UR STRIP.AUTO: 6 [PH] (ref 5–8)
PLATELET # BLD AUTO: 201 10(3)UL (ref 150–450)
POTASSIUM SERPL-SCNC: 3.8 MMOL/L (ref 3.5–5.1)
PROCALCITONIN SERPL-MCNC: <0.05 NG/ML (ref ?–0.16)
PROT SERPL-MCNC: 7 G/DL (ref 6.4–8.2)
PROT UR STRIP.AUTO-MCNC: 100 MG/DL
PROTHROMBIN TIME: 17.4 SECONDS (ref 11.6–14.8)
Q-T INTERVAL: 380 MS
QRS DURATION: 90 MS
QTC CALCULATION (BEZET): 435 MS
R AXIS: -57 DEGREES
RBC # BLD AUTO: 3.81 X10(6)UL
SARS-COV-2 RNA RESP QL NAA+PROBE: NOT DETECTED
SODIUM SERPL-SCNC: 137 MMOL/L (ref 136–145)
SP GR UR STRIP.AUTO: 1.01 (ref 1–1.03)
T AXIS: 268 DEGREES
UROBILINOGEN UR STRIP.AUTO-MCNC: <2 MG/DL
VENTRICULAR RATE: 79 BPM
WBC # BLD AUTO: 7.2 X10(3) UL (ref 4–11)

## 2022-07-15 PROCEDURE — 85025 COMPLETE CBC W/AUTO DIFF WBC: CPT | Performed by: EMERGENCY MEDICINE

## 2022-07-15 PROCEDURE — 85025 COMPLETE CBC W/AUTO DIFF WBC: CPT

## 2022-07-15 PROCEDURE — 93010 ELECTROCARDIOGRAM REPORT: CPT | Performed by: EMERGENCY MEDICINE

## 2022-07-15 PROCEDURE — 84145 PROCALCITONIN (PCT): CPT | Performed by: EMERGENCY MEDICINE

## 2022-07-15 PROCEDURE — 85610 PROTHROMBIN TIME: CPT | Performed by: EMERGENCY MEDICINE

## 2022-07-15 PROCEDURE — 80053 COMPREHEN METABOLIC PANEL: CPT

## 2022-07-15 PROCEDURE — 83880 ASSAY OF NATRIURETIC PEPTIDE: CPT | Performed by: EMERGENCY MEDICINE

## 2022-07-15 PROCEDURE — 51701 INSERT BLADDER CATHETER: CPT | Performed by: EMERGENCY MEDICINE

## 2022-07-15 PROCEDURE — 99285 EMERGENCY DEPT VISIT HI MDM: CPT | Performed by: EMERGENCY MEDICINE

## 2022-07-15 PROCEDURE — 83605 ASSAY OF LACTIC ACID: CPT | Performed by: EMERGENCY MEDICINE

## 2022-07-15 PROCEDURE — 72110 X-RAY EXAM L-2 SPINE 4/>VWS: CPT | Performed by: EMERGENCY MEDICINE

## 2022-07-15 PROCEDURE — 74177 CT ABD & PELVIS W/CONTRAST: CPT | Performed by: EMERGENCY MEDICINE

## 2022-07-15 PROCEDURE — 96374 THER/PROPH/DIAG INJ IV PUSH: CPT | Performed by: EMERGENCY MEDICINE

## 2022-07-15 PROCEDURE — 73502 X-RAY EXAM HIP UNI 2-3 VIEWS: CPT | Performed by: EMERGENCY MEDICINE

## 2022-07-15 PROCEDURE — 93005 ELECTROCARDIOGRAM TRACING: CPT

## 2022-07-15 PROCEDURE — 85730 THROMBOPLASTIN TIME PARTIAL: CPT | Performed by: EMERGENCY MEDICINE

## 2022-07-15 PROCEDURE — 81001 URINALYSIS AUTO W/SCOPE: CPT | Performed by: EMERGENCY MEDICINE

## 2022-07-15 PROCEDURE — 71045 X-RAY EXAM CHEST 1 VIEW: CPT | Performed by: EMERGENCY MEDICINE

## 2022-07-15 PROCEDURE — 80053 COMPREHEN METABOLIC PANEL: CPT | Performed by: EMERGENCY MEDICINE

## 2022-07-15 RX ORDER — LABETALOL HYDROCHLORIDE 5 MG/ML
20 INJECTION, SOLUTION INTRAVENOUS ONCE
Status: COMPLETED | OUTPATIENT
Start: 2022-07-15 | End: 2022-07-15

## 2022-07-15 RX ORDER — LORATADINE 10 MG/1
10 TABLET ORAL DAILY
COMMUNITY

## 2022-07-15 RX ORDER — IOHEXOL 350 MG/ML
80 INJECTION, SOLUTION INTRAVENOUS
Status: COMPLETED | OUTPATIENT
Start: 2022-07-15 | End: 2022-07-15

## 2022-07-15 NOTE — ED INITIAL ASSESSMENT (HPI)
Patient to ED with son.  + high BP today, checked by visiting nurse.  + N/V/D, swelling and bloating for 4-5 days.    + unwitnessed fall last night, + right hip pain .   + headache

## 2022-09-30 ENCOUNTER — APPOINTMENT (OUTPATIENT)
Dept: CT IMAGING | Facility: HOSPITAL | Age: 82
End: 2022-09-30
Attending: STUDENT IN AN ORGANIZED HEALTH CARE EDUCATION/TRAINING PROGRAM
Payer: MEDICARE

## 2022-09-30 ENCOUNTER — APPOINTMENT (OUTPATIENT)
Dept: GENERAL RADIOLOGY | Facility: HOSPITAL | Age: 82
End: 2022-09-30
Attending: STUDENT IN AN ORGANIZED HEALTH CARE EDUCATION/TRAINING PROGRAM
Payer: MEDICARE

## 2022-09-30 ENCOUNTER — HOSPITAL ENCOUNTER (EMERGENCY)
Facility: HOSPITAL | Age: 82
Discharge: HOME | End: 2022-09-30
Attending: STUDENT IN AN ORGANIZED HEALTH CARE EDUCATION/TRAINING PROGRAM
Payer: MEDICARE

## 2022-09-30 VITALS
HEIGHT: 64.96 IN | WEIGHT: 149.94 LBS | OXYGEN SATURATION: 95 % | HEART RATE: 87 BPM | DIASTOLIC BLOOD PRESSURE: 86 MMHG | TEMPERATURE: 97 F | BODY MASS INDEX: 24.98 KG/M2 | SYSTOLIC BLOOD PRESSURE: 179 MMHG | RESPIRATION RATE: 18 BRPM

## 2022-09-30 DIAGNOSIS — S16.1XXA NECK STRAIN, INITIAL ENCOUNTER: ICD-10-CM

## 2022-09-30 DIAGNOSIS — S70.01XA CONTUSION OF RIGHT HIP, INITIAL ENCOUNTER: ICD-10-CM
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
## 2022-09-30 DIAGNOSIS — I10 PRIMARY HYPERTENSION: ICD-10-CM

## 2022-09-30 LAB
ALBUMIN SERPL-MCNC: 3.3 G/DL (ref 3.4–5)
ALBUMIN/GLOB SERPL: 0.9 {RATIO} (ref 1–2)
ALP LIVER SERPL-CCNC: 94 U/L
ALT SERPL-CCNC: 18 U/L
ANION GAP SERPL CALC-SCNC: 3 MMOL/L (ref 0–18)
AST SERPL-CCNC: 15 U/L (ref 15–37)
BASOPHILS # BLD AUTO: 0.02 X10(3) UL (ref 0–0.2)
BASOPHILS NFR BLD AUTO: 0.4 %
BILIRUB SERPL-MCNC: 0.3 MG/DL (ref 0.1–2)
BILIRUB UR QL STRIP.AUTO: NEGATIVE
BUN BLD-MCNC: 9 MG/DL (ref 7–18)
CALCIUM BLD-MCNC: 8.8 MG/DL (ref 8.5–10.1)
CHLORIDE SERPL-SCNC: 106 MMOL/L (ref 98–112)
CK SERPL-CCNC: 33 U/L
CLARITY UR REFRACT.AUTO: CLEAR
CO2 SERPL-SCNC: 32 MMOL/L (ref 21–32)
COLOR UR AUTO: YELLOW
CREAT BLD-MCNC: 0.98 MG/DL
EOSINOPHIL # BLD AUTO: 0.19 X10(3) UL (ref 0–0.7)
EOSINOPHIL NFR BLD AUTO: 4 %
ERYTHROCYTE [DISTWIDTH] IN BLOOD BY AUTOMATED COUNT: 13.6 %
GFR SERPLBLD BASED ON 1.73 SQ M-ARVRAT: 57 ML/MIN/1.73M2 (ref 60–?)
GLOBULIN PLAS-MCNC: 3.7 G/DL (ref 2.8–4.4)
GLUCOSE BLD-MCNC: 85 MG/DL (ref 70–99)
GLUCOSE UR STRIP.AUTO-MCNC: NEGATIVE MG/DL
HCT VFR BLD AUTO: 35.8 %
HGB BLD-MCNC: 11.9 G/DL
IMM GRANULOCYTES # BLD AUTO: 0 X10(3) UL (ref 0–1)
IMM GRANULOCYTES NFR BLD: 0 %
KETONES UR STRIP.AUTO-MCNC: NEGATIVE MG/DL
LYMPHOCYTES # BLD AUTO: 1.1 X10(3) UL (ref 1–4)
LYMPHOCYTES NFR BLD AUTO: 23 %
MCH RBC QN AUTO: 29.5 PG (ref 26–34)
MCHC RBC AUTO-ENTMCNC: 33.2 G/DL (ref 31–37)
MCV RBC AUTO: 88.6 FL
MONOCYTES # BLD AUTO: 0.49 X10(3) UL (ref 0.1–1)
MONOCYTES NFR BLD AUTO: 10.3 %
NEUTROPHILS # BLD AUTO: 2.98 X10 (3) UL (ref 1.5–7.7)
NEUTROPHILS # BLD AUTO: 2.98 X10(3) UL (ref 1.5–7.7)
NEUTROPHILS NFR BLD AUTO: 62.3 %
NITRITE UR QL STRIP.AUTO: NEGATIVE
OSMOLALITY SERPL CALC.SUM OF ELEC: 290 MOSM/KG (ref 275–295)
PH UR STRIP.AUTO: 6 [PH] (ref 5–8)
PLATELET # BLD AUTO: 123 10(3)UL (ref 150–450)
POTASSIUM SERPL-SCNC: 4.6 MMOL/L (ref 3.5–5.1)
PROT SERPL-MCNC: 7 G/DL (ref 6.4–8.2)
PROT UR STRIP.AUTO-MCNC: NEGATIVE MG/DL
RBC # BLD AUTO: 4.04 X10(6)UL
SARS-COV-2 RNA RESP QL NAA+PROBE: NOT DETECTED
SODIUM SERPL-SCNC: 141 MMOL/L (ref 136–145)
SP GR UR STRIP.AUTO: 1.02 (ref 1–1.03)
TROPONIN I HIGH SENSITIVITY: 7 NG/L
UROBILINOGEN UR STRIP.AUTO-MCNC: 0.2 MG/DL
WBC # BLD AUTO: 4.8 X10(3) UL (ref 4–11)

## 2022-09-30 PROCEDURE — 72125 CT NECK SPINE W/O DYE: CPT | Performed by: STUDENT IN AN ORGANIZED HEALTH CARE EDUCATION/TRAINING PROGRAM

## 2022-09-30 PROCEDURE — 84484 ASSAY OF TROPONIN QUANT: CPT | Performed by: STUDENT IN AN ORGANIZED HEALTH CARE EDUCATION/TRAINING PROGRAM

## 2022-09-30 PROCEDURE — 99285 EMERGENCY DEPT VISIT HI MDM: CPT

## 2022-09-30 PROCEDURE — 70450 CT HEAD/BRAIN W/O DYE: CPT | Performed by: STUDENT IN AN ORGANIZED HEALTH CARE EDUCATION/TRAINING PROGRAM

## 2022-09-30 PROCEDURE — 73552 X-RAY EXAM OF FEMUR 2/>: CPT | Performed by: STUDENT IN AN ORGANIZED HEALTH CARE EDUCATION/TRAINING PROGRAM

## 2022-09-30 PROCEDURE — 85025 COMPLETE CBC W/AUTO DIFF WBC: CPT | Performed by: STUDENT IN AN ORGANIZED HEALTH CARE EDUCATION/TRAINING PROGRAM

## 2022-09-30 PROCEDURE — 73502 X-RAY EXAM HIP UNI 2-3 VIEWS: CPT | Performed by: STUDENT IN AN ORGANIZED HEALTH CARE EDUCATION/TRAINING PROGRAM

## 2022-09-30 PROCEDURE — 82550 ASSAY OF CK (CPK): CPT | Performed by: STUDENT IN AN ORGANIZED HEALTH CARE EDUCATION/TRAINING PROGRAM

## 2022-09-30 PROCEDURE — 81015 MICROSCOPIC EXAM OF URINE: CPT | Performed by: STUDENT IN AN ORGANIZED HEALTH CARE EDUCATION/TRAINING PROGRAM

## 2022-09-30 PROCEDURE — 80053 COMPREHEN METABOLIC PANEL: CPT | Performed by: STUDENT IN AN ORGANIZED HEALTH CARE EDUCATION/TRAINING PROGRAM

## 2022-09-30 PROCEDURE — 87086 URINE CULTURE/COLONY COUNT: CPT | Performed by: STUDENT IN AN ORGANIZED HEALTH CARE EDUCATION/TRAINING PROGRAM

## 2022-09-30 PROCEDURE — 71045 X-RAY EXAM CHEST 1 VIEW: CPT | Performed by: STUDENT IN AN ORGANIZED HEALTH CARE EDUCATION/TRAINING PROGRAM

## 2022-09-30 PROCEDURE — 93005 ELECTROCARDIOGRAM TRACING: CPT

## 2022-09-30 PROCEDURE — 81001 URINALYSIS AUTO W/SCOPE: CPT | Performed by: STUDENT IN AN ORGANIZED HEALTH CARE EDUCATION/TRAINING PROGRAM

## 2022-09-30 PROCEDURE — 93010 ELECTROCARDIOGRAM REPORT: CPT

## 2022-09-30 PROCEDURE — 36415 COLL VENOUS BLD VENIPUNCTURE: CPT

## 2022-09-30 RX ORDER — CARVEDILOL 12.5 MG/1
25 TABLET ORAL 2 TIMES DAILY WITH MEALS
Status: DISCONTINUED | OUTPATIENT
Start: 2022-09-30 | End: 2022-09-30

## 2022-09-30 RX ORDER — LISINOPRIL 20 MG/1
20 TABLET ORAL DAILY
Status: DISCONTINUED | OUTPATIENT
Start: 2022-09-30 | End: 2022-09-30

## 2022-09-30 NOTE — ED INITIAL ASSESSMENT (HPI)
Pt was brought in by EMS for a fall that occurred yesterday in her home. Pt fell in her tub. Unknown LOC. Pt c/o of neck pain and back pain. c-collar in place by EMS.

## 2022-10-02 LAB
ATRIAL RATE: 65 BPM
Q-T INTERVAL: 358 MS
QRS DURATION: 102 MS
QTC CALCULATION (BEZET): 418 MS
R AXIS: -54 DEGREES
T AXIS: -74 DEGREES
VENTRICULAR RATE: 82 BPM

## 2022-10-24 ENCOUNTER — APPOINTMENT (OUTPATIENT)
Dept: GENERAL RADIOLOGY | Facility: HOSPITAL | Age: 82
End: 2022-10-24
Attending: EMERGENCY MEDICINE
Payer: MEDICARE

## 2022-10-24 ENCOUNTER — APPOINTMENT (OUTPATIENT)
Dept: CT IMAGING | Facility: HOSPITAL | Age: 82
End: 2022-10-24
Attending: EMERGENCY MEDICINE
Payer: MEDICARE

## 2022-10-24 ENCOUNTER — HOSPITAL ENCOUNTER (INPATIENT)
Facility: HOSPITAL | Age: 82
LOS: 5 days | Discharge: HOME | End: 2022-10-29
Attending: EMERGENCY MEDICINE | Admitting: INTERNAL MEDICINE
Payer: MEDICARE

## 2022-10-24 ENCOUNTER — HOSPITAL ENCOUNTER (INPATIENT)
Facility: HOSPITAL | Age: 82
LOS: 5 days | Discharge: HOME HEALTH | End: 2022-10-29
Attending: EMERGENCY MEDICINE | Admitting: INTERNAL MEDICINE
Payer: MEDICARE

## 2022-10-24 DIAGNOSIS — I50.9 ACUTE ON CHRONIC CONGESTIVE HEART FAILURE, UNSPECIFIED HEART FAILURE TYPE (HCC): ICD-10-CM

## 2022-10-24 DIAGNOSIS — I48.20 CHRONIC ATRIAL FIBRILLATION (HCC): ICD-10-CM

## 2022-10-24 DIAGNOSIS — J96.01 ACUTE HYPOXEMIC RESPIRATORY FAILURE (HCC): Primary | ICD-10-CM

## 2022-10-24 DIAGNOSIS — J18.9 NOSOCOMIAL PNEUMONIA: ICD-10-CM
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
## 2022-10-24 DIAGNOSIS — N39.0 URINARY TRACT INFECTION WITHOUT HEMATURIA, SITE UNSPECIFIED: ICD-10-CM

## 2022-10-24 DIAGNOSIS — R77.8 ELEVATED TROPONIN: ICD-10-CM

## 2022-10-24 PROBLEM — R79.89 ELEVATED TROPONIN: Status: ACTIVE | Noted: 2022-10-24

## 2022-10-24 LAB
ALBUMIN SERPL-MCNC: 3.4 G/DL (ref 3.4–5)
ALBUMIN/GLOB SERPL: 0.9 {RATIO} (ref 1–2)
ALP LIVER SERPL-CCNC: 92 U/L
ALT SERPL-CCNC: 16 U/L
ANION GAP SERPL CALC-SCNC: 5 MMOL/L (ref 0–18)
AST SERPL-CCNC: 20 U/L (ref 15–37)
BASOPHILS # BLD AUTO: 0.05 X10(3) UL (ref 0–0.2)
BASOPHILS NFR BLD AUTO: 0.7 %
BILIRUB SERPL-MCNC: 0.5 MG/DL (ref 0.1–2)
BILIRUB UR QL STRIP.AUTO: NEGATIVE
BUN BLD-MCNC: 29 MG/DL (ref 7–18)
CALCIUM BLD-MCNC: 9.2 MG/DL (ref 8.5–10.1)
CHLORIDE SERPL-SCNC: 106 MMOL/L (ref 98–112)
CHOLEST SERPL-MCNC: 156 MG/DL (ref ?–200)
CO2 SERPL-SCNC: 28 MMOL/L (ref 21–32)
CREAT BLD-MCNC: 1.59 MG/DL
EOSINOPHIL # BLD AUTO: 0.02 X10(3) UL (ref 0–0.7)
EOSINOPHIL NFR BLD AUTO: 0.3 %
ERYTHROCYTE [DISTWIDTH] IN BLOOD BY AUTOMATED COUNT: 13.4 %
GFR SERPLBLD BASED ON 1.73 SQ M-ARVRAT: 32 ML/MIN/1.73M2 (ref 60–?)
GLOBULIN PLAS-MCNC: 3.7 G/DL (ref 2.8–4.4)
GLUCOSE BLD-MCNC: 89 MG/DL (ref 70–99)
GLUCOSE UR STRIP.AUTO-MCNC: NEGATIVE MG/DL
HCT VFR BLD AUTO: 37.6 %
HDLC SERPL-MCNC: 54 MG/DL (ref 40–59)
HGB BLD-MCNC: 12.7 G/DL
HYALINE CASTS #/AREA URNS AUTO: PRESENT /LPF
IMM GRANULOCYTES # BLD AUTO: 0.02 X10(3) UL (ref 0–1)
IMM GRANULOCYTES NFR BLD: 0.3 %
INR BLD: 1.2 (ref 0.85–1.16)
KETONES UR STRIP.AUTO-MCNC: NEGATIVE MG/DL
LACTATE SERPL-SCNC: 1 MMOL/L (ref 0.4–2)
LDLC SERPL CALC-MCNC: 77 MG/DL (ref ?–100)
LYMPHOCYTES # BLD AUTO: 1.9 X10(3) UL (ref 1–4)
LYMPHOCYTES NFR BLD AUTO: 25.1 %
MCH RBC QN AUTO: 30.7 PG (ref 26–34)
MCHC RBC AUTO-ENTMCNC: 33.8 G/DL (ref 31–37)
MCV RBC AUTO: 90.8 FL
MONOCYTES # BLD AUTO: 0.77 X10(3) UL (ref 0.1–1)
MONOCYTES NFR BLD AUTO: 10.2 %
NEUTROPHILS # BLD AUTO: 4.81 X10 (3) UL (ref 1.5–7.7)
NEUTROPHILS # BLD AUTO: 4.81 X10(3) UL (ref 1.5–7.7)
NEUTROPHILS NFR BLD AUTO: 63.4 %
NITRITE UR QL STRIP.AUTO: NEGATIVE
NONHDLC SERPL-MCNC: 102 MG/DL (ref ?–130)
NT-PROBNP SERPL-MCNC: ABNORMAL PG/ML (ref ?–450)
OSMOLALITY SERPL CALC.SUM OF ELEC: 293 MOSM/KG (ref 275–295)
PH UR STRIP.AUTO: 5 [PH] (ref 5–8)
PLATELET # BLD AUTO: 119 10(3)UL (ref 150–450)
POTASSIUM SERPL-SCNC: 4.3 MMOL/L (ref 3.5–5.1)
PROT SERPL-MCNC: 7.1 G/DL (ref 6.4–8.2)
PROT UR STRIP.AUTO-MCNC: 30 MG/DL
PROTHROMBIN TIME: 15.2 SECONDS (ref 11.6–14.8)
RBC # BLD AUTO: 4.14 X10(6)UL
SARS-COV-2 RNA RESP QL NAA+PROBE: NOT DETECTED
SODIUM SERPL-SCNC: 139 MMOL/L (ref 136–145)
SP GR UR STRIP.AUTO: 1.01 (ref 1–1.03)
TRIGL SERPL-MCNC: 146 MG/DL (ref 30–149)
TROPONIN I HIGH SENSITIVITY: 232 NG/L
UROBILINOGEN UR STRIP.AUTO-MCNC: <2 MG/DL
VLDLC SERPL CALC-MCNC: 23 MG/DL (ref 0–30)
WBC # BLD AUTO: 7.6 X10(3) UL (ref 4–11)
WBC #/AREA URNS AUTO: >50 /HPF

## 2022-10-24 PROCEDURE — 71045 X-RAY EXAM CHEST 1 VIEW: CPT | Performed by: EMERGENCY MEDICINE

## 2022-10-24 PROCEDURE — 87086 URINE CULTURE/COLONY COUNT: CPT | Performed by: EMERGENCY MEDICINE

## 2022-10-24 PROCEDURE — 72125 CT NECK SPINE W/O DYE: CPT | Performed by: EMERGENCY MEDICINE

## 2022-10-24 PROCEDURE — 93005 ELECTROCARDIOGRAM TRACING: CPT

## 2022-10-24 PROCEDURE — 93010 ELECTROCARDIOGRAM REPORT: CPT

## 2022-10-24 PROCEDURE — 87186 SC STD MICRODIL/AGAR DIL: CPT | Performed by: EMERGENCY MEDICINE

## 2022-10-24 PROCEDURE — 36415 COLL VENOUS BLD VENIPUNCTURE: CPT

## 2022-10-24 PROCEDURE — 96375 TX/PRO/DX INJ NEW DRUG ADDON: CPT

## 2022-10-24 PROCEDURE — 99285 EMERGENCY DEPT VISIT HI MDM: CPT

## 2022-10-24 PROCEDURE — 85610 PROTHROMBIN TIME: CPT | Performed by: EMERGENCY MEDICINE

## 2022-10-24 PROCEDURE — 70450 CT HEAD/BRAIN W/O DYE: CPT | Performed by: EMERGENCY MEDICINE

## 2022-10-24 PROCEDURE — 83605 ASSAY OF LACTIC ACID: CPT | Performed by: EMERGENCY MEDICINE

## 2022-10-24 PROCEDURE — 94660 CPAP INITIATION&MGMT: CPT

## 2022-10-24 PROCEDURE — 80053 COMPREHEN METABOLIC PANEL: CPT | Performed by: EMERGENCY MEDICINE

## 2022-10-24 PROCEDURE — 87040 BLOOD CULTURE FOR BACTERIA: CPT | Performed by: EMERGENCY MEDICINE

## 2022-10-24 PROCEDURE — 84484 ASSAY OF TROPONIN QUANT: CPT | Performed by: EMERGENCY MEDICINE

## 2022-10-24 PROCEDURE — 87077 CULTURE AEROBIC IDENTIFY: CPT | Performed by: EMERGENCY MEDICINE

## 2022-10-24 PROCEDURE — 85025 COMPLETE CBC W/AUTO DIFF WBC: CPT | Performed by: EMERGENCY MEDICINE

## 2022-10-24 PROCEDURE — 80061 LIPID PANEL: CPT | Performed by: EMERGENCY MEDICINE

## 2022-10-24 PROCEDURE — 81001 URINALYSIS AUTO W/SCOPE: CPT | Performed by: EMERGENCY MEDICINE

## 2022-10-24 PROCEDURE — 96365 THER/PROPH/DIAG IV INF INIT: CPT

## 2022-10-24 PROCEDURE — 83880 ASSAY OF NATRIURETIC PEPTIDE: CPT | Performed by: EMERGENCY MEDICINE

## 2022-10-24 PROCEDURE — 71275 CT ANGIOGRAPHY CHEST: CPT | Performed by: EMERGENCY MEDICINE

## 2022-10-24 RX ORDER — FLUTICASONE FUROATE AND VILANTEROL 200; 25 UG/1; UG/1
1 POWDER RESPIRATORY (INHALATION) DAILY
Refills: 0 | Status: DISCONTINUED | OUTPATIENT
Start: 2022-10-24 | End: 2022-10-29

## 2022-10-24 RX ORDER — METOCLOPRAMIDE HYDROCHLORIDE 5 MG/ML
5 INJECTION INTRAMUSCULAR; INTRAVENOUS EVERY 8 HOURS PRN
Status: DISCONTINUED | OUTPATIENT
Start: 2022-10-24 | End: 2022-10-29

## 2022-10-24 RX ORDER — LEVETIRACETAM 250 MG/1
250 TABLET ORAL 2 TIMES DAILY
Status: DISCONTINUED | OUTPATIENT
Start: 2022-10-24 | End: 2022-10-29

## 2022-10-24 RX ORDER — SERTRALINE HYDROCHLORIDE 100 MG/1
200 TABLET, FILM COATED ORAL DAILY
Status: DISCONTINUED | OUTPATIENT
Start: 2022-10-25 | End: 2022-10-29

## 2022-10-24 RX ORDER — LISINOPRIL 20 MG/1
20 TABLET ORAL 2 TIMES DAILY
Status: DISCONTINUED | OUTPATIENT
Start: 2022-10-25 | End: 2022-10-28

## 2022-10-24 RX ORDER — HEPARIN SODIUM 5000 [USP'U]/ML
5000 INJECTION, SOLUTION INTRAVENOUS; SUBCUTANEOUS EVERY 8 HOURS SCHEDULED
Status: DISCONTINUED | OUTPATIENT
Start: 2022-10-24 | End: 2022-10-29

## 2022-10-24 RX ORDER — MELATONIN
325
Status: DISCONTINUED | OUTPATIENT
Start: 2022-10-25 | End: 2022-10-29

## 2022-10-24 RX ORDER — FUROSEMIDE 10 MG/ML
40 INJECTION INTRAMUSCULAR; INTRAVENOUS ONCE
Status: COMPLETED | OUTPATIENT
Start: 2022-10-24 | End: 2022-10-24

## 2022-10-24 RX ORDER — IOHEXOL 350 MG/ML
85 INJECTION, SOLUTION INTRAVENOUS
Status: COMPLETED | OUTPATIENT
Start: 2022-10-24 | End: 2022-10-24

## 2022-10-24 RX ORDER — ASPIRIN 81 MG/1
324 TABLET, CHEWABLE ORAL ONCE
Status: COMPLETED | OUTPATIENT
Start: 2022-10-24 | End: 2022-10-24

## 2022-10-24 RX ORDER — DONEPEZIL HYDROCHLORIDE 5 MG/1
10 TABLET, FILM COATED ORAL NIGHTLY
Status: DISCONTINUED | OUTPATIENT
Start: 2022-10-24 | End: 2022-10-29

## 2022-10-24 RX ORDER — ONDANSETRON 2 MG/ML
4 INJECTION INTRAMUSCULAR; INTRAVENOUS EVERY 6 HOURS PRN
Status: DISCONTINUED | OUTPATIENT
Start: 2022-10-24 | End: 2022-10-29

## 2022-10-24 RX ORDER — PANTOPRAZOLE SODIUM 40 MG/1
40 TABLET, DELAYED RELEASE ORAL
Status: DISCONTINUED | OUTPATIENT
Start: 2022-10-25 | End: 2022-10-28

## 2022-10-24 RX ORDER — IPRATROPIUM BROMIDE AND ALBUTEROL SULFATE 2.5; .5 MG/3ML; MG/3ML
3 SOLUTION RESPIRATORY (INHALATION) EVERY 4 HOURS PRN
Status: DISCONTINUED | OUTPATIENT
Start: 2022-10-24 | End: 2022-10-29

## 2022-10-24 RX ORDER — CARVEDILOL 12.5 MG/1
25 TABLET ORAL 2 TIMES DAILY WITH MEALS
Status: DISCONTINUED | OUTPATIENT
Start: 2022-10-25 | End: 2022-10-29

## 2022-10-24 RX ORDER — MEMANTINE HYDROCHLORIDE 10 MG/1
10 TABLET ORAL 2 TIMES DAILY
Status: DISCONTINUED | OUTPATIENT
Start: 2022-10-24 | End: 2022-10-29

## 2022-10-24 NOTE — ED INITIAL ASSESSMENT (HPI)
Patient arrived to the ED from home by [facility] for failure to thrive. Per EMS, Home health doctor states patient has been weak for the last week, cough, loss of appetite, increased confusion, and sleeping alot. Patient fell yesterday, unable to walk today. Hx of COPD and Dementia. AAOx3 and denies pain. Patient is purse lip breathing at this time. SpO2 is 100% on RA.

## 2022-10-25 ENCOUNTER — APPOINTMENT (OUTPATIENT)
Dept: CV DIAGNOSTICS | Facility: HOSPITAL | Age: 82
End: 2022-10-25
Attending: HOSPITALIST
Payer: MEDICARE

## 2022-10-25 LAB
ADENOVIRUS PCR:: NOT DETECTED
ANION GAP SERPL CALC-SCNC: 4 MMOL/L (ref 0–18)
ATRIAL RATE: 104 BPM
B PARAPERT DNA SPEC QL NAA+PROBE: NOT DETECTED
B PERT DNA SPEC QL NAA+PROBE: NOT DETECTED
BASOPHILS # BLD AUTO: 0.05 X10(3) UL (ref 0–0.2)
BASOPHILS NFR BLD AUTO: 0.6 %
BUN BLD-MCNC: 31 MG/DL (ref 7–18)
C DIFF TOX B STL QL: POSITIVE
C PNEUM DNA SPEC QL NAA+PROBE: NOT DETECTED
CALCIUM BLD-MCNC: 8.7 MG/DL (ref 8.5–10.1)
CHLORIDE SERPL-SCNC: 103 MMOL/L (ref 98–112)
CO2 SERPL-SCNC: 32 MMOL/L (ref 21–32)
CORONAVIRUS 229E PCR:: NOT DETECTED
CORONAVIRUS HKU1 PCR:: NOT DETECTED
CORONAVIRUS NL63 PCR:: NOT DETECTED
CORONAVIRUS OC43 PCR:: NOT DETECTED
CREAT BLD-MCNC: 1.51 MG/DL
EOSINOPHIL # BLD AUTO: 0.12 X10(3) UL (ref 0–0.7)
EOSINOPHIL NFR BLD AUTO: 1.5 %
ERYTHROCYTE [DISTWIDTH] IN BLOOD BY AUTOMATED COUNT: 13.4 %
FLUAV RNA SPEC QL NAA+PROBE: NOT DETECTED
FLUBV RNA SPEC QL NAA+PROBE: NOT DETECTED
GFR SERPLBLD BASED ON 1.73 SQ M-ARVRAT: 34 ML/MIN/1.73M2 (ref 60–?)
GLUCOSE BLD-MCNC: 91 MG/DL (ref 70–99)
HCT VFR BLD AUTO: 35.1 %
HGB BLD-MCNC: 12 G/DL
IMM GRANULOCYTES # BLD AUTO: 0.02 X10(3) UL (ref 0–1)
IMM GRANULOCYTES NFR BLD: 0.3 %
LYMPHOCYTES # BLD AUTO: 1.84 X10(3) UL (ref 1–4)
LYMPHOCYTES NFR BLD AUTO: 23.4 %
MAGNESIUM SERPL-MCNC: 2.1 MG/DL (ref 1.6–2.6)
MCH RBC QN AUTO: 30.7 PG (ref 26–34)
MCHC RBC AUTO-ENTMCNC: 34.2 G/DL (ref 31–37)
MCV RBC AUTO: 89.8 FL
METAPNEUMOVIRUS PCR:: NOT DETECTED
MONOCYTES # BLD AUTO: 1 X10(3) UL (ref 0.1–1)
MONOCYTES NFR BLD AUTO: 12.7 %
MYCOPLASMA PNEUMONIA PCR:: NOT DETECTED
NEUTROPHILS # BLD AUTO: 4.83 X10 (3) UL (ref 1.5–7.7)
NEUTROPHILS # BLD AUTO: 4.83 X10(3) UL (ref 1.5–7.7)
NEUTROPHILS NFR BLD AUTO: 61.5 %
OSMOLALITY SERPL CALC.SUM OF ELEC: 294 MOSM/KG (ref 275–295)
PARAINFLUENZA 1 PCR:: NOT DETECTED
PARAINFLUENZA 2 PCR:: NOT DETECTED
PARAINFLUENZA 3 PCR:: NOT DETECTED
PARAINFLUENZA 4 PCR:: NOT DETECTED
PLATELET # BLD AUTO: 131 10(3)UL (ref 150–450)
POTASSIUM SERPL-SCNC: 3.6 MMOL/L (ref 3.5–5.1)
POTASSIUM SERPL-SCNC: 4.5 MMOL/L (ref 3.5–5.1)
Q-T INTERVAL: 412 MS
QRS DURATION: 98 MS
QTC CALCULATION (BEZET): 466 MS
R AXIS: -60 DEGREES
RBC # BLD AUTO: 3.91 X10(6)UL
RHINOVIRUS/ENTERO PCR:: NOT DETECTED
RSV RNA SPEC QL NAA+PROBE: NOT DETECTED
SARS-COV-2 RNA NPH QL NAA+NON-PROBE: NOT DETECTED
SODIUM SERPL-SCNC: 139 MMOL/L (ref 136–145)
T AXIS: -85 DEGREES
TROPONIN I HIGH SENSITIVITY: 173 NG/L
TROPONIN I HIGH SENSITIVITY: 211 NG/L
VENTRICULAR RATE: 77 BPM
WBC # BLD AUTO: 7.9 X10(3) UL (ref 4–11)

## 2022-10-25 PROCEDURE — 97530 THERAPEUTIC ACTIVITIES: CPT

## 2022-10-25 PROCEDURE — 94799 UNLISTED PULMONARY SVC/PX: CPT

## 2022-10-25 PROCEDURE — 93306 TTE W/DOPPLER COMPLETE: CPT | Performed by: HOSPITALIST

## 2022-10-25 PROCEDURE — 84484 ASSAY OF TROPONIN QUANT: CPT | Performed by: INTERNAL MEDICINE

## 2022-10-25 PROCEDURE — 87493 C DIFF AMPLIFIED PROBE: CPT | Performed by: HOSPITALIST

## 2022-10-25 PROCEDURE — 83735 ASSAY OF MAGNESIUM: CPT | Performed by: INTERNAL MEDICINE

## 2022-10-25 PROCEDURE — 84132 ASSAY OF SERUM POTASSIUM: CPT | Performed by: INTERNAL MEDICINE

## 2022-10-25 PROCEDURE — 0202U NFCT DS 22 TRGT SARS-COV-2: CPT | Performed by: INTERNAL MEDICINE

## 2022-10-25 PROCEDURE — 97161 PT EVAL LOW COMPLEX 20 MIN: CPT

## 2022-10-25 PROCEDURE — 80048 BASIC METABOLIC PNL TOTAL CA: CPT | Performed by: INTERNAL MEDICINE

## 2022-10-25 PROCEDURE — 85025 COMPLETE CBC W/AUTO DIFF WBC: CPT | Performed by: INTERNAL MEDICINE

## 2022-10-25 PROCEDURE — 97165 OT EVAL LOW COMPLEX 30 MIN: CPT

## 2022-10-25 RX ORDER — POTASSIUM CHLORIDE 20 MEQ/1
40 TABLET, EXTENDED RELEASE ORAL EVERY 4 HOURS
Status: COMPLETED | OUTPATIENT
Start: 2022-10-25 | End: 2022-10-25

## 2022-10-25 RX ORDER — PRAMIPEXOLE DIHYDROCHLORIDE 0.25 MG/1
0.25 TABLET ORAL NIGHTLY
Status: DISCONTINUED | OUTPATIENT
Start: 2022-10-25 | End: 2022-10-29

## 2022-10-25 RX ORDER — VANCOMYCIN HYDROCHLORIDE 125 MG/1
125 CAPSULE ORAL EVERY 6 HOURS
Status: DISCONTINUED | OUTPATIENT
Start: 2022-10-25 | End: 2022-10-29

## 2022-10-25 RX ORDER — ATORVASTATIN CALCIUM 40 MG/1
40 TABLET, FILM COATED ORAL NIGHTLY
Status: DISCONTINUED | OUTPATIENT
Start: 2022-10-25 | End: 2022-10-29

## 2022-10-25 RX ORDER — CLONAZEPAM 0.5 MG/1
0.5 TABLET ORAL 2 TIMES DAILY PRN
Status: DISCONTINUED | OUTPATIENT
Start: 2022-10-25 | End: 2022-10-29

## 2022-10-25 RX ORDER — ALBUTEROL SULFATE 90 UG/1
2 AEROSOL, METERED RESPIRATORY (INHALATION) EVERY 6 HOURS PRN
Status: DISCONTINUED | OUTPATIENT
Start: 2022-10-25 | End: 2022-10-29

## 2022-10-25 RX ORDER — ASPIRIN 81 MG/1
81 TABLET ORAL DAILY
Status: DISCONTINUED | OUTPATIENT
Start: 2022-10-26 | End: 2022-10-29

## 2022-10-25 NOTE — ED QUICK NOTES
Orders for admission, patient is aware of plan and ready to go upstairs. Any questions, please call ED RN Armin Easton at extension [phone]. Patient Covid vaccination status: Fully vaccinated     COVID Test Ordered in ED: Rapid SARS-CoV-2 by PCR    COVID Suspicion at Admission: Low clinical suspicion for COVID    Running Infusions:      Mental Status/LOC at time of transport: AAOx3    Other pertinent information:   CIWA score: N/A   NIH score:  N/A      Hx of Dementia, increased confusion. Daughter Yvette Feliz wants to be called and updated.

## 2022-10-25 NOTE — PLAN OF CARE
Pt A&Ox2. Pt disorientated to year and situation. Pt extremely [place]. Hearing aids at home. Pt currently on 1.5 L 02 NC. Lungs with bilat crackles. Cpap hs. Pt is in afib with occasional v pace on tele. Heparin subcutaneous for prophylaxis dvt. Pt incontinent of urine. Purewick in place. Pt having frequent stools. Cdiff sample sent. Isolation In place. Pt receiving IV Merrem q12. K replaced. Bed alarm on for patient safety. When working with PT pt became extremely dizzy and ortho positive when siting on edge of bed. Pt returned to supine position and symptoms improved. Problem: RESPIRATORY - ADULT  Goal: Achieves optimal ventilation and oxygenation  Description: INTERVENTIONS:  - Assess for changes in respiratory status  - Assess for changes in mentation and behavior  - Position to facilitate oxygenation and minimize respiratory effort  - Oxygen supplementation based on oxygen saturation or ABGs  - Provide Smoking Cessation handout, if applicable  - Encourage broncho-pulmonary hygiene including cough, deep breathe, Incentive Spirometry  - Assess the need for suctioning and perform as needed  - Assess and instruct to report SOB or any respiratory difficulty  - Respiratory Therapy support as indicated  - Manage/alleviate anxiety  - Monitor for signs/symptoms of CO2 retention  Outcome: Progressing     Problem: CARDIOVASCULAR - ADULT  Goal: Maintains optimal cardiac output and hemodynamic stability  Description: INTERVENTIONS:  - Monitor vital signs, rhythm, and trends  - Monitor for bleeding, hypotension and signs of decreased cardiac output  - Evaluate effectiveness of vasoactive medications to optimize hemodynamic stability  - Monitor arterial and/or venous puncture sites for bleeding and/or hematoma  - Assess quality of pulses, skin color and temperature  - Assess for signs of decreased coronary artery perfusion - ex.  Angina  - Evaluate fluid balance, assess for edema, trend weights  Outcome: Progressing  Goal: Absence of cardiac arrhythmias or at baseline  Description: INTERVENTIONS:  - Continuous cardiac monitoring, monitor vital signs, obtain 12 lead EKG if indicated  - Evaluate effectiveness of antiarrhythmic and heart rate control medications as ordered  - Initiate emergency measures for life threatening arrhythmias  - Monitor electrolytes and administer replacement therapy as ordered  Outcome: Progressing

## 2022-10-25 NOTE — PROGRESS NOTES
10/25/22 1117   Clinical Encounter Type   Visited With Patient   Routine Visit Introduction   Referral From Nurse   Referral To    Voodoo Encounters   Voodoo Needs Prayer   Taxonomy   Intended Effects Build relationship of care and support   Methods Offer spiritual/Spiritism support   Interventions [place]   Patient was laying in bed. Patient stated she is feeling better today than yesterday. Patient asked  to pray at bedside.  provided prayer and pastoral support. Patient has difficulty in hearing.  should speak fairly loud when asking questions. Spiritual Care can be requested via an [facility] consult or by pager at [phone].      [facility]   Staff

## 2022-10-25 NOTE — ED QUICK NOTES
Patients daughter, Jacob Merchant, called and requested an update. Brief update provided. Adriana states when we have more information to contact her.

## 2022-10-25 NOTE — PROGRESS NOTES
[facility] Pharmacy Note:  Renal Adjustment for meropenem (MERREM)    Emily Bustillos is a 78year old patient who has been prescribed meropenem (MERREM) 1 gm every 12 hrs. The estimated creatinine clearance is 24.2 mL/min (A) (based on SCr of 1.59 mg/dL (H)). The dose has been adjusted to meropenem (MERREM) 500 mg every 12 hrs per hospital renal dose adjustment protocol for treatment of pneumonia. Pharmacy will follow and adjust dose as warranted for additional renal function changes.     Thank you,    Nahomi Camejo, PharmD  10/24/2022  10:59 PM

## 2022-10-25 NOTE — CM/SW NOTE
Chart reviewed for discharge needs. Confirmed with [facility] that pt is current w/ them for RN/PT services. JACINTA order entered. PT/OT evaluated pt and are recommending Allan Becerril at hospital discharge.  &  to remain available and supportive for discharge planning needs.     Cheikh Chang, MSW, [facility]  Discharge [address] [facility].

## 2022-10-25 NOTE — PROGRESS NOTES
Pt dizzy upon standing. Ortho's positive.         10/25/22 1704 10/25/22 1706 10/25/22 1708   Vital Signs   /50 112/77 (!) 80/53   MAP (mmHg) 68 85 58   BP Location Left arm Left arm Left arm   BP Method Automatic Automatic Automatic   Patient Position Lying Sitting Standing

## 2022-10-25 NOTE — ED QUICK NOTES
Patient daughter called and informed of room assignment and that patient is going upstairs now. All questions answered.

## 2022-10-26 LAB
ANION GAP SERPL CALC-SCNC: 3 MMOL/L (ref 0–18)
BUN BLD-MCNC: 28 MG/DL (ref 7–18)
CALCIUM BLD-MCNC: 8.6 MG/DL (ref 8.5–10.1)
CHLORIDE SERPL-SCNC: 105 MMOL/L (ref 98–112)
CO2 SERPL-SCNC: 29 MMOL/L (ref 21–32)
CREAT BLD-MCNC: 1.02 MG/DL
ERYTHROCYTE [DISTWIDTH] IN BLOOD BY AUTOMATED COUNT: 13.2 %
GFR SERPLBLD BASED ON 1.73 SQ M-ARVRAT: 54 ML/MIN/1.73M2 (ref 60–?)
GLUCOSE BLD-MCNC: 81 MG/DL (ref 70–99)
HCT VFR BLD AUTO: 34.6 %
HGB BLD-MCNC: 12.1 G/DL
L PNEUMO AG UR QL: NEGATIVE
MAGNESIUM SERPL-MCNC: 2.1 MG/DL (ref 1.6–2.6)
MCH RBC QN AUTO: 31 PG (ref 26–34)
MCHC RBC AUTO-ENTMCNC: 35 G/DL (ref 31–37)
MCV RBC AUTO: 88.7 FL
OSMOLALITY SERPL CALC.SUM OF ELEC: 289 MOSM/KG (ref 275–295)
PLATELET # BLD AUTO: 131 10(3)UL (ref 150–450)
POTASSIUM SERPL-SCNC: 4.3 MMOL/L (ref 3.5–5.1)
RBC # BLD AUTO: 3.9 X10(6)UL
SODIUM SERPL-SCNC: 137 MMOL/L (ref 136–145)
STREP PNEUMO ANTIGEN, URINE: NEGATIVE
WBC # BLD AUTO: 7.2 X10(3) UL (ref 4–11)

## 2022-10-26 PROCEDURE — 87449 NOS EACH ORGANISM AG IA: CPT | Performed by: INTERNAL MEDICINE

## 2022-10-26 PROCEDURE — 80048 BASIC METABOLIC PNL TOTAL CA: CPT | Performed by: HOSPITALIST

## 2022-10-26 PROCEDURE — 85027 COMPLETE CBC AUTOMATED: CPT | Performed by: HOSPITALIST

## 2022-10-26 PROCEDURE — 83735 ASSAY OF MAGNESIUM: CPT | Performed by: HOSPITALIST

## 2022-10-26 PROCEDURE — 94799 UNLISTED PULMONARY SVC/PX: CPT

## 2022-10-26 RX ORDER — SODIUM CHLORIDE 9 MG/ML
INJECTION, SOLUTION INTRAVENOUS CONTINUOUS
Status: DISCONTINUED | OUTPATIENT
Start: 2022-10-26 | End: 2022-10-28

## 2022-10-26 NOTE — PLAN OF CARE
Assumed care of pt at 0730  + C. Diff, contact isolation  Alert and oriented to person, place, and time, disoriented to situation  Confusion and short term memory loss, hx of dementia  1.5 L NC, lung sounds diminished with some crackles  A fib with controlled rates, switches to v paced, no cardiac symptoms  Denies any pain  Redness on bottom due to incontinence, barium cream on, no skin breakdown  0.9 sodium chloride fluids running at 50ml/hr continuous  Meropenem 0.5 g in 100 ml (for + UTI) running at 33.3 ml/hr q 12 hr  Fall precautions in place, bed alarm on  Pt updated on plan of care, will continue to monitor          Problem: RESPIRATORY - ADULT  Goal: Achieves optimal ventilation and oxygenation  Description: INTERVENTIONS:  - Assess for changes in respiratory status  - Assess for changes in mentation and behavior  - Position to facilitate oxygenation and minimize respiratory effort  - Oxygen supplementation based on oxygen saturation or ABGs  - Provide Smoking Cessation handout, if applicable  - Encourage broncho-pulmonary hygiene including cough, deep breathe, Incentive Spirometry  - Assess the need for suctioning and perform as needed  - Assess and instruct to report SOB or any respiratory difficulty  - Respiratory Therapy support as indicated  - Manage/alleviate anxiety  - Monitor for signs/symptoms of CO2 retention  Outcome: Progressing     Problem: CARDIOVASCULAR - ADULT  Goal: Maintains optimal cardiac output and hemodynamic stability  Description: INTERVENTIONS:  - Monitor vital signs, rhythm, and trends  - Monitor for bleeding, hypotension and signs of decreased cardiac output  - Evaluate effectiveness of vasoactive medications to optimize hemodynamic stability  - Monitor arterial and/or venous puncture sites for bleeding and/or hematoma  - Assess quality of pulses, skin color and temperature  - Assess for signs of decreased coronary artery perfusion - ex.  Angina  - Evaluate fluid balance, assess for edema, trend weights  Outcome: Progressing  Goal: Absence of cardiac arrhythmias or at baseline  Description: INTERVENTIONS:  - Continuous cardiac monitoring, monitor vital signs, obtain 12 lead EKG if indicated  - Evaluate effectiveness of antiarrhythmic and heart rate control medications as ordered  - Initiate emergency measures for life threatening arrhythmias  - Monitor electrolytes and administer replacement therapy as ordered  Outcome: Progressing

## 2022-10-26 NOTE — DISCHARGE INSTRUCTIONS
[facility]  J:[phone]  [address] at hospital discharge. [address] [address]  [phone]  [email or web address]    [address] [facility]    [phone]  www. [facility]. [facility] [facility] [address] on Wheels: [phone]

## 2022-10-26 NOTE — CM/SW NOTE
SW met with pt at bedside to discuss discharge planning. The pt is alert and oriented. Pt confirms that she is current w/ [facility]. SW also received order to assist with meals on wheels. Confirmed with pt that she is currently getting meals on wheels through the [facility]. SW left message for DON screener to confirm that pt is current w/ meals on wheels. Pt also stated that she does not have oxygen at home. Pt currently requiring oxygen. All questions addressed.      [facility]  P:[phone]  Martha Langley, [place], [facility]  Discharge [address] [facility].

## 2022-10-27 LAB
ANION GAP SERPL CALC-SCNC: 5 MMOL/L (ref 0–18)
BUN BLD-MCNC: 17 MG/DL (ref 7–18)
CALCIUM BLD-MCNC: 8.9 MG/DL (ref 8.5–10.1)
CHLORIDE SERPL-SCNC: 108 MMOL/L (ref 98–112)
CO2 SERPL-SCNC: 27 MMOL/L (ref 21–32)
CREAT BLD-MCNC: 0.8 MG/DL
ERYTHROCYTE [DISTWIDTH] IN BLOOD BY AUTOMATED COUNT: 13.1 %
GFR SERPLBLD BASED ON 1.73 SQ M-ARVRAT: 73 ML/MIN/1.73M2 (ref 60–?)
GLUCOSE BLD-MCNC: 93 MG/DL (ref 70–99)
HCT VFR BLD AUTO: 33.4 %
HGB BLD-MCNC: 11.9 G/DL
MCH RBC QN AUTO: 31 PG (ref 26–34)
MCHC RBC AUTO-ENTMCNC: 35.6 G/DL (ref 31–37)
MCV RBC AUTO: 87 FL
OSMOLALITY SERPL CALC.SUM OF ELEC: 291 MOSM/KG (ref 275–295)
PLATELET # BLD AUTO: 132 10(3)UL (ref 150–450)
POTASSIUM SERPL-SCNC: 4.2 MMOL/L (ref 3.5–5.1)
RBC # BLD AUTO: 3.84 X10(6)UL
SODIUM SERPL-SCNC: 140 MMOL/L (ref 136–145)
WBC # BLD AUTO: 6.1 X10(3) UL (ref 4–11)

## 2022-10-27 PROCEDURE — 85027 COMPLETE CBC AUTOMATED: CPT | Performed by: HOSPITALIST

## 2022-10-27 PROCEDURE — 97116 GAIT TRAINING THERAPY: CPT

## 2022-10-27 PROCEDURE — 80048 BASIC METABOLIC PNL TOTAL CA: CPT | Performed by: HOSPITALIST

## 2022-10-27 PROCEDURE — 97110 THERAPEUTIC EXERCISES: CPT

## 2022-10-27 RX ORDER — TRAZODONE HYDROCHLORIDE 50 MG/1
100 TABLET ORAL NIGHTLY
Status: DISCONTINUED | OUTPATIENT
Start: 2022-10-27 | End: 2022-10-29

## 2022-10-27 RX ORDER — TRIAMCINOLONE ACETONIDE 5 MG/G
CREAM TOPICAL 2 TIMES DAILY
Status: DISCONTINUED | OUTPATIENT
Start: 2022-10-27 | End: 2022-10-29

## 2022-10-27 NOTE — PLAN OF CARE
Patient is Aox3 overnight, disoriented to situation. Pt on room air, VERAS. Afib/Vpaced on tele. Pt denies any pain. Incontinent at times, utilizes bedside commode. Up x2 w/ walker stand pivot to commode. High fall risk, pt is impulsive. Bed alarm on overnight. Call light within reach. Plan: Contact iso, PO Vanco, IV Merrem, IVF, orthos daily    Problem: RESPIRATORY - ADULT  Goal: Achieves optimal ventilation and oxygenation  Description: INTERVENTIONS:  - Assess for changes in respiratory status  - Assess for changes in mentation and behavior  - Position to facilitate oxygenation and minimize respiratory effort  - Oxygen supplementation based on oxygen saturation or ABGs  - Provide Smoking Cessation handout, if applicable  - Encourage broncho-pulmonary hygiene including cough, deep breathe, Incentive Spirometry  - Assess the need for suctioning and perform as needed  - Assess and instruct to report SOB or any respiratory difficulty  - Respiratory Therapy support as indicated  - Manage/alleviate anxiety  - Monitor for signs/symptoms of CO2 retention  10/26/2022 2209 by Jacque Carballo RN  Outcome: Progressing  10/26/2022 2209 by Jacque Carballo RN  Outcome: Progressing     Problem: CARDIOVASCULAR - ADULT  Goal: Maintains optimal cardiac output and hemodynamic stability  Description: INTERVENTIONS:  - Monitor vital signs, rhythm, and trends  - Monitor for bleeding, hypotension and signs of decreased cardiac output  - Evaluate effectiveness of vasoactive medications to optimize hemodynamic stability  - Monitor arterial and/or venous puncture sites for bleeding and/or hematoma  - Assess quality of pulses, skin color and temperature  - Assess for signs of decreased coronary artery perfusion - ex.  Angina  - Evaluate fluid balance, assess for edema, trend weights  10/26/2022 2209 by Jacque Carballo RN  Outcome: Progressing  10/26/2022 2209 by Jacque Carballo RN  Outcome: Progressing  Goal: Absence of cardiac arrhythmias or at baseline  Description: INTERVENTIONS:  - Continuous cardiac monitoring, monitor vital signs, obtain 12 lead EKG if indicated  - Evaluate effectiveness of antiarrhythmic and heart rate control medications as ordered  - Initiate emergency measures for life threatening arrhythmias  - Monitor electrolytes and administer replacement therapy as ordered  10/26/2022 2209 by Jose Lopez RN  Outcome: Progressing  10/26/2022 2209 by Jose Lopez RN  Outcome: Progressing

## 2022-10-27 NOTE — PLAN OF CARE
Assumed care of pt at 0730  + C.  Diff, contact isolation  Alert and oriented to person, place, and time, disoriented to situation  Confusion and short term memory loss, hx of dementia  Up x1 to 2 and walker, some generalized weakness  R/A, crackles bilaterally  A fib with controlled rates, switches to v paced, no cardiac symptoms  Denies any pain  Nausea with one vomiting occurrence around 1000, prn IV zofran given  Redness on bottom due to incontinence, barium cream on, no skin breakdown  0.9 sodium chloride fluids running at 50ml/hr continuous  Meropenem 0.5 g in 100 ml (for + UTI) running at 33.3 ml/hr q 12 hr  Incontinent at times of bladder and bowel, BMs still watery  Fall precautions in place, bed alarm on  Pt updated on plan of care, will continue to monitor             Problem: RESPIRATORY - ADULT  Goal: Achieves optimal ventilation and oxygenation  Description: INTERVENTIONS:  - Assess for changes in respiratory status  - Assess for changes in mentation and behavior  - Position to facilitate oxygenation and minimize respiratory effort  - Oxygen supplementation based on oxygen saturation or ABGs  - Provide Smoking Cessation handout, if applicable  - Encourage broncho-pulmonary hygiene including cough, deep breathe, Incentive Spirometry  - Assess the need for suctioning and perform as needed  - Assess and instruct to report SOB or any respiratory difficulty  - Respiratory Therapy support as indicated  - Manage/alleviate anxiety  - Monitor for signs/symptoms of CO2 retention  Outcome: Progressing     Problem: CARDIOVASCULAR - ADULT  Goal: Maintains optimal cardiac output and hemodynamic stability  Description: INTERVENTIONS:  - Monitor vital signs, rhythm, and trends  - Monitor for bleeding, hypotension and signs of decreased cardiac output  - Evaluate effectiveness of vasoactive medications to optimize hemodynamic stability  - Monitor arterial and/or venous puncture sites for bleeding and/or hematoma  - Assess quality of pulses, skin color and temperature  - Assess for signs of decreased coronary artery perfusion - ex.  Angina  - Evaluate fluid balance, assess for edema, trend weights  Outcome: Progressing  Goal: Absence of cardiac arrhythmias or at baseline  Description: INTERVENTIONS:  - Continuous cardiac monitoring, monitor vital signs, obtain 12 lead EKG if indicated  - Evaluate effectiveness of antiarrhythmic and heart rate control medications as ordered  - Initiate emergency measures for life threatening arrhythmias  - Monitor electrolytes and administer replacement therapy as ordered  Outcome: Progressing

## 2022-10-27 NOTE — PROGRESS NOTES
10/26/22 2011 10/26/22 2013 10/26/22 2015   Vital Signs   /75 123/79 114/87   MAP (mmHg) 90 90 92   BP Location Right arm Right arm Right arm   BP Method Automatic Automatic Automatic   Patient Position Lying Sitting Standing

## 2022-10-28 LAB
ANION GAP SERPL CALC-SCNC: 4 MMOL/L (ref 0–18)
BUN BLD-MCNC: 13 MG/DL (ref 7–18)
CALCIUM BLD-MCNC: 9 MG/DL (ref 8.5–10.1)
CHLORIDE SERPL-SCNC: 111 MMOL/L (ref 98–112)
CO2 SERPL-SCNC: 27 MMOL/L (ref 21–32)
CREAT BLD-MCNC: 0.7 MG/DL
ERYTHROCYTE [DISTWIDTH] IN BLOOD BY AUTOMATED COUNT: 13.5 %
GFR SERPLBLD BASED ON 1.73 SQ M-ARVRAT: 85 ML/MIN/1.73M2 (ref 60–?)
GLUCOSE BLD-MCNC: 83 MG/DL (ref 70–99)
HCT VFR BLD AUTO: 33.1 %
HGB BLD-MCNC: 11.3 G/DL
HGB BLD-MCNC: 11.5 G/DL
MCH RBC QN AUTO: 30.7 PG (ref 26–34)
MCHC RBC AUTO-ENTMCNC: 34.7 G/DL (ref 31–37)
MCV RBC AUTO: 88.3 FL
OSMOLALITY SERPL CALC.SUM OF ELEC: 293 MOSM/KG (ref 275–295)
PLATELET # BLD AUTO: 122 10(3)UL (ref 150–450)
POTASSIUM SERPL-SCNC: 3.6 MMOL/L (ref 3.5–5.1)
RBC # BLD AUTO: 3.75 X10(6)UL
SODIUM SERPL-SCNC: 142 MMOL/L (ref 136–145)
WBC # BLD AUTO: 4.5 X10(3) UL (ref 4–11)

## 2022-10-28 PROCEDURE — 85027 COMPLETE CBC AUTOMATED: CPT | Performed by: HOSPITALIST

## 2022-10-28 PROCEDURE — 80048 BASIC METABOLIC PNL TOTAL CA: CPT | Performed by: HOSPITALIST

## 2022-10-28 PROCEDURE — C9113 INJ PANTOPRAZOLE SODIUM, VIA: HCPCS | Performed by: HOSPITALIST

## 2022-10-28 PROCEDURE — 82272 OCCULT BLD FECES 1-3 TESTS: CPT | Performed by: HOSPITALIST

## 2022-10-28 PROCEDURE — 85018 HEMOGLOBIN: CPT | Performed by: HOSPITALIST

## 2022-10-28 RX ORDER — POTASSIUM CHLORIDE 20 MEQ/1
40 TABLET, EXTENDED RELEASE ORAL EVERY 4 HOURS
Status: COMPLETED | OUTPATIENT
Start: 2022-10-28 | End: 2022-10-28

## 2022-10-28 NOTE — PLAN OF CARE
Pt A/Ox3, does not remember correctly why she is here but does know she is at [facility]. On room air. A-fib on tele. Continent. . continues to have watery stools slowing down overnight. Denies pain. Up with sba and walker to pivot. Fluids infusing. Receiving Iv abx. All needs met      Problem: RESPIRATORY - ADULT  Goal: Achieves optimal ventilation and oxygenation  Description: INTERVENTIONS:  - Assess for changes in respiratory status  - Assess for changes in mentation and behavior  - Position to facilitate oxygenation and minimize respiratory effort  - Oxygen supplementation based on oxygen saturation or ABGs  - Provide Smoking Cessation handout, if applicable  - Encourage broncho-pulmonary hygiene including cough, deep breathe, Incentive Spirometry  - Assess the need for suctioning and perform as needed  - Assess and instruct to report SOB or any respiratory difficulty  - Respiratory Therapy support as indicated  - Manage/alleviate anxiety  - Monitor for signs/symptoms of CO2 retention  Outcome: Progressing     Problem: CARDIOVASCULAR - ADULT  Goal: Maintains optimal cardiac output and hemodynamic stability  Description: INTERVENTIONS:  - Monitor vital signs, rhythm, and trends  - Monitor for bleeding, hypotension and signs of decreased cardiac output  - Evaluate effectiveness of vasoactive medications to optimize hemodynamic stability  - Monitor arterial and/or venous puncture sites for bleeding and/or hematoma  - Assess quality of pulses, skin color and temperature  - Assess for signs of decreased coronary artery perfusion - ex.  Angina  - Evaluate fluid balance, assess for edema, trend weights  Outcome: Progressing  Goal: Absence of cardiac arrhythmias or at baseline  Description: INTERVENTIONS:  - Continuous cardiac monitoring, monitor vital signs, obtain 12 lead EKG if indicated  - Evaluate effectiveness of antiarrhythmic and heart rate control medications as ordered  - Initiate emergency measures for life threatening arrhythmias  - Monitor electrolytes and administer replacement therapy as ordered  Outcome: Progressing

## 2022-10-28 NOTE — PLAN OF CARE
Assumed care of patient @ 0730 patient resting in bed, AOX2, reports no pain. Lung sounds clear but diminished bilaterally, O2 saturation 98% on room air. No complaints of shortness of breath or chest pain. Afib on tele. Bowel sounds present and active in all quadrants. Continent of bowel and bladder. Two loose stools today. Patient voiding in commode with no issue. No skin issues noted. Patient has some confusion and anxiety, perpetually forgets why she is here. Gave clonazepam this afternoon, abated some anxiety. Plan of Care: PO vancomycin, IV merrem, monitor o2 saturation. Discussed plan of care with patient, verbalized understanding. Call light within reach.       Problem: RESPIRATORY - ADULT  Goal: Achieves optimal ventilation and oxygenation  Description: INTERVENTIONS:  - Assess for changes in respiratory status  - Assess for changes in mentation and behavior  - Position to facilitate oxygenation and minimize respiratory effort  - Oxygen supplementation based on oxygen saturation or ABGs  - Provide Smoking Cessation handout, if applicable  - Encourage broncho-pulmonary hygiene including cough, deep breathe, Incentive Spirometry  - Assess the need for suctioning and perform as needed  - Assess and instruct to report SOB or any respiratory difficulty  - Respiratory Therapy support as indicated  - Manage/alleviate anxiety  - Monitor for signs/symptoms of CO2 retention  Outcome: Progressing     Problem: CARDIOVASCULAR - ADULT  Goal: Maintains optimal cardiac output and hemodynamic stability  Description: INTERVENTIONS:  - Monitor vital signs, rhythm, and trends  - Monitor for bleeding, hypotension and signs of decreased cardiac output  - Evaluate effectiveness of vasoactive medications to optimize hemodynamic stability  - Monitor arterial and/or venous puncture sites for bleeding and/or hematoma  - Assess quality of pulses, skin color and temperature  - Assess for signs of decreased coronary artery perfusion - ex. Angina  - Evaluate fluid balance, assess for edema, trend weights  Outcome: Progressing  Goal: Absence of cardiac arrhythmias or at baseline  Description: INTERVENTIONS:  - Continuous cardiac monitoring, monitor vital signs, obtain 12 lead EKG if indicated  - Evaluate effectiveness of antiarrhythmic and heart rate control medications as ordered  - Initiate emergency measures for life threatening arrhythmias  - Monitor electrolytes and administer replacement therapy as ordered  Outcome: Progressing     Problem: SAFETY ADULT - FALL  Goal: Free from fall injury  Description: INTERVENTIONS:  - Assess pt frequently for physical needs  - Identify cognitive and physical deficits and behaviors that affect risk of falls. - [place] fall precautions as indicated by assessment.  - Educate pt/family on patient safety including physical limitations  - Instruct pt to call for assistance with activity based on assessment  - Modify environment to reduce risk of injury  - Provide assistive devices as appropriate  - Consider OT/PT consult to assist with strengthening/mobility  - Encourage toileting schedule  Outcome: Progressing     Problem: Patient/Family Goals  Goal: Patient/Family Long Term Goal  Description: Patient's Long Term Goal: To stay out of the hospital.   Interventions:    - Follow medication regimen, attend follow up appointments, eat heart healthy diet  - See additional Care Plan goals for specific interventions    Outcome: Progressing  Goal: Patient/Family Short Term Goal  Description: Patient's Short Term Goal: To go home.     Interventions:   - Tele, medications, labs, remain free from falls, abx  - See additional Care Plan goals for specific interventions  Outcome: Progressing

## 2022-10-28 NOTE — PROGRESS NOTES
10/28/22 0457 10/28/22 0459 10/28/22 0500   Vital Signs   /69 111/68 112/61   MAP (mmHg) 84 75 74   BP Location Left arm Left arm Left arm   BP Method Automatic Automatic Automatic   Patient Position Lying Sitting Standing

## 2022-10-29 VITALS
WEIGHT: 144.13 LBS | SYSTOLIC BLOOD PRESSURE: 147 MMHG | HEART RATE: 86 BPM | HEIGHT: 65.5 IN | TEMPERATURE: 98 F | RESPIRATION RATE: 18 BRPM | OXYGEN SATURATION: 98 % | DIASTOLIC BLOOD PRESSURE: 64 MMHG | BODY MASS INDEX: 23.73 KG/M2

## 2022-10-29 LAB
ANION GAP SERPL CALC-SCNC: 6 MMOL/L (ref 0–18)
BASOPHILS # BLD AUTO: 0.03 X10(3) UL (ref 0–0.2)
BASOPHILS NFR BLD AUTO: 0.6 %
BUN BLD-MCNC: 9 MG/DL (ref 7–18)
CALCIUM BLD-MCNC: 9.2 MG/DL (ref 8.5–10.1)
CHLORIDE SERPL-SCNC: 109 MMOL/L (ref 98–112)
CO2 SERPL-SCNC: 26 MMOL/L (ref 21–32)
CREAT BLD-MCNC: 0.67 MG/DL
EOSINOPHIL # BLD AUTO: 0.31 X10(3) UL (ref 0–0.7)
EOSINOPHIL NFR BLD AUTO: 6.3 %
ERYTHROCYTE [DISTWIDTH] IN BLOOD BY AUTOMATED COUNT: 13.4 %
GFR SERPLBLD BASED ON 1.73 SQ M-ARVRAT: 86 ML/MIN/1.73M2 (ref 60–?)
GLUCOSE BLD-MCNC: 78 MG/DL (ref 70–99)
HCT VFR BLD AUTO: 34.4 %
HGB BLD-MCNC: 12 G/DL
IMM GRANULOCYTES # BLD AUTO: 0.01 X10(3) UL (ref 0–1)
IMM GRANULOCYTES NFR BLD: 0.2 %
LYMPHOCYTES # BLD AUTO: 1 X10(3) UL (ref 1–4)
LYMPHOCYTES NFR BLD AUTO: 20.2 %
MCH RBC QN AUTO: 30.5 PG (ref 26–34)
MCHC RBC AUTO-ENTMCNC: 34.9 G/DL (ref 31–37)
MCV RBC AUTO: 87.5 FL
MONOCYTES # BLD AUTO: 0.42 X10(3) UL (ref 0.1–1)
MONOCYTES NFR BLD AUTO: 8.5 %
NEUTROPHILS # BLD AUTO: 3.17 X10 (3) UL (ref 1.5–7.7)
NEUTROPHILS # BLD AUTO: 3.17 X10(3) UL (ref 1.5–7.7)
NEUTROPHILS NFR BLD AUTO: 64.2 %
OSMOLALITY SERPL CALC.SUM OF ELEC: 290 MOSM/KG (ref 275–295)
PLATELET # BLD AUTO: 149 10(3)UL (ref 150–450)
POTASSIUM SERPL-SCNC: 4.5 MMOL/L (ref 3.5–5.1)
POTASSIUM SERPL-SCNC: 4.5 MMOL/L (ref 3.5–5.1)
RBC # BLD AUTO: 3.93 X10(6)UL
SODIUM SERPL-SCNC: 141 MMOL/L (ref 136–145)
WBC # BLD AUTO: 4.9 X10(3) UL (ref 4–11)

## 2022-10-29 PROCEDURE — 84132 ASSAY OF SERUM POTASSIUM: CPT | Performed by: HOSPITALIST

## 2022-10-29 PROCEDURE — C9113 INJ PANTOPRAZOLE SODIUM, VIA: HCPCS | Performed by: HOSPITALIST

## 2022-10-29 PROCEDURE — 80048 BASIC METABOLIC PNL TOTAL CA: CPT | Performed by: INTERNAL MEDICINE

## 2022-10-29 PROCEDURE — 85025 COMPLETE CBC W/AUTO DIFF WBC: CPT | Performed by: INTERNAL MEDICINE

## 2022-10-29 RX ORDER — VANCOMYCIN HYDROCHLORIDE 125 MG/1
125 CAPSULE ORAL EVERY 6 HOURS
Qty: 36 CAPSULE | Refills: 0 | Status: SHIPPED | OUTPATIENT
Start: 2022-10-29 | End: 2022-11-07

## 2022-10-29 RX ORDER — ASPIRIN 81 MG/1
81 TABLET ORAL DAILY
Qty: 30 TABLET | Refills: 0 | Status: SHIPPED | OUTPATIENT
Start: 2022-10-29

## 2022-10-29 NOTE — CM/SW NOTE
MSW informed [facility]  P:[phone]  F:[phone] of [place] home today. [address] arranged for 1:15 . PCS form completed.     Wendolyn Romberg, LCSW

## 2022-10-29 NOTE — PLAN OF CARE
Assumed care around 1930. A/Ox2-3, can be forgetful. On RA w/ sats >90. Monitor shows a-fib/ventricular pacing. Voids on commode, last BM 10/28. No reports of pain. Up 1x pivot. Plan to cont abx. GI to see regarding rust-colored stool. Safety precautions in place, call light within reach, bed alarm on. Pt had 1 rust-colored stool, MD notified, instructed to consult GI and continue to monitor stool. Stool sample sent, pending results. Problem: RESPIRATORY - ADULT  Goal: Achieves optimal ventilation and oxygenation  Description: INTERVENTIONS:  - Assess for changes in respiratory status  - Assess for changes in mentation and behavior  - Position to facilitate oxygenation and minimize respiratory effort  - Oxygen supplementation based on oxygen saturation or ABGs  - Provide Smoking Cessation handout, if applicable  - Encourage broncho-pulmonary hygiene including cough, deep breathe, Incentive Spirometry  - Assess the need for suctioning and perform as needed  - Assess and instruct to report SOB or any respiratory difficulty  - Respiratory Therapy support as indicated  - Manage/alleviate anxiety  - Monitor for signs/symptoms of CO2 retention  Outcome: Progressing     Problem: CARDIOVASCULAR - ADULT  Goal: Maintains optimal cardiac output and hemodynamic stability  Description: INTERVENTIONS:  - Monitor vital signs, rhythm, and trends  - Monitor for bleeding, hypotension and signs of decreased cardiac output  - Evaluate effectiveness of vasoactive medications to optimize hemodynamic stability  - Monitor arterial and/or venous puncture sites for bleeding and/or hematoma  - Assess quality of pulses, skin color and temperature  - Assess for signs of decreased coronary artery perfusion - ex.  Angina  - Evaluate fluid balance, assess for edema, trend weights  Outcome: Progressing  Goal: Absence of cardiac arrhythmias or at baseline  Description: INTERVENTIONS:  - Continuous cardiac monitoring, monitor vital signs, obtain 12 lead EKG if indicated  - Evaluate effectiveness of antiarrhythmic and heart rate control medications as ordered  - Initiate emergency measures for life threatening arrhythmias  - Monitor electrolytes and administer replacement therapy as ordered  Outcome: Progressing     Problem: SAFETY ADULT - FALL  Goal: Free from fall injury  Description: INTERVENTIONS:  - Assess pt frequently for physical needs  - Identify cognitive and physical deficits and behaviors that affect risk of falls. - [place] fall precautions as indicated by assessment.  - Educate pt/family on patient safety including physical limitations  - Instruct pt to call for assistance with activity based on assessment  - Modify environment to reduce risk of injury  - Provide assistive devices as appropriate  - Consider OT/PT consult to assist with strengthening/mobility  - Encourage toileting schedule  Outcome: Progressing     Problem: Patient/Family Goals  Goal: Patient/Family Long Term Goal  Description: Patient's Long Term Goal: To stay out of the hospital.   Interventions:    - Follow medication regimen, attend follow up appointments, eat heart healthy diet  - See additional Care Plan goals for specific interventions    Outcome: Progressing  Goal: Patient/Family Short Term Goal  Description: Patient's Short Term Goal: To go home.     Interventions:   - Tele, medications, labs, remain free from falls, abx  - See additional Care Plan goals for specific interventions  Outcome: Progressing

## 2023-01-14 ENCOUNTER — APPOINTMENT (OUTPATIENT)
Dept: GENERAL RADIOLOGY | Facility: HOSPITAL | Age: 83
End: 2023-01-14
Attending: EMERGENCY MEDICINE
Payer: OTHER MISCELLANEOUS

## 2023-01-14 ENCOUNTER — HOSPITAL ENCOUNTER (EMERGENCY)
Facility: HOSPITAL | Age: 83
Discharge: HOME | End: 2023-01-14
Attending: EMERGENCY MEDICINE
Payer: OTHER MISCELLANEOUS

## 2023-01-14 ENCOUNTER — APPOINTMENT (OUTPATIENT)
Dept: CT IMAGING | Facility: HOSPITAL | Age: 83
End: 2023-01-14
Attending: EMERGENCY MEDICINE
Payer: OTHER MISCELLANEOUS

## 2023-01-14 VITALS
HEART RATE: 75 BPM | DIASTOLIC BLOOD PRESSURE: 72 MMHG | TEMPERATURE: 97 F | SYSTOLIC BLOOD PRESSURE: 154 MMHG | OXYGEN SATURATION: 99 % | RESPIRATION RATE: 21 BRPM

## 2023-01-14 DIAGNOSIS — R53.1 WEAKNESS GENERALIZED: ICD-10-CM
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
## 2023-01-14 DIAGNOSIS — D69.6 THROMBOCYTOPENIA (HCC): ICD-10-CM

## 2023-01-14 DIAGNOSIS — D50.8 OTHER IRON DEFICIENCY ANEMIA: ICD-10-CM

## 2023-01-14 LAB
ALBUMIN SERPL-MCNC: 3.1 G/DL (ref 3.4–5)
ALBUMIN/GLOB SERPL: 0.9 {RATIO} (ref 1–2)
ALP LIVER SERPL-CCNC: 84 U/L
ALT SERPL-CCNC: 14 U/L
ANION GAP SERPL CALC-SCNC: 2 MMOL/L (ref 0–18)
AST SERPL-CCNC: 19 U/L (ref 15–37)
BASOPHILS # BLD AUTO: 0.03 X10(3) UL (ref 0–0.2)
BASOPHILS NFR BLD AUTO: 0.9 %
BILIRUB SERPL-MCNC: 0.2 MG/DL (ref 0.1–2)
BILIRUB UR QL STRIP.AUTO: NEGATIVE
BUN BLD-MCNC: 14 MG/DL (ref 7–18)
CALCIUM BLD-MCNC: 8.8 MG/DL (ref 8.5–10.1)
CHLORIDE SERPL-SCNC: 105 MMOL/L (ref 98–112)
CLARITY UR REFRACT.AUTO: CLEAR
CO2 SERPL-SCNC: 34 MMOL/L (ref 21–32)
COLOR UR AUTO: YELLOW
CREAT BLD-MCNC: 0.85 MG/DL
EOSINOPHIL # BLD AUTO: 0.09 X10(3) UL (ref 0–0.7)
EOSINOPHIL NFR BLD AUTO: 2.7 %
ERYTHROCYTE [DISTWIDTH] IN BLOOD BY AUTOMATED COUNT: 12.1 %
GFR SERPLBLD BASED ON 1.73 SQ M-ARVRAT: 68 ML/MIN/1.73M2 (ref 60–?)
GLOBULIN PLAS-MCNC: 3.6 G/DL (ref 2.8–4.4)
GLUCOSE BLD-MCNC: 84 MG/DL (ref 70–99)
GLUCOSE UR STRIP.AUTO-MCNC: NEGATIVE MG/DL
HCT VFR BLD AUTO: 32.5 %
HGB BLD-MCNC: 10.6 G/DL
IMM GRANULOCYTES # BLD AUTO: 0 X10(3) UL (ref 0–1)
IMM GRANULOCYTES NFR BLD: 0 %
KETONES UR STRIP.AUTO-MCNC: NEGATIVE MG/DL
LYMPHOCYTES # BLD AUTO: 0.83 X10(3) UL (ref 1–4)
LYMPHOCYTES NFR BLD AUTO: 24.6 %
MCH RBC QN AUTO: 30 PG (ref 26–34)
MCHC RBC AUTO-ENTMCNC: 32.6 G/DL (ref 31–37)
MCV RBC AUTO: 92.1 FL
MONOCYTES # BLD AUTO: 0.3 X10(3) UL (ref 0.1–1)
MONOCYTES NFR BLD AUTO: 8.9 %
NEUTROPHILS # BLD AUTO: 2.13 X10 (3) UL (ref 1.5–7.7)
NEUTROPHILS # BLD AUTO: 2.13 X10(3) UL (ref 1.5–7.7)
NEUTROPHILS NFR BLD AUTO: 62.9 %
NITRITE UR QL STRIP.AUTO: NEGATIVE
OSMOLALITY SERPL CALC.SUM OF ELEC: 292 MOSM/KG (ref 275–295)
PH UR STRIP.AUTO: 5.5 [PH] (ref 5–8)
PLATELET # BLD AUTO: 91 10(3)UL (ref 150–450)
POTASSIUM SERPL-SCNC: 4.7 MMOL/L (ref 3.5–5.1)
PROT SERPL-MCNC: 6.7 G/DL (ref 6.4–8.2)
PROT UR STRIP.AUTO-MCNC: NEGATIVE MG/DL
RBC # BLD AUTO: 3.53 X10(6)UL
SODIUM SERPL-SCNC: 141 MMOL/L (ref 136–145)
SP GR UR STRIP.AUTO: 1.01 (ref 1–1.03)
UROBILINOGEN UR STRIP.AUTO-MCNC: 0.2 MG/DL
WBC # BLD AUTO: 3.4 X10(3) UL (ref 4–11)

## 2023-01-14 PROCEDURE — 85025 COMPLETE CBC W/AUTO DIFF WBC: CPT | Performed by: EMERGENCY MEDICINE

## 2023-01-14 PROCEDURE — 81015 MICROSCOPIC EXAM OF URINE: CPT | Performed by: EMERGENCY MEDICINE

## 2023-01-14 PROCEDURE — 99285 EMERGENCY DEPT VISIT HI MDM: CPT

## 2023-01-14 PROCEDURE — 70450 CT HEAD/BRAIN W/O DYE: CPT | Performed by: EMERGENCY MEDICINE

## 2023-01-14 PROCEDURE — 87077 CULTURE AEROBIC IDENTIFY: CPT | Performed by: EMERGENCY MEDICINE

## 2023-01-14 PROCEDURE — 87186 SC STD MICRODIL/AGAR DIL: CPT | Performed by: EMERGENCY MEDICINE

## 2023-01-14 PROCEDURE — 87086 URINE CULTURE/COLONY COUNT: CPT | Performed by: EMERGENCY MEDICINE

## 2023-01-14 PROCEDURE — 80053 COMPREHEN METABOLIC PANEL: CPT | Performed by: EMERGENCY MEDICINE

## 2023-01-14 PROCEDURE — 93010 ELECTROCARDIOGRAM REPORT: CPT

## 2023-01-14 PROCEDURE — 73502 X-RAY EXAM HIP UNI 2-3 VIEWS: CPT | Performed by: EMERGENCY MEDICINE

## 2023-01-14 PROCEDURE — 36415 COLL VENOUS BLD VENIPUNCTURE: CPT

## 2023-01-14 PROCEDURE — 81001 URINALYSIS AUTO W/SCOPE: CPT | Performed by: EMERGENCY MEDICINE

## 2023-01-14 PROCEDURE — 93005 ELECTROCARDIOGRAM TRACING: CPT

## 2023-01-14 RX ORDER — LISINOPRIL 20 MG/1
20 TABLET ORAL DAILY
COMMUNITY

## 2023-01-14 NOTE — ED QUICK NOTES
[facility] called to ask this RN to call w/ any updates. Unable to obtain information from hospice nurse as per company the nurse for patient is on lunch break.     [phone]

## 2023-01-14 NOTE — ED INITIAL ASSESSMENT (HPI)
Pt to ER via EMS after a fall. patient states she was \"feeling weak and then I just dropped. \" Patient complains of pain in her right groin. +blood thinner. 3Lo2 on arrival, wears 2.5L o2 @ home.

## 2023-01-14 NOTE — ED QUICK NOTES
Phone call received from Julián Sims RN from [facility]. Updates provided. Awaiting test results.      [phone]

## 2023-01-14 NOTE — HOSPICE RN NOTE
[facility] made aware of pt's transfer to ED. Called to get update from RN Masood Lucero RN unavailable. Left callback number to discuss patient condition to determine if pt was being admitted and/or if pt would need to revoke from hospice to seek aggressive treatment.      Deon Dixon RN, BSN  [facility] Nurse Liaison  Office: [phone] or After Hours: [phone]

## 2023-01-15 LAB
Q-T INTERVAL: 328 MS
QRS DURATION: 90 MS
QTC CALCULATION (BEZET): 381 MS
R AXIS: -42 DEGREES
T AXIS: -65 DEGREES
VENTRICULAR RATE: 81 BPM

## 2023-02-07 ENCOUNTER — HOSPITAL ENCOUNTER (INPATIENT)
Facility: HOSPITAL | Age: 83
LOS: 3 days | Discharge: HOSPICE | End: 2023-02-10
Attending: EMERGENCY MEDICINE | Admitting: INTERNAL MEDICINE
Payer: MEDICARE

## 2023-02-07 ENCOUNTER — APPOINTMENT (OUTPATIENT)
Dept: GENERAL RADIOLOGY | Facility: HOSPITAL | Age: 83
End: 2023-02-07
Attending: EMERGENCY MEDICINE
Payer: MEDICARE

## 2023-02-07 DIAGNOSIS — Z51.5 HOSPICE CARE: ICD-10-CM

## 2023-02-07 DIAGNOSIS — J18.9 MULTIFOCAL PNEUMONIA: ICD-10-CM

## 2023-02-07 DIAGNOSIS — J96.01 ACUTE HYPOXEMIC RESPIRATORY FAILURE (HCC): Primary | ICD-10-CM

## 2023-02-07 LAB
ALBUMIN SERPL-MCNC: 3.6 G/DL (ref 3.4–5)
ALBUMIN/GLOB SERPL: 0.9 {RATIO} (ref 1–2)
ALP LIVER SERPL-CCNC: 94 U/L
ALT SERPL-CCNC: 14 U/L
ANION GAP SERPL CALC-SCNC: 2 MMOL/L (ref 0–18)
ARTERIAL PATENCY WRIST A: POSITIVE
AST SERPL-CCNC: 16 U/L (ref 15–37)
BASE EXCESS BLDA CALC-SCNC: 6.1 MMOL/L (ref ?–2)
BASOPHILS # BLD AUTO: 0.09 X10(3) UL (ref 0–0.2)
BASOPHILS NFR BLD AUTO: 0.5 %
BILIRUB SERPL-MCNC: 0.5 MG/DL (ref 0.1–2)
BILIRUB UR QL STRIP.AUTO: NEGATIVE
BODY TEMPERATURE: 98.6 F
BUN BLD-MCNC: 9 MG/DL (ref 7–18)
CALCIUM BLD-MCNC: 8.7 MG/DL (ref 8.5–10.1)
CHLORIDE SERPL-SCNC: 105 MMOL/L (ref 98–112)
CLARITY UR REFRACT.AUTO: CLEAR
CO2 SERPL-SCNC: 31 MMOL/L (ref 21–32)
COHGB MFR BLD: 2.2 % SAT (ref 0–3)
COLOR UR AUTO: YELLOW
CREAT BLD-MCNC: 0.86 MG/DL
EOSINOPHIL # BLD AUTO: 0.25 X10(3) UL (ref 0–0.7)
EOSINOPHIL NFR BLD AUTO: 1.4 %
ERYTHROCYTE [DISTWIDTH] IN BLOOD BY AUTOMATED COUNT: 12.8 %
EXPIRATORY PRESSURE: 6 CM H2O
FIO2: 40 %
GFR SERPLBLD BASED ON 1.73 SQ M-ARVRAT: 67 ML/MIN/1.73M2 (ref 60–?)
GLOBULIN PLAS-MCNC: 4.2 G/DL (ref 2.8–4.4)
GLUCOSE BLD-MCNC: 160 MG/DL (ref 70–99)
GLUCOSE UR STRIP.AUTO-MCNC: NEGATIVE MG/DL
HCO3 BLDA-SCNC: 29.7 MEQ/L (ref 21–27)
HCT VFR BLD AUTO: 35.4 %
HGB BLD-MCNC: 11.1 G/DL
HGB BLD-MCNC: 11.5 G/DL
IMM GRANULOCYTES # BLD AUTO: 0.09 X10(3) UL (ref 0–1)
IMM GRANULOCYTES NFR BLD: 0.5 %
INSP PRESSURE: 12 CM H2O
KETONES UR STRIP.AUTO-MCNC: NEGATIVE MG/DL
LACTATE SERPL-SCNC: 1.2 MMOL/L (ref 0.4–2)
LEUKOCYTE ESTERASE UR QL STRIP.AUTO: NEGATIVE
LYMPHOCYTES # BLD AUTO: 1.4 X10(3) UL (ref 1–4)
LYMPHOCYTES NFR BLD AUTO: 7.8 %
MCH RBC QN AUTO: 29.7 PG (ref 26–34)
MCHC RBC AUTO-ENTMCNC: 32.5 G/DL (ref 31–37)
MCV RBC AUTO: 91.5 FL
METHGB MFR BLD: 0.6 % SAT (ref 0.4–1.5)
MONOCYTES # BLD AUTO: 1.21 X10(3) UL (ref 0.1–1)
MONOCYTES NFR BLD AUTO: 6.7 %
NEUTROPHILS # BLD AUTO: 14.92 X10 (3) UL (ref 1.5–7.7)
NEUTROPHILS # BLD AUTO: 14.92 X10(3) UL (ref 1.5–7.7)
NEUTROPHILS NFR BLD AUTO: 83.1 %
NITRITE UR QL STRIP.AUTO: NEGATIVE
NT-PROBNP SERPL-MCNC: 2045 PG/ML (ref ?–450)
OSMOLALITY SERPL CALC.SUM OF ELEC: 288 MOSM/KG (ref 275–295)
OXYHGB MFR BLDA: 96.2 % (ref 92–100)
PCO2 BLDA: 62 MM HG (ref 35–45)
PH BLDA: 7.34 [PH] (ref 7.35–7.45)
PH UR STRIP.AUTO: 7 [PH] (ref 5–8)
PLATELET # BLD AUTO: 169 10(3)UL (ref 150–450)
PO2 BLDA: 91 MM HG (ref 80–100)
POTASSIUM SERPL-SCNC: 4.5 MMOL/L (ref 3.5–5.1)
PROT SERPL-MCNC: 7.8 G/DL (ref 6.4–8.2)
RBC # BLD AUTO: 3.87 X10(6)UL
SARS-COV-2 RNA RESP QL NAA+PROBE: NOT DETECTED
SODIUM SERPL-SCNC: 138 MMOL/L (ref 136–145)
SP GR UR STRIP.AUTO: 1.02 (ref 1–1.03)
TROPONIN I HIGH SENSITIVITY: 7 NG/L
UROBILINOGEN UR STRIP.AUTO-MCNC: 0.2 MG/DL
VENT RATE: 12 /MIN
WBC # BLD AUTO: 18 X10(3) UL (ref 4–11)

## 2023-02-07 PROCEDURE — 81001 URINALYSIS AUTO W/SCOPE: CPT | Performed by: EMERGENCY MEDICINE

## 2023-02-07 PROCEDURE — 96374 THER/PROPH/DIAG INJ IV PUSH: CPT

## 2023-02-07 PROCEDURE — 82803 BLOOD GASES ANY COMBINATION: CPT | Performed by: EMERGENCY MEDICINE

## 2023-02-07 PROCEDURE — 87040 BLOOD CULTURE FOR BACTERIA: CPT | Performed by: EMERGENCY MEDICINE

## 2023-02-07 PROCEDURE — 99285 EMERGENCY DEPT VISIT HI MDM: CPT

## 2023-02-07 PROCEDURE — 83050 HGB METHEMOGLOBIN QUAN: CPT | Performed by: EMERGENCY MEDICINE

## 2023-02-07 PROCEDURE — 5A09357 ASSISTANCE WITH RESPIRATORY VENTILATION, LESS THAN 24 CONSECUTIVE HOURS, CONTINUOUS POSITIVE AIRWAY PRESSURE: ICD-10-PCS | Performed by: EMERGENCY MEDICINE

## 2023-02-07 PROCEDURE — 93010 ELECTROCARDIOGRAM REPORT: CPT

## 2023-02-07 PROCEDURE — 36415 COLL VENOUS BLD VENIPUNCTURE: CPT

## 2023-02-07 PROCEDURE — 36600 WITHDRAWAL OF ARTERIAL BLOOD: CPT | Performed by: EMERGENCY MEDICINE

## 2023-02-07 PROCEDURE — 84484 ASSAY OF TROPONIN QUANT: CPT | Performed by: EMERGENCY MEDICINE

## 2023-02-07 PROCEDURE — 83605 ASSAY OF LACTIC ACID: CPT | Performed by: EMERGENCY MEDICINE

## 2023-02-07 PROCEDURE — S0077 INJECTION, CLINDAMYCIN PHOSP: HCPCS | Performed by: INTERNAL MEDICINE

## 2023-02-07 PROCEDURE — 71045 X-RAY EXAM CHEST 1 VIEW: CPT | Performed by: EMERGENCY MEDICINE

## 2023-02-07 PROCEDURE — 83880 ASSAY OF NATRIURETIC PEPTIDE: CPT | Performed by: EMERGENCY MEDICINE

## 2023-02-07 PROCEDURE — 85018 HEMOGLOBIN: CPT | Performed by: EMERGENCY MEDICINE

## 2023-02-07 PROCEDURE — 94002 VENT MGMT INPAT INIT DAY: CPT

## 2023-02-07 PROCEDURE — 82375 ASSAY CARBOXYHB QUANT: CPT | Performed by: EMERGENCY MEDICINE

## 2023-02-07 PROCEDURE — 94660 CPAP INITIATION&MGMT: CPT

## 2023-02-07 PROCEDURE — 85025 COMPLETE CBC W/AUTO DIFF WBC: CPT | Performed by: EMERGENCY MEDICINE

## 2023-02-07 PROCEDURE — 99291 CRITICAL CARE FIRST HOUR: CPT

## 2023-02-07 PROCEDURE — 81015 MICROSCOPIC EXAM OF URINE: CPT | Performed by: EMERGENCY MEDICINE

## 2023-02-07 PROCEDURE — 80053 COMPREHEN METABOLIC PANEL: CPT | Performed by: EMERGENCY MEDICINE

## 2023-02-07 PROCEDURE — 94640 AIRWAY INHALATION TREATMENT: CPT

## 2023-02-07 RX ORDER — CLONAZEPAM 0.5 MG/1
0.5 TABLET ORAL 2 TIMES DAILY
Status: DISCONTINUED | OUTPATIENT
Start: 2023-02-07 | End: 2023-02-10

## 2023-02-07 RX ORDER — ONDANSETRON 2 MG/ML
4 INJECTION INTRAMUSCULAR; INTRAVENOUS EVERY 6 HOURS PRN
Status: DISCONTINUED | OUTPATIENT
Start: 2023-02-07 | End: 2023-02-10

## 2023-02-07 RX ORDER — HYDROMORPHONE HYDROCHLORIDE 1 MG/ML
0.5 SOLUTION ORAL EVERY 6 HOURS PRN
Status: DISCONTINUED | OUTPATIENT
Start: 2023-02-07 | End: 2023-02-10

## 2023-02-07 RX ORDER — TRAZODONE HYDROCHLORIDE 50 MG/1
100 TABLET ORAL NIGHTLY
Status: DISCONTINUED | OUTPATIENT
Start: 2023-02-07 | End: 2023-02-10

## 2023-02-07 RX ORDER — IPRATROPIUM BROMIDE AND ALBUTEROL SULFATE 2.5; .5 MG/3ML; MG/3ML
3 SOLUTION RESPIRATORY (INHALATION) ONCE
Status: COMPLETED | OUTPATIENT
Start: 2023-02-07 | End: 2023-02-07

## 2023-02-07 RX ORDER — SERTRALINE HYDROCHLORIDE 100 MG/1
200 TABLET, FILM COATED ORAL DAILY
Status: DISCONTINUED | OUTPATIENT
Start: 2023-02-07 | End: 2023-02-10

## 2023-02-07 RX ORDER — LISINOPRIL 20 MG/1
20 TABLET ORAL DAILY
Status: DISCONTINUED | OUTPATIENT
Start: 2023-02-07 | End: 2023-02-07

## 2023-02-07 RX ORDER — LISINOPRIL 10 MG/1
10 TABLET ORAL DAILY
Status: DISCONTINUED | OUTPATIENT
Start: 2023-02-08 | End: 2023-02-09

## 2023-02-07 RX ORDER — FLUTICASONE FUROATE AND VILANTEROL 200; 25 UG/1; UG/1
1 POWDER RESPIRATORY (INHALATION) DAILY
Status: DISCONTINUED | OUTPATIENT
Start: 2023-02-07 | End: 2023-02-07

## 2023-02-07 RX ORDER — METRONIDAZOLE 500 MG/100ML
500 INJECTION, SOLUTION INTRAVENOUS EVERY 8 HOURS
Status: DISCONTINUED | OUTPATIENT
Start: 2023-02-07 | End: 2023-02-07

## 2023-02-07 RX ORDER — HYDROMORPHONE HYDROCHLORIDE 1 MG/ML
0.5 SOLUTION ORAL EVERY 6 HOURS PRN
COMMUNITY

## 2023-02-07 RX ORDER — METHYLPREDNISOLONE SODIUM SUCCINATE 125 MG/2ML
125 INJECTION, POWDER, LYOPHILIZED, FOR SOLUTION INTRAMUSCULAR; INTRAVENOUS ONCE
Status: COMPLETED | OUTPATIENT
Start: 2023-02-07 | End: 2023-02-07

## 2023-02-07 RX ORDER — IPRATROPIUM BROMIDE AND ALBUTEROL SULFATE 2.5; .5 MG/3ML; MG/3ML
3 SOLUTION RESPIRATORY (INHALATION) EVERY 4 HOURS PRN
Status: DISCONTINUED | OUTPATIENT
Start: 2023-02-07 | End: 2023-02-10

## 2023-02-07 RX ORDER — ATORVASTATIN CALCIUM 40 MG/1
40 TABLET, FILM COATED ORAL DAILY
Status: DISCONTINUED | OUTPATIENT
Start: 2023-02-07 | End: 2023-02-10

## 2023-02-07 RX ORDER — LEVETIRACETAM 250 MG/1
250 TABLET ORAL 2 TIMES DAILY
Status: DISCONTINUED | OUTPATIENT
Start: 2023-02-07 | End: 2023-02-10

## 2023-02-07 RX ORDER — PANTOPRAZOLE SODIUM 40 MG/1
40 TABLET, DELAYED RELEASE ORAL
Status: DISCONTINUED | OUTPATIENT
Start: 2023-02-08 | End: 2023-02-10

## 2023-02-07 RX ORDER — HEPARIN SODIUM 5000 [USP'U]/ML
5000 INJECTION, SOLUTION INTRAVENOUS; SUBCUTANEOUS EVERY 8 HOURS SCHEDULED
Status: DISCONTINUED | OUTPATIENT
Start: 2023-02-07 | End: 2023-02-10

## 2023-02-07 RX ORDER — ACETAMINOPHEN 325 MG/1
650 TABLET ORAL EVERY 6 HOURS PRN
Status: DISCONTINUED | OUTPATIENT
Start: 2023-02-07 | End: 2023-02-10

## 2023-02-07 RX ORDER — PRAMIPEXOLE DIHYDROCHLORIDE 0.25 MG/1
0.25 TABLET ORAL NIGHTLY
Status: DISCONTINUED | OUTPATIENT
Start: 2023-02-07 | End: 2023-02-10

## 2023-02-07 RX ORDER — CARVEDILOL 12.5 MG/1
25 TABLET ORAL 2 TIMES DAILY WITH MEALS
Status: DISCONTINUED | OUTPATIENT
Start: 2023-02-07 | End: 2023-02-10

## 2023-02-07 RX ORDER — CLINDAMYCIN PHOSPHATE 600 MG/50ML
600 INJECTION INTRAVENOUS EVERY 8 HOURS
Status: DISCONTINUED | OUTPATIENT
Start: 2023-02-07 | End: 2023-02-10

## 2023-02-07 NOTE — PROGRESS NOTES
NURSING ADMISSION NOTE      Patient admitted via Cart  Oriented to room. Safety precautions initiated. Bed in low position. Call light in reach. Pt on 2L NC on admit. BiPAP orders noted, and pt placed on BiPAP. Per pulm note, we can wean off BiPAP as tolerated. [id number] Laura Randall by hospitalist to put pt back on 2L NC, satting 92-93%. Pt A&Ox2, poor historian. Pt reports her daughter helps her with her medications. Attempted to call Adriana, but unable to reach her to go over admission questions and PTA med list. Left message to return call and gave current RN phone number. Pt reports mild R hip pain on admit, but declines intervention. General diet- meal ordered. Straight cath done for UA. IV antibiotics. 1823: Able to reach pt's family and go through admission navigator and update PTA med list. Dr. Nova Troncoso notified about PTA med list changes.

## 2023-02-07 NOTE — HOSPICE RN NOTE
[facility] nursing follow up on home hospice patient. This patient was admitted to hospice at home on 1/5/23. Spoke with both children daughter Iwona Sequeira and son Duane Pulido. Family wants patient treated aggressively. Hospice revocation form will be signed today. [facility] will follow when family is ready for comfort focused treatment only. Please call [facility] with any questions or concerns.     Sam Mendoza RN, [address] [facility] Liaison  [phone] after hours

## 2023-02-07 NOTE — ED QUICK NOTES
Orders for admission, patient is aware of plan and ready to go upstairs.  Any questions, please call ED RN Dianne Joy at extension [phone]    Patient Covid vaccination status: Fully vaccinated     COVID Test Ordered in ED: Rapid SARS-CoV-2 by PCR    COVID Suspicion at Admission: Low clinical suspicion for COVID    Running Infusions:  None    Mental Status/LOC at time of transport: oriented x2    Other pertinent information: patient is on bipapCIWA score: N/A   NIH score:  N/A

## 2023-02-07 NOTE — ED QUICK NOTES
Inpatient hospice care   Came they want to talk to the family  About the  dnr status  will be  Admitted for comfort care  Hospice number--[phone]

## 2023-02-07 NOTE — ED INITIAL ASSESSMENT (HPI)
Brought by  [facility]   From home with complaints of shortness of breath . Lightheadedness vomiting since morning . Known case of COPD initial saturation at home was  82%. On arrival  Oxygen saturation is 97%. vomited once in the ED . ORIENTED  X2.

## 2023-02-08 LAB
ANION GAP SERPL CALC-SCNC: 2 MMOL/L (ref 0–18)
BASOPHILS # BLD AUTO: 0.01 X10(3) UL (ref 0–0.2)
BASOPHILS NFR BLD AUTO: 0.1 %
BUN BLD-MCNC: 13 MG/DL (ref 7–18)
CALCIUM BLD-MCNC: 8.3 MG/DL (ref 8.5–10.1)
CHLORIDE SERPL-SCNC: 104 MMOL/L (ref 98–112)
CO2 SERPL-SCNC: 31 MMOL/L (ref 21–32)
CREAT BLD-MCNC: 0.77 MG/DL
EOSINOPHIL # BLD AUTO: 0 X10(3) UL (ref 0–0.7)
EOSINOPHIL NFR BLD AUTO: 0 %
ERYTHROCYTE [DISTWIDTH] IN BLOOD BY AUTOMATED COUNT: 12.8 %
GFR SERPLBLD BASED ON 1.73 SQ M-ARVRAT: 76 ML/MIN/1.73M2 (ref 60–?)
GLUCOSE BLD-MCNC: 127 MG/DL (ref 70–99)
HCT VFR BLD AUTO: 30.1 %
HGB BLD-MCNC: 9.8 G/DL
IMM GRANULOCYTES # BLD AUTO: 0.03 X10(3) UL (ref 0–1)
IMM GRANULOCYTES NFR BLD: 0.4 %
LYMPHOCYTES # BLD AUTO: 0.87 X10(3) UL (ref 1–4)
LYMPHOCYTES NFR BLD AUTO: 10.8 %
MCH RBC QN AUTO: 29.6 PG (ref 26–34)
MCHC RBC AUTO-ENTMCNC: 32.6 G/DL (ref 31–37)
MCV RBC AUTO: 90.9 FL
MONOCYTES # BLD AUTO: 0.67 X10(3) UL (ref 0.1–1)
MONOCYTES NFR BLD AUTO: 8.3 %
NEUTROPHILS # BLD AUTO: 6.45 X10 (3) UL (ref 1.5–7.7)
NEUTROPHILS # BLD AUTO: 6.45 X10(3) UL (ref 1.5–7.7)
NEUTROPHILS NFR BLD AUTO: 80.4 %
OSMOLALITY SERPL CALC.SUM OF ELEC: 286 MOSM/KG (ref 275–295)
PLATELET # BLD AUTO: 112 10(3)UL (ref 150–450)
POTASSIUM SERPL-SCNC: 4 MMOL/L (ref 3.5–5.1)
RBC # BLD AUTO: 3.31 X10(6)UL
SODIUM SERPL-SCNC: 137 MMOL/L (ref 136–145)
WBC # BLD AUTO: 8 X10(3) UL (ref 4–11)

## 2023-02-08 PROCEDURE — S0077 INJECTION, CLINDAMYCIN PHOSP: HCPCS | Performed by: INTERNAL MEDICINE

## 2023-02-08 PROCEDURE — 94640 AIRWAY INHALATION TREATMENT: CPT

## 2023-02-08 PROCEDURE — 80048 BASIC METABOLIC PNL TOTAL CA: CPT | Performed by: INTERNAL MEDICINE

## 2023-02-08 PROCEDURE — 85025 COMPLETE CBC W/AUTO DIFF WBC: CPT | Performed by: INTERNAL MEDICINE

## 2023-02-08 PROCEDURE — 94799 UNLISTED PULMONARY SVC/PX: CPT

## 2023-02-08 RX ORDER — AZITHROMYCIN 250 MG/1
500 TABLET, FILM COATED ORAL
Status: DISCONTINUED | OUTPATIENT
Start: 2023-02-08 | End: 2023-02-10

## 2023-02-08 NOTE — CM/SW NOTE
SW received order for hospice consult. Chart reviewed, pt had [facility] and dc'd from hospice services on 2/7/23 on hospital admission. SW called [facility] ([phone]) and notified [facility] of hospice consult. [facility] stated they are currently following pt and will f/u with pt's family regarding hospice.     SYLVIA Miguel  Discharge Planner

## 2023-02-08 NOTE — HOSPICE RN NOTE
4:38 spoke with pt son, he will met us in the room tomorrow morning at 9:30am to sign new hospice consents. Edil Concepcion RN. Message left for pt son and daughter to see if they are interested in resuming hospice when pt discharges home.

## 2023-02-09 LAB
BASOPHILS # BLD AUTO: 0.03 X10(3) UL (ref 0–0.2)
BASOPHILS NFR BLD AUTO: 0.5 %
EOSINOPHIL # BLD AUTO: 0.09 X10(3) UL (ref 0–0.7)
EOSINOPHIL NFR BLD AUTO: 1.4 %
ERYTHROCYTE [DISTWIDTH] IN BLOOD BY AUTOMATED COUNT: 12.7 %
HCT VFR BLD AUTO: 32.1 %
HGB BLD-MCNC: 10.4 G/DL
IMM GRANULOCYTES # BLD AUTO: 0.02 X10(3) UL (ref 0–1)
IMM GRANULOCYTES NFR BLD: 0.3 %
LYMPHOCYTES # BLD AUTO: 0.92 X10(3) UL (ref 1–4)
LYMPHOCYTES NFR BLD AUTO: 14.4 %
MCH RBC QN AUTO: 29.2 PG (ref 26–34)
MCHC RBC AUTO-ENTMCNC: 32.4 G/DL (ref 31–37)
MCV RBC AUTO: 90.2 FL
MONOCYTES # BLD AUTO: 0.62 X10(3) UL (ref 0.1–1)
MONOCYTES NFR BLD AUTO: 9.7 %
NEUTROPHILS # BLD AUTO: 4.73 X10 (3) UL (ref 1.5–7.7)
NEUTROPHILS # BLD AUTO: 4.73 X10(3) UL (ref 1.5–7.7)
NEUTROPHILS NFR BLD AUTO: 73.7 %
PLATELET # BLD AUTO: 115 10(3)UL (ref 150–450)
RBC # BLD AUTO: 3.56 X10(6)UL
WBC # BLD AUTO: 6.4 X10(3) UL (ref 4–11)

## 2023-02-09 PROCEDURE — 85025 COMPLETE CBC W/AUTO DIFF WBC: CPT | Performed by: INTERNAL MEDICINE

## 2023-02-09 PROCEDURE — S0077 INJECTION, CLINDAMYCIN PHOSP: HCPCS | Performed by: INTERNAL MEDICINE

## 2023-02-09 RX ORDER — LISINOPRIL 20 MG/1
20 TABLET ORAL DAILY
Status: DISCONTINUED | OUTPATIENT
Start: 2023-02-10 | End: 2023-02-10

## 2023-02-09 RX ORDER — HYDRALAZINE HYDROCHLORIDE 10 MG/1
10 TABLET, FILM COATED ORAL EVERY 4 HOURS PRN
Status: DISCONTINUED | OUTPATIENT
Start: 2023-02-09 | End: 2023-02-10

## 2023-02-09 RX ORDER — LISINOPRIL 10 MG/1
10 TABLET ORAL ONCE
Status: COMPLETED | OUTPATIENT
Start: 2023-02-09 | End: 2023-02-09

## 2023-02-09 NOTE — DISCHARGE INSTRUCTIONS
When transitioning home with hospice or palliative services it is important to have someone to call. Please contact [facility] [phone] after discharge to assist with any questions or concerns.

## 2023-02-09 NOTE — HOSPICE RN NOTE
Called and spoke with family on the phone and confirmed the patient will be discharging tomorrow around 11 am and being readmitted to hospice around 1pm at home. [facility] to arrange transport.

## 2023-02-09 NOTE — PROGRESS NOTES
[facility] liaison set up transpiration for tomorrow, Friday to home, 1130a  home with [facility].

## 2023-02-09 NOTE — PROGRESS NOTES
02/09/23 0559   Provider Notification   Reason for Communication Review case   Provider Name April MD Ander   Method of Communication Page   Response Waiting for response     /87

## 2023-02-10 VITALS
TEMPERATURE: 98 F | HEIGHT: 65 IN | WEIGHT: 155 LBS | BODY MASS INDEX: 25.83 KG/M2 | OXYGEN SATURATION: 96 % | SYSTOLIC BLOOD PRESSURE: 172 MMHG | DIASTOLIC BLOOD PRESSURE: 75 MMHG | HEART RATE: 74 BPM | RESPIRATION RATE: 18 BRPM

## 2023-02-10 PROCEDURE — S0077 INJECTION, CLINDAMYCIN PHOSP: HCPCS | Performed by: INTERNAL MEDICINE

## 2023-02-10 RX ORDER — CEFDINIR 300 MG/1
300 CAPSULE ORAL 2 TIMES DAILY
Qty: 8 CAPSULE | Refills: 0 | Status: SHIPPED | OUTPATIENT
Start: 2023-02-10 | End: 2023-02-14

## 2023-02-10 RX ORDER — LISINOPRIL 20 MG/1
20 TABLET ORAL DAILY
Qty: 30 TABLET | Refills: 0 | Status: SHIPPED | COMMUNITY
Start: 2023-02-10

## 2023-02-10 NOTE — PROGRESS NOTES
NURSING DISCHARGE NOTE    Discharged Home via Ambulance. Accompanied by Support staff  Belongings Taken by patient/family. Pt discharged home with home hospice. IV removed.  Instruction sent with EMS staff

## 2023-02-20 ENCOUNTER — HOSPITAL ENCOUNTER (EMERGENCY)
Facility: HOSPITAL | Age: 83
Discharge: HOME | End: 2023-02-21
Attending: EMERGENCY MEDICINE
Payer: OTHER MISCELLANEOUS

## 2023-02-20 DIAGNOSIS — F11.90 OPIATE MISUSE: ICD-10-CM
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
## 2023-02-20 DIAGNOSIS — R41.0 DELIRIUM: ICD-10-CM

## 2023-02-20 PROCEDURE — 99285 EMERGENCY DEPT VISIT HI MDM: CPT

## 2023-02-20 PROCEDURE — 93005 ELECTROCARDIOGRAM TRACING: CPT

## 2023-02-20 PROCEDURE — 82077 ASSAY SPEC XCP UR&BREATH IA: CPT | Performed by: EMERGENCY MEDICINE

## 2023-02-20 PROCEDURE — 93010 ELECTROCARDIOGRAM REPORT: CPT

## 2023-02-20 PROCEDURE — 36415 COLL VENOUS BLD VENIPUNCTURE: CPT

## 2023-02-20 PROCEDURE — 85025 COMPLETE CBC W/AUTO DIFF WBC: CPT | Performed by: EMERGENCY MEDICINE

## 2023-02-20 PROCEDURE — 80053 COMPREHEN METABOLIC PANEL: CPT | Performed by: EMERGENCY MEDICINE

## 2023-02-21 ENCOUNTER — APPOINTMENT (OUTPATIENT)
Dept: GENERAL RADIOLOGY | Facility: HOSPITAL | Age: 83
End: 2023-02-21
Attending: EMERGENCY MEDICINE
Payer: OTHER MISCELLANEOUS

## 2023-02-21 ENCOUNTER — APPOINTMENT (OUTPATIENT)
Dept: CT IMAGING | Facility: HOSPITAL | Age: 83
End: 2023-02-21
Attending: EMERGENCY MEDICINE
Payer: OTHER MISCELLANEOUS

## 2023-02-21 VITALS
DIASTOLIC BLOOD PRESSURE: 52 MMHG | HEART RATE: 63 BPM | RESPIRATION RATE: 13 BRPM | WEIGHT: 155 LBS | SYSTOLIC BLOOD PRESSURE: 138 MMHG | BODY MASS INDEX: 26 KG/M2 | OXYGEN SATURATION: 97 % | TEMPERATURE: 97 F

## 2023-02-21 LAB
ALBUMIN SERPL-MCNC: 3.1 G/DL (ref 3.4–5)
ALBUMIN/GLOB SERPL: 0.8 {RATIO} (ref 1–2)
ALP LIVER SERPL-CCNC: 77 U/L
ALT SERPL-CCNC: 17 U/L
AMPHET UR QL SCN: NEGATIVE
ANION GAP SERPL CALC-SCNC: 0 MMOL/L (ref 0–18)
AST SERPL-CCNC: 26 U/L (ref 15–37)
BASOPHILS # BLD AUTO: 0.05 X10(3) UL (ref 0–0.2)
BASOPHILS NFR BLD AUTO: 1.1 %
BILIRUB SERPL-MCNC: 0.3 MG/DL (ref 0.1–2)
BILIRUB UR QL STRIP.AUTO: NEGATIVE
BUN BLD-MCNC: 12 MG/DL (ref 7–18)
CALCIUM BLD-MCNC: 9 MG/DL (ref 8.5–10.1)
CANNABINOIDS UR QL SCN: NEGATIVE
CHLORIDE SERPL-SCNC: 104 MMOL/L (ref 98–112)
CO2 SERPL-SCNC: 36 MMOL/L (ref 21–32)
COCAINE UR QL: NEGATIVE
COLOR UR AUTO: YELLOW
CREAT BLD-MCNC: 0.89 MG/DL
CREAT UR-SCNC: 72.5 MG/DL
EOSINOPHIL # BLD AUTO: 0.14 X10(3) UL (ref 0–0.7)
EOSINOPHIL NFR BLD AUTO: 2.9 %
ERYTHROCYTE [DISTWIDTH] IN BLOOD BY AUTOMATED COUNT: 12.9 %
ETHANOL SERPL-MCNC: <3 MG/DL (ref ?–3)
GFR SERPLBLD BASED ON 1.73 SQ M-ARVRAT: 64 ML/MIN/1.73M2 (ref 60–?)
GLOBULIN PLAS-MCNC: 3.9 G/DL (ref 2.8–4.4)
GLUCOSE BLD-MCNC: 88 MG/DL (ref 70–99)
GLUCOSE UR STRIP.AUTO-MCNC: NEGATIVE MG/DL
HCT VFR BLD AUTO: 33.3 %
HGB BLD-MCNC: 10.9 G/DL
HYALINE CASTS #/AREA URNS AUTO: PRESENT /LPF
IMM GRANULOCYTES # BLD AUTO: 0.01 X10(3) UL (ref 0–1)
IMM GRANULOCYTES NFR BLD: 0.2 %
KETONES UR STRIP.AUTO-MCNC: NEGATIVE MG/DL
LYMPHOCYTES # BLD AUTO: 1.28 X10(3) UL (ref 1–4)
LYMPHOCYTES NFR BLD AUTO: 26.9 %
MCH RBC QN AUTO: 29.5 PG (ref 26–34)
MCHC RBC AUTO-ENTMCNC: 32.7 G/DL (ref 31–37)
MCV RBC AUTO: 90.2 FL
MDMA UR QL SCN: NEGATIVE
MONOCYTES # BLD AUTO: 0.45 X10(3) UL (ref 0.1–1)
MONOCYTES NFR BLD AUTO: 9.5 %
NEUTROPHILS # BLD AUTO: 2.83 X10 (3) UL (ref 1.5–7.7)
NEUTROPHILS # BLD AUTO: 2.83 X10(3) UL (ref 1.5–7.7)
NEUTROPHILS NFR BLD AUTO: 59.4 %
NITRITE UR QL STRIP.AUTO: NEGATIVE
OSMOLALITY SERPL CALC.SUM OF ELEC: 289 MOSM/KG (ref 275–295)
OXYCODONE UR QL SCN: NEGATIVE
PH UR STRIP.AUTO: 5 [PH] (ref 5–8)
PLATELET # BLD AUTO: 144 10(3)UL (ref 150–450)
POTASSIUM SERPL-SCNC: 4.4 MMOL/L (ref 3.5–5.1)
PROT SERPL-MCNC: 7 G/DL (ref 6.4–8.2)
PROT UR STRIP.AUTO-MCNC: NEGATIVE MG/DL
Q-T INTERVAL: 396 MS
QRS DURATION: 88 MS
QTC CALCULATION (BEZET): 401 MS
R AXIS: -41 DEGREES
RBC # BLD AUTO: 3.69 X10(6)UL
RBC UR QL AUTO: NEGATIVE
SODIUM SERPL-SCNC: 140 MMOL/L (ref 136–145)
SP GR UR STRIP.AUTO: 1.01 (ref 1–1.03)
T AXIS: -54 DEGREES
UROBILINOGEN UR STRIP.AUTO-MCNC: <2 MG/DL
VENTRICULAR RATE: 62 BPM
WBC # BLD AUTO: 4.8 X10(3) UL (ref 4–11)

## 2023-02-21 PROCEDURE — 70450 CT HEAD/BRAIN W/O DYE: CPT | Performed by: EMERGENCY MEDICINE

## 2023-02-21 PROCEDURE — 87077 CULTURE AEROBIC IDENTIFY: CPT | Performed by: EMERGENCY MEDICINE

## 2023-02-21 PROCEDURE — 73502 X-RAY EXAM HIP UNI 2-3 VIEWS: CPT | Performed by: EMERGENCY MEDICINE

## 2023-02-21 PROCEDURE — 81001 URINALYSIS AUTO W/SCOPE: CPT | Performed by: EMERGENCY MEDICINE

## 2023-02-21 PROCEDURE — 80307 DRUG TEST PRSMV CHEM ANLYZR: CPT | Performed by: EMERGENCY MEDICINE

## 2023-02-21 PROCEDURE — 87086 URINE CULTURE/COLONY COUNT: CPT | Performed by: EMERGENCY MEDICINE

## 2023-02-21 NOTE — ED INITIAL ASSESSMENT (HPI)
PT ARRIVED VIA EMS FROM HOME, CALLED FOR WEAKNESS. PT SLEEPY ON ASSESSMENT, REPORTS DIZZINESS, DENIES FALL. PT DOESN'T THINK FAMILY IS COMING IN. UNSURE OF DATE. DENIES SHORTNESS OF BREATH. WEARING OXYGEN, PINPOINT PUPILS. INCREASE OF PAIN MED POSSIBLE PER EMS. PT UNSURE.

## 2023-02-21 NOTE — ED QUICK NOTES
Pt's daughter called & inquire pt status, updated. Per daughter, pt fell & the washroom & unable to get up the reasong for 911 call. Claimed pt was complaining of R upper leg pain. ERMD made aware.  With new orders

## 2024-08-15 NOTE — PLAN OF CARE
Pt transferred from [facility] to room 7602, pt is alert and oriented x 4, tele monitoring continued, a-fib on the monitor, ventricular paced rhythm noted. Pt with bruised and swollen left side of face, denies any headache at this time.  Pt assisted to bathroom w pulse oximetry

## 2024-08-17 NOTE — ED PROVIDER NOTES
Patient Seen in: [facility] Emergency Department      History   Patient presents with:  Fatigue    Stated Complaint: lethargy    HPI/Subjective:   HPI    This is an 78-year-old female past medical history dementia, A. fib n on Eliquis no significant ABDOMEN: Soft, nontender and nondistended. Normoactive bowel sounds. No rebound. No guarding. EXTREMITIES: Warm with brisk capillary refill.        ED Course     Labs Reviewed   COMP METABOLIC PANEL (14) - Abnormal; Notable for the following components: HISTORY: (As transcribed by Technologist)  Increased weakness. Recently diagnosed with UTI. FINDINGS: No evidence of intracranial hemorrhage or extra-axial fluid collection.  Lucencies in the deep periventricular white matter are likely sequelae of chron cells.  There is complete opacification of the right frontal sinus milder changes in the left frontal sinus, left ethmoid air cells and sphenoid sinus. MASTOIDS:          No sign of acute inflammation.  SKULL:             No evidence for fracture or osseous arthropathy CT 3 through C5-6. No disc herniation or canal stenosis. Multiple level stable mild neural foraminal narrowing. CONCLUSION:  Stable degenerative changes most severe at C5-6. No acute fracture.    Preliminary interpretation was perf [address] [facility]  51.  [phone]    On 8/27/2021            Medications Prescribed:  Current Discharge Medication List Subjective: Patient seen and examined. No new events except as noted.     REVIEW OF SYSTEMS:    CONSTITUTIONAL:= weakness, fevers or chills  EYES/ENT: No visual changes;  No vertigo or throat pain   NECK: No pain or stiffness  RESPIRATORY: No cough, wheezing, hemoptysis; No shortness of breath  CARDIOVASCULAR: No chest pain or palpitations  GASTROINTESTINAL: No abdominal or epigastric pain. No nausea, vomiting, or hematemesis; No diarrhea or constipation. No melena or hematochezia.  GENITOURINARY: No dysuria, frequency or hematuria  NEUROLOGICAL: No numbness or weakness  SKIN: No itching, burning, rashes, or lesions   All other review of systems is negative unless indicated above.    MEDICATIONS:  MEDICATIONS  (STANDING):  atorvastatin 80 milliGRAM(s) Oral at bedtime  dextrose 5%. 1000 milliLiter(s) (50 mL/Hr) IV Continuous <Continuous>  digoxin  Injectable 125 MICROGram(s) IV Push <User Schedule>  levothyroxine 150 MICROGram(s) Oral daily  metoprolol tartrate 25 milliGRAM(s) Oral every 6 hours  midodrine. 30 milliGRAM(s) Oral every 8 hours  pantoprazole  Injectable 40 milliGRAM(s) IV Push two times a day  vancomycin    Solution 500 milliGRAM(s) Oral every 6 hours      PHYSICAL EXAM:  T(C): 36.4 (08-17-24 @ 20:14), Max: 36.5 (08-17-24 @ 12:31)  HR: 63 (08-17-24 @ 20:14) (63 - 100)  BP: 102/63 (08-17-24 @ 20:14) (102/63 - 117/83)  RR: 18 (08-17-24 @ 20:14) (18 - 18)  SpO2: 99% (08-17-24 @ 20:14) (97% - 99%)  Wt(kg): --  I&O's Summary    16 Aug 2024 07:01  -  17 Aug 2024 07:00  --------------------------------------------------------  IN: 230 mL / OUT: 1100 mL / NET: -870 mL    17 Aug 2024 07:01  -  17 Aug 2024 22:10  --------------------------------------------------------  IN: 120 mL / OUT: 275 mL / NET: -155 mL          Appearance: NAD  HEENT:   Dry  oral mucosa, PERRL, EOMI	  Lymphatic: No lymphadenopathy , no edema  Cardiovascular: Irregular  S1 S2, No JVD, No murmurs , Peripheral pulses palpable 2+ bilaterally  Respiratory: decreased bs   Gastrointestinal:  Soft, Non-tender, + BS	+ostomy  Skin: No rashes, No ecchymoses, No cyanosis, warm to touch  Musculoskeletal: decreased range of motion and strength  Psychiatry: sleepy    Ext: No edema          LABS:    CARDIAC MARKERS:                                12.3   10.11 )-----------( 76       ( 17 Aug 2024 06:48 )             38.7     08-17    134<L>  |  94<L>  |  40<H>  ----------------------------<  74  4.0   |  27  |  2.39<H>    Ca    8.7      17 Aug 2024 06:47    TPro  5.6<L>  /  Alb  3.4  /  TBili  1.7<H>  /  DBili  x   /  AST  30  /  ALT  37  /  AlkPhos  110  08-17    proBNP:   Lipid Profile:   HgA1c:   TSH:             TELEMETRY: 	  AF  ECG:  	  RADIOLOGY:   DIAGNOSTIC TESTING:  [ ] Echocardiogram:  [ ]  Catheterization:  [ ] Stress Test:    OTHER:

## 2024-11-18 NOTE — PATIENT INSTRUCTIONS
Monitor INR more frequently with Home health nurse. Follow-up with Coumadin Clinic with results. Follow-up with PCP once antibiotics are completed. · Complete full course of antibiotics. · Over the counter Tylenol needed for pain/discomfort.   · Lebron Goodman The patient was scheduled for a colonoscopy on 12/5/2024. She called and requested to be rescheduled for 12/16/2024, patient choice.

## 2024-12-31 NOTE — PLAN OF CARE
Routine appointments for health maintenance with a primary care provider are very important and emergency department visits are no substitute.  You should review all findings and test results from your visit today with your primary care physician.        We recommended that you take medications as prescribed.        Return to the emergency department for any new or concerning signs/symptoms or failure to improve.   Patient is alert to first and last name, confused/dementia/wants to go home and cries a lot RA, lungs clear, AV pacing, positive bowel sounds. Diarrhea, C diff +- vanco po. Merrem IV for UTI. Up x1 with walker. Lives with family. OK to DC home  FU with pulmonary in 2 weeks. OK to DC  home per hospitalist and cards. RN notified daughter, Yvette Feliz, that ambulance is coming to pick her up in a half an hour and that [facility] is set up. No further questions.       Problem: RESPIRATORY - ADULT  Goal: Achieves optimal ventilation and oxygenation  Description: INTERVENTIONS:  - Assess for changes in respiratory status  - Assess for changes in mentation and behavior  - Position to facilitate oxygenation and minimize respiratory effort  - Oxygen supplementation based on oxygen saturation or ABGs  - Provide Smoking Cessation handout, if applicable  - Encourage broncho-pulmonary hygiene including cough, deep breathe, Incentive Spirometry  - Assess the need for suctioning and perform as needed  - Assess and instruct to report SOB or any respiratory difficulty  - Respiratory Therapy support as indicated  - Manage/alleviate anxiety  - Monitor for signs/symptoms of CO2 retention  Outcome: Completed negative

## 2025-07-14 NOTE — PROGRESS NOTES
[facility] [place] Periop Services  General Surgery Department  Operative Note    SUMMARY     Date of Procedure: 7/14/2025    Procedure: Procedure(s) (LRB):  COLONOSCOPY, WITH POLYPECTOMY USING SNARE: 21306 (CPT®)       Surgeon(s) and Role:  Donita Ferrer MD     Pre-Operative Diagnosis: Pre-Op Diagnosis Codes:      * Positive colorectal cancer screening using Cologuard test [R19.5]    Post-Operative Diagnosis:   Cecal polyp        Anesthesia: Local MAC      Findings:  -Large, multilobulated polyp in cecum near mouth of terminal ileum s/p multiple hot snare biopsies  -There was normal mucosa with normal submucosal venous pattern.    -No vascular lesions were identified.    -No major diverticular disease was encountered.       Indications for the Procedure:  56yo male with no family history of colorectal cancer who presents for diagnostic colonoscopy for positive cologuard  .    Operative Conduct in Detail:   The risks, benefits, and alternatives were thoroughly discussed with the patient. Despite the risks, the patient wished to proceed. Informed consent was obtained and it will scanned to the electronic chart.      The patient was placed on left lateral decubitus. After achieving moderate sedation, a digital rectal examination was performed; subsequently, a standard colonoscope was introduced through the anus and advanced without difficulty up to the cecum where terminal ileum and appendiceal orifice were visualized The scope was withdrawn slowly while the mucosa was carefully examined. The following findings were seen:    Bowel Preparation: good  Rectal exam was normal with good rectal tone and no masses or blood detected in the rectal vault.   -Large, multilobulated polyp in cecum near mouth of terminal ileum s/p multiple hot snare biopsies  -There was normal mucosa with normal submucosal venous pattern.    -No vascular lesions were identified.    -No major diverticular disease was encountered.  Patient more confused and hallucinating; narcotics d/c'd, ultram ordered only for severe pain otherwise just tylenol for pain, U/A ordered as well.    and daughter updated via telephone.       03/21/18 4072   Provider Notification   Reason for Commun     Withdrawal time >6 minutes (if no biopsies/polypectomies obtained)      Complications: No    Estimated Blood Loss (EBL): Minimal             Specimens:   ID Type Source Tests Collected by Time Destination   1 : polyp at 0803 Tissue Large intestine, Cecum SPECIMEN TO PATHOLOGY Donita Ferrer MD 7/14/2025 0803                   Condition: Good    Disposition: PACU - hemodynamically stable.    Recommendation:    Follow up pathology;   Recommend ileocecectomy     Donita Ferrer MD  General Surgery   [phone]

## (undated) DEVICE — TOTAL HIP CDS: Brand: MEDLINE INDUSTRIES, INC.

## (undated) DEVICE — SUTURE ETHIBOND 1 OS-6

## (undated) DEVICE — SUTURE ETHIBOND 5 V-37

## (undated) DEVICE — STERILE POLYISOPRENE POWDER-FREE SURGICAL GLOVES: Brand: PROTEXIS

## (undated) DEVICE — SUTURE VICRYL 0 CP-1

## (undated) DEVICE — PAD SACRAL SPAN AID

## (undated) DEVICE — 1010 S-DRAPE TOWEL DRAPE 10/BX: Brand: STERI-DRAPE™

## (undated) DEVICE — KENDALL SCD EXPRESS SLEEVES, KNEE LENGTH, MEDIUM: Brand: KENDALL SCD

## (undated) DEVICE — STOCK ORTH TUB 72X8IN NLTX

## (undated) DEVICE — CHLORAPREP ORANGE TINT 10.5ML

## (undated) DEVICE — DRESSING AQUACEL AG 3.5 X 10

## (undated) DEVICE — SOL  .9 3000ML

## (undated) DEVICE — Device: Brand: POWER-FLO®

## (undated) DEVICE — 3M™ STERI-DRAPE™ U-DRAPE 1015: Brand: STERI-DRAPE™

## (undated) DEVICE — 2C14 #2 PDO 45 X 45: Brand: 2C14 #2 PDO 45 X 45

## (undated) DEVICE — SUTURE VICRYL 2-0 CP-1

## (undated) DEVICE — SOL  .9 1000ML BTL

## (undated) DEVICE — Device: Brand: STABLECUT®

## (undated) DEVICE — REM POLYHESIVE ADULT PATIENT RETURN ELECTRODE: Brand: VALLEYLAB

## (undated) NOTE — IP AVS SNAPSHOT
[address]            (For Outpatient Use Only) Initial Admit Date: 7/31/2019   Inpt/Obs Admit Date: Inpt: 7/31/19 / Obs: N/A   Discharge Date:    Chris Harper:  [de-identified]   MRN: [de-identified]   CSN: [id number]   CEID: [id number] Hospital Account Financial Class: Medicare    August 6, 2019

## (undated) NOTE — ED AVS SNAPSHOT
[facility] Emergency Department    [place]  [address] [place] [address]    Phone:  [phone]    Fax:  [phone]           Niall Box   MRN: [id number]    Department:  [facility] Emergency Department   Date of Visit:  2/20/2 Call Coumadin clinic tomorrow to see how to adjust her dose. We are subtherapeutic here. Return if headaches, vomiting, chest pain, new complaints.   Wash abrasions with soap and water    Discharge References/Attachments     NECK SPRAIN OR STRAIN ([place] [facility] Emergency Department. Follow-up care is at the discretion of that Physician. IF THERE IS ANY CHANGE OR WORSENING OF YOUR CONDITION, CALL YOUR PRIMARY CARE PHYSICIAN AT ONCE OR RETURN IMMEDIATELY TO THE EMERGENCY DEPARTMENT.     If you hav harming yourself, contact [facility] at [phone]. - If you don’t have insurance, [facility]ori Love has partnered with Patient [address] to help you get signed up for insurance coverage.   Patient Pearl CONCLUSION:  No fracture. No significant change from 2/14/14.            Dictated by: Tiffanie Vanegas MD on 2/20/2017 at 20:03       Approved by: Tiffanie Vanegas MD              Narrative:    PROCEDURE:  CT OF THE CERVICAL SPINE WITHOUT CONTRAST     COMPARISON:

## (undated) NOTE — IP AVS SNAPSHOT
[address]            (For Outpatient Use Only) Initial Admit Date: 7/14/2020   Inpt/Obs Admit Date: Inpt: 7/14/20 / Obs: N/A   Discharge Date:    Julieta Madrigal:  [de-identified]   MRN: [de-identified]   CSN: [id number]   CEID: [id number] Subscriber Name:  Dominic Ledezma :    Subscriber ID:  Pt Rel to Subscriber:    Hospital Account Financial Class: Medicare    2020

## (undated) NOTE — IP AVS SNAPSHOT
Patient Demographics     Address  [address] [place] [address] Phone  [phone] [facility]  [phone] [facility] E-mail Address  [email or web address]      Emergency Contact(s)     Name Relation Home Work Cosme Romero Daughter   [phone] Albuterol Sulfate  (90 Base) MCG/ACT Aers  Commonly known as:  PROAIR HFA      Inhale 2 puffs into the lungs every 6 (six) hours as needed.    Wilton Linares MD         AmLODIPine Besylate 2.5 MG Tabs  Commonly known as:  NORVASC  Start taking on:  3/ JHONATHAN Ayala         Metoprolol Succinate ER 50 MG Tb24  Commonly known as: Toprol XL      Take 1 tablet (50 mg total) by mouth 2x Daily(Beta Blocker).    JHONATHAN FARFAN         Pantoprazole Sodium 40 MG Tbec  Commonly known as:  PROTONIX [id number] aspirin EC tab 81 mg 03/23/18 1520 Given      [id number] atorvastatin (LIPITOR) tab 40 mg 03/22/18 2033 Given      [id number] diphenhydrAMINE (BENADRYL) 12.5 MG/5ML oral liquid 12.5 mg 03/23/18 1521 Given      [id number] levETIRAcetam (KEPPRA) tab Component Value Reference Range Flag Lab   Potassium 3.8 3.6 - 5.1 mmol/L [facility]            HEMOGLOBIN [[id number]] (Abnormal)  Resulted: 03/22/18 1830, Result status: Final result   Ordering provider:  Angelica Fernandez MD  03/22/18 1156 Resulting lab: Past Medical History:   Diagnosis Date   • Anemia 2013    normal coloncopy, egd, capulse endoscopy   • Arrhythmia     A-Fib diagnosed 2005   • Asthma    • ATRIAL FIBRILLATION     s/p pacemaker 2005. on coumadin.    • Calculus of kidney    • CONGESTIVE HEART No date: OTHER SURGICAL HISTORY      Comment: BLADDER LIFT  9/2005: PACEMAKER      Comment: Medtronic pacemaker     ALL:    Codeine                 Hallucinations  Bees                    Anaphylaxis  Carbapenems               Cephalosporins            Cho Extremities: Extremities normal, atraumatic, no cyanosis or edema.    Skin: Ecchymosis on right thorax, hip   Neurologic: Moving, all four extremities     Data Review:    LABS:     Lab Results  Component Value Date   WBC 12.5 03/20/2018   HGB 10.0 03/20/201 CONCLUSION: 1. No acute intracranial findings 2. Cerebral atrophy with small vessel changes. 3.  Near complete opacification of the left maxillary sinus with portions with increased density suggestive of inspissated material versus fungal infection.  4.  Sc C7.  Multiplanar reconstructions are generated. Dose reduction techniques were used. Dose information is transmitted to the ACR [facility] [employer] of Radiology) NRDR ([address]) which includes the Dose Index Registry.   PATIENT STATED Result Date: 3/19/2018  PROCEDURE:  CT ABDOMEN+PELVIS (CPT=74176)  COMPARISON:  [facility] , CT CHEST (KVT=36211), 12/16/2013, 13:47. [facility] , CT SPINE CERVICAL (CPT=72125), 3/19/2018, 20:29. [facility] , CT BRAIN OR HEAD (96074), 3/19/2018, 20:27.   INDICATIONS: CONCLUSION:  1. There are 2 areas of hyperdensity noted within the right psoas muscle likely representing intramuscular hematomas.   Additionally there is a small amount of stranding noted within the right retroperitoneum extending into the right pelvis wh TECHNIQUE:  Unilateral 2 to 3 views of the hip and pelvis if performed. COMPARISON:  [facility] , XR PELVIS (1 VIEW) (CPT=72170), 7/26/2017, 9:53. [facility] , CT ABD(C/S)/PELVIS(C) (SET), 1/04/2007, 11:12.   INDICATIONS:  fall  PATIENT STATED HISTORY: (As transc discharge, patient will require TBD.[NB.1]      Electronically signed by Deidre Ardon MD on 3/20/2018  5:36 PM   Attribution Johnson    NB. 1 - Deidre Ardon MD on 3/20/2018  5:27 PM                        Consults - MD Consult Notes      Consults signed b bleed.  INR was therapeutic on presentation and has since been reversed. She has some baseline confusion and memory limitations. She denies CP. No SOB. No edema.       Past Medical History:   HISTORY:[EP.1]  Past Medical History:   Diagnosis Date   • An No date: OTHER SURGICAL HISTORY      Comment: pacemaker  No date: OTHER SURGICAL HISTORY      Comment: L SINUS TISSUE REMOVED  No date: OTHER SURGICAL HISTORY      Comment: CAROTIDENDARTERECTOMY, RIGHT  No date: OTHER SURGICAL HISTORY      Comment: BLADDER PANTOPRAZOLE SODIUM 40 MG Oral Tab EC TAKE 1 TABLET(40 MG) BY MOUTH EVERY MORNING BEFORE BREAKFAST Disp: 30 tablet Rfl: 3   FERROUS SULFATE 325 (65 Fe) MG Oral Tab TAKE 1 TABLET BY MOUTH DAILY WITH BREAKFAST.  Disp: 30 tablet Rfl: 5   PROPRANOLOL HCL  Warfarin Sodium 5 MG Oral Tab Restart on 11/30/17. Take 1/2 to 1 tablet daily as directed by [facility].  Disp: 120 tablet Rfl: 2   [DISCONTINUED] Pantoprazole Sodium (PROTONIX) 40 MG Oral Tab EC Take 1 tablet (40 mg total) by mouth every mo Lungs: Clear to ascultation bilaterally. No focal rales, rhonchi, or wheezes. Good air movement is noted throughout all lung fields. Abdomen: Soft. Non-distended. Non-tender. Bowel sounds are present and normoactive. No guarding or rebound.    Extrem -Pacing rate elevated at 80  8. Sz disorder  9. Dementia  10. PVD   -S/P RCEA  11. COPD    Plan:  1. Anticoagulant reversed   -Situation discussed with  at bedside.   While anticoagulant is reversed and not anticoagulated, pt is at risk for thr Inability to ambulate due to hip    Retroperitoneal hematoma    Retroperitoneal hemorrhage      Past Medical History  Past Medical History:   Diagnosis Date   • Anemia 2013    normal coloncopy, egd, capulse endoscopy   • Arrhythmia     A-Fib diagnosed 20 No date: OTHER SURGICAL HISTORY      Comment: L SINUS TISSUE REMOVED  No date: OTHER SURGICAL HISTORY      Comment: CAROTIDENDARTERECTOMY, RIGHT  No date: OTHER SURGICAL HISTORY      Comment: BLADDER LIFT  9/2005: PACEMAKER      Comment: Medtronic pacemake Skilled Therapy Provided: Pt lying in bed, asleep, and easy to arouse. Pt agreed to PT. Pt Mod I with bed mobility, but upon sitting, pt with R trunk lean and weakness.  Pt instructed to scoot to EOB and posture improved with but with increased lean in the Goal #1 Patient is able to demonstrate supine - sit EOB @ level: supervision      Goal #2 Patient is able to demonstrate transfers Sit to/from Stand at assistance level: minimum assistance      Goal #3 Patient is able to ambulate 100 feet with assist devic evidence of hemorrhage, mass, midline shift, or extra-axial fluid collection. The visualized paranasal sinuses show near complete opacification the left maxillary sinus. .  Scalp hematoma near the vertex. No evidence of depressed skull fracture. • Peripheral vascular disease (HCC)    • Peripheral vascular disease, unspecified ([facility]    • Pneumonia, organism unspecified(486)    • PONV (postoperative nausea and vomiting)    • [facility] embolism ([facility]    • Seizure disorder ([facility]    • Unspecified essen · Motor Planning: impaired  · Safety Judgement:  decreased awareness of need for assistance and decreased awareness of need for safety  · Awareness of Deficits:  decreased awareness of deficits    RANGE OF MOTION AND STRENGTH ASSESSMENT  Upper extremity RO Assistive Device: Rolling walker  Pattern: Shuffle; Ataxic  Stoop/Curb Assistance: Not tested       Skilled Therapy Provided: Pt presents in supine. Poor historian.  Supine to sit with mod assist. Sit to stand with cues for hand placement on rw and min easton level of function.   DISCHARGE RECOMMENDATIONS  PT Discharge Recommendations: Sub-acute rehabilitation (ELOS 12 to 14 days)    PLAN  PT Treatment Plan: Bed mobility;Gait training;Strengthening;Stair training;Transfer training;Balance training  Rehab Potenti hemorrhage and CHI.  Pt is s/p fall in 11/17 resulting in  R frontal SAH[CC.2]    Problem List  Principal Problem:    Closed head injury, initial encounter  Active Problems:     At risk for falling    Closed head injury    Contusion of right hip, initial en 10/28/2013: COLONOSCOPY      Comment: Procedure: COLONOSCOPY;  Surgeon: Tony Riley MD;  Location: [address] [place] [address] ENDOSCOPY  No date: HYSTERECTOMY  No date: OPEN HEART SURGERY (PBP)      Comment: 2002 CABG x 2  No date: [address] [place] [address] needed during task. Ttime spent on psychosocial discussion regarding current vs Previous level of function and how to adapt activities based on daily tolerance. [CC.2]      Patient End of Session: In bed;Needs met;Call light within reach;RN aware of session Functional Transfer Goals--progressing  3/22   Patient will transfer from sit to supine:  with modified independent  Patient will transfer from supine to sit:  with modified independent  Patient will transfer to toilet:  with modified independent  Patient ischemic changes. There is no   evidence of hemorrhage, mass, midline shift, or extra-axial fluid collection.      The visualized paranasal sinuses show near complete opacification the left maxillary sinus. .  Scalp hematoma near the vertex.  No evidence of • PERIPHERAL VASCULAR DISEASE    • Peripheral vascular disease (HCC)    • Peripheral vascular disease, unspecified (HCC)    • Pneumonia, organism unspecified(486)    • PONV (postoperative nausea and vomiting)    • Pulmonary embolism (HCC)    • Seizure diso \"I bought grab bars multiple times but I returned them because no one has put them up. \" ( agreeable to put them up this time. )[AO.2]    Patient self-stated goal is[AO.1] to go home[AO.2]     OBJECTIVE  Precautions: Seizure;Bed/chair alarm  Fall Ri -   Toileting, which includes using toilet, bedpan or urinal? : A Lot  -   Putting on and taking off regular upper body clothing?: A Little  -   Taking care of personal grooming such as brushing teeth?: A Little  -   Eating meals?: A Little    AM-PAC Score 1983), 90% of normal scores 6 points or less.  Scores of 7 or higher would indicate a need for further evaluation to rule out a dementing disorder, such as Alzheimer’s disease[AO.2]     In this OT evaluation patient presents with the following performance d Number of Visits to Meet Established Goals: 7    ADL Goals[AO.1]   Patient will perform grooming: with modified independent  Patient will perform lower body dressing:  with modified independent  Patient will perform toileting: with modified independent[AO.

## (undated) NOTE — ED AVS SNAPSHOT
Sheri García   MRN: [id number]    Department:  [facility] Emergency Department   Date of Visit:  8/6/2019           Disclosure     Insurance plans vary and the physician(s) referred by the ER may not be covered by your plan.  Please contact your i tell this physician (or your personal doctor if your instructions are to return to your personal doctor) about any new or lasting problems. The primary care or specialist physician will see patients referred from the [facility] Emergency Department.  Ion Mckeon

## (undated) NOTE — ED AVS SNAPSHOT
[facility] Emergency Department    [place]  [address] [place] [address]    Phone:  [phone]    Fax:  [phone]           Jo Ann Magaña   MRN: [id number]    Department:  [facility] Emergency Department   Date of Visit:  2/20/2 IF THERE IS ANY CHANGE OR WORSENING OF YOUR CONDITION, CALL YOUR PRIMARY CARE PHYSICIAN AT ONCE OR RETURN IMMEDIATELY TO THE EMERGENCY DEPARTMENT.     If you have been prescribed any medication(s), please fill your prescription right away and begin taking t

## (undated) NOTE — LETTER
Consent to Procedure/Sedation    Date: __________________    Time: _______________    1. I authorize the performance upon Susie Brownele the followin.  I authorize DrSally _________________________ (and whomever is designated as the doctor’s assistant ___________________________    ___________________    Witness: ____________________     Date: ______________    Printed: 2017   5:25 PM    Patient Name: Gabriela Lezama        : 8/10/1940       Medical Record #: [id number]

## (undated) NOTE — LETTER
[facility] [address], [address] [place] [address]    Consent for Anesthesia   1.   I, Olive Duncan agree to be cared for by a physician anesthesiologist alone and/or with a nurse anesthetist, who is specially trained to monitor me and give me me allergic reactions to medications, injury to my airway, heart, lungs, vision, nerves, or muscles and in extremely rare instances death. 5. My doctor has explained to me other choices available to me for my care (alternatives).   6. Pregnant Patients (“epid Printed: 8/1/2019 at 3:51 PM    Medical Record #: [id number]                                            Page 1 of 1

## (undated) NOTE — LETTER
Quin Wallace [address]  [place], [address] [place] [address]    Consent for Operation  Date: __________________                                Time: _______________    1.  I authorize the performance upon Lilly Gibson the following operation:  Procedure(s procedure has been videotaped, the surgeon will obtain the original videotape. The hospital will not be responsible for storage or maintenance of this tape.   7. For the purpose of advancing medical education, I consent to the admittance of observers to the STATEMENTS REQUIRING INSERTION OR COMPLETION WERE FILLED IN.     Signature of Patient:   ___________________________    When the patient is a minor or mentally incompetent to give consent:  Signature of person authorized to consent for patient: ____________ drugs/illegal medications). Failure to inform my anesthesiologist about these medicines may increase my risk of anesthetic complications. iv. If I am allergic to anything or have had a reaction to anesthesia before.   3. I understand how the anesthesia med I have discussed the procedure and information above with the patient (or patient’s representative) and answered their questions. The patient or their representative has agreed to have anesthesia services.     _______________________________________________

## (undated) NOTE — ED AVS SNAPSHOT
Makayla Ella   MRN: [id number]    Department:  [facility] Emergency Department   Date of Visit:  8/26/2017           Disclosure     Insurance plans vary and the physician(s) referred by the ER may not be covered by your plan.  Please contact your If you have been prescribed any medication(s), please fill your prescription right away and begin taking the medication(s) as directed    If the emergency physician has read X-rays, these will be re-interpreted by a radiologist.  If there is a significant

## (undated) NOTE — IP AVS SNAPSHOT
[address]            (For Outpatient Use Only) Initial Admit Date: 8/27/2021   Inpt/Obs Admit Date: Inpt: 8/27/21 / Obs: 08/28/21   Discharge Date:    Pipo Hernandez:  [de-identified]   MRN: [de-identified]   CSN: [id number]   CEID: [id number] Number:  Insurance Type:    Subscriber Name:  Subscriber :    Subscriber ID:  Pt Rel to Subscriber:    Hospital Account Financial Class: Medicare    2021

## (undated) NOTE — IP AVS SNAPSHOT
[address]            (For Outpatient Use Only) Initial Admit Date: 2022   Inpt/Obs Admit Date: Inpt: 22 / Obs: N/A   Discharge Date:    Ny Shepherd:  [de-identified]   MRN: [de-identified]   CSN: [id number]   CEID: [id number]        ENCOUNTER  Patient Class: Inpatient Admitting Provider: Jarred Galindo MD Unit: [facility] 4NW-A   Hospital Service: Medical Attending Provider: Nettie Levin MD   Bed: 408-A   Visit Type:   Referring Physician: No ref. provider found Billing Flag:    Admit Diagnosis: Injury of head, initial encounter [S09.90XA]      PATIENT  Legal Name:   Toño Light   Legal Sex: Female  Gender ID:              [address] [facility] Name:    PCP:  Tucker Arizmendi MD Home: [phone]   Address:  [address] [place] [address] : 8/10/1940 (81 yrs) Mobile: [phone]         City/State/Zip: [facility] [address] Marital:  Language: Ksenia Juniper: Johnie SSN4: [id number] Baptism: [address] Not L*     Race: White Ethnicity: Non  Or [facility]   Name Relationship Legal Guardian? Home Phone Work Phone Mobile Phone   1. Adriana Keita  2.  Keo Fajardo Daughter  Son    [phone]     GUARANTOR  Guarantor: Evon Atwood : 8/10/1940 Home Phone: [phone]   Address: [address] [place] [address]  Sex: Female Work Phone:    City/State/Zip: [place] [address] [place] [address] Rd   Rel. to Patient: Self Guarantor ID: [id number]   [address]   Employer:  Status: RETIRED     COVERAGE  PRIMARY INSURANCE   Payor: MEDICARE Plan: MEDICARE PART A&B   Group Number:  Insurance Type: INDEMNITY   Subscriber Name: Zain Feliciano : 08/10/1940   Subscriber ID: [id number] Pt Rel to Subscriber: Self   SECONDARY INSURANCE   Payor:  Plan:    Group Number:  Insurance Type:    Subscriber Name:  Subscriber :    Subscriber ID:  Pt Rel to Subscriber:    TERTIARY INSURANCE   Payor:  Plan:    Group Number:  Insurance Type:    Subscriber Name:  Last Cabral :    Subscriber ID:  Pt Rel to Subscriber:    Hospital Account Financial Class: Medicare    2022

## (undated) NOTE — IP AVS SNAPSHOT
Patient Demographics     Address  [address] [place] [address] Phone  [phone] [facility])  [phone] (Mobile) *Preferred* E-mail Address  [email or web address]      Emergency Contact(s)     Name Relation Home Work Cosme Romero Daughter [phone] butalbital-acetaminophen-caffeine -40 MG Tabs  Commonly known as: FIORICET      TAKE 1 TABLET BY MOUTH DAILY AS NEEDED FOR HEADACHE   Kendall Cobos MD   [    ]   [    ]   [    ]   [    ]     carvedilol 25 MG Tabs  Commonly known as: Marget Babinski [    ]   [    ]   [    ]     ipratropium-albuterol 0.5-2.5 (3) MG/3ML Soln  Commonly known as: DUONEB      Take 3 mL by nebulization every 4 (four) hours as needed.  Dx code: J44.9 please run under Medicare Part Richi Wilson MD   [    ] EVERY 6 HOURS AS NEEDED FOR WHEEZING OR SHORTNESS OF BREATH   JHONATHAN Watters   [    ]   [    ]   [    ]   [    ]           Where to Get Your Medications      These medications were sent to [place] [address] #[id number] [address] 11/17/21 2033 Given      [id number] levETIRAcetam (KEPPRA) tab 250 mg 11/18/21 0900 Given      [id number] lisinopril tab 20 mg 11/17/21 2032 Given      [id number] lisinopril tab 20 mg 11/18/21 0900 Given      [id number] pantoprazole (PROTONIX) EC tab 20 mg 11/ With Differential With Platelet.   Procedure                               Abnormality         Status                     ---------                               -----------         ------                     CBC W/ DIFFERENTIAL[[id number]]          Abnormal negative bacilli isolated.       Urine Culture, Routine [[id number]]  (Abnormal)  (Susceptibility) Collected: 11/13/21 9069    Order Status: Completed Lab Status: Final result Updated: 11/16/21 0609    Specimen: Urine, clean catch      Urine Culture >100,000 Status: Signed    : Iona Guillen DO (Physician)       [facility] Hospitalist Team  History and Physical       Assessment/Plan:     #N/V/D  -seems improved currently  -ivf given, zofran prn  -stool culture, cdiff if diarrhea  -CLD advance as tolerated frequently 07/14/2020   • GERD     EGD 2010   • Hearing impairment     no hearing aids   • HYPERLIPIDEMIA    • HYPERTENSION    • Insomnia, unspecified    • MCI (mild cognitive impairment) 2013    stable on Aricept   • Neuropathy 1/31/2018   • Occlusion and states she can have 2%             milk.   Pcn [Bicillin L-A]          Comment:SHORTNESS OF BREATH  Pollen                    Quinolones                Shellfish               ANAPHYLAXIS  Shellfish-Derived P*    OTHER (SEE COMMENTS)     No current outpatie normal    Throat: Lips normal   Neck: Supple, symmetrical, trachea midline   Lungs:   Clear to auscultation bilaterally. Normal effort   Chest wall:  No tenderness or deformity   Heart:  IRIR   Abdomen:   Soft, non-tender.  Bowel sounds normal. No masses, 11/13/2021  PROCEDURE:  CT ABDOMEN PELVIS IV CONTRAST, NO ORAL (ER)  COMPARISON:  [facility] , CT, CT ANGIOGRAPHY, CHEST (CPT=71275), 8/29/2021, 3:25 PM.  [facility] , CT, CT ABDOMEN+PELVIS(CPT=74176), 3/19/2018, 10:05 PM.  INDICATIONS:  vomiting  TECHNIQUE:  CT s Diverticulosis of the sigmoid colon without ends of diverticulitis. The visualized small bowel is unremarkable. ABDOMINAL WALL:  No mass or hernia. URINARY BLADDER:  No visible focal wall thickening, lesion, or calculus.   Small amount a gas within the ur TBD.      Electronically signed by Tricia Gupta DO on 11/14/2021  9:02 AM              Consults - MD Consult Notes      Consults signed by Portia Masterson MD at 11/14/2021  7:49 AM     Author: Portia Masterson MD Service: Cardiology Author Ty Comment:SEVERE RECTAL BLEEDING  [employer]               OTHER (SEE COMMENTS)  Eggs Or Egg-Derived*    OTHER (SEE COMMENTS)  Iodine (Topical)        ANAPHYLAXIS    Comment:Severe vomiting  Milk                    OTHER (SEE COMMENTS)    Comment:Difficul No rub or gallop. No murmur. Lungs: CTA  Abdomen: Soft, non-tender. Extremities: No edema  Neurologic: no focal deficits  Skin: Warm and dry.           Telemetry: fib    Laboratories and Data:  Diagnostics:    EKG, 11/14/2021:  Afib, NSSTTW changes home for N&V, noted to be in Afib with RVR.   Pt diagnosed with UTI.       Therapy significant co-morbidities:  PMH: dementia, asthma, COPD, HTN, fall with R hip fracture July 2019, PAF, CHD, seizure disorder       Admit   8/27-8/29: re-admit for poor appet I'm in any pain or not\"    OBJECTIVE  Precautions: Bed/chair alarm    WEIGHT BEARING RESTRICTION  Weight Bearing Restriction: None                PAIN ASSESSMENT   Rating: Unable to rate          BALANCE addressed; Alarm set       Therapist PPE: surgical mask, goggles and gloves during session    Patient/Family PPE: Mask worn in asencio                       Occupational Therapy Notes (last 72 hours)      Occupational Therapy Note signed by Leatha Tenorio, LAD and SVG to OM     • Coronary atherosclerosis    • Dementia without behavioral disturbance ([facility] [address] 12/11/2020   • DEPRESSION    • Extrinsic asthma, unspecified    • Falls frequently 07/14/2020   • GERD     EGD 2010   • Hearing impairment     no hearing ai her son and daughter in law live on the second floor. Patient has assistance from her dtr for bathing, dressing leaks, medications.     SUBJECTIVE   Initially upset with having to wait for assistance to use the toilet    Patient self-stated goal is to get body clothing?: A Little  -   Bathing (including washing, rinsing, drying)?: A Little  -   Toileting, which includes using toilet, bedpan or urinal? : A Little  -   Putting on and taking off regular upper body clothing?: A Little  -   Taking care of person insight and safety , decreased memory, decreased problem solving. These deficits impact the patient’s ability to participate in ADLs, instrumental activities of daily living, rest and sleep, work, leisure and social participation.      The patient is functi perform lower body dressing:  with setup and with supervision  Patient will perform toileting: with supervision    Functional Transfer Goals  Patient will transfer to toilet:  with supervision    UE Exercise Program Goal  Patient will be supervision with santa

## (undated) NOTE — IP AVS SNAPSHOT
Patient Demographics     Address Phone E-mail Address    [address] [phone] [facility])  [phone] [facility]) [email or web address]      Emergency Contact(s)     Name Relation Home Work [facility] [address] [facility] Acidophilus/Pectin Caps   Commonly known as:  PROBIOTIC        Take 1 capsule by mouth daily.                             ADVAIR DISKUS 250-50 MCG/DOSE Aepb   Generic drug:  fluticasone-salmeterol        Inhale 1 puff into the lungs every 12 (twelve) hours gabapentin 600 MG Tabs   Last time this was given:  600 mg on 5/27/2017 10:16 AM   Commonly known as:  NEURONTIN        Take 1 tablet (600 mg total) by mouth 3 (three) times daily.     Maisha Dimas                                 levETIRAcetam 500 MG Last time this was given:  25 mg on 5/27/2017  9:00 AM   Commonly known as: Topamax        Take 25 mg by mouth 2 (two) times daily.                                TraZODone HCl 50 MG Tabs   Last time this was given:  50 mg on 5/26/2017  9:22 PM   Commonly [id number] gabapentin (NEURONTIN) tab 600 mg 05/26/17 2122 Given      [id number] gabapentin (NEURONTIN) tab 600 mg 05/27/17 1016 Given      [id number] levETIRAcetam (KEPPRA) tab 500 mg 05/26/17 2122 Given      [id number] levETIRAcetam (KEPPRA) tab 500 mg 05/2 Corrected Calcium Formula:      ((4.0 - Albumin) x 0.8 + Calcium    Note: Calculation is only valid when Albumin is less than 4.0g/dL.      Sodium 143 136-144 mmol/L  [facility]   Potassium 3.7 3.6-5.1 mmol/L  [facility]   Chloride 114 101-111 mmol/L H [facility] Procedure Component Value Units Date/Time    Clostridium difficile(toxigenic)PCR Once [[id number]]  (Abnormal) Collected:  05/25/17 0915    Order Status:  Completed Lab Status:  Final result Updated:  05/25/17 1151    Specimen Information:  Stool from [facility] right CEA 2000   • Insomnia, unspecified    • ATRIAL FIBRILLATION      s/p pacemaker 2005. on coumadin. • DEPRESSION    • HYPERLIPIDEMIA    • HYPERTENSION    • GERD      EGD 2010   • CORONARY ARTERY DISEASE      .  CABG 4/28/13 due to [place] disease wit Milk                    Other (See Comments)    Comment:Difficulty breathing. Pt states she can have 2%             milk.   Pcn [Bicillin L-A]          Comment:SHORTNESS OF BREATH  Pollen                    Quinolones                Shellfish Neurologic: Normal strength, no focal deficit appreciated     Data Review:    LABS:     Lab Results  Component Value Date   WBC 11.0 05/24/2017   HGB 11.3 05/24/2017   HCT 34.2 05/24/2017   .0 05/24/2017   CREATSERUM 0.94 05/24/2017   BUN 8 05/24/20 Minerva Hughes MD on 5/25/2017 at 7:49     Approved by: Kandy Toledo MD            Xr Shoulder, Complete (min 2 Views), Left (cpt=73030)    5/25/2017  PROCEDURE:  XR SHOULDER, COMPLETE (MIN 2 VIEWS), LEFT (CPT=73030)     TECHNIQUE:  Multiple views were obtained 5/24/2017  CONCLUSION:  No acute intracranial pathology.    Dictated by: Sammie Anderson MD on 5/24/2017 at 23:39     Approved by: Sammie Anderson MD            Ct Spine Cervical (cpt=72125)    5/24/2017  PROCEDURE:  CT OF THE CERVICAL SPINE WITHOUT CONTRAST  C Surgical clips in the right upper quadrant. Normal heart size and pulmonary vascularity. No pleural effusion or pneumothorax. No lobar consolidation. 5/25/2017  CONCLUSION:  No active cardiopulmonary process identified.     Dictated by: Juan Jose Lucero, Primary Diagnosis at discharge from Hospital: Other: Generalized weakness, UTI, Acute C difficile colitis; still recommend for TCM follow-up    Please note that only IHP DMG and EMG patients enrolled in the Medicare ACO, [facility] ACO and [address] [place] [address] will be hand (CPT=72110), 4/25/2017, 18:59. INDICATIONS:  GENERALIZED WEAKNESS  PATIENT STATED HISTORY: (As transcribed by Technologist)  Patient offered no additional information at this time. FINDINGS:  No acute fracture or subluxation.  Mild facet arthropathy in None.  INDICATIONS:  GENERALIZED WEAKNESS  PATIENT STATED HISTORY: (As transcribed by Technologist)  Patient offered no additional information at this time. FINDINGS:  No evidence of acute displaced fracture or dislocation. Normal mineralization.  Matthew Buchanan weakness, nausea, vomiting, diarrhea, right facial droop today. Patient fell today, unknown head injury, unknown loss of consciousness. FINDINGS:   VENTRICLES/SULCI:  Stable symmetric prominence of ventricular system and cortical sulci.   INTRACRANIAL: vacuum phenomenon in the superior endplate of C6. Stable multilevel facet hypertrophy and foraminal narrowing, previously reported in detail on 2/14/14. Stable atherosclerotic calcifications. No mass. Stable biapical pleural-parenchymal scarring.       5/24 Take 1 to 1&1/2 tablet(s) daily as directed by [facility].    lisinopril 10 MG Oral Tab  Take 1 tablet (10 mg total) by mouth daily.     Metoprolol Succinate ER 50 MG Oral Tablet 24 Hr  Take 1 tablet (50 mg total) by mouth 2x Daily([facility] Physical Therapy Notes (last 72 hours) (Notes from 5/24/2017 11:29 AM through 5/27/2017 11:29 AM)      Physical Therapy Note by rAlene White PT at 5/26/2017 10:12 AM  Version 1 of 1    Author:  Arlene White PT Service:  (none) Author Type:  Levi Burns • CONGESTIVE HEART FAILURE    • PERIPHERAL VASCULAR DISEASE    • Pneumonia, organism unspecified(486)    • Anemia 2013     normal coloncopy, egd, capulse endoscopy   • Visual impairment    • Depression    • Arrhythmia      A-Fib diagnosed 2005   • Hearing · Overall Cognitive Status:  WFL - within functional limits    RANGE OF MOTION AND STRENGTH ASSESSMENT  Upper extremity ROM and strength are within functional limits     Lower extremity ROM is within functional limits    Lower extremity strength is within Patient End of Session: Up in chair; All patient questions and concerns addressed;SCDs in place;Call light within reach    ASSESSMENT     Patient is a 64year old female admitted 5/24/2017 for generalized weakness.    In this PT evaluation, the patient prese Occupational Therapy Notes (last 72 hours) (Notes from 5/24/2017 11:29 AM through 5/27/2017 11:29 AM)      Occupational Therapy Note by Eli Garcia OT at 5/25/2017  9:39 AM  Version 1 of 1    Author:  Eli Garcia OT Service:  (none) Author Ty

## (undated) NOTE — IP AVS SNAPSHOT
[address]            (For Outpatient Use Only) Initial Admit Date: 5/3/2021   Inpt/Obs Admit Date: Inpt: N/A / Obs: 05/03/21   Discharge Date:    Melva Copier:  [de-identified]   MRN: [de-identified]   CSN: [id number]   CEID: [id number] Number:  Insurance Type:    Subscriber Name:  Subscriber :    Subscriber ID:  Pt Rel to Subscriber:    Hospital Account Financial Class: Medicare    May 6, 2021

## (undated) NOTE — IP AVS SNAPSHOT
Patient Demographics     Address  [address] [place] [address] Phone  [phone] [facility]  [phone] [facility] E-mail Address  [email or web address]      Emergency Contact(s)     Name Relation Home Work [address] Daughter   [phone] o AQUACEL dressing is waterproof and does not require being covered before showering.   o Pat dressing and surrounding skin dry after shower                      AQUACEL    o MEDIPORE/COVERLET dressing is NOT waterproof and REQUIRES being covered with a juliocesar ? Watch for increased redness, warmth, any odor, increased drainage or opening of the incision. A little clear yellow or blood tinged drainage is normal up to 2 weeks after surgery but it should be less every day until it stops.   ? Call physician if you no ? Apply ice  or cold therapy to surgical site for 20 minutes at least four times a day, especially after therapy. Be sure there is a thin cloth barrier between skin and ice or cold therapy. ?  Change position at least every 45 minutes while awake to avoid ? Schedule outpatient physical therapy per your surgeon’s orders. ? Schedule one week follow up after surgery WITH PRIMARY CARE PHYSICIAN; review your medications over last 6 months.   Your body gets stressed by surgery and that stress can affect all your know that you had your hip replaced, as you will most likely set off the metal detector. The doctors no longer provide an identification card for this as they are easily copied.  ALSO request a wheelchair the first year to board and get off a plane…this aid Instructions Authorizing Provider Morning Afternoon Evening As Needed   acetaminophen 325 MG Tabs  Commonly known as:  TYLENOL      Take 2 tablets (650 mg total) by mouth every 4 (four) hours as needed.    Melquiades Rubi MD         Albuterol Sulfate Aida Arteaga MD         Memantine HCl 5 MG Tabs  Commonly known as:  NAMENDA  Next dose due: Tonight 9 pm      Take 5 mg by mouth 2 (two) times daily.           Pramipexole Dihydrochloride 0.125 MG Tabs  Commonly known as:  MIRAPEX  Next dose due: [id number] apixaban (ELIQUIS) tab 2.5 mg 08/06/19 0815 Given      [id number] aspirin EC tab 81 mg 08/06/19 0815 Given      [id number] atorvastatin (LIPITOR) tab 40 mg 08/05/19 2045 Given      [id number] docusate sodium (COLACE) cap 100 mg 08/05/19 2045 Given Anion Gap 9 0 - 18 mmol/L — [facility]   BUN 17 7 - 18 mg/dL [facility]   Creatinine 0.62 0.55 - 1.02 mg/dL [facility]   BUN/CREA Ratio 27.4 10.0 - 20.0 [facility]   Calcium, Total 8.4 8.5 - 10.1 mg/dL  [facility]   Calculated Osmolality 278 275 - 29 HEMATOCRIT[[id number]]                                                                    Please view results for these tests on the individual orders.     Specimen Information    Type Source Collected On   Blood — 08/04/19 0193            FOLATE, RBC [[id number] MEDICAL RECORD #:   [id number]       YOB: 1940  ADMISSION DATE:       07/31/2019    HISTORY AND PHYSICAL EXAMINATION    HISTORY OF PRESENT ILLNESS:  This 43-year-old female presents to [facility] emergency room with an injury to her he REVIEW OF SYSTEMS:  Negative. PHYSICAL EXAMINATION:    VITAL SIGNS:  The patient is 5 feet 5 inches and weighs 66 kg for a BMI of 24.21. Vital signs stable. Temperature 98.3, pulse 95, respiratory rate 18, blood pressure 130/57.   HEENT:  [facility] Day # 3 PCP Reena Carranza MD   Date of Admission:  7/31/2019  Date of Consult:  August 3, 2019   Referring Physician: Rogelio Babcock MD  History of Present Illness:    The patient is a [de-identified]year old female for whom a neurology consultation has been r • Peripheral vascular disease (HCC)    • Peripheral vascular disease, unspecified ([facility]    • Pneumonia, organism unspecified(486)    • PONV (postoperative nausea and vomiting)    • [facility]    • Seizure disorder ([facility]    • Unspecified essen HYDROmorphone HCl (DILAUDID) 1 MG/ML injection 0.1 mg 0.1 mg Intravenous Q4H PRN   traMADol HCl (ULTRAM) tab 25 mg 25 mg Oral Q6H PRN   Or      traMADol HCl (ULTRAM) tab 50 mg 50 mg Oral Q6H PRN   hydrALAzine HCl (APRESOLINE) injection 5 mg 5 mg Intravenou clonazePAM (KLONOPIN) tab 0.5 mg 0.5 mg Oral Nightly       Medications Prior to Admission:  gabapentin 300 MG Oral Cap Take 600 mg by mouth 3 (three) times daily. levETIRAcetam 250 MG Oral Tab Take 250 mg by mouth 2 (two) times daily.    traZODone HCl 50 Bees                    ANAPHYLAXIS  Carbapenems               Cephalosporins            Chocolate                 Cipro [Ciprofloxaci*        Comment:SEVERE RECTAL BLEEDING  Eggs Or Egg-Derived*      Iodine (Topical)        ANAPHYLAXIS    Comment:Severe v  (L) 08/03/2019    K 4.6 08/03/2019     08/03/2019    CO2 28.0 08/03/2019     (H) 08/03/2019    CA 8.9 08/03/2019    ALB 2.8 (L) 07/31/2019    ALKPHO 133 07/31/2019    TP 6.8 07/31/2019    AST 33 07/31/2019    ALT 25 07/31/2019    PTT 3 Xr Hip W Or Wo Pelvis 2 Or 3 Views, Right (cpt=73502)    Result Date: 7/31/2019  CONCLUSION:  Right femoral neck fracture, likely subcapital.  Continued follow-up is suggested.    Dictated by: Kitty Tsang MD on 7/31/2019 at 8:53     Approved by: Sanchez Guaman Filed:  8/6/2019 12:54 PM Date of Service:  8/6/2019 12:51 PM Status:  Signed    :  Payal Ramirez MD (Physician)                                                            General Medicine Discharge Summary     Patient ID:[[id number]]  Henok Martinez Now on RA  - CPAP overnight  - VQ low prob  - minimize pain meds, push IS, wean 02     4) CAD/PAF, pacemaker  - resumed BB, ASA, ACEi, statin     5) seizure disorder- keppra     6) asthma/copd- lung exam normal. Does not appear to be on regular inhalers.  A ferrous sulfate 325 (65 FE) MG Oral Tab EC  Take 325 mg by mouth daily with breakfast.    Donepezil HCl 10 MG Oral Tab  Take 10 mg by mouth nightly.     Albuterol Sulfate HFA (PROAIR HFA) 108 (90 Base) MCG/ACT Inhalation Aero Soln  Inhale 2 puffs into the l :  Chiquita Patterson PTA (Physical Therapy Assistant)        PHYSICAL THERAPY TREATMENT NOTE - INPATIENT    Room Number: 351/351-A     Session: 3  Number of Visits to Meet Established Goals: 3    Presenting Problem: S/p Right Cemented Bipolar hip • PERIPHERAL VASCULAR DISEASE    • Peripheral vascular disease (HCC)    • Peripheral vascular disease, unspecified (HCC)    • Pneumonia, organism unspecified(486)    • PONV (postoperative nausea and vomiting)    • Pulmonary embolism (HCC)    • Seizure diso Static Standing: Poor  Dynamic Standing: Poor -    ACTIVITY TOLERANCE                        O2 WALK                    AM-PAC '6-Clicks' INPATIENT SHORT FORM - BASIC MOBILITY  How much difficulty does the patient currently have. ..  -   Turning over in bed Patient End of Session: Up in chair;Needs met;Call light within reach;RN aware of session/findings; All patient questions and concerns addressed; Alarm set;SCDs in place    ASSESSMENT    Patient is a [de-identified]year old female admitted on 7/31/2019 for R cemented bi CY.1 Sofi Rodriguez, PTA on 8/6/2019 12:17 PM               Physical Therapy Note signed by Jennyfer Hood PT at 8/5/2019  2:10 PM  Version 1 of 1    Author:  Jennyfer Hood PT Service:  Rehab Author Type:  Physical Therapist    Filed:  8/5/20 • History of blood transfusion    • HYPERLIPIDEMIA    • HYPERTENSION    • Insomnia, unspecified    • MCI (mild cognitive impairment) 2013    stable on Aricept   • Neuropathy 1/31/2018   • Occlusion and stenosis of carotid artery without mention of cerebral WEIGHT BEARING RESTRICTION[NP.1]  Weight Bearing Restriction: R lower extremity        R Lower Extremity: Weight Bearing as Tolerated[NP.2]       PAIN ASSESSMENT[NP.1]   Rating: Unable to rate  Location: Apparent pain in left hip @ surgical site  Merian Friendly Pattern: R Decreased stance time  Stoop/Curb Assistance: Not tested  Comment : Above score is based on FIM definations[NP.2]    Skilled Therapy Provided: WILY Perez approved this session.  Patient was able to arouse with verbal commands, Unable to follow v strength, functional mobility, and transfers and would benefit from ELOISE upon DC in order to progress towards PLOF of household and community ambulation distances with walker and modA for 8 stairs to get into home.      DISCHARGE RECOMMENDATIONS[NP.1]  PT Raphael Lozano Presenting Problem: s/p R cemented bipolar hip replacement 8/2/19    Problem List  Principal Problem:    Closed fracture of right hip, initial encounter [facility])  Active Problems:    Anemia    Closed fracture of right hip (HCC)    Hypoxia    Atrial fibrillati • ESOPHAGOGASTRODUODENOSCOPY (EGD) N/A 10/28/2013    Performed by Marylu Zapata MD at [address] [facility] N/A 4/28/2013    Performed by Brittany Anderson MD at [address] [place] [address] ([facility])      [address] [place] [address] How much help from another person does the patient currently need. ..   -   Moving to and from a bed to a chair (including a wheelchair)?: Total   -   Need to walk in hospital room?: Total   -   Climbing 3-5 steps with a railing?: Total       AM-PAC Score: of session/findings; All patient questions and concerns addressed;SCDs in place; Ice applied; Alarm set    ASSESSMENT   Eliza Burns demonstrated improved arousal in session this date, but still lethargic.  Follows one-step commands inconsistently and responds with o Occupational Therapy Note signed by Magalie Blanca OT at 8/6/2019  2:23 PM  Version 1 of 1    Author:  Magalie Blanca OT Service:  Rehab Author Type:  Occupational Therapist    Filed:  8/6/2019  2:23 PM Date of Service:  8/6/2019  2:18 PM Status: • Insomnia, unspecified    • MCI (mild cognitive impairment) 2013    stable on Aricept   • Neuropathy 1/31/2018   • Occlusion and stenosis of carotid artery without mention of cerebral infarction     right CEA 2000   • Other and unspecified hyperlipidemia Management Techniques: Relaxation;Repositioning[MM. 2]     ACTIVITY TOLERANCE[MM.1]  Pulse: (!) 127  Heart Rate Source: Monitor[MM. 2]                  O2 SATURATIONS[MM. 1]  SPO2 on Room Air at Rest: 94[MM. 2]             ACTIVITIES OF DAILY LIVING ASSESSMENT Patient is below her baseline and deconditioned, required 2 person max A for tranfsers.  Recommend ELOISE at DC, elos at least 10-12 days[MM.1]    OT Discharge Recommendations: Sub-acute rehabilitation(elos 10-12 days)  OT Device Recommendations: TBD[MM.2] OCCUPATIONAL THERAPY TREATMENT NOTE - INPATIENT     Room Number: 351/351-A  Session:[MM.1] 2/6[MM.2]  Number of Visits to Meet Established Goals: 6    Presenting Problem: (R cemented bipolar hip replacement on 8/2/19)[MM.3]    History related to current ad • Peripheral vascular disease (HCC)    • Peripheral vascular disease, unspecified ([facility]    • Pneumonia, organism unspecified(486)    • PONV (postoperative nausea and vomiting)    • [facility]    • Seizure disorder ([facility]    • Unspecified essen ACTIVITIES OF DAILY LIVING ASSESSMENT  AM-PAC ‘6-Clicks’ Inpatient Daily Activity Short Form  How much help from another person does the patient currently need…[MM.1]  -   Putting on and taking off regular lower body clothing?: Total  -   Bathing (includin Patient presents with impaired cognition and  decreased alertness impacting participation in therapy. Patient had elevated blood pressure this date and  required medical management for this.  Patient demonstrates no recall of hip precautions and requires de Patient will transfer to bedside commode:  with min assist     UE Exercise Program Goal  Patient will be supervision with bilateral AROM HEP (home exercise program).     Additional Goals:  Pt will return verbalize hip precautions and incorporate during one CABG April 2013, depression, hearing impairment in bilateral ears, hypertension, visual impairment,  and seizure disorder.      Problem List  Principal Problem:    Closed fracture of right hip, initial encounter [facility])  Active Problems:    Anemia    Closed f • COLONOSCOPY N/A 10/28/2013    Performed by Bradly Herbert MD at [facility] ENDOSCOPY   • ESOPHAGOGASTRODUODENOSCOPY (EGD) N/A 10/28/2013    Performed by Bradly Herbert MD at [address] [place] [address] N/A 4/28/2013    Performed by Pat Covington Approx Degree of Impairment: 66.57%  Standardized Score (AM-PAC Scale): 30.6  CMS Modifier (G-Code): CL    FUNCTIONAL TRANSFER ASSESSMENT  Supine to Sit : Dependent assistance(max A x2)  Sit to Stand: Dependent assistance(mod A x2 using RW)    Skilled Ther of session/findings; All patient questions and concerns addressed;SCDs in place; Ice applied    ASSESSMENT   Patient seen for OT treatment session this AM. Patient continuing to demonstrated increased drowsiness but improved from evaluation yesterday.  Naren equipment PRN and while maintaining hip precautions  Patient will perform toileting: with min assist and with bedside commode     Functional Transfer Goals  Patient will transfer from supine to sit:  with min assist  Patient will transfer from sit to stand

## (undated) NOTE — IP AVS SNAPSHOT
[facility] [place] [address] [place], [address] ~ [phone]                Discharge Summary   4/24/2017    Niall Box           Admission Information        Provider Department    4/24/2017 Reena Cotton MD  3ne-A         Than CHANGE how you take these medications        Instructions Authorizing Provider    Morning Afternoon Evening As Needed    ClonazePAM 0.5 MG Tabs   Commonly known as:  Brandon Tejeda   What changed:  See the new instructions.         TA Last time this was given:  40 mg on 4/27/2017  8:00 PM   Commonly known as:  LIPITOR   Next dose due:  4/28/17 Evening          Take 1 tablet (40 mg total) by mouth once daily.     Mara Brumfield-APAP-Caffeine -39 Last time this was given:  500 mg on 4/28/2017  8:22 AM   Commonly known as:  KEPPRA   Next dose due:  4/28/17 Evening        Take 500 mg by mouth 2 (two) times daily.                                Pramipexole Dihydrochloride 0.125 MG Tabs   Last time this DIZZINESS (VERTIGO) AND BALANCE PROBLEMS: STAYING SAFE (ENGLISH)    SOFT TISSUE CONTUSION (ENGLISH)      Future Appointments     May 23, 2017  9:45 AM   FOLLOW UP with Noah Lynch MD   [facility] INTERNAL MEDICINE - LIZBETH Payne ( WAS 5.6 (04/28/17)  92 (04/28/17)  17 (04/28/17)  0.90 (04/28/17)  8.9 (04/26/17)  157 (H) (04/26/17)  20      Metabolic Lab Results  (Last result in the past 90 days)    ALT Bilirubin,Total Total Protein Albumin Sodium Potassium Chloride    (04/26/17)  22 (04 _____________________________________________________________________________    Medication Side Effects - Medications to be taken at home  As your caregivers, we want you to be aware of the medications you are prescribed to take and their potential SIDE E What to report to your healthcare team: Unusual bleeding, abdominal pain, dark, loose bowel movements           Blood Producing Medications     ferrous sulfate 325 (65 FE) MG Oral Tab EC       Use: Increase blood cell counts   Most common side effects: [employer] Pramipexole Dihydrochloride 0.125 MG Oral Tab       Use:  Treat conditions such as seizures, headaches, depression, anxiety and other neurologic conditions   Most common side effects:  Dizziness, drowsiness, headache, nausea/vomiting, somnolence   What to

## (undated) NOTE — IP AVS SNAPSHOT
Patient Demographics     Address  [address] [place] [address] Phone  [phone] [facility]  [phone] [facility] E-mail Address  [email or web address]      Emergency Contact(s)     Name Relation Home Work [address] Negro Daughter [phone] bedtime      INHALE 1 PUFF INTO THE LUNGS EVERY 12 HOURS   Enrique Agudelo MD         apixaban 2.5 MG Tabs  Commonly known as: ELIQUIS  Next dose due: 5/6 bedtime      Take 1 tablet (2.5 mg total) by mouth 2 (two) times daily.    Meng Abarca MD hydrALAzine HCl 25 MG Tabs  Commonly known as: APRESOLINE  Next dose due: 5/6 bedtime      Take 25 mg by mouth 3 (three) times daily.           ipratropium-albuterol 0.5-2.5 (3) MG/3ML Soln  Commonly known as: DUONEB  Next dose due: As needed  Notes to pat Take 1 tablet (50 mg total) by mouth 2 (two) times daily as needed for Pain.    Malgorzata Owens MD         traZODone HCl 100 MG Tabs  Commonly known as: DESYREL  Next dose due: As needed   Notes to patient: For sleep      Take 1 tablet (100 mg total) by [id number] Umeclidinium Bromide (INCRUSE ELLIPTA) 62.5 MCG/INH inhaler 1 puff 05/06/21 0853 Given      [id number] acetaminophen (TYLENOL) tab 650 mg 05/06/21 0902 Given      [id number] apixaban (ELIQUIS) tab 2.5 mg 05/05/21 2046 Given      [id number] apixaban [[id number]] Collected: 05/04/21 0108    Order Status: Completed Lab Status: Preliminary result Updated: 05/06/21 0200    Specimen: Blood,peripheral      Blood Culture Result No Growth 2 Days    Blood Culture FREQ X 2 [[id number]] Collected: 05/04/21 0108 LAD and SVG to OM     • Coronary atherosclerosis    • DEPRESSION    • Extrinsic asthma, unspecified    • GERD     EGD 2010   • Hearing impairment     no hearing aids   • HYPERLIPIDEMIA    • HYPERTENSION    • Insomnia, unspecified    • MCI (mild cognitive i BREATH  Pollen                    Quinolones                Shellfish               ANAPHYLAXIS     Sulfamethoxazole-TMP DS, 1 tablet, 2 times per day  Fluticasone Furoate-Vilanterol, 1 puff, Daily  apixaban, 2.5 mg, BID  atorvastatin, 40 mg, Nightly  carv 05/04/2021    ALB 3.4 05/03/2021    ALKPHO 100 05/03/2021    BILT 0.9 05/03/2021    TP 8.1 05/03/2021    AST 32 05/03/2021    ALT 26 05/03/2021    MG 2.1 05/04/2021       Radiology: CT BRAIN OR HEAD ([id number])    Result Date: 4/30/2021  PROCEDURE:  CT BRAIN O last night, was found in bed, pt on blood thinners asprin  TECHNIQUE:  Noncontrast CT scanning of the cervical spine is performed from the skull base through C7. Multiplanar reconstructions are generated. Dose reduction techniques were used.  Dose informa right hip and right lower back pain. FINDINGS:    BONES:  Preservation of lumbar vertebral body height. Minimal dextroscoliosis. Minimal anterolisthesis of L3 in relation to L4 and retrolisthesis of L4 in relation to L5.   There are degenerative change -PPI, wean as o/p if appropriate    # Dementia  - aricept, namenda    # Major Depression/ Generalized anxiety d/o   -reviewed home meds, continue, currently appears stable     # asthma/COPD  - incruse, breo    # Seizure hx?  - keppra    Dispo: med  -Lives not sure of the dates. History:[RAYMOND.1]  Past Medical History:   Diagnosis Date   • Anemia 2013    normal coloncopy, egd, capulse endoscopy   • Asthma    • ATRIAL FIBRILLATION 2005    s/p pacemaker 2005. on coumadin.    • Calculus of kidney    • Closed fract Mother    • Breast Cancer Mother 36        diagnosed 3 different times   • Other (Other) Mother    • Other (Other) Father         alzheimers   • Cancer Brother       reports that she quit smoking about 27 years ago. Her smoking use included cigarettes.  She puff, 1 puff, Inhalation, Daily  •  acetaminophen (TYLENOL) tab 650 mg, 650 mg, Oral, Q6H PRN  •  PEG 3350 (MIRALAX) powder packet 17 g, 17 g, Oral, Daily PRN  •  magnesium hydroxide (MILK OF MAGNESIA) 400 MG/5ML suspension 30 mL, 30 mL, Oral, Daily PRN  • (six) hours as needed for Pain., Disp: , Rfl:   Memantine HCl (NAMENDA) 10 MG Oral Tab, Take 1 tablet (10 mg total) by mouth 2 (two) times daily. , Disp: 180 tablet, Rfl: 3  atorvastatin 40 MG Oral Tab, Take 1 tablet (40 mg total) by mouth daily. , Disp: 90 Physical Exam:    General: No acute distress. Alert and oriented x 3.   Vital signs:[RAYMOND.1] Temp:  [97.5 °F (36.4 °C)-98.4 °F (36.9 °C)] 98.4 °F (36.9 °C)  Pulse:  [] 96  Resp:  [16-25] 20  BP: (104-173)/(67-99) 135/76[RAYMOND.2]  HEENT: Moist mucous [facility] ([facility] Dementia with behavioral disturbance, unspecified dementia type (HCC)     Urinary tract infection without hematuria     Urinary tract infection without hematuria, site unspecified     Weakness generalized[RAYMOND.2]      ASSESSMENT/PLAN:  1. UTI/symptoms of odo Risk of readmission after discharge from the hospital.    Discharge Condition: stable    Disposition: subacute rehab    Important Follow up:    Elizabeth Pfeiffer MD  [facility] [place] [address] [phone]    Schedule an appoint Aepb  Generic drug: fluticasone-salmeterol      INHALE 1 PUFF INTO THE LUNGS EVERY 12 HOURS   Quantity: 180 each  Refills: 0     apixaban 2.5 MG Tabs  Commonly known as: ELIQUIS      Take 1 tablet (2.5 mg total) by mouth 2 (two) times daily.    Quantity: 60 mL by nebulization every 4 (four) hours as needed.  Dx code: J44.9 please run under Medicare Part B   Quantity: 90 mL  Refills: 0     levETIRAcetam 250 MG Tabs  Commonly known as: KEPPRA      TAKE 1 TABLET(250 MG) BY MOUTH TWICE DAILY   Quantity: 180 tablet [facility], [phone], [phone]  [address], [address] [place] [address]    Phone: [phone]   · Sulfamethoxazole-TMP -160 MG Tabs per tablet[DO.2]             Activity:[DO.1] activity as tolerated[DO. 2]  Diet:[DO.1] regular diet[DO. Problems:    Urinary tract infection without hematuria    Weakness generalized      Past Medical History  Past Medical History:   Diagnosis Date   • Anemia 2013    normal coloncopy, egd, capulse endoscopy   • Asthma    • ATRIAL FIBRILLATION 2005    s/p pac \"That got the blood flowing! \"     Patient’s self-stated goal is not stated     OBJECTIVE  Precautions: Seizure;Bed/chair alarm    WEIGHT BEARING RESTRICTION  Weight Bearing Restriction: None                PAIN ASSESSMENT   Rating: Unable to rate  Locati pt t/f to [facility] chair c CGA and RW. Pt wheeled to hallway for gait training. Pt gait trained c RW min A progressing to mod A as pt fatigues, chair follow provided.   Pt can be impulsive with sitting d/t fatigue and reports legs \"feel wobbly\"  Pt wheeled b Walter Craig, LEODAN on 5/6/2021  9:39 AM               Physical Therapy Note signed by Leslye Albert PT at 5/4/2021  2:31 PM  Version 1 of 1    Author: Leslye Albert PT Service: Rehab Author Type: Physical Therapist    Filed: 5/4/2021  2:31 PM Date unspecified ([facility]    • Pulmonary embolism ([facility]    • SAH (subarachnoid hemorrhage) ([place] [address].) 11/22/2017   • Seizure disorder ([facility]    • Unspecified essential hypertension    • Unspecified sleep apnea SPLIT 9-25-13    AHI 55 RDI 60  Sao2 giovana 88% CPAP 12 [facility] disoriented to place and disoriented to time  · Memory:  decreased recall of recent events and decreased short term memory  · Following Commands:  follows one step commands with increased time and follows one step commands with repetition  · Initiation: cu Assistive Device: Rolling walker  Pattern: Shuffle (impulsive)          Skilled Therapy Provided: Pt received supine in bed and is agreeable to therapy. Pt frustrated with incontinence and initially requests new brief.  Brief noted to be clean but encourage asthma, COPD, HTN, fall with R hip fracture July 2019, PAF, CHD, seizure disorder.  In this PT evaluation, the patient presents with the following impairments abd pain, balance impairments, poor safety awareness, decreased activity tolerance, and impulsivit 5/4/2021  5:54 PM  Version 1 of 1    Author: Debi Ruiz OT Service: Rehab Author Type: Occupational Therapist    Filed: 5/4/2021  5:54 PM Date of Service: 5/4/2021  1:30 PM Status: Signed    : Debi Ruiz OT (Occupational Therapist (subarachnoid hemorrhage) ([facility] 11/22/2017   • Seizure disorder [facility])    • Unspecified essential hypertension    • Unspecified sleep apnea SPLIT 9-25-13    AHI 55 RDI 60  Sao2 giovana 88% CPAP 12 [facility]   • Visual impairment[KK. 2]        Past Surgical Histo Cognitive Status:  WFL - within functional limits    VISION  Current Vision: no visual deficits    PERCEPTION  Overall Perception Status:   WFL - within functional limits    SENSATION  Light touch:  intact    Communication: Pt is able to communicate all ba for weight shifting    Therex  (UB, LB, Core)  (PROM, AAROM, AROM, SROM)    ADL  LB Dress: Mod A at EOB for socks  Toileting: Mod A at commode with t/f and jesus-care and clothing management    Pt left supine in bed with alarm on.   Pt educated on [address] training;IADL training;Continued evaluation; Compensatory technique education;Equipment eval/education;Patient/Family training;Patient/Family education; Endurance training;UE strengthening/ROM; Functional transfer training  Rehab Potential : Good  Frequency ( Goals        Problem: Patient/Family Goals    Goal Priority Disciplines Outcome Interventions   Patient/Family Long Term Goal     Interdisciplinary     Description: Patient's Long Term Goal: ***    Interventions:  - ***  - See additional Care Plan goals fo

## (undated) NOTE — IP AVS SNAPSHOT
[facility] [place]  [address] [place], [address] ~ [phone]                Discharge Summary   5/24/2017    [facility] MICHAEL           Admission Information        Provider Department    5/24/2017 DO Viktor Prather Instructions Authorizing Provider    Morning Afternoon Evening As Needed    ClonazePAM 0.5 MG Tabs   Commonly known as:  Juan Berry   What changed:    - how much to take  - how to take this  - when to take this  - additional instructions        TAKE 1 TABLE Take 1 tablet (40 mg total) by mouth once daily. EmmieDayton quach                           Butalbital-APAP-Caffeine -40 MG Tabs   Commonly known as:  FIORICET, ESGIC        Take 1 tablet by mouth daily as needed for Pain.     Lonjeannette Quechee Take 120 mg by mouth 2 (two) times daily. Sertraline HCl 100 MG Tabs   Last time this was given:  100 mg on 5/27/2017 10:17 AM   Commonly known as:  ZOLOFT        Take 100 mg by mouth 2 (two) times daily. [address] [facility] [address] [phone]            Jun 07, 2017  3:45 PM   Hospital/SNF F/U with Noah Lynch MD   [facility] INTERNAL MEDICINE [address] ([address])    [address] [place] [phone] (05/24/17)  21      Metabolic Lab Results  (Last result in the past 90 days)    ALT Bilirubin,Total Total Protein Albumin Sodium Potassium Chloride    (05/24/17)  18 (05/24/17)  0.5 (05/24/17)  5.8 (L) (05/24/17)  2.6 (L) (05/27/17)  143 (05/27/17)  3. Call [phone] for help. [facility] is NOT to be used for urgent needs.   For medical emergencies, dial 911.             _____________________________________________________________________________    Medication Side Effects - Medications to be taken at Warfarin Sodium 5 MG Oral Tab       Use: Prevent the development or progression of blood clots   Most common side effects: Abnormal bleeding   What to report to your healthcare team: Bruising, blood in urine or stool, or nosebleeds             Salicylates Most common side effects: Headache, nasal irritation, palpitations, increased heart rate, increased blood pressure, allergic reaction, yeast infection of the mouth/tongue   What to report to your healthcare provider: Head or mouth pain, coating on mouth/to

## (undated) NOTE — IP AVS SNAPSHOT
[address]            (For Outpatient Use Only) Initial Admit Date: 11/13/2021   Inpt/Obs Admit Date: Inpt: 11/13/21 / Obs: N/A   Discharge Date:    Hospital Acct:  [de-identified]   MRN: [de-identified]   CSN: [id number]   CEID: [id number] :    Subscriber ID:  Pt Rel to Subscriber:    Hospital Account Financial Class: Medicare    2021

## (undated) NOTE — IP AVS SNAPSHOT
Patient Demographics     Address Phone E-mail Address    [address] [phone] [facility])  [phone] [facility] [email or web address]      Emergency Contact(s)     Name Relation Home Work [facility] [address] [facility] Commonly known as:  PROAIR HFA        Inhale 2 puffs into the lungs every 6 (six) hours as needed.     Veronica Locker                           atorvastatin 40 MG Tabs   Last time this was given:  40 mg on 4/27/2017  8:00 PM   Commonly known as:  LIPITOR   N Estevan Glaser                           gabapentin 300 MG Caps   Last time this was given:  600 mg on 4/28/2017  2:58 PM   Commonly known as:  NEURONTIN   Next dose due:  4/28/17 Evening          Take 600 mg by mouth 3 (three) times daily. Last time this was given:  7.5 mg on 4/27/2017  8:01 PM   Commonly known as:  COUMADIN   Next dose due:  4/29/17 Evening. Notes to Patient:  Take Mon, Tues, Thurs, Fri, Sat. Take 5 mg by mouth As Directed.  Monday, Tuesday, Thursday, Friday, Satu [id number] Warfarin Sodium (COUMADIN) tab 7.5 mg 04/27/17 2001 Given      [id number] aspirin EC tab 81 mg 04/28/17 0822 Given      [id number] famoTIDine (PEPCID) tab 20 mg 04/27/17 2000 Given      [id number] famoTIDine (PEPCID) tab 20 mg 04/28/17 9248 Given Components       Value Reference Range Flag Lab   Glucose 92 70-99 mg/dL  [facility]   BUN 17 8-20 mg/dL  [facility]   Creatinine 0.90 0.55-1.02 mg/dL  [facility]   GFR 61 >=60   [facility]   Comment:           Estimated GFR units: mL/min/1.73 square m Urine Culture No Growth 1 Day          H&P - H&P Note      H&P by Sanjiv Miller MD at 4/25/2017 12:40 PM  Version 2 of 2    Author:  Sanjiv Miller MD Service:  (none) Author Type:  Physician    Filed:  4/25/2017  5:28 PM Note Time:  4/25/20 • PERIPHERAL VASCULAR DISEASE    • Pneumonia, organism unspecified    • Anemia 2013     normal coloncopy, egd, capulse endoscopy   • Visual impairment    • Depression    • Arrhythmia      A-Fib diagnosed 2005   • Hearing impairment      no hearing aids   • Smoking status: Former Smoker  1.00 Packs/Day  For 30.00 Years     Types: Cigarettes    Quit date: 05/16/1993    Smokeless tobacco: Never Used    Comment: pt quit 13-14 years ago.     Alcohol Use: No        Fam Hx  Family History   Problem Relation Age of arthropathy or acute abnormality. SOFT TISSUES:  Negative. No visible soft tissue swelling. EFFUSION:  None visible. OTHER:  Negative. 4/24/2017  CONCLUSION:  No acute bony injury to the right wrist.    Dictated by:  Angelica Garcia MD on 4/24/2017 at 18: and vertebral arteries. B-mode 2-dimensional images of the vascular structures, Doppler spectral analysis, and color flow Doppler imaging were  performed.   Stenosis measurements obtained in this exam were performed using Consensus Panel categories and RAMAN been falling more often lately. FINDINGS:  The heart and mediastinal contours are stable. There is no acute airspace disease. There is left-sided dual-lead cardiac pacemaker. 4/24/2017  CONCLUSION:  No acute cortical motor process.     Dictated by: Author:  Abigail Worthington MD Service:  (none) Author Type:  Physician    Filed:  4/25/2017  5:24 PM Note Time:  4/25/2017 12:40 PM Status:  Signed    :   Abigail Worthington MD (Physician)      Related Notes: Addendum by Abigail Worthington MD • Visual impairment    • Depression    • Arrhythmia      A-Fib diagnosed 2005   • Hearing impairment      no hearing aids   • MCI (mild cognitive impairment) 2013     stable on Aricept   • PONV (postoperative nausea and vomiting)           Past Surgical Hi Comment: pt quit 13-14 years ago.     Alcohol Use: No        Fam Hx  Family History   Problem Relation Age of Onset   • Cancer Mother      breast ca   • Heart Disorder Mother    • Traci Lunch Mother    • Breast Cancer Mother 36     diagnosed 3 dif 4/24/2017  CONCLUSION:  No acute bony injury to the right wrist.    Dictated by: Michelle Nguyen MD on 4/24/2017 at 18:05     Approved by:  Michelle Nguyen MD            Ct Brain Or Head ([id number])    4/24/2017  PROCEDURE:  CT BRAIN OR HEAD ([id number])  COMPARISON:  BRADEN performed. Stenosis measurements obtained in this exam were performed using Consensus Panel categories and NASCET criteria.   FINDINGS:   IMAGE FINDINGS -plaque involves the mid common carotid, carotid bulbs, and proximal internal carotid arteries bilater left-sided dual-lead cardiac pacemaker. 4/24/2017  CONCLUSION:  No acute cortical motor process. Dictated by: Elvin Nunez MD on 4/24/2017 at 18:04     Approved by:  Elvin Nunez MD                 ASSESSMENT / PLAN:    64year old female with PMH sig :  Mati Au MD (Physician)           [facility]    PATIENT'S NAME: Jayson Gross   ATTENDING PHYSICIAN: Darby Darling. Jaxon Neil M.D.   [facility]anner[facility] Givens: Shabbir Alvarez M.D.    PATIENT ACCOUNT#:   [de-identified]    LOCATION:  [facility] states she does not wish to be in the hospital and she wants to go home; however, she understands that she needs to stay at least overnight for evaluation of these multiple falls.       PAST MEDICAL HISTORY:  Peripheral vascular disease, carotid artery sten give less resistance proximally on her right than she can on her left side, and dorsiflexion is also weaker on her right than it is on her left. The patient states this is chronic and not new. Gait was deferred. Sensation appears equal bilaterally.   Aga could contribute to her falls, it does not seem that it really has made much of a difference, and her worsening falls are in the last day or two. Would continue this dose for now. Could modify in the future if necessary.   We will, though, get Odette Henley • HYPERLIPIDEMIA    • HYPERTENSION    • GERD      EGD 2010   • CORONARY ARTERY DISEASE      .  CABG 4/28/13 due to [place] disease with LIMA to LAD and SVG to OM     • HIGH BLOOD PRESSURE    • Esophageal reflux    • HIGH CHOLESTEROL    • Unspecified sleep ap BALANCE                                                                                                                     Static Sitting: Good  Dynamic Sitting: Fair           Static Standing: Poor +  Dynamic Standing: Poor +    ACTIVITY TOLERANCE  O2 Sa understanding. Pt left in chair, alarm set.      THERAPEUTIC EXERCISES  Lower Extremity Ankle pumps  side stepping, sit to stand     Upper Extremity      Position Sitting & Standing     Repetitions   2-20   Sets   1     Patient End of Session: Up in chair;N Version 1 of 1    Author:  Kane Glynn PTA Service:  (none) Author Type:  Physical Therapy Assistant    Filed:  4/28/2017  9:29 AM Note Time:  4/28/2017  9:28 AM Status:  Signed    :  Kane Glynn PTA (Physical Therapy Assistant) Active Problems:    Contusion of right wrist, initial encounter    Coagulopathy [facility])      Past Medical History  Past Medical History   Diagnosis Date   • Asthma    • Peripheral vascular disease, unspecified ([facility]    • Occlusion and stenosis of carotid art Type of Home: House   Home Layout: Two level  Stairs to Enter : 4     Stairs to Bedroom: 5  Railing: Yes    Lives With: Spouse;Daughter (and SHELLY)  Drives: No  Patient Owned Equipment:  (rollator)  Patient Regularly Uses: Reading glasses    Prior Level of I Modified Davidson Balance Scale: 6/28        Other Test(s): short blessed test 6            NEUROLOGICAL FINDINGS  Neurological Findings: Coordination - Heel to Miller;Sensation     Coordination - Heel to Shin: Symmetrical     Sensation: intact       ACTIVITY TO amb with rw 150' with min a with unsteady, ataxic gait pattern, uneven step length and inconsistent balance. Pt up in bs chair, chair alarm activated, pct and rn aware. Performed Short Blessed Test, pt having difficulty with recall and naming months.     E Number of Visits to Meet Established Goals: 5      CURRENT GOALS    Goal #1 Patient is able to demonstrate supine - sit EOB @ level: independent     Goal #2 Patient is able to demonstrate transfers Sit to/from Stand at assistance level: supervision     [facility] • Occlusion and stenosis of carotid artery without mention of cerebral infarction      right CEA 2000   • Insomnia, unspecified    • ATRIAL FIBRILLATION      s/p pacemaker 2005. on coumadin.    • DEPRESSION    • HYPERLIPIDEMIA    • HYPERTENSION    • GERD Prior Level of [place]: pt appears somewhat confused and slow to respond at times, reports she amb with rollator at home, only amb on stairs with family present. Pt lives with spouse, dtr, cody, and reports her spouse is always with her.       [address] ACTIVITY TOLERANCE  O2 Saturation: 92-95%  Room air  No shortness of breath    AM-PAC '6-Clicks' INPATIENT SHORT FORM - BASIC MOBILITY  How much difficulty does the patient currently have. ..  -   Turning over in bed (including adjusting bedclothes, sheets Functional activity tolerated  Gait training  Posture  Transfer training    Patient End of Session: Up in chair;Needs met;Call light within reach;RN aware of session/findings; All patient questions and concerns addressed; Alarm set; Discussed recommendations Goal #3 Patient is able to ambulate 150 feet with assist device: walker - rolling at assistance level: supervision     Goal #4 Score improvement Modified Davidson balance to at least 12/28   Goal #5    Goal #6    Goal Comments: Goals established on 4/26/2017 • Esophageal reflux    • HIGH CHOLESTEROL    • Unspecified sleep apnea    • Extrinsic asthma, unspecified    • COPD    • Unspecified sleep apnea SPLIT 9-25-13     AHI 55 RDI 60  Sao2 giovana 88% CPAP 12 [facility]   • Unspecified essential hypertension    • Oth OBJECTIVE  Precautions: Bed/chair alarm  Fall Risk: High fall risk    WEIGHT BEARING RESTRICTION  Weight Bearing Restriction: None                PAIN ASSESSMENT  Rating: Unable to rate  Location: generalized  Management Techniques: Repositioning; Activity How much help from another person does the patient currently need. ..   -   Moving to and from a bed to a chair (including a wheelchair)?: A Little   -   Need to walk in hospital room?: A Little   -   Climbing 3-5 steps with a railing?: A Lot       AM-PAC S outside of THE [facility].    In this PT evaluation, the patient presents with the following impairments: dec strength, dec balance with score of 6/28 on modified Davidson balance scale, a score of 23 and below indicates increased risk for falls, pt also with dec safet Author:  Chad Quintana OT Service:  (none) Author Type:  Occupational Therapist    Filed:  4/27/2017 12:05 PM Note Time:  4/27/2017 12:02 PM Status:  Signed    :  Chad Quintana OT (Occupational Therapist)           IP OT attempt to see pat

## (undated) NOTE — IP AVS SNAPSHOT
[address]            (For Outpatient Use Only) Initial Admit Date: 2023   Inpt/Obs Admit Date: Inpt: 23 / Obs: N/A   Discharge Date:    Alejandra Sherwoodyer:  [de-identified]   MRN: [de-identified]   CSN: [id number]   CEID: [id number]        ENCOUNTER  Patient Class: Inpatient Admitting Provider: Bradley Matson MD Unit: [address] Service: Medical Attending Provider: India Ayala DO   Bed: 503-A   Visit Type:   Referring Physician: No ref. provider found Billing Flag:    Admit Diagnosis: Hospice care [Z51.5]      PATIENT  Legal Name:   Tanika Ceballos   Legal Sex: Female  Gender ID:              Pref Name:    PCP:  Cristian Joe MD Home: [phone]   Address:  [address] [place] [address] : 8/10/1940 (82 yrs) Mobile: [phone]         City/State/Zip: [place] [address] Marital:  Language: [address] [place] [address] Drive: [employer] SSN4: [id number] Rastafarian: [address] [address] Not L*     Race: White Ethnicity: Non  Or [address] CONTACT   Name Relationship Legal Guardian? Home Phone Work Phone Mobile Phone   1. Adriana Keita  2.  Victoriano Lombardo Daughter  Son    [phone]     GUARANTOR  Guarantor: Kalli Sultana : 8/10/1940 Home Phone: [phone]   Address: [address] [place] [address]  Sex: Female Work Phone:    City/[facility]/Zip: [address]   Rel. to Patient: Self Guarantor ID: [id number]   [address]   Employer:  Status: RETIRED     COVERAGE  PRIMARY INSURANCE   Payor: MEDICARE Plan: MEDICARE PART A&B   Group Number:  Insurance Type: INDEMNITY   Subscriber Name: [place]ph [place]es : 08/10/1940   Subscriber ID: [id number] Pt Rel to Subscriber: Self   SECONDARY INSURANCE   Payor:  Plan:    Group Number:  Insurance Type:    Subscriber Name:  Subscriber :    Subscriber ID:  Pt Rel to Subscriber:    TERTIARY INSURANCE   Payor:  Plan:    Group Number:  Insurance Type:    Subscriber Name:  Subscriber : Subscriber ID:  Pt Rel to Subscriber:    Hospital Account Financial Class: Medicare    February 10, 2023

## (undated) NOTE — IP AVS SNAPSHOT
[address]            (For Outpatient Use Only) Initial Admit Date: 1/1/2022   Inpt/Obs Admit Date: Inpt: N/A / Obs: 01/01/22   Discharge Date:    Hospital Acct:  [de-identified]   MRN: [de-identified]   CSN: [id number]   CEID: [id number] Subscriber :    Subscriber ID:  Pt Rel to Subscriber:    Hospital Account Financial Class: Medicare    January 3, 2022

## (undated) NOTE — IP AVS SNAPSHOT
[address]            (For Outpatient Use Only) Initial Admit Date: 3/19/2018   Inpt/Obs Admit Date: Inpt: 3/20/18 / Obs: N/A   Discharge Date:    Florian Morales:  [de-identified]   MRN: [de-identified]   CSN: [id number]        ENCOUNTER  Patient Subscriber ID:  Pt Rel to Subscriber:    Hospital Account Financial Class: Medicare    March 23, 2018

## (undated) NOTE — IP AVS SNAPSHOT
[address]            (For Outpatient Use Only) Initial Admit Date: 10/24/2022   Inpt/Obs Admit Date: Inpt: 10/24/22 / Obs: N/A   Discharge Date:    Jeri Poles:  [de-identified]   MRN: [de-identified]   CSN: [id number]   CEID: [id number]        ENCOUNTER  Patient Class: Inpatient Admitting Provider: Shoaib Baron MD Unit: [address] [place] [address] Service: Cardiac Telemetry Attending Provider: Shoaib Baron MD   Bed: 7186-C   Visit Type:   Referring Physician: No ref. provider found Billing Flag:    Admit Diagnosis: Nosocomial pneumonia [J18.9, F82]      PATIENT  Legal Name:   Denia Weller   Legal Sex: Female  Gender ID:              [address] [facility] Name:    PCP:  Naima Hawk MD Home: [phone]   Address:  [address] [place] [address] : 8/10/1940 (82 yrs) Mobile: [phone]         City/[facility]/Zip: [place] [address] Marital:  Language: Christopher Goon: Johnie SSN4: [id number] Zoroastrianism: [address] Not L*     Race: White Ethnicity: Non  Or [address] [place] [address]   Name Relationship Legal Guardian? Home Phone Work Phone Mobile Phone   1. Adriana Keita  2.  Meng Graham Daughter  Son    [phone]     GUARANTOR  Guarantor: Jessi Donaldson : 8/10/1940 Home Phone: [phone]   Address: [address] [place] [address]  Sex: Female Work Phone:    City/State/Zip: [place], [address] [place] [address]   Rel. to Patient: Self Guarantor ID: [id number]   [address]   Employer:  Status: RETIRED     COVERAGE  PRIMARY INSURANCE   Payor: MEDICARE Plan: MEDICARE PART A&B   Group Number:  Insurance Type: INDEMNITY   Subscriber Name: Christine Castanon : 08/10/1940   Subscriber ID: [id number] Pt Rel to Subscriber: Self   SECONDARY INSURANCE   Payor:  Plan:    Group Number:  Insurance Type:    Subscriber Name:  Subscriber :    Subscriber ID:  Pt Rel to Subscriber:    TERTIARY INSURANCE   Payor:  Plan:    Group Number:  Insurance Type:    Subscriber Name:  Keith Rosado :    Subscriber ID:  Pt Rel to Subscriber:    Hospital Account Financial Class: Medicare    2022

## (undated) NOTE — ED AVS SNAPSHOT
Sara Jimenez   MRN: [id number]    Department:  [facility] Emergency Department   Date of Visit:  6/1/2018           Disclosure     Insurance plans vary and the physician(s) referred by the ER may not be covered by your plan.  Please contact your i tell this physician (or your personal doctor if your instructions are to return to your personal doctor) about any new or lasting problems. The primary care or specialist physician will see patients referred from the [facility] Emergency Department.  Nba Rivera

## (undated) NOTE — IP AVS SNAPSHOT
Patient Demographics     Address  [address] [place] [address] Phone  [phone] [facility]  [phone] [facility] E-mail Address  [email or web address]      Emergency Contact(s)     Name Relation Home Work Cosme Romero Daughter   [phone] Butalbital-APAP-Caffeine -40 MG Tabs  Commonly known as:  FIORICET      Take 1 tablet by mouth daily as needed for Headaches.    Eddie Mendoza MD         carvedilol 25 MG Tabs  Commonly known as:  COREG  Next dose due:  Saturday      Take 1 table Commonly known as:  PROTONIX  Next dose due:  Sunday      TAKE 1 TABLET(40 MG) BY MOUTH EVERY MORNING BEFORE BREAKFAST   Hemant Slater MD         Pramipexole Dihydrochloride 0.125 MG Tabs  Commonly known as:  MIRAPEX  Next dose due:  Saturday      GREGORY [id number] Memantine HCl (NAMENDA) tab 10 mg 07/18/20 0956 Given      [id number] Pantoprazole Sodium (PROTONIX) EC tab 40 mg 07/18/20 0530 Given      [id number] Pramipexole Dihydrochloride (MIRAPEX) tab 0.25 mg 07/17/20 2016 Given      [id number] Sertraline H Patient Weight  62.4 kg (137 lb 9.1 oz)      Lab Results Last 24 Hours    No matching results found     Microbiology Results (All)     Procedure Component Value Units Date/Time    Blood Culture FREQ X 2 [[id number]] Collected:  07/16/20 1654    Order Status Pending Labs     Order Current Status    BLOOD CULTURE Preliminary result    BLOOD CULTURE Preliminary result         H&P - H&P Note      H&P signed by Gayla Sigala MD at 7/15/2020  1:26 PM  Version 1 of 1    Author:  Gayla Sigala MD Service:  Robe Duval • Occlusion and stenosis of carotid artery without mention of cerebral infarction     right CEA 2000   • Other and unspecified hyperlipidemia    • PERIPHERAL VASCULAR DISEASE    • Peripheral vascular disease (HCC)    • Peripheral vascular disease, unspecif Comment:Difficulty breathing. Pt states she can have 2%             milk.   Pcn [Bicillin L-A]          Comment:SHORTNESS OF BREATH  Pollen                    Quinolones                Shellfish               ANAPHYLAXIS     Home Medications:  DONEPEZIL H ACETAMINOPHEN-CODEINE #3 300-30 MG Oral Tab, TAKE 1 TO 2 TABLETS BY MOUTH EVERY 4 HOURS, Disp: 40 tablet, Rfl: 0  hydrALAzine HCl 25 MG Oral Tab, Take 25 mg by mouth 3 (three) times daily. , Disp: , Rfl:   apixaban 2.5 MG Oral Tab, Take 1 tablet (2.5 mg tot Pulm: Lungs clear bilaterally, good inspiratory effort   CV:  nL S1/S2[SB.1], RRR[SB.2]  Abd: soft, NT/ND, no hepatomegaly, +BS  MSK: moving all extremities, no edema  Neuro: no focal deficits  Skin:[SB.1] + lesions mostly scabbed over on left hand[SB. 2] Finalized by: Krystal Manriquez MD on 7/14/2020 at 5:48 PM          Ct Spine Cervical (cpt=72125)    Result Date: 7/14/2020  PROCEDURE:  CT SPINE CERVICAL (CPT=72125)  COMPARISON:  LEIGH ANN HALEY, CT SPINE CERVICAL (CPT=72125), 8/06/2019, 10:42 PM.  INDICATIONS INDICATIONS:  fall/back pain  TECHNIQUE:  Noncontrast CT scanning is performed through the lumbar spine. Multiplanar reconstructions are generated. Dose reduction techniques were used.  Dose information is transmitted to the [facility] ([address],First [facility] (CPT=71045), 10/31/2019, 11:54 AM.  INDICATIONS:  fall/back pain  PATIENT STATED HISTORY: (As transcribed by Technologist)  Patient denies any chest complaints. She has a pacemaker from about 8 years ago. FINDINGS:  The cardiac shadow is enlarged.   No ev PHYSICIAN Certification of Need for Inpatient Hospitalization -[.7] Initial Certification[SB.2]    Patient will require inpatient services that will reasonably be expected to span two midnight's based on the clinical documentation in H+P.    Based on jimbo . CABG 4/28/13 due to [place] disease with LIMA to LAD and SVG to OM     • Coronary atherosclerosis    • DEPRESSION    • Depression    • Esophageal reflux    • Extrinsic asthma, unspecified    • GERD     EGD 2010   • Hearing impairment     no hearing aids • REMOVAL GALLBLADDER     • TOTAL ABDOM HYSTERECTOMY       Family History   Problem Relation Age of Onset   • Cancer Mother         breast ca   • Heart Disorder Mother    • Breast Cancer Mother 36        diagnosed 3 different times   • Other (Other) Mother •  lisinopril tab 20 mg, 20 mg, Oral, BID  •  Memantine HCl (NAMENDA) tab 10 mg, 10 mg, Oral, BID  •  Pantoprazole Sodium (PROTONIX) EC tab 40 mg, 40 mg, Oral, QAM AC  •  Pramipexole Dihydrochloride (MIRAPEX) tab 0.25 mg, 0.25 mg, Oral, Nightly  •  Sertral PANTOPRAZOLE SODIUM 40 MG Oral Tab EC, TAKE 1 TABLET(40 MG) BY MOUTH EVERY MORNING BEFORE BREAKFAST, Disp: 30 tablet, Rfl: 11  ADVAIR DISKUS 250-50 MCG/DOSE Inhalation Aerosol Powder, Breath Activated, INHALE 1 PUFF INTO THE LUNGS EVERY 12 HOURS, Disp: 3 e A comprehensive 10 point review of systems was completed. Pertinent positives and negatives noted in the the HPI. Physical Exam:    General: No acute distress.  Awake   Vital signs: Temp:  [97.9 °F (36.6 °C)-98.5 °F (36.9 °C)] 97.9 °F (36.6 °C)  Pulse S/P CABG (coronary artery bypass graft)     COPD (chronic obstructive pulmonary disease) (HCC)     sputum culture mold     Hyperglycemia     Nausea     Cervical spondylosis with myelopathy     Spondylolisthesis of lumbar region     Spinal stenosis of cate Aztreonam pending sensitivites[RAYMOND.2]        Sparkle Gallagher MD  [facility]  [phone][TH.6]    Electronically signed by Ramonita Lozada MD on 7/16/2020  4:24 PM   Attribution Key    RAYMOND. 1 - Ramonita Lozada MD on 7/16 Falls frequently    Urinary tract infection without hematuria, site unspecified    UTI (urinary tract infection)    General weakness      Past Medical History  Past Medical History:   Diagnosis Date   • Anemia 2013    normal coloncopy, egd, capulse endos Performed by Berny Patten MD at [facility] 134     • HIP TOTAL REPLACEMENT Right 8/2/2019    Performed by George Woodall MD at [address] [place] [address] ([facility])      2002 CABG x 2   • OTHER SURGICAL HISTORY NEUROLOGICAL FINDINGS                      ACTIVITY TOLERANCE                         O2 WALK                  AM-PAC '6-Clicks' INPATIENT SHORT FORM - BASIC MOBILITY  How much difficulty does the patient currently have. ..  -   Turning over in bed (includi session/findings; All patient questions and concerns addressed; In bed;SCDs in place; Alarm set    ASSESSMENT   Patient is a 78year old female admitted on 7/14/2020 for[EP.1] fall, UTI, metabolic encephalopathy, shingles LUE[EP.2].   Pertinent comorbidities a Stand[EP.2] at assistance level:[EP.1] CGA[EP.2]     Goal #3 Patient is able to ambulate[EP.1] 50[EP.2] feet with assist device:[EP.1] walker - rolling[EP.2] at assistance level:[EP.1] minimum assistance[EP.2]     Goal #4    Goal #5    Goal #6    Goal Comm Urinary tract infection without hematuria, site unspecified    UTI (urinary tract infection)    General weakness      Past Medical History  Past Medical History:   Diagnosis Date   • Anemia 2013    normal coloncopy, egd, capulse endoscopy   • Arrhythmia Performed by Berny Patten MD at [facility] 134     • HIP TOTAL REPLACEMENT Right 8/2/2019    Performed by George Woodall MD at [address] [place] [address] ([facility])      2002 CABG x 2   • OTHER SURGICAL HISTORY transfers. Reporting migraines. Cold packs for comfort. Pt was left with PT and RN. PPE worn by this OT: gown, surgical mask and gloves. Patient End of Session: In bed; With [facility] staff;Needs met;Call light within reach;RN aware of session/findings;A Filed:  7/15/2020  5:11 PM Date of Service:  7/15/2020  2:30 PM Status:  Signed    :  Vish Post OT (Occupational Therapist)       OCCUPATIONAL THERAPY EVALUATION - INPATIENT     Room Number: 5329/9368-F  Evaluation Date: 7/15/2020  Type o • Pneumonia, organism unspecified(486)    • PONV (postoperative nausea and vomiting)    • Pulmonary embolism (HCC)    • Seizure disorder ([facility]    • Unspecified essential hypertension    • Unspecified sleep apnea    • Unspecified sleep apnea SPLIT 9-25-13 mechanics; Relaxation;Breathing techniques;Repositioning    COGNITION  Overall Cognitive Status:  WFL - within functional limits    VISION  Current Vision: no visual deficits    PERCEPTION  Overall Perception Status:   WFL - within functional limits    SENS and min assist to stand and min assist to take steps, pt unable to complete more 2/2 to light sensitivity and HA. Patient End of Session: Up in chair;Needs met;Call light within reach; All patient questions and concerns addressed;SCDs in place; Alarm set Number of Visits to Meet Established Goals: 5    ADL Goals   Patient will perform upper body dressing:  with supervision  Patient will perform lower body dressing:  with supervision  Patient will perform toileting: with supervision    Functional Transfer G Interventions:  - ***  - See additional Care Plan goals for specific interventions   Patient/Family Short Term Goal     Interdisciplinary Adequate for Discharge    Description:  Patient's Short Term Goal: ***    Interventions:   - ***  - See additional Car

## (undated) NOTE — Clinical Note
Anticipated discharge from rehab is 8/1, to son's home, with residential home health.  Hyponatremia in rehab